# Patient Record
Sex: FEMALE | Race: WHITE | Employment: FULL TIME | URBAN - NONMETROPOLITAN AREA
[De-identification: names, ages, dates, MRNs, and addresses within clinical notes are randomized per-mention and may not be internally consistent; named-entity substitution may affect disease eponyms.]

---

## 2017-10-02 ENCOUNTER — HOSPITAL ENCOUNTER (EMERGENCY)
Age: 32
Discharge: HOME | End: 2017-10-02
Payer: COMMERCIAL

## 2017-10-02 VITALS
DIASTOLIC BLOOD PRESSURE: 79 MMHG | HEART RATE: 79 BPM | OXYGEN SATURATION: 95 % | SYSTOLIC BLOOD PRESSURE: 125 MMHG | TEMPERATURE: 98.6 F | RESPIRATION RATE: 16 BRPM | HEIGHT: 65 IN | BODY MASS INDEX: 38.32 KG/M2 | WEIGHT: 230 LBS

## 2017-10-02 DIAGNOSIS — J06.9 VIRAL URI WITH COUGH: ICD-10-CM

## 2017-10-02 DIAGNOSIS — J02.9 ACUTE PHARYNGITIS, UNSPECIFIED ETIOLOGY: Primary | ICD-10-CM

## 2017-10-02 PROCEDURE — 99213 OFFICE O/P EST LOW 20 MIN: CPT | Performed by: NURSE PRACTITIONER

## 2017-10-02 PROCEDURE — 99212 OFFICE O/P EST SF 10 MIN: CPT

## 2017-10-02 RX ORDER — BENZONATATE 200 MG/1
200 CAPSULE ORAL 3 TIMES DAILY PRN
Qty: 21 CAPSULE | Refills: 0 | Status: SHIPPED | OUTPATIENT
Start: 2017-10-02 | End: 2017-10-09

## 2017-10-02 RX ORDER — AZITHROMYCIN 250 MG/1
TABLET, FILM COATED ORAL
Qty: 6 TABLET | Refills: 0 | Status: SHIPPED | OUTPATIENT
Start: 2017-10-02 | End: 2018-01-11

## 2017-10-02 ASSESSMENT — ENCOUNTER SYMPTOMS
SORE THROAT: 1
SHORTNESS OF BREATH: 0
RHINORRHEA: 0
SINUS CONGESTION: 0
COUGH: 1
CHEST TIGHTNESS: 0
BACK PAIN: 0
VOICE CHANGE: 0
SINUS PRESSURE: 0
DIARRHEA: 0
TROUBLE SWALLOWING: 0
NAUSEA: 0
VOMITING: 0
ABDOMINAL PAIN: 0

## 2017-10-02 ASSESSMENT — PAIN DESCRIPTION - PAIN TYPE: TYPE: ACUTE PAIN

## 2017-10-02 ASSESSMENT — PAIN DESCRIPTION - LOCATION: LOCATION: THROAT

## 2017-10-02 ASSESSMENT — PAIN SCALES - GENERAL: PAINLEVEL_OUTOF10: 1

## 2017-10-02 NOTE — ED AVS SNAPSHOT
After Visit Summary  (Discharge Instructions)    Medication List for Home    Based on the information you provided to us as well as any changes during this visit, the following is your updated medication list.  Compare this with your prescription bottles at home. If you have any questions or concerns, contact your primary care physician's office. Daily Medication List (This medication list can be shared with any Healthcare provider who is helping you manage your medications)      There are NEW medications for you. START taking them after you leave the hospital     azithromycin 250 MG tablet   Commonly known as:  ZITHROMAX Z-ANDRES   2 tablets day 1 then1 tablet days 2 - 5.       benzonatate 200 MG capsule   Commonly known as:  TESSALON   Take 1 capsule by mouth 3 times daily as needed for Cough         These are medications you told us you were taking at home, CONTINUE taking them after you leave the hospital     ABILIFY 5 MG tablet   Generic drug:  ARIPiprazole   Take 5 mg by mouth daily       metoprolol tartrate 50 MG tablet   Commonly known as:  LOPRESSOR   Take 1 tablet by mouth 2 times daily       QUEtiapine 25 MG tablet   Commonly known as:  SEROQUEL   Take 25 mg by mouth as needed       WELLBUTRIN  MG extended release tablet   Generic drug:  buPROPion   Take 300 mg by mouth every morning            Where to Get Your Medications      You can get these medications from any pharmacy     Bring a paper prescription for each of these medications     azithromycin 250 MG tablet    benzonatate 200 MG capsule               Allergies as of 10/2/2017     No Known Allergies      Immunizations as of 10/2/2017     No immunizations on file. After Visit Summary    This summary was created for you. Thank you for entrusting your care to us.   The following information includes details about your hospital/visit stay along with steps you should take to help with your recovery once you leave the hospital.  In this packet, you will find information about the topics listed below:    · Instructions about your medications including a list of your home medications  · A summary of your hospital visit  · Follow-up appointments once you have left the hospital  · Your care plan at home      You may receive a survey regarding the care you received during your stay. Your input is valuable to us. We encourage you to complete and return your survey in the envelope provided. We hope you will choose us in the future for your healthcare needs. Patient Information     Patient Name SKY Patel Wooster 1985      Care Provided at:     Name Address Phone       [address] [phone]            Your Visit    Here you will find information about your visit, including the reason for your visit. Please take this sheet with you when you visit your doctor or other health care provider in the future. It will help determine the best possible medical care for you at that time. If you have any questions once you leave the hospital, please call the department phone number listed below. Diagnoses this visit     Your diagnoses were ACUTE PHARYNGITIS, UNSPECIFIED ETIOLOGY and VIRAL URI WITH COUGH. Visit Information     Date of Visit Department Dept Phone    10/2/2017 [facility] [phone]      You were seen by     You were seen by Marquis Jaye NP. Follow-up Appointments    Below is a list of your follow-up and future appointments. This may not be a complete list as you may have made appointments directly with providers that we are not aware of or your providers may have made some for you. Please call your providers to confirm appointments. It is important to keep your appointments.  Please bring your current insurance card, photo ID, co-pay, and all medication bottles to your appointment. If self-pay, payment is expected at the time of service. Follow-up Information     Follow up with Caryl Rinne, MD In 1 week. Specialty:  Family Medicine    Why:  For recheck and further evaluation and management, If symptoms worsen go to the Emergency Department    Contact information:    [address] [facility]  [phone]        Preventive Care        Date Due    HIV screening is recommended for all people regardless of risk factors  aged 15-65 years at least once (lifetime) who have never been HIV tested. 12/11/2002    Tetanus Combination Vaccine (1 - Tdap) 12/11/2006    Pneumococcal Vaccine - Pneumovax for adults aged 19-64 years with: chronic heart disease, chronic lung disease, diabetes mellitus, alcoholism, chronic liver disease, or cigarette smoking. (1 of 1 - PPSV23) 12/11/2006    Pap Smear 12/11/2008    Yearly Flu Vaccine (1) 9/1/2017                 Care Plan Once You Return Home    This section includes instructions you will need to follow once you leave the hospital.  Your care team will discuss these with you, so you and those caring for you know how to best care for your health needs at home. This section may also include educational information about certain health topics that may be of help to you. Important Information if you smoke or are exposed to smoking       SMOKING: QUIT SMOKING. THIS IS THE MOST IMPORTANT ACTION YOU CAN TAKE TO IMPROVE YOUR CURRENT AND FUTURE HEALTH. Call the [address] [place] [address] at [place] NOW ([phone])    Smoking harms nonsmokers. When nonsmokers are around people who smoke, they absorb nicotine, carbon monoxide, and other ingredients of tobacco smoke.      DO NOT SMOKE AROUND CHILDREN     Children exposed to secondhand smoke are at an increased risk of:  Sudden Infant Death Syndrome (SIDS), acute respiratory infections, inflammation of the middle ear, and severe asthma. Over a longer time, it causes heart disease and lung cancer. There is no safe level of exposure to secondhand smoke. Important information for a smoker       SMOKING: QUIT SMOKING. THIS IS THE MOST IMPORTANT ACTION YOU CAN TAKE TO IMPROVE YOUR CURRENT AND FUTURE HEALTH. Call the [address] [place] [address] [place] at [place] NOW ([phone])    Smoking harms nonsmokers. When nonsmokers are around people who smoke, they absorb nicotine, carbon monoxide, and other ingredients of tobacco smoke. DO NOT SMOKE AROUND CHILDREN     Children exposed to secondhand smoke are at an increased risk of:  Sudden Infant Death Syndrome (SIDS), acute respiratory infections, inflammation of the middle ear, and severe asthma. Over a longer time, it causes heart disease and lung cancer. There is no safe level of exposure to secondhand smoke. [employer] Signup     BoostUp allows you to send messages to your doctor, view your test results, renew your prescriptions, schedule appointments, view visit notes, and more. How Do I Sign Up? 1. In your Internet browser, go to [email or web address]  2. Click on the Sign Up Now link in the Sign In box. You will see the New Member Sign Up page. 3. Enter your BoostUp Access Code exactly as it appears below. You will not need to use this code after youve completed the sign-up process. If you do not sign up before the expiration date, you must request a new code. BoostUp Access Code: [email or web address]  Expires: 12/1/2017 10:25 AM    4. Enter your Social Security Number ([id number]) and Date of Birth (mm/dd/yyyy) as indicated and click Submit. You will be taken to the next sign-up page. 5. Create a BoostUp ID. This will be your BoostUp login ID and cannot be changed, so think of one that is secure and easy to remember. Date:____________Time:____________              Discharge Instructions            Sore Throat: Care Instructions  Your Care Instructions    Infection by bacteria or a virus causes most sore throats. Cigarette smoke, dry air, air pollution, allergies, and yelling can also cause a sore throat. Sore throats can be painful and annoying. Fortunately, most sore throats go away on their own. If you have a bacterial infection, your doctor may prescribe antibiotics. Follow-up care is a key part of your treatment and safety. Be sure to make and go to all appointments, and call your doctor if you are having problems. It's also a good idea to know your test results and keep a list of the medicines you take. How can you care for yourself at home? · If your doctor prescribed antibiotics, take them as directed. Do not stop taking them just because you feel better. You need to take the full course of antibiotics. · Gargle with warm salt water once an hour to help reduce swelling and relieve discomfort. Use 1 teaspoon of salt mixed in 1 cup of warm water. · Take an over-the-counter pain medicine, such as acetaminophen (Tylenol), ibuprofen (Advil, Motrin), or naproxen (Aleve). Read and follow all instructions on the label. · Be careful when taking over-the-counter cold or flu medicines and Tylenol at the same time. Many of these medicines have acetaminophen, which is Tylenol. Read the labels to make sure that you are not taking more than the recommended dose. Too much acetaminophen (Tylenol) can be harmful. · Drink plenty of fluids. Fluids may help soothe an irritated throat. Hot fluids, such as tea or soup, may help decrease throat pain. · Use over-the-counter throat lozenges to soothe pain. Regular cough drops or hard candy may also help. These should not be given to young children because of the risk of choking. · Do not smoke or allow others to smoke around you.  If you need help · You have a new symptom, such as a sore throat, an earache, or sinus pain. · You cough more deeply or more often, especially if you notice more mucus or a change in the color of your mucus. · You do not get better as expected. Where can you learn more? Go to [email or web address] org and sign in to your [employer] account. Enter R083 in the SocialBrowse box to learn more about \"Upper Respiratory Infection (Cold): Care Instructions. \"     If you do not have an account, please click on the \"Sign Up Now\" link. Current as of: March 25, 2017  Content Version: 11.3  © 6790-9447 SMS [employer], [employer]. Care instructions adapted under license by [facility] [address] If you have questions about a medical condition or this instruction, always ask your healthcare professional. [address] any warranty or liability for your use of this information.

## 2017-10-02 NOTE — ED PROVIDER NOTES
[facility]  Urgent Care Encounter       CHIEF COMPLAINT       Chief Complaint   Patient presents with    Pharyngitis       Nurses Notes reviewed and I agree except as noted in the HPI. HISTORY OF PRESENT ILLNESS   Jaelyn Morocho is a 36 y.o. female who presents To the urgent care center complaining of a sore throat and cough for the past month. Patient states she has been taking over-the-counter medication with no relief. Patient states that she has not had any fever, chills, vomiting or diarrhea but has been nauseated from being cough drops all day. Patient is a 36 y.o. female presenting with throat problem. The history is provided by the patient. Pharyngitis   Location:  Generalized  Quality:  Aching  Severity:  Mild  Onset quality:  Gradual  Duration:  1 month  Timing:  Constant  Progression:  Waxing and waning  Chronicity:  New  Relieved by:  Nothing  Ineffective treatments:  OTC medications  Associated symptoms: cough    Associated symptoms: no abdominal pain, no adenopathy, no chest pain, no chills, no ear discharge, no ear pain, no epistaxis, no fever, no headaches, no neck stiffness, no postnasal drip, no rash, no rhinorrhea, no shortness of breath, no sinus congestion, no trouble swallowing and no voice change    Cough:     Cough characteristics:  Non-productive    Sputum characteristics:  Nondescript    Severity:  Moderate    Onset quality:  Gradual    Duration:  2 weeks    Timing:  Intermittent    Progression:  Waxing and waning    Chronicity:  New  Risk factors: no exposure to strep        REVIEW OF SYSTEMS     Review of Systems   Constitutional: Negative for activity change, appetite change, chills, diaphoresis, fatigue and fever. HENT: Positive for sore throat. Negative for congestion, ear discharge, ear pain, nosebleeds, postnasal drip, rhinorrhea, sinus pressure, trouble swallowing and voice change. Respiratory: Positive for cough.  Negative for chest tightness

## 2017-10-02 NOTE — ED NOTES
Pt complains of having sore throat for one month. States it has gotten worse in the past week. States pain is currently 1/10, but it gets worse at night and can barely swallow.      Bard Saadia RN  10/02/17 1007

## 2018-01-11 ENCOUNTER — HOSPITAL ENCOUNTER (EMERGENCY)
Age: 33
Discharge: HOME | End: 2018-01-11
Attending: EMERGENCY MEDICINE
Payer: COMMERCIAL

## 2018-01-11 VITALS
TEMPERATURE: 98.7 F | HEART RATE: 76 BPM | OXYGEN SATURATION: 98 % | SYSTOLIC BLOOD PRESSURE: 170 MMHG | DIASTOLIC BLOOD PRESSURE: 92 MMHG | WEIGHT: 230 LBS | HEIGHT: 65 IN | RESPIRATION RATE: 16 BRPM | BODY MASS INDEX: 38.32 KG/M2

## 2018-01-11 DIAGNOSIS — R03.0 ELEVATED BLOOD PRESSURE READING WITHOUT DIAGNOSIS OF HYPERTENSION: ICD-10-CM

## 2018-01-11 DIAGNOSIS — F17.200 TOBACCO USE DISORDER: ICD-10-CM

## 2018-01-11 DIAGNOSIS — J03.90 ACUTE TONSILLITIS, UNSPECIFIED ETIOLOGY: Primary | ICD-10-CM

## 2018-01-11 DIAGNOSIS — H66.001 ACUTE SUPPURATIVE OTITIS MEDIA OF RIGHT EAR WITHOUT SPONTANEOUS RUPTURE OF TYMPANIC MEMBRANE, RECURRENCE NOT SPECIFIED: ICD-10-CM

## 2018-01-11 LAB
GROUP A STREP CULTURE, REFLEX: NEGATIVE
REFLEX THROAT C + S: NORMAL

## 2018-01-11 PROCEDURE — 87070 CULTURE OTHR SPECIMN AEROBIC: CPT

## 2018-01-11 PROCEDURE — 99213 OFFICE O/P EST LOW 20 MIN: CPT | Performed by: EMERGENCY MEDICINE

## 2018-01-11 PROCEDURE — 99213 OFFICE O/P EST LOW 20 MIN: CPT

## 2018-01-11 PROCEDURE — 87880 STREP A ASSAY W/OPTIC: CPT

## 2018-01-11 RX ORDER — PANTOPRAZOLE SODIUM 40 MG/1
40 TABLET, DELAYED RELEASE ORAL DAILY
COMMUNITY

## 2018-01-11 RX ORDER — CEFDINIR 300 MG/1
300 CAPSULE ORAL 2 TIMES DAILY
Qty: 14 CAPSULE | Refills: 0 | Status: SHIPPED | OUTPATIENT
Start: 2018-01-11 | End: 2018-01-18

## 2018-01-11 ASSESSMENT — ENCOUNTER SYMPTOMS
SHORTNESS OF BREATH: 0
FACIAL SWELLING: 0
BACK PAIN: 0
EYE PAIN: 0
CONSTIPATION: 0
WHEEZING: 0
VOICE CHANGE: 0
BLOOD IN STOOL: 0
TROUBLE SWALLOWING: 0
STRIDOR: 0
VOMITING: 0
COUGH: 0
DIARRHEA: 0
SORE THROAT: 1
SINUS PRESSURE: 0
ABDOMINAL PAIN: 0
NAUSEA: 0
CHOKING: 0
EYE REDNESS: 0
EYE DISCHARGE: 0

## 2018-01-11 ASSESSMENT — PAIN SCALES - GENERAL: PAINLEVEL_OUTOF10: 10

## 2018-01-11 ASSESSMENT — PAIN DESCRIPTION - LOCATION: LOCATION: THROAT

## 2018-01-11 ASSESSMENT — PAIN DESCRIPTION - PAIN TYPE: TYPE: ACUTE PAIN

## 2018-01-11 NOTE — ED TRIAGE NOTES
Yary Barclay arrives ambulatory by self to room with complaint of sore throat. Throat is red and tonsils are moderatllly swollenSymptoms started 3 weeks   ago.

## 2018-01-11 NOTE — LETTER
[address] [place] [address] Urgent Care  [address] [place], [place] [address] [address] [address]  Phone: [phone]               January 11, 2018    Patient: Silvia Snow   YOB: 1985   Date of Visit: 1/11/2018       To Whom It May Concern:    Yasmeen Corcoran was seen and treated in our emergency department on 1/11/2018. She may return to work on 1/15/18. No work January 11, January 13 and January 14, 2018.        Sincerely,       Will Adrian MD         Signature:__________________________________

## 2018-01-12 NOTE — ED PROVIDER NOTES
[facility]  Urgent Care Encounter      CHIEF COMPLAINT       Chief Complaint   Patient presents with    Pharyngitis       Nurses Notes reviewed and I agree except as noted in the HPI. HISTORY OF PRESENT ILLNESS   Latasha Bhatia is a 29 y.o. female who presents With three-week history of increasingly severe sore throat, ear pain, large erythematous tonsils, painful glands in the neck, congestion, dry cough, fatigue. She rates throat pain at 10 out of 10 severity. No chest pain, shortness of breath, fever, vomiting, abdominal pain, dizziness, syncope, rash,  symptoms. Has tonsils, no history of diabetes or asthma. Smokes cigarettes. REVIEW OF SYSTEMS     Review of Systems   Constitutional: Positive for appetite change. Negative for chills, fatigue, fever and unexpected weight change. HENT: Positive for ear pain, postnasal drip and sore throat. Negative for congestion, ear discharge, facial swelling, hearing loss, nosebleeds, sinus pressure, trouble swallowing and voice change. Eyes: Negative for pain, discharge, redness and visual disturbance. Respiratory: Negative for cough, choking, shortness of breath, wheezing and stridor. Cardiovascular: Negative for chest pain and leg swelling. Gastrointestinal: Negative for abdominal pain, blood in stool, constipation, diarrhea, nausea and vomiting. Genitourinary: Negative for dysuria, flank pain, frequency, hematuria, urgency, vaginal bleeding and vaginal discharge. Musculoskeletal: Negative for arthralgias, back pain, neck pain and neck stiffness. Skin: Negative for rash. Neurological: Negative for dizziness, seizures, syncope, weakness, light-headedness and headaches. Hematological: Positive for adenopathy. Does not bruise/bleed easily. Psychiatric/Behavioral: Negative for confusion, sleep disturbance and suicidal ideas. The patient is not nervous/anxious. All other systems reviewed and are negative.       PAST MEDICAL HISTORY         Diagnosis Date    Anxiety     Bipolar 1 disorder ([facility] [address]     Depression     Hypertension     Tachycardia        SURGICAL HISTORY     Patient  has a past surgical history that includes Tonsillectomy; hernia repair; and Cholecystectomy. CURRENT MEDICATIONS       Discharge Medication List as of 1/11/2018  7:26 PM      CONTINUE these medications which have NOT CHANGED    Details   pantoprazole (PROTONIX) 40 MG tablet Take 40 mg by mouth dailyHistorical Med      metoprolol (LOPRESSOR) 50 MG tablet Take 1 tablet by mouth 2 times daily, Disp-50 tablet, R-0      buPROPion (WELLBUTRIN XL) 300 MG extended release tablet Take 300 mg by mouth every morning Historical Med      ARIPiprazole (ABILIFY) 5 MG tablet Take 5 mg by mouth daily Historical Med      QUEtiapine (SEROQUEL) 25 MG tablet Take 25 mg by mouth as needed Historical Med             ALLERGIES     Patient is has No Known Allergies. FAMILY HISTORY     Patient's family history includes Cancer in her mother; Depression in her father and mother; High Cholesterol in her mother. SOCIAL HISTORY     Patient  reports that she has been smoking Cigarettes. She has been smoking about 0.50 packs per day. She has never used smokeless tobacco. She reports that she does not drink alcohol or use drugs. PHYSICAL EXAM     ED TRIAGE VITALS  BP: (!) 170/92, Temp: 98.7 °F (37.1 °C), Pulse: 76, Resp: 16, SpO2: 98 %  Physical Exam   Constitutional: She is oriented to person, place, and time. She appears well-developed and well-nourished. No distress. Moist membranes, normal airway, no stridor or trismus   HENT:   Head: Normocephalic and atraumatic. Right Ear: External ear normal. Tympanic membrane is erythematous and retracted. Left Ear: Tympanic membrane and external ear normal.   Nose: Nose normal. No rhinorrhea. Right sinus exhibits no maxillary sinus tenderness and no frontal sinus tenderness.  Left sinus exhibits no maxillary sinus capsule Take 1 capsule by mouth 2 times daily for 7 days, Disp-14 capsule, R-0Print           Discharge Medication List as of 1/11/2018  7:26 PM          MD Nadia Melara MD  01/11/18 4764

## 2018-01-13 LAB — THROAT/NOSE CULTURE: NORMAL

## 2018-01-27 ENCOUNTER — HOSPITAL ENCOUNTER (EMERGENCY)
Age: 33
Discharge: HOME | End: 2018-01-27
Payer: COMMERCIAL

## 2018-01-27 VITALS
DIASTOLIC BLOOD PRESSURE: 81 MMHG | TEMPERATURE: 98.7 F | HEIGHT: 64 IN | RESPIRATION RATE: 16 BRPM | WEIGHT: 230 LBS | OXYGEN SATURATION: 95 % | SYSTOLIC BLOOD PRESSURE: 118 MMHG | HEART RATE: 83 BPM | BODY MASS INDEX: 39.27 KG/M2

## 2018-01-27 DIAGNOSIS — H65.02 ACUTE SEROUS OTITIS MEDIA OF LEFT EAR, RECURRENCE NOT SPECIFIED: Primary | ICD-10-CM

## 2018-01-27 DIAGNOSIS — I10 BENIGN ESSENTIAL HTN: ICD-10-CM

## 2018-01-27 PROCEDURE — 99212 OFFICE O/P EST SF 10 MIN: CPT

## 2018-01-27 PROCEDURE — 99213 OFFICE O/P EST LOW 20 MIN: CPT | Performed by: NURSE PRACTITIONER

## 2018-01-27 RX ORDER — FLUTICASONE PROPIONATE 50 MCG
1 SPRAY, SUSPENSION (ML) NASAL DAILY
Qty: 1 BOTTLE | Refills: 0 | Status: SHIPPED | OUTPATIENT
Start: 2018-01-27

## 2018-01-27 RX ORDER — CETIRIZINE HYDROCHLORIDE 10 MG/1
10 TABLET ORAL DAILY
Qty: 30 TABLET | Refills: 0 | Status: SHIPPED | OUTPATIENT
Start: 2018-01-27

## 2018-01-27 ASSESSMENT — PAIN SCALES - GENERAL: PAINLEVEL_OUTOF10: 3

## 2018-01-27 ASSESSMENT — ENCOUNTER SYMPTOMS
SINUS PAIN: 0
NAUSEA: 1
VOMITING: 0
COUGH: 1
SINUS PRESSURE: 1
CHEST TIGHTNESS: 0
SHORTNESS OF BREATH: 0
RHINORRHEA: 0
SORE THROAT: 0
DIARRHEA: 1

## 2018-01-27 ASSESSMENT — PAIN DESCRIPTION - ORIENTATION: ORIENTATION: LEFT

## 2018-01-27 ASSESSMENT — PAIN DESCRIPTION - DESCRIPTORS: DESCRIPTORS: ACHING

## 2018-01-27 ASSESSMENT — PAIN DESCRIPTION - PAIN TYPE: TYPE: ACUTE PAIN

## 2018-01-27 ASSESSMENT — PAIN DESCRIPTION - LOCATION: LOCATION: EAR

## 2018-01-27 NOTE — ED TRIAGE NOTES
Pt complains she thinks she has an upper respiratory infection. States she began to have multiple symptoms 2-3 days ago and they are not getting better.

## 2018-02-08 ENCOUNTER — HOSPITAL ENCOUNTER (EMERGENCY)
Age: 33
Discharge: HOME | End: 2018-02-08
Payer: COMMERCIAL

## 2018-02-08 VITALS
OXYGEN SATURATION: 98 % | DIASTOLIC BLOOD PRESSURE: 90 MMHG | RESPIRATION RATE: 18 BRPM | WEIGHT: 230 LBS | SYSTOLIC BLOOD PRESSURE: 128 MMHG | TEMPERATURE: 97.8 F | HEIGHT: 65 IN | BODY MASS INDEX: 38.32 KG/M2 | HEART RATE: 84 BPM

## 2018-02-08 DIAGNOSIS — R11.2 NON-INTRACTABLE VOMITING WITH NAUSEA, UNSPECIFIED VOMITING TYPE: ICD-10-CM

## 2018-02-08 DIAGNOSIS — H83.09 LABYRINTHITIS, UNSPECIFIED LATERALITY: Primary | ICD-10-CM

## 2018-02-08 PROCEDURE — 99213 OFFICE O/P EST LOW 20 MIN: CPT | Performed by: NURSE PRACTITIONER

## 2018-02-08 PROCEDURE — 6370000000 HC RX 637 (ALT 250 FOR IP): Performed by: NURSE PRACTITIONER

## 2018-02-08 PROCEDURE — 6360000002 HC RX W HCPCS

## 2018-02-08 PROCEDURE — 99212 OFFICE O/P EST SF 10 MIN: CPT

## 2018-02-08 RX ORDER — ONDANSETRON 4 MG/1
4 TABLET, ORALLY DISINTEGRATING ORAL ONCE
Status: COMPLETED | OUTPATIENT
Start: 2018-02-08 | End: 2018-02-08

## 2018-02-08 RX ORDER — ONDANSETRON 4 MG/1
4 TABLET, ORALLY DISINTEGRATING ORAL EVERY 8 HOURS PRN
Qty: 15 TABLET | Refills: 0 | Status: SHIPPED | OUTPATIENT
Start: 2018-02-08 | End: 2018-02-13

## 2018-02-08 RX ORDER — ONDANSETRON 4 MG/1
TABLET, ORALLY DISINTEGRATING ORAL
Status: COMPLETED
Start: 2018-02-08 | End: 2018-02-08

## 2018-02-08 RX ORDER — PREDNISONE 10 MG/1
TABLET ORAL
Qty: 41 TABLET | Refills: 0 | Status: SHIPPED | OUTPATIENT
Start: 2018-02-08

## 2018-02-08 RX ORDER — AMOXICILLIN AND CLAVULANATE POTASSIUM 875; 125 MG/1; MG/1
1 TABLET, FILM COATED ORAL 2 TIMES DAILY
COMMUNITY

## 2018-02-08 RX ORDER — MECLIZINE HYDROCHLORIDE CHEWABLE TABLETS 25 MG/1
25 TABLET, CHEWABLE ORAL ONCE
Status: COMPLETED | OUTPATIENT
Start: 2018-02-08 | End: 2018-02-08

## 2018-02-08 RX ORDER — MECLIZINE HYDROCHLORIDE 25 MG/1
25 TABLET ORAL 3 TIMES DAILY PRN
Qty: 15 TABLET | Refills: 0 | Status: SHIPPED | OUTPATIENT
Start: 2018-02-08 | End: 2018-02-13

## 2018-02-08 RX ADMIN — ONDANSETRON 4 MG: 4 TABLET, ORALLY DISINTEGRATING ORAL at 13:04

## 2018-02-08 RX ADMIN — MECLIZINE HCL 25 MG: 25 TABLET, CHEWABLE ORAL at 12:57

## 2018-02-08 ASSESSMENT — ENCOUNTER SYMPTOMS
ABDOMINAL PAIN: 0
DIARRHEA: 1
SHORTNESS OF BREATH: 0
CHEST TIGHTNESS: 0
VOMITING: 1
SINUS PRESSURE: 0
SORE THROAT: 0
NAUSEA: 1
COUGH: 0

## 2018-02-08 NOTE — ED TRIAGE NOTES
Patient ambulated to room with c/o dizziness, nausea, and vomiting beginning this morning at 1100, upon waking. Continues on previously prescribed oral antibiotics. Requests work excuse.

## 2018-02-08 NOTE — ED PROVIDER NOTES
[facility]  Urgent Care Encounter       CHIEF COMPLAINT       Chief Complaint   Patient presents with    Nausea     Vomiting, dizziness       Nurses Notes reviewed and I agree except as noted in the HPI. HISTORY OF PRESENT ILLNESS   Renetta De Santiago is a 29 y.o. female who presents With dizziness, spinning sensation, and nausea and vomiting that started this morning. She also had one episode of diarrhea this morning. She was treated for left ear infection and tonsillitis in the beginning of January. She returned at the end of January with complaints of left ear pain and was treated for serous otitis media with Claritin and Flonase. She currently has no ear pain but she does feel like her head is congested. She denies fever chills and body aches. She states her appetite is good despite the nausea and vomiting. She denies any upper respiratory infection symptoms. The history is provided by the patient. No  was used. REVIEW OF SYSTEMS     Review of Systems   Constitutional: Negative for activity change, appetite change, chills, fatigue and fever. HENT: Positive for congestion (Head). Negative for ear pain, sinus pressure and sore throat. Respiratory: Negative for cough, chest tightness and shortness of breath. Cardiovascular: Negative for chest pain. Gastrointestinal: Positive for diarrhea (Feels is related to antibiotic use), nausea and vomiting. Negative for abdominal pain. Musculoskeletal: Negative for myalgias. Neurological: Positive for dizziness (Spinning sensation). Negative for headaches. PAST MEDICAL HISTORY         Diagnosis Date    Anxiety     Bipolar 1 disorder ([facility] [address]     Depression     Hypertension     Tachycardia        SURGICAL HISTORY     Patient  has a past surgical history that includes Tonsillectomy; hernia repair; and Cholecystectomy.     CURRENT MEDICATIONS       Previous Medications    AMOXICILLIN-CLAVULANATE

## 2018-02-26 ENCOUNTER — HOSPITAL ENCOUNTER (EMERGENCY)
Facility: HOSPITAL | Age: 33
Discharge: HOME | End: 2018-02-27
Admitting: FAMILY MEDICINE

## 2018-02-26 DIAGNOSIS — Z20.5 EXPOSURE TO HEPATITIS: Primary | ICD-10-CM

## 2018-02-26 PROCEDURE — 80074 ACUTE HEPATITIS PANEL: CPT | Performed by: NURSE PRACTITIONER

## 2018-02-26 PROCEDURE — 99283 EMERGENCY DEPT VISIT LOW MDM: CPT

## 2018-02-27 VITALS
OXYGEN SATURATION: 100 % | HEIGHT: 65 IN | HEART RATE: 85 BPM | RESPIRATION RATE: 18 BRPM | SYSTOLIC BLOOD PRESSURE: 128 MMHG | TEMPERATURE: 98 F | DIASTOLIC BLOOD PRESSURE: 78 MMHG | BODY MASS INDEX: 35.82 KG/M2 | WEIGHT: 215 LBS

## 2018-02-27 LAB
HAV IGM SERPL QL IA: NORMAL
HBV CORE IGM SERPL QL IA: NORMAL
HBV SURFACE AG SERPL QL IA: NORMAL
HCV AB SER DONR QL: NORMAL

## 2018-10-09 ENCOUNTER — NURSE TRIAGE (OUTPATIENT)
Dept: CALL CENTER | Facility: HOSPITAL | Age: 33
End: 2018-10-09

## 2018-10-10 NOTE — TELEPHONE ENCOUNTER
"Caller asking if she can soak her finger in Epsom salts after a cat bite. Advised caller she can do this as long as she continues to take her prescribed antibiotic and pain medication as directed by her PCP.     Reason for Disposition  • Health Information question, no triage required and triager able to answer question    Additional Information  • Negative: [1] Caller is not with the adult (patient) AND [2] reporting urgent symptoms  • Negative: Lab result questions  • Negative: Medication questions  • Negative: Caller cannot be reached by phone  • Negative: Caller has already spoken to PCP or another triager  • Negative: RN needs further essential information from caller in order to complete triage  • Negative: Requesting regular office appointment  • Negative: [1] Caller requesting NON-URGENT health information AND [2] PCP's office is the best resource    Answer Assessment - Initial Assessment Questions  1. REASON FOR CALL or QUESTION: \"What is your reason for calling today?\" or \"How can I best help you?\" or \"What question do you have that I can help answer?\"      Can I soak a cat bite in Epson salts?    Protocols used: INFORMATION ONLY CALL-ADULT-      "

## 2018-10-29 ENCOUNTER — LAB (OUTPATIENT)
Dept: LAB | Facility: HOSPITAL | Age: 33
End: 2018-10-29

## 2018-10-29 ENCOUNTER — TRANSCRIBE ORDERS (OUTPATIENT)
Dept: ADMINISTRATIVE | Facility: HOSPITAL | Age: 33
End: 2018-10-29

## 2018-10-29 DIAGNOSIS — L68.0 HIRSUTIES: ICD-10-CM

## 2018-10-29 DIAGNOSIS — I10 ESSENTIAL HYPERTENSION, MALIGNANT: ICD-10-CM

## 2018-10-29 DIAGNOSIS — I10 ESSENTIAL HYPERTENSION, MALIGNANT: Primary | ICD-10-CM

## 2018-10-29 DIAGNOSIS — E66.9 LIFELONG OBESITY: ICD-10-CM

## 2018-10-29 LAB
ALBUMIN SERPL-MCNC: 4.8 G/DL (ref 3.5–5)
ANION GAP SERPL CALCULATED.3IONS-SCNC: 6.8 MMOL/L (ref 3.6–11.2)
BUN BLD-MCNC: 8 MG/DL (ref 7–21)
BUN/CREAT SERPL: 12.9 (ref 7–25)
CALCIUM SPEC-SCNC: 9.8 MG/DL (ref 7.7–10)
CHLORIDE SERPL-SCNC: 105 MMOL/L (ref 99–112)
CO2 SERPL-SCNC: 24.2 MMOL/L (ref 24.3–31.9)
CREAT BLD-MCNC: 0.62 MG/DL (ref 0.43–1.29)
GFR SERPL CREATININE-BSD FRML MDRD: 113 ML/MIN/1.73
GLUCOSE BLD-MCNC: 129 MG/DL (ref 70–110)
PHOSPHATE SERPL-MCNC: 2.7 MG/DL (ref 2.7–4.5)
POTASSIUM BLD-SCNC: 4.3 MMOL/L (ref 3.5–5.3)
SODIUM BLD-SCNC: 136 MMOL/L (ref 135–153)

## 2018-10-29 PROCEDURE — 80069 RENAL FUNCTION PANEL: CPT

## 2018-10-29 PROCEDURE — 82627 DEHYDROEPIANDROSTERONE: CPT

## 2018-10-29 PROCEDURE — 36415 COLL VENOUS BLD VENIPUNCTURE: CPT

## 2018-10-29 PROCEDURE — 83835 ASSAY OF METANEPHRINES: CPT

## 2018-10-29 PROCEDURE — 83498 ASY HYDROXYPROGESTERONE 17-D: CPT

## 2018-10-29 PROCEDURE — 84146 ASSAY OF PROLACTIN: CPT

## 2018-10-29 PROCEDURE — 84402 ASSAY OF FREE TESTOSTERONE: CPT

## 2018-10-29 PROCEDURE — 84403 ASSAY OF TOTAL TESTOSTERONE: CPT

## 2018-10-30 LAB
DHEA-S SERPL-MCNC: 357.7 UG/DL (ref 84.8–378)
PROLACTIN SERPL-MCNC: 7.7 NG/ML (ref 4.8–23.3)

## 2018-10-31 ENCOUNTER — LAB (OUTPATIENT)
Dept: LAB | Facility: HOSPITAL | Age: 33
End: 2018-10-31

## 2018-10-31 ENCOUNTER — TRANSCRIBE ORDERS (OUTPATIENT)
Dept: ADMINISTRATIVE | Facility: HOSPITAL | Age: 33
End: 2018-10-31

## 2018-10-31 DIAGNOSIS — E66.9 OBESITY, UNSPECIFIED CLASSIFICATION, UNSPECIFIED OBESITY TYPE, UNSPECIFIED WHETHER SERIOUS COMORBIDITY PRESENT: Primary | ICD-10-CM

## 2018-10-31 DIAGNOSIS — E66.9 OBESITY, UNSPECIFIED CLASSIFICATION, UNSPECIFIED OBESITY TYPE, UNSPECIFIED WHETHER SERIOUS COMORBIDITY PRESENT: ICD-10-CM

## 2018-10-31 PROCEDURE — 82533 TOTAL CORTISOL: CPT

## 2018-11-01 LAB
METANEPHRINE, PL: <10 PG/ML (ref 0–62)
NORMETANEPHRINE, PL: 41 PG/ML (ref 0–145)
TESTOST FREE MFR SERPL: 2.96 % (ref 0.5–2.8)
TESTOST FREE SERPL-MCNC: 0.36 NG/DL (ref 0.1–0.85)
TESTOST SERPL-MCNC: 12 NG/DL (ref 10–55)

## 2018-11-08 LAB
17OHP SERPL-MCNC: <10 NG/DL
CORTIS SAL-MCNC: <0.01 UG/DL
SALIVARY CORTISOL #2: 0.01 UG/DL

## 2019-01-29 ENCOUNTER — OFFICE VISIT (OUTPATIENT)
Dept: PSYCHIATRY | Facility: CLINIC | Age: 34
End: 2019-01-29

## 2019-01-29 VITALS
BODY MASS INDEX: 39.12 KG/M2 | SYSTOLIC BLOOD PRESSURE: 119 MMHG | HEIGHT: 65 IN | HEART RATE: 91 BPM | DIASTOLIC BLOOD PRESSURE: 85 MMHG | WEIGHT: 234.8 LBS

## 2019-01-29 DIAGNOSIS — F31.30 BIPOLAR I DISORDER, MOST RECENT EPISODE DEPRESSED (HCC): Primary | ICD-10-CM

## 2019-01-29 DIAGNOSIS — Z79.899 MEDICATION MANAGEMENT: ICD-10-CM

## 2019-01-29 LAB
AMPHETAMINE CUT-OFF: NORMAL
BENZODIAZIPINE CUT-OFF: NORMAL
BUPRENORPHINE CUT-OFF: NORMAL
COCAINE CUT-OFF: NORMAL
EXTERNAL AMPHETAMINE SCREEN URINE: NEGATIVE
EXTERNAL BENZODIAZEPINE SCREEN URINE: NEGATIVE
EXTERNAL BUPRENORPHINE SCREEN URINE: NEGATIVE
EXTERNAL COCAINE SCREEN URINE: NEGATIVE
EXTERNAL MDMA: NEGATIVE
EXTERNAL METHADONE SCREEN URINE: NEGATIVE
EXTERNAL METHAMPHETAMINE SCREEN URINE: NEGATIVE
EXTERNAL OPIATES SCREEN URINE: NEGATIVE
EXTERNAL OXYCODONE SCREEN URINE: NEGATIVE
EXTERNAL THC SCREEN URINE: NEGATIVE
MDMA CUT-OFF: NORMAL
METHADONE CUT-OFF: NORMAL
METHAMPHETAMINE CUT-OFF: NORMAL
OPIATES CUT-OFF: NORMAL
OXYCODONE CUT-OFF: NORMAL
THC CUT-OFF: NORMAL

## 2019-01-29 PROCEDURE — 90792 PSYCH DIAG EVAL W/MED SRVCS: CPT | Performed by: NURSE PRACTITIONER

## 2019-01-29 RX ORDER — SPIRONOLACTONE 100 MG/1
TABLET, FILM COATED ORAL
Refills: 5 | COMMUNITY
Start: 2019-01-09 | End: 2020-02-27 | Stop reason: SDUPTHER

## 2019-01-29 RX ORDER — QUETIAPINE FUMARATE 50 MG/1
50 TABLET, FILM COATED ORAL
Refills: 1 | COMMUNITY
Start: 2019-01-15 | End: 2019-04-09 | Stop reason: SDUPTHER

## 2019-01-29 RX ORDER — FOLIC ACID 1 MG/1
1000 TABLET ORAL DAILY
Refills: 2 | COMMUNITY
Start: 2019-01-23 | End: 2019-05-02

## 2019-01-29 RX ORDER — LURASIDONE HYDROCHLORIDE 60 MG/1
60 TABLET, FILM COATED ORAL DAILY
Qty: 30 TABLET | Refills: 1 | Status: SHIPPED | OUTPATIENT
Start: 2019-01-29 | End: 2019-04-09 | Stop reason: SDUPTHER

## 2019-01-29 RX ORDER — PANTOPRAZOLE SODIUM 40 MG/1
40 TABLET, DELAYED RELEASE ORAL DAILY
Refills: 3 | COMMUNITY
Start: 2019-01-17 | End: 2019-05-21 | Stop reason: SDUPTHER

## 2019-01-29 RX ORDER — BUSPIRONE HYDROCHLORIDE 10 MG/1
10 TABLET ORAL 2 TIMES DAILY PRN
Refills: 1 | COMMUNITY
Start: 2019-01-23 | End: 2019-04-09 | Stop reason: SDUPTHER

## 2019-01-29 RX ORDER — METOPROLOL TARTRATE 50 MG/1
TABLET, FILM COATED ORAL
Refills: 3 | COMMUNITY
Start: 2019-01-09 | End: 2019-03-12

## 2019-01-29 RX ORDER — ERGOCALCIFEROL 1.25 MG/1
CAPSULE ORAL
Refills: 0 | COMMUNITY
Start: 2018-12-11 | End: 2019-03-26 | Stop reason: SDUPTHER

## 2019-01-29 RX ORDER — LURASIDONE HYDROCHLORIDE 60 MG/1
60 TABLET, FILM COATED ORAL DAILY
Refills: 1 | COMMUNITY
Start: 2018-11-14 | End: 2019-01-29 | Stop reason: SDUPTHER
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
## 2019-01-31 ENCOUNTER — PRIOR AUTHORIZATION (OUTPATIENT)
Dept: PSYCHIATRY | Facility: CLINIC | Age: 34
End: 2019-01-31

## 2019-01-31 ENCOUNTER — TELEPHONE (OUTPATIENT)
Dept: PSYCHIATRY | Facility: CLINIC | Age: 34
End: 2019-01-31

## 2019-02-13 ENCOUNTER — APPOINTMENT (OUTPATIENT)
Dept: CT IMAGING | Facility: HOSPITAL | Age: 34
End: 2019-02-13

## 2019-02-13 ENCOUNTER — OFFICE VISIT (OUTPATIENT)
Dept: RETAIL CLINIC | Facility: CLINIC | Age: 34
End: 2019-02-13

## 2019-02-13 ENCOUNTER — HOSPITAL ENCOUNTER (EMERGENCY)
Facility: HOSPITAL | Age: 34
Discharge: HOME | End: 2019-02-13
Attending: FAMILY MEDICINE | Admitting: FAMILY MEDICINE

## 2019-02-13 VITALS
TEMPERATURE: 98.5 F | BODY MASS INDEX: 39.61 KG/M2 | DIASTOLIC BLOOD PRESSURE: 84 MMHG | RESPIRATION RATE: 18 BRPM | WEIGHT: 238 LBS | OXYGEN SATURATION: 98 % | SYSTOLIC BLOOD PRESSURE: 122 MMHG | HEART RATE: 113 BPM

## 2019-02-13 VITALS
HEART RATE: 115 BPM | DIASTOLIC BLOOD PRESSURE: 106 MMHG | HEIGHT: 65 IN | BODY MASS INDEX: 38.32 KG/M2 | RESPIRATION RATE: 18 BRPM | SYSTOLIC BLOOD PRESSURE: 147 MMHG | TEMPERATURE: 98 F | WEIGHT: 230 LBS | OXYGEN SATURATION: 98 %

## 2019-02-13 DIAGNOSIS — R06.02 SHORTNESS OF BREATH: Primary | ICD-10-CM

## 2019-02-13 DIAGNOSIS — Z87.898 HISTORY OF TACHYCARDIA: ICD-10-CM

## 2019-02-13 DIAGNOSIS — R00.0 TACHYCARDIA: ICD-10-CM

## 2019-02-13 DIAGNOSIS — J02.0 STREP PHARYNGITIS: Primary | ICD-10-CM

## 2019-02-13 DIAGNOSIS — R00.0 SINUS TACHYCARDIA: ICD-10-CM

## 2019-02-13 LAB
ALBUMIN SERPL-MCNC: 4.8 G/DL (ref 3.5–5)
ALBUMIN/GLOB SERPL: 1.5 G/DL (ref 1.5–2.5)
ALP SERPL-CCNC: 80 U/L (ref 35–104)
ALT SERPL W P-5'-P-CCNC: 43 U/L (ref 10–36)
ANION GAP SERPL CALCULATED.3IONS-SCNC: 9.1 MMOL/L (ref 3.6–11.2)
APTT PPP: 34.2 SECONDS (ref 23.8–36.1)
AST SERPL-CCNC: 52 U/L (ref 10–30)
B-HCG UR QL: NEGATIVE
BASOPHILS # BLD AUTO: 0.06 10*3/MM3 (ref 0–0.3)
BASOPHILS NFR BLD AUTO: 0.5 % (ref 0–2)
BILIRUB SERPL-MCNC: 0.2 MG/DL (ref 0.2–1.8)
BILIRUB UR QL STRIP: NEGATIVE
BNP SERPL-MCNC: 2 PG/ML (ref 0–100)
BUN BLD-MCNC: 7 MG/DL (ref 7–21)
BUN/CREAT SERPL: 10.6 (ref 7–25)
CALCIUM SPEC-SCNC: 9.8 MG/DL (ref 7.7–10)
CHLORIDE SERPL-SCNC: 102 MMOL/L (ref 99–112)
CLARITY UR: CLEAR
CO2 SERPL-SCNC: 26.9 MMOL/L (ref 24.3–31.9)
COLOR UR: YELLOW
CREAT BLD-MCNC: 0.66 MG/DL (ref 0.43–1.29)
CRP SERPL-MCNC: 2.39 MG/DL (ref 0–0.99)
D DIMER PPP FEU-MCNC: <0.27 MCGFEU/ML (ref 0–0.5)
DEPRECATED RDW RBC AUTO: 44.5 FL (ref 37–54)
EOSINOPHIL # BLD AUTO: 0.33 10*3/MM3 (ref 0–0.7)
EOSINOPHIL NFR BLD AUTO: 2.7 % (ref 0–5)
ERYTHROCYTE [DISTWIDTH] IN BLOOD BY AUTOMATED COUNT: 14.3 % (ref 11.5–14.5)
EXPIRATION DATE: ABNORMAL
GFR SERPL CREATININE-BSD FRML MDRD: 104 ML/MIN/1.73
GLOBULIN UR ELPH-MCNC: 3.1 GM/DL
GLUCOSE BLD-MCNC: 104 MG/DL (ref 70–110)
GLUCOSE UR STRIP-MCNC: NEGATIVE MG/DL
HCT VFR BLD AUTO: 48 % (ref 37–47)
HGB BLD-MCNC: 15.6 G/DL (ref 12–16)
HGB UR QL STRIP.AUTO: NEGATIVE
IMM GRANULOCYTES # BLD AUTO: 0.1 10*3/MM3 (ref 0–0.03)
IMM GRANULOCYTES NFR BLD AUTO: 0.8 % (ref 0–0.5)
INR PPP: 0.88 (ref 0.9–1.1)
INTERNAL CONTROL: ABNORMAL
KETONES UR QL STRIP: NEGATIVE
LEUKOCYTE ESTERASE UR QL STRIP.AUTO: NEGATIVE
LYMPHOCYTES # BLD AUTO: 3.07 10*3/MM3 (ref 1–3)
LYMPHOCYTES NFR BLD AUTO: 25.4 % (ref 21–51)
Lab: ABNORMAL
MCH RBC QN AUTO: 27.8 PG (ref 27–33)
MCHC RBC AUTO-ENTMCNC: 32.5 G/DL (ref 33–37)
MCV RBC AUTO: 85.6 FL (ref 80–94)
MONOCYTES # BLD AUTO: 0.8 10*3/MM3 (ref 0.1–0.9)
MONOCYTES NFR BLD AUTO: 6.6 % (ref 0–10)
NEUTROPHILS # BLD AUTO: 7.75 10*3/MM3 (ref 1.4–6.5)
NEUTROPHILS NFR BLD AUTO: 64 % (ref 30–70)
NITRITE UR QL STRIP: NEGATIVE
OSMOLALITY SERPL CALC.SUM OF ELEC: 274 MOSM/KG (ref 273–305)
PH UR STRIP.AUTO: 8 [PH] (ref 5–8)
PLATELET # BLD AUTO: 373 10*3/MM3 (ref 130–400)
PMV BLD AUTO: 9.2 FL (ref 6–10)
POTASSIUM BLD-SCNC: 3.9 MMOL/L (ref 3.5–5.3)
PROT SERPL-MCNC: 7.9 G/DL (ref 6–8)
PROT UR QL STRIP: NEGATIVE
PROTHROMBIN TIME: 12.1 SECONDS (ref 11–15.4)
RBC # BLD AUTO: 5.61 10*6/MM3 (ref 4.2–5.4)
S PYO AG THROAT QL: NEGATIVE
S PYO AG THROAT QL: POSITIVE
SODIUM BLD-SCNC: 138 MMOL/L (ref 135–153)
SP GR UR STRIP: 1.01 (ref 1–1.03)
TROPONIN I SERPL-MCNC: <0.006 NG/ML
UROBILINOGEN UR QL STRIP: NORMAL
WBC NRBC COR # BLD: 12.11 10*3/MM3 (ref 4.5–12.5)

## 2019-02-13 PROCEDURE — 93005 ELECTROCARDIOGRAM TRACING: CPT | Performed by: FAMILY MEDICINE

## 2019-02-13 PROCEDURE — 0 IOPAMIDOL PER 1 ML: Performed by: FAMILY MEDICINE

## 2019-02-13 PROCEDURE — 86140 C-REACTIVE PROTEIN: CPT | Performed by: FAMILY MEDICINE

## 2019-02-13 PROCEDURE — 96361 HYDRATE IV INFUSION ADD-ON: CPT

## 2019-02-13 PROCEDURE — 84484 ASSAY OF TROPONIN QUANT: CPT | Performed by: FAMILY MEDICINE

## 2019-02-13 PROCEDURE — 87880 STREP A ASSAY W/OPTIC: CPT | Performed by: NURSE PRACTITIONER

## 2019-02-13 PROCEDURE — 87081 CULTURE SCREEN ONLY: CPT | Performed by: FAMILY MEDICINE

## 2019-02-13 PROCEDURE — 99203 OFFICE O/P NEW LOW 30 MIN: CPT | Performed by: NURSE PRACTITIONER

## 2019-02-13 PROCEDURE — 83880 ASSAY OF NATRIURETIC PEPTIDE: CPT | Performed by: FAMILY MEDICINE

## 2019-02-13 PROCEDURE — 80053 COMPREHEN METABOLIC PANEL: CPT | Performed by: FAMILY MEDICINE

## 2019-02-13 PROCEDURE — 81003 URINALYSIS AUTO W/O SCOPE: CPT | Performed by: FAMILY MEDICINE

## 2019-02-13 PROCEDURE — 85730 THROMBOPLASTIN TIME PARTIAL: CPT | Performed by: FAMILY MEDICINE

## 2019-02-13 PROCEDURE — 81025 URINE PREGNANCY TEST: CPT | Performed by: FAMILY MEDICINE

## 2019-02-13 PROCEDURE — 93010 ELECTROCARDIOGRAM REPORT: CPT | Performed by: INTERNAL MEDICINE

## 2019-02-13 PROCEDURE — 85610 PROTHROMBIN TIME: CPT | Performed by: FAMILY MEDICINE

## 2019-02-13 PROCEDURE — 71275 CT ANGIOGRAPHY CHEST: CPT

## 2019-02-13 PROCEDURE — 87880 STREP A ASSAY W/OPTIC: CPT | Performed by: FAMILY MEDICINE

## 2019-02-13 PROCEDURE — 99284 EMERGENCY DEPT VISIT MOD MDM: CPT

## 2019-02-13 PROCEDURE — 96360 HYDRATION IV INFUSION INIT: CPT

## 2019-02-13 PROCEDURE — 85379 FIBRIN DEGRADATION QUANT: CPT | Performed by: FAMILY MEDICINE

## 2019-02-13 PROCEDURE — 85025 COMPLETE CBC W/AUTO DIFF WBC: CPT | Performed by: FAMILY MEDICINE

## 2019-02-13 PROCEDURE — 71275 CT ANGIOGRAPHY CHEST: CPT | Performed by: RADIOLOGY

## 2019-02-13 RX ORDER — SODIUM CHLORIDE 0.9 % (FLUSH) 0.9 %
10 SYRINGE (ML) INJECTION AS NEEDED
Status: DISCONTINUED | OUTPATIENT
Start: 2019-02-13 | End: 2019-02-14 | Stop reason: HOSPADM

## 2019-02-13 RX ORDER — SODIUM CHLORIDE 9 MG/ML
125 INJECTION, SOLUTION INTRAVENOUS CONTINUOUS
Status: DISCONTINUED | OUTPATIENT
Start: 2019-02-13 | End: 2019-02-14 | Stop reason: HOSPADM

## 2019-02-13 RX ORDER — AMOXICILLIN 500 MG/1
500 CAPSULE ORAL 2 TIMES DAILY
Qty: 20 CAPSULE | Refills: 0 | OUTPATIENT
Start: 2019-02-13 | End: 2019-02-15

## 2019-02-13 RX ADMIN — IOPAMIDOL 95 ML: 755 INJECTION, SOLUTION INTRAVENOUS at 22:58

## 2019-02-13 RX ADMIN — SODIUM CHLORIDE 125 ML/HR: 9 INJECTION, SOLUTION INTRAVENOUS at 20:40

## 2019-02-13 NOTE — PATIENT INSTRUCTIONS
Strep Throat  Strep throat is a bacterial infection of the throat. Your health care provider may call the infection tonsillitis or pharyngitis, depending on whether there is swelling in the tonsils or at the back of the throat. Strep throat is most common during the cold months of the year in children who are 5-15 years of age, but it can happen during any season in people of any age. This infection is spread from person to person (contagious) through coughing, sneezing, or close contact.  What are the causes?  Strep throat is caused by the bacteria called Streptococcus pyogenes.  What increases the risk?  This condition is more likely to develop in:  · People who spend time in crowded places where the infection can spread easily.  · People who have close contact with someone who has strep throat.    What are the signs or symptoms?  Symptoms of this condition include:  · Fever or chills.  · Redness, swelling, or pain in the tonsils or throat.  · Pain or difficulty when swallowing.  · White or yellow spots on the tonsils or throat.  · Swollen, tender glands in the neck or under the jaw.  · Red rash all over the body (rare).    How is this diagnosed?  This condition is diagnosed by performing a rapid strep test or by taking a swab of your throat (throat culture test). Results from a rapid strep test are usually ready in a few minutes, but throat culture test results are available after one or two days.  How is this treated?  This condition is treated with antibiotic medicine.  Follow these instructions at home:  Medicines  · Take over-the-counter and prescription medicines only as told by your health care provider.  · Take your antibiotic as told by your health care provider. Do not stop taking the antibiotic even if you start to feel better.  · Have family members who also have a sore throat or fever tested for strep throat. They may need antibiotics if they have the strep infection.  Eating and drinking  · Do not  share food, drinking cups, or personal items that could cause the infection to spread to other people.  · If swallowing is difficult, try eating soft foods until your sore throat feels better.  · Drink enough fluid to keep your urine clear or pale yellow.  General instructions  · Gargle with a salt-water mixture 3-4 times per day or as needed. To make a salt-water mixture, completely dissolve ½-1 tsp of salt in 1 cup of warm water.  · Make sure that all household members wash their hands well.  · Get plenty of rest.  · Stay home from school or work until you have been taking antibiotics for 24 hours.  · Keep all follow-up visits as told by your health care provider. This is important.  Contact a health care provider if:  · The glands in your neck continue to get bigger.  · You develop a rash, cough, or earache.  · You cough up a thick liquid that is green, yellow-brown, or bloody.  · You have pain or discomfort that does not get better with medicine.  · Your problems seem to be getting worse rather than better.  · You have a fever.  Get help right away if:  · You have new symptoms, such as vomiting, severe headache, stiff or painful neck, chest pain, or shortness of breath.  · You have severe throat pain, drooling, or changes in your voice.  · You have swelling of the neck, or the skin on the neck becomes red and tender.  · You have signs of dehydration, such as fatigue, dry mouth, and decreased urination.  · You become increasingly sleepy, or you cannot wake up completely.  · Your joints become red or painful.  This information is not intended to replace advice given to you by your health care provider. Make sure you discuss any questions you have with your health care provider.  Document Released: 12/15/2001 Document Revised: 08/16/2017 Document Reviewed: 04/11/2016  [employer] Interactive Patient Education © 2018 [employer]

## 2019-02-13 NOTE — PROGRESS NOTES
"LAMONTKACI@  Jessica Harris is a 33 y.o. female.   Chief Complaint   Patient presents with   • Earache   • URI      History of Present Illness   Presents to [facility] with c/o onset of ear congestion with nasal stuffiness for past week with mild cough which onset a few days ago. She also has associated c/o of \"shortness of breath\" with mild exertion of walking or doing house work. Reports a PMH of tachycardia and is currently treated with Lopressor. Has never seen a cardiologist. She denies any fever.     The following portions of the patient's history were reviewed and updated as appropriate: allergies, current medications, past family history, past medical history, past social history, past surgical history and problem list.    Current Outpatient Medications:   •  busPIRone (BUSPAR) 10 MG tablet, Take 10 mg by mouth 2 (Two) Times a Day As Needed., Disp: , Rfl: 1  •  folic acid (FOLVITE) 1 MG tablet, Take 1,000 mcg by mouth Daily., Disp: , Rfl: 2  •  lurasidone HCl (LATUDA) 60 MG tablet tablet, Take 1 tablet by mouth Daily., Disp: 30 tablet, Rfl: 1  •  metoprolol tartrate (LOPRESSOR) 50 MG tablet, TAKE ONE TABLET BY MOUTH TWICE A DAY FOR BLOOD PRESSURE, Disp: , Rfl: 3  •  pantoprazole (PROTONIX) 40 MG EC tablet, Take 40 mg by mouth Daily., Disp: , Rfl: 3  •  QUEtiapine (SEROquel) 50 MG tablet, Take 50 mg by mouth every night at bedtime., Disp: , Rfl: 1  •  spironolactone (ALDACTONE) 100 MG tablet, TAKE ONE TABLET BY MOUTH EVERY DAY FOR FLUID, Disp: , Rfl: 5  •  SPRINTEC 28 0.25-35 MG-MCG per tablet, Take 1 tablet by mouth Daily., Disp: , Rfl: 11  •  vitamin D (ERGOCALCIFEROL) 26161 units capsule capsule, TAKE ONE CAPSULE BY MOUTH EVERY WEEK FOR VITAMIN DEFICIENCY, Disp: , Rfl: 0  •  amoxicillin (AMOXIL) 500 MG capsule, Take 1 capsule by mouth 2 (Two) Times a Day for 10 days., Disp: 20 capsule, Rfl: 0  No current facility-administered medications for this visit.     No Known Allergies    Review of Systems "   Constitutional: Positive for fatigue. Negative for activity change, chills and fever.   HENT: Positive for sore throat (for past days) and trouble swallowing. Negative for drooling, ear pain, mouth sores, rhinorrhea, sinus pressure and voice change.    Eyes: Negative for discharge.   Respiratory: Positive for cough (intermittent) and shortness of breath (mild). Negative for choking and wheezing.    Gastrointestinal: Negative for abdominal pain, diarrhea, nausea and vomiting.   Musculoskeletal: Negative for myalgias and neck stiffness.   Skin: Negative for color change, pallor and rash.   Allergic/Immunologic: Negative for immunocompromised state.   Neurological: Positive for headaches (mild). Negative for dizziness.   Psychiatric/Behavioral: Negative for sleep disturbance.     Objective     Visit Vitals  /84 (BP Location: Left arm, Patient Position: Sitting, Cuff Size: Adult)   Pulse 113   Temp 98.5 °F (36.9 °C) (Oral)   Resp 18   Wt 108 kg (238 lb)   LMP 11/01/2018   SpO2 98%   BMI 39.61 kg/m²     Physical Exam   Constitutional: She is oriented to person, place, and time. She appears well-developed and well-nourished.   HENT:   Head: Normocephalic.   Right Ear: Tympanic membrane is bulging. Tympanic membrane is not erythematous. A middle ear effusion is present.   Left Ear: Tympanic membrane is bulging. Tympanic membrane is not erythematous. A middle ear effusion is present.   Nose: Nose normal.   Mouth/Throat: Mucous membranes are normal. No uvula swelling. Posterior oropharyngeal erythema present. No posterior oropharyngeal edema or tonsillar abscesses. No tonsillar exudate.   Eyes: Conjunctivae are normal. Pupils are equal, round, and reactive to light.   Neck: Normal range of motion.   Cardiovascular: Regular rhythm and intact distal pulses. Tachycardia present.   Reports PMH of tachycardia   Pulmonary/Chest: Effort normal and breath sounds normal. No respiratory distress. She has no wheezes.    Abdominal: Soft. Bowel sounds are normal. She exhibits no distension. There is no tenderness. There is no guarding.   Musculoskeletal: Normal range of motion.   Lymphadenopathy:        Head (right side): Tonsillar adenopathy present.        Head (left side): Tonsillar adenopathy present.     She has cervical adenopathy.   Neurological: She is alert and oriented to person, place, and time.   Skin: Skin is warm and dry. Capillary refill takes less than 2 seconds.   Psychiatric: She has a normal mood and affect. Her behavior is normal.       Lab Results (last 24 hours)     Procedure Component Value Units Date/Time    POC Rapid Strep A [[id number]]  (Abnormal) Collected:  02/13/19 1822    Specimen:  Swab Updated:  02/13/19 1822     Rapid Strep A Screen Positive     Internal Control Passed     Lot Number [id number]     Expiration Date 05/31/20    CBC & Differential [[id number]] Collected:  02/13/19 2031    Specimen:  Blood Updated:  02/13/19 2042    Narrative:       The following orders were created for panel order CBC & Differential.  Procedure                               Abnormality         Status                     ---------                               -----------         ------                     CBC Auto Differential[[id number]]        Abnormal            Final result                 Please view results for these tests on the individual orders.    Comprehensive Metabolic Panel [[id number]]  (Abnormal) Collected:  02/13/19 2031    Specimen:  Blood from Arm, Left Updated:  02/13/19 2104     Glucose 104 mg/dL      BUN 7 mg/dL      Creatinine 0.66 mg/dL      Sodium 138 mmol/L      Potassium 3.9 mmol/L      Chloride 102 mmol/L      CO2 26.9 mmol/L      Calcium 9.8 mg/dL      Total Protein 7.9 g/dL      Albumin 4.80 g/dL      ALT (SGPT) 43 U/L      AST (SGOT) 52 U/L      Alkaline Phosphatase 80 U/L      Comment: Note New Reference Ranges        Total Bilirubin 0.2 mg/dL      eGFR Non African Amer 104 mL/min/1.73       Globulin 3.1 gm/dL      A/G Ratio 1.5 g/dL      BUN/Creatinine Ratio 10.6     Anion Gap 9.1 mmol/L     Protime-INR [[id number]]  (Abnormal) Collected:  02/13/19 2031    Specimen:  Blood from Arm, Left Updated:  02/13/19 2102     Protime 12.1 Seconds      INR 0.88    Narrative:       Suggested INR therapeutic range for stable oral anticoagulant therapy:    Low Intensity therapy:   1.5-2.0  Moderate Intensity therapy:   2.0-3.0  High Intensity therapy:   2.5-4.0    aPTT [[id number]]  (Normal) Collected:  02/13/19 2031    Specimen:  Blood from Arm, Left Updated:  02/13/19 2102     PTT 34.2 seconds     Narrative:       PTT Heparin Therapeutic Range:  59 - 95 seconds    BNP [[id number]]  (Normal) Collected:  02/13/19 2031    Specimen:  Blood from Arm, Left Updated:  02/13/19 2109     BNP 2.0 pg/mL     Troponin [[id number]]  (Normal) Collected:  02/13/19 2031    Specimen:  Blood from Arm, Left Updated:  02/13/19 2108     Troponin I <0.006 ng/mL     Narrative:       Ultra Troponin I Reference Range:         <=0.039 ng/mL: Negative    0.04-0.779 ng/mL: Indeterminate Range. Suspicious of MI.  Clinical correlation required.       >=0.78  ng/mL: Consistent with myocardial injury.  Clinical correlation required.    D-dimer, Quantitative [[id number]]  (Normal) Collected:  02/13/19 2031    Specimen:  Blood from Arm, Left Updated:  02/13/19 2102     D-Dimer, Quantitative <0.27 MCGFEU/mL     Narrative:       d-Dimer assay result is to be used in conjunction with a non-high clinical pretest probability (PTP) assessment tool to exclude PE and aid in diagnosis of VTE with cutoff of 0.5 MCGFEU/mL.    C-reactive Protein [[id number]]  (Abnormal) Collected:  02/13/19 2031    Specimen:  Blood from Arm, Left Updated:  02/13/19 2105     C-Reactive Protein 2.39 mg/dL     CBC Auto Differential [[id number]]  (Abnormal) Collected:  02/13/19 2031    Specimen:  Blood from Arm, Left Updated:  02/13/19 2042     WBC 12.11 10*3/mm3      RBC 5.61 10*6/mm3       Hemoglobin 15.6 g/dL      Hematocrit 48.0 %      MCV 85.6 fL      MCH 27.8 pg      MCHC 32.5 g/dL      RDW 14.3 %      RDW-SD 44.5 fl      MPV 9.2 fL      Platelets 373 10*3/mm3      Neutrophil % 64.0 %      Lymphocyte % 25.4 %      Monocyte % 6.6 %      Eosinophil % 2.7 %      Basophil % 0.5 %      Immature Grans % 0.8 %      Neutrophils, Absolute 7.75 10*3/mm3      Lymphocytes, Absolute 3.07 10*3/mm3      Monocytes, Absolute 0.80 10*3/mm3      Eosinophils, Absolute 0.33 10*3/mm3      Basophils, Absolute 0.06 10*3/mm3      Immature Grans, Absolute 0.10 10*3/mm3     Osmolality, Calculated [[id number]]  (Normal) Collected:  02/13/19 2031    Specimen:  Blood from Arm, Left Updated:  02/13/19 2104     Osmolality Calc 274.0 mOsm/kg     Urinalysis With Microscopic If Indicated (No Culture) - Urine, Clean Catch [[id number]]  (Normal) Collected:  02/13/19 2035    Specimen:  Urine, Clean Catch Updated:  02/13/19 2103     Color, UA Yellow     Appearance, UA Clear     pH, UA 8.0     Specific Gravity, UA 1.014     Glucose, UA Negative     Ketones, UA Negative     Bilirubin, UA Negative     Blood, UA Negative     Protein, UA Negative     Leuk Esterase, UA Negative     Nitrite, UA Negative     Urobilinogen, UA 0.2 E.U./dL    Narrative:       Urine microscopic not indicated.    Pregnancy, Urine - Urine, Clean Catch [[id number]]  (Normal) Collected:  02/13/19 2035    Specimen:  Urine, Clean Catch Updated:  02/13/19 2104     HCG, Urine QL Negative    Narrative:       Diluted specimens may cause false negative results.    Rapid Strep A Screen - Swab, Throat [[id number]]  (Normal) Collected:  02/13/19 2036    Specimen:  Swab from Throat Updated:  02/13/19 2108     Strep A Ag Negative    Beta Strep Culture, Throat - Swab, Throat [[id number]] Collected:  02/13/19 2036    Specimen:  Swab from Throat Updated:  02/13/19 2108        Assessment/Plan   Jessica was seen today for earache and uri.    Diagnoses and all orders for this visit:    Strep  pharyngitis  -     POC Rapid Strep A  -     amoxicillin (AMOXIL) 500 MG capsule; Take 1 capsule by mouth 2 (Two) Times a Day for 10 days.    Tachycardia    History of tachycardia               Discussed with the patient impression, positive strep screen. Recommend f/u at the ED for symptoms of shortness of breath which worsen or fail to improve. Schedule a f/u with PCP within next few days for additional diagnostic testing as deemed necessary. Patient verbalized understanding.

## 2019-02-14 NOTE — ED NOTES
Consent for contrast obtained at this time, placed on pt chart.     Wisam Morris, RN  02/13/19 8498

## 2019-02-14 NOTE — ED PROVIDER NOTES
Subjective   34yo female sent here from urgent care - pt reports 3 weeks of shortness of breath; however she says they were concerned where she is a smoker and on OCP's that she may have a blood clot- pt says her heart rate is always        History provided by:  Patient  Shortness of Breath   Severity:  Moderate  Onset quality:  Gradual  Timing:  Intermittent  Progression:  Worsening  Chronicity:  New  Context: activity and URI    Relieved by:  Nothing  Worsened by:  Nothing  Ineffective treatments:  None tried  Associated symptoms: cough and sore throat    Associated symptoms: no abdominal pain, no chest pain and no fever    Risk factors: no recent alcohol use, no family hx of DVT, no hx of cancer, no hx of PE/DVT, no obesity, no oral contraceptive use, no prolonged immobilization, no recent surgery and no tobacco use        Review of Systems   Constitutional: Negative.  Negative for fever.   HENT: Positive for sore throat.    Respiratory: Positive for cough and shortness of breath.    Cardiovascular: Negative.  Negative for chest pain.   Gastrointestinal: Negative.  Negative for abdominal pain.   Endocrine: Negative.    Genitourinary: Negative.  Negative for dysuria.   Skin: Negative.    Neurological: Negative.    Psychiatric/Behavioral: Negative.    All other systems reviewed and are negative.      Past Medical History:   Diagnosis Date   • Anxiety    • Bipolar 1 disorder (CMS/HCC)    • Depression    • GERD (gastroesophageal reflux disease)    • History of bronchitis    • History of ear infections    • History of stomach ulcers    • History of streptococcal infection    • Hypertension    • Urinary tract infection        No Known Allergies    Past Surgical History:   Procedure Laterality Date   • ADENOIDECTOMY     • CHOLECYSTECTOMY     • DENTAL PROCEDURE     • HERNIA REPAIR     • TONSILLECTOMY     • WISDOM TOOTH EXTRACTION         Family History   Problem Relation Age of Onset   • Cancer Mother    • Stroke Mother     • Lung disease Mother    • No Known Problems Father    • Cancer Other    • Heart disease Other    • Stroke Other        Social History     Socioeconomic History   • Marital status: Single     Spouse name: Not on file   • Number of children: Not on file   • Years of education: Not on file   • Highest education level: Not on file   Tobacco Use   • Smoking status: Current Every Day Smoker     Packs/day: 0.50     Types: Cigarettes   • Smokeless tobacco: Never Used   Substance and Sexual Activity   • Alcohol use: No     Frequency: Never   • Drug use: No   • Sexual activity: Defer     Birth control/protection: Pill           Objective   Physical Exam   Constitutional: She is oriented to person, place, and time. She appears well-developed and well-nourished. She appears ill.   HENT:   Head: Normocephalic and atraumatic.   Mouth/Throat: Oropharynx is clear and moist.   Eyes: EOM are normal. Pupils are equal, round, and reactive to light.   Neck: Neck supple.   Cardiovascular: Regular rhythm. Tachycardia present.   Pulmonary/Chest: Effort normal.   Abdominal: Soft. Bowel sounds are normal.   Musculoskeletal: Normal range of motion.   Neurological: She is alert and oriented to person, place, and time.   Skin: Skin is warm. Capillary refill takes less than 2 seconds.   Psychiatric: She has a normal mood and affect. Her behavior is normal. Judgment and thought content normal.   Nursing note and vitals reviewed.      Procedures           ED Course                  MDM  Number of Diagnoses or Management Options  Shortness of breath: new and requires workup  Sinus tachycardia: new and requires workup     Amount and/or Complexity of Data Reviewed  Clinical lab tests: ordered and reviewed  Tests in the radiology section of CPT®: ordered and reviewed  Tests in the medicine section of CPT®: reviewed and ordered  Independent visualization of images, tracings, or specimens: yes    Risk of Complications, Morbidity, and/or  Mortality  Presenting problems: high  Diagnostic procedures: moderate  Management options: moderate    Patient Progress  Patient progress: improved        Final diagnoses:   Shortness of breath   Sinus tachycardia            Roz Damon,   02/16/19 0750

## 2019-02-15 ENCOUNTER — OFFICE VISIT (OUTPATIENT)
Dept: CARDIOLOGY | Facility: CLINIC | Age: 34
End: 2019-02-15

## 2019-02-15 VITALS
DIASTOLIC BLOOD PRESSURE: 84 MMHG | WEIGHT: 238 LBS | HEART RATE: 100 BPM | BODY MASS INDEX: 40.63 KG/M2 | OXYGEN SATURATION: 94 % | HEIGHT: 64 IN | SYSTOLIC BLOOD PRESSURE: 130 MMHG

## 2019-02-15 DIAGNOSIS — F41.9 ANXIETY AND DEPRESSION: ICD-10-CM

## 2019-02-15 DIAGNOSIS — Z72.0 TOBACCO ABUSE: ICD-10-CM

## 2019-02-15 DIAGNOSIS — R00.2 PALPITATIONS: Primary | ICD-10-CM

## 2019-02-15 DIAGNOSIS — R06.09 DYSPNEA ON EXERTION: ICD-10-CM

## 2019-02-15 DIAGNOSIS — F32.A ANXIETY AND DEPRESSION: ICD-10-CM

## 2019-02-15 LAB — BACTERIA SPEC AEROBE CULT: NORMAL

## 2019-02-15 PROCEDURE — 99406 BEHAV CHNG SMOKING 3-10 MIN: CPT | Performed by: INTERNAL MEDICINE

## 2019-02-15 PROCEDURE — 93000 ELECTROCARDIOGRAM COMPLETE: CPT | Performed by: INTERNAL MEDICINE

## 2019-02-15 PROCEDURE — 99204 OFFICE O/P NEW MOD 45 MIN: CPT | Performed by: INTERNAL MEDICINE

## 2019-02-15 NOTE — PROGRESS NOTES
Vimal Walker PA  Jesscia Harris  1985  02/15/2019    Patient Active Problem List   Diagnosis   • Palpitations   • Dyspnea on exertion (NYHA class 2-3)   • Anxiety and depression   • Tobacco abuse       Dear Vimal Walker PA:    Subjective     Jessica Harris is a 33 y.o. female with the problems as listed above, presents    Chief complaint: Palpitations with rapid heart beat.    History of Present Illness: Ms. Harris is a pleasant 33-year-old  female with no history of known heart disease or coronary artery disease or any structural heart disease, no known cardiac arrhythmias, presents with complains of having palpitations with rapid heart beat associated with some dizziness at times but no syncope.  She was diagnosed to have some sinus tachycardia in the past.  She admits to having anxiety for which she takes buspar.  She denies any recent fever or chills.  She has no history of thyroid problems.  She denies taking any over-the-counter cold medicines that could affect the heart rate.  She has dyspnea with mild-to-moderate exertion with no PND, orthopnea or pedal edema.  She denies any chest pains.    No Known Allergies:      Current Outpatient Medications:   •  busPIRone (BUSPAR) 10 MG tablet, Take 10 mg by mouth 2 (Two) Times a Day As Needed., Disp: , Rfl: 1  •  folic acid (FOLVITE) 1 MG tablet, Take 1,000 mcg by mouth Daily., Disp: , Rfl: 2  •  lurasidone HCl (LATUDA) 60 MG tablet tablet, Take 1 tablet by mouth Daily., Disp: 30 tablet, Rfl: 1  •  metoprolol tartrate (LOPRESSOR) 50 MG tablet, TAKE ONE TABLET BY MOUTH TWICE A DAY FOR BLOOD PRESSURE, Disp: , Rfl: 3  •  pantoprazole (PROTONIX) 40 MG EC tablet, Take 40 mg by mouth Daily., Disp: , Rfl: 3  •  QUEtiapine (SEROquel) 50 MG tablet, Take 50 mg by mouth every night at bedtime., Disp: , Rfl: 1  •  spironolactone (ALDACTONE) 100 MG tablet, TAKE ONE TABLET BY MOUTH EVERY DAY FOR FLUID, Disp: , Rfl: 5  •  SPRINTEC 28 0.25-35 MG-MCG  per tablet, Take 1 tablet by mouth Daily., Disp: , Rfl: 11  •  vitamin D (ERGOCALCIFEROL) 50008 units capsule capsule, TAKE ONE CAPSULE BY MOUTH EVERY WEEK FOR VITAMIN DEFICIENCY, Disp: , Rfl: 0    Past Medical History:   Diagnosis Date   • Anxiety    • Bipolar 1 disorder (CMS/HCC)    • Depression    • GERD (gastroesophageal reflux disease)    • History of bronchitis    • History of ear infections    • History of stomach ulcers    • History of streptococcal infection    • Hypertension    • Urinary tract infection      Past Surgical History:   Procedure Laterality Date   • ADENOIDECTOMY     • CHOLECYSTECTOMY     • DENTAL PROCEDURE     • HERNIA REPAIR     • TONSILLECTOMY     • WISDOM TOOTH EXTRACTION       Family History   Problem Relation Age of Onset   • Cancer Mother    • Stroke Mother    • Lung disease Mother    • No Known Problems Father    • Cancer Other    • Heart disease Other    • Stroke Other      Social History     Tobacco Use   • Smoking status: Current Every Day Smoker     Packs/day: 0.50     Types: Cigarettes   • Smokeless tobacco: Never Used   Substance Use Topics   • Alcohol use: No     Frequency: Never   • Drug use: No     Review of Systems   Constitution: Positive for malaise/fatigue. Negative for chills, diaphoresis and fever.   Eyes:        Wears glasses     Cardiovascular: Positive for dyspnea on exertion and palpitations. Negative for chest pain, irregular heartbeat, leg swelling, near-syncope, orthopnea and paroxysmal nocturnal dyspnea.   Respiratory: Negative for cough, hemoptysis and shortness of breath.    Endocrine: Negative for cold intolerance and heat intolerance.   Hematologic/Lymphatic: Does not bruise/bleed easily.   Skin: Negative for rash.   Musculoskeletal: Positive for joint pain and myalgias.   Gastrointestinal: Negative for abdominal pain, constipation, diarrhea, nausea and vomiting.   Genitourinary: Negative for dysuria and hematuria.        Hx Kidney stones   Neurological:  "Positive for dizziness and headaches. Negative for focal weakness and numbness.   Psychiatric/Behavioral: Positive for depression. The patient has insomnia.        Objective   Blood pressure 130/84, pulse 100, height 162.6 cm (64\"), weight 108 kg (238 lb), SpO2 94 %.  Body mass index is 40.85 kg/m².        Physical Exam   Constitutional: She is oriented to person, place, and time. She appears well-developed and well-nourished.   HENT:   Mouth/Throat: Oropharynx is clear and moist.   Eyes: EOM are normal. Pupils are equal, round, and reactive to light.   Neck: Neck supple. No JVD present. No tracheal deviation present. No thyromegaly present.   Cardiovascular: Normal rate, regular rhythm, S1 normal and S2 normal. Exam reveals no gallop, no S3, no S4 and no friction rub.   No murmur heard.  Pulmonary/Chest: Effort normal and breath sounds normal.   Abdominal: Soft. Bowel sounds are normal. She exhibits no mass. There is no tenderness.   Musculoskeletal: Normal range of motion. She exhibits no edema.   Lymphadenopathy:     She has no cervical adenopathy.   Neurological: She is alert and oriented to person, place, and time.   Skin: Skin is warm and dry. No rash noted.   Psychiatric: She has a normal mood and affect.       Lab Results   Component Value Date     02/13/2019    K 3.9 02/13/2019     02/13/2019    CO2 26.9 02/13/2019    BUN 7 02/13/2019    CREATININE 0.66 02/13/2019    GLUCOSE 104 02/13/2019    CALCIUM 9.8 02/13/2019    AST 52 (H) 02/13/2019    ALT 43 (H) 02/13/2019    ALKPHOS 80 02/13/2019    LABIL2 1.5 01/29/2015     No results found for: CKTOTAL  Lab Results   Component Value Date    WBC 12.11 02/13/2019    HGB 15.6 02/13/2019    HCT 48.0 (H) 02/13/2019     02/13/2019     Lab Results   Component Value Date    INR 0.88 (L) 02/13/2019     Lab Results   Component Value Date    BNP 2.0 02/13/2019       ECG 12 Lead  Date/Time: 2/15/2019 9:27 AM  Performed by: Tobi Weston MD  Authorized " by: Tobi Weston MD   Rhythm: sinus rhythm  Conduction: conduction normal  ST Segments: ST segments normal    Clinical impression: normal ECG              Assessment/Plan    Diagnosis Plan   1. Palpitations  Holter Monitor - 24 Hour, Echo-doppler   2. Dyspnea on exertion (NYHA class 2-3)  Adult Transthoracic Echo   3. Anxiety and depression     4. Tobacco abuse        Recommendations:    Orders Placed This Encounter   Procedures   • Holter Monitor - 24 Hour   • ECG 12 Lead   • Adult Transthoracic Echo Complete W/ Cont if Necessary Per Protocol      1. Continue with metoprolol for now.  2. We will evaluate further with a Holter monitor and echo Doppler study.  3. I have asked her to cut back on the consumption of caffeinated beverages.  I advised Jessica of the risks of continuing to use tobacco, and I provided her with tobacco cessation educational materials in the After Visit Summary.During this visit, I spent 5  minutes counseling the patient regarding tobacco cessation.  She says she wants to try nicotine patches.    Return in about 4 weeks (around 3/15/2019).    As always, Vimal Walker PA  I appreciate very much the opportunity to participate in the cardiovascular care of your patients. Please do not hesitate to call me with any questions with regards to Jessica Harris evaluation and management.       With Best Regards,        Tobi Weston MD, [facility]    Dragon disclaimer:  Much of this encounter note is an electronic transcription/translation of spoken language to printed text. The electronic translation of spoken language may permit erroneous, or at times, nonsensical words or phrases to be inadvertently transcribed; Although I have reviewed the note for such errors, some may still exist.

## 2019-02-18 ENCOUNTER — APPOINTMENT (OUTPATIENT)
Dept: CARDIOLOGY | Facility: HOSPITAL | Age: 34
End: 2019-02-18

## 2019-02-18 ENCOUNTER — HOSPITAL ENCOUNTER (OUTPATIENT)
Dept: RESPIRATORY THERAPY | Facility: HOSPITAL | Age: 34
Discharge: HOME | End: 2019-02-18
Admitting: INTERNAL MEDICINE

## 2019-02-18 DIAGNOSIS — R00.2 PALPITATIONS: ICD-10-CM

## 2019-02-18 PROCEDURE — 93226 XTRNL ECG REC<48 HR SCAN A/R: CPT

## 2019-02-18 PROCEDURE — 93225 XTRNL ECG REC<48 HRS REC: CPT

## 2019-02-19 ENCOUNTER — HOSPITAL ENCOUNTER (OUTPATIENT)
Dept: CARDIOLOGY | Facility: HOSPITAL | Age: 34
Discharge: HOME | End: 2019-02-19
Admitting: INTERNAL MEDICINE

## 2019-02-19 DIAGNOSIS — R06.09 DYSPNEA ON EXERTION: ICD-10-CM

## 2019-02-19 DIAGNOSIS — R00.2 PALPITATIONS: ICD-10-CM

## 2019-02-19 PROCEDURE — 93306 TTE W/DOPPLER COMPLETE: CPT

## 2019-02-19 PROCEDURE — 93306 TTE W/DOPPLER COMPLETE: CPT | Performed by: INTERNAL MEDICINE

## 2019-02-20 LAB
BH CV ECHO MEAS - ACS: 1.9 CM
BH CV ECHO MEAS - AO ROOT AREA (BSA CORRECTED): 1.4
BH CV ECHO MEAS - AO ROOT AREA: 7.3 CM^2
BH CV ECHO MEAS - AO ROOT DIAM: 3 CM
BH CV ECHO MEAS - BSA(HAYCOCK): 2.3 M^2
BH CV ECHO MEAS - BSA: 2.1 M^2
BH CV ECHO MEAS - BZI_BMI: 40.9 KILOGRAMS/M^2
BH CV ECHO MEAS - BZI_METRIC_HEIGHT: 162.6 CM
BH CV ECHO MEAS - BZI_METRIC_WEIGHT: 108 KG
BH CV ECHO MEAS - EDV(CUBED): 60.5 ML
BH CV ECHO MEAS - EDV(MOD-SP4): 42 ML
BH CV ECHO MEAS - EDV(TEICH): 66.9 ML
BH CV ECHO MEAS - EF(CUBED): 57.2 %
BH CV ECHO MEAS - EF(MOD-SP4): 69 %
BH CV ECHO MEAS - EF(TEICH): 49.5 %
BH CV ECHO MEAS - ESV(CUBED): 25.9 ML
BH CV ECHO MEAS - ESV(MOD-SP4): 13 ML
BH CV ECHO MEAS - ESV(TEICH): 33.8 ML
BH CV ECHO MEAS - FS: 24.6 %
BH CV ECHO MEAS - IVS/LVPW: 1.1
BH CV ECHO MEAS - IVSD: 1.2 CM
BH CV ECHO MEAS - LA DIMENSION: 3.2 CM
BH CV ECHO MEAS - LA/AO: 1.1
BH CV ECHO MEAS - LV DIASTOLIC VOL/BSA (35-75): 19.9 ML/M^2
BH CV ECHO MEAS - LV MASS(C)D: 157.2 GRAMS
BH CV ECHO MEAS - LV MASS(C)DI: 74.7 GRAMS/M^2
BH CV ECHO MEAS - LV SYSTOLIC VOL/BSA (12-30): 6.2 ML/M^2
BH CV ECHO MEAS - LVIDD: 3.9 CM
BH CV ECHO MEAS - LVIDS: 3 CM
BH CV ECHO MEAS - LVLD AP4: 7.2 CM
BH CV ECHO MEAS - LVLS AP4: 6 CM
BH CV ECHO MEAS - LVOT AREA (M): 2.3 CM^2
BH CV ECHO MEAS - LVOT AREA: 2.3 CM^2
BH CV ECHO MEAS - LVOT DIAM: 1.7 CM
BH CV ECHO MEAS - LVPWD: 1.2 CM
BH CV ECHO MEAS - MV A MAX VEL: 75.5 CM/SEC
BH CV ECHO MEAS - MV E MAX VEL: 78.5 CM/SEC
BH CV ECHO MEAS - MV E/A: 1
BH CV ECHO MEAS - PA ACC SLOPE: 1597 CM/SEC^2
BH CV ECHO MEAS - PA ACC TIME: 0.07 SEC
BH CV ECHO MEAS - PA PR(ACCEL): 47.3 MMHG
BH CV ECHO MEAS - RVDD: 2.9 CM
BH CV ECHO MEAS - SI(CUBED): 16.4 ML/M^2
BH CV ECHO MEAS - SI(MOD-SP4): 13.8 ML/M^2
BH CV ECHO MEAS - SI(TEICH): 15.7 ML/M^2
BH CV ECHO MEAS - SV(CUBED): 34.6 ML
BH CV ECHO MEAS - SV(MOD-SP4): 29 ML
BH CV ECHO MEAS - SV(TEICH): 33.1 ML
MAXIMAL PREDICTED HEART RATE: 189 BPM
STRESS TARGET HR: 161 BPM

## 2019-02-21 PROCEDURE — 93227 XTRNL ECG REC<48 HR R&I: CPT | Performed by: INTERNAL MEDICINE

## 2019-03-12 ENCOUNTER — OFFICE VISIT (OUTPATIENT)
Dept: FAMILY MEDICINE CLINIC | Facility: CLINIC | Age: 34
End: 2019-03-12

## 2019-03-12 DIAGNOSIS — F31.77 BIPOLAR DISORDER, IN PARTIAL REMISSION, MOST RECENT EPISODE MIXED (HCC): ICD-10-CM

## 2019-03-12 DIAGNOSIS — E28.2 PCOS (POLYCYSTIC OVARIAN SYNDROME): ICD-10-CM

## 2019-03-12 DIAGNOSIS — K21.9 GASTROESOPHAGEAL REFLUX DISEASE WITHOUT ESOPHAGITIS: ICD-10-CM

## 2019-03-12 DIAGNOSIS — Z87.442 HISTORY OF NEPHROLITHIASIS: ICD-10-CM

## 2019-03-12 DIAGNOSIS — E55.9 VITAMIN D DEFICIENCY: ICD-10-CM

## 2019-03-12 DIAGNOSIS — I87.2 CHRONIC VENOUS INSUFFICIENCY: ICD-10-CM

## 2019-03-12 DIAGNOSIS — R00.2 PALPITATIONS: Primary | ICD-10-CM

## 2019-03-12 DIAGNOSIS — R06.83 SNORING: ICD-10-CM

## 2019-03-12 DIAGNOSIS — F17.200 SMOKER: ICD-10-CM

## 2019-03-12 DIAGNOSIS — Z00.00 HEALTHCARE MAINTENANCE: ICD-10-CM

## 2019-03-12 PROBLEM — R06.09 DYSPNEA ON EXERTION: Status: RESOLVED | Noted: 2019-02-15 | Resolved: 2019-03-12

## 2019-03-12 PROCEDURE — 99204 OFFICE O/P NEW MOD 45 MIN: CPT | Performed by: GENERAL PRACTICE

## 2019-03-12 RX ORDER — DEXAMETHASONE 1 MG
TABLET ORAL
Qty: 1 TABLET | Refills: 0 | Status: SHIPPED | OUTPATIENT
Start: 2019-03-12 | End: 2019-05-02

## 2019-03-12 RX ORDER — PROPRANOLOL HYDROCHLORIDE 120 MG/1
120 CAPSULE, EXTENDED RELEASE ORAL DAILY
Qty: 30 CAPSULE | Refills: 5 | Status: SHIPPED | OUTPATIENT
Start: 2019-03-12 | End: 2019-09-05 | Stop reason: SDUPTHER

## 2019-03-13 VITALS
WEIGHT: 245 LBS | TEMPERATURE: 98.6 F | RESPIRATION RATE: 12 BRPM | BODY MASS INDEX: 40.82 KG/M2 | SYSTOLIC BLOOD PRESSURE: 130 MMHG | DIASTOLIC BLOOD PRESSURE: 70 MMHG | HEIGHT: 65 IN | OXYGEN SATURATION: 98 % | HEART RATE: 118 BPM

## 2019-03-13 PROBLEM — R06.83 SNORING: Status: ACTIVE | Noted: 2019-03-13

## 2019-03-13 PROBLEM — Z87.442 HISTORY OF NEPHROLITHIASIS: Status: ACTIVE | Noted: 2019-03-13

## 2019-03-13 NOTE — PROGRESS NOTES
Subjective   Jessica Harris is a 33 y.o. female.     History of Present Illness     This 33 year old woman presents today to establish care.    Palpitations  She gives a long history of intermittent palpitations.  She admits to shortness of breath with above average exertion as well as intermittent swelling of the ankles toward the end of the day but denies any chest pain, lightheadedness, diaphoresis, or nausea.  She is currently taking metoprolol with some improvement.  Holter monitoring performed on 2/18/2019 revealed sinus tachycardia but was otherwise unremarkable.  Echocardiogram performed on 2/20/2019 was also normal with an estimated EF of 61-65%.    Bipolar Affective Disorder  She gives a history of depression and anxiety, and difficulty sleeping.  She is followed by psychiatry and is currently prescribed lurasidone, buspirone, and quetiapine.  She feels she is doing well at present    Polycystic Ovarian Syndrome  She gives a history of irregular periods, nasal hair growth, and increasing weight gain. She is followed by gynecology and is currently prescribed an OC and spironolactone.  She was unable to tolerate metformin in the past    GERD  She gives a history of intermittent heartburn described as a burning epigastric and retrosternal discomfort.  There is no history of any difficulty swallowing, vomiting, change in her bowel habits, hematochezia or melena and she denies any fever or chills.  She is currently prescribed pantoprazole with a good control of her symptoms.    The following portions of the patient's history were reviewed and updated as appropriate: allergies, current medications, past family history, past medical history, past social history, past surgical history and problem list.    Review of Systems   Constitutional: Positive for fatigue. Negative for appetite change, chills, fever and unexpected weight change.   HENT: Negative for congestion, ear pain, rhinorrhea, sneezing, sore throat  and voice change.    Eyes: Negative for visual disturbance.   Respiratory: Positive for shortness of breath. Negative for cough and wheezing.         Occasionally wakes with a feeling she cannot get breath and has been told that she snores   Cardiovascular: Positive for palpitations and leg swelling. Negative for chest pain.   Gastrointestinal: Negative for abdominal pain, blood in stool, constipation, diarrhea, nausea and vomiting.   Endocrine: Negative for polydipsia.   Genitourinary: Negative for difficulty urinating, dysuria, frequency, hematuria, menstrual problem, pelvic pain, urgency, vaginal bleeding and vaginal discharge.   Musculoskeletal: Positive for arthralgias and myalgias. Negative for back pain, joint swelling and neck pain.   Skin: Negative for color change.   Neurological: Positive for headaches. Negative for tremors, weakness and numbness.   Psychiatric/Behavioral: Negative for dysphoric mood, sleep disturbance and suicidal ideas. The patient is nervous/anxious.      Objective   Physical Exam   Constitutional: She is oriented to person, place, and time. No distress.   Bright and in good spirits. No apparent distress. No pallor, jaundice, diaphoresis, or cyanosis.   HENT:   Head: Atraumatic.   Right Ear: Tympanic membrane, external ear and ear canal normal.   Left Ear: Tympanic membrane, external ear and ear canal normal.   Nose: Nose normal.   Mouth/Throat: Oropharynx is clear and moist. Mucous membranes are not pale and not cyanotic.   Eyes: EOM are normal. Pupils are equal, round, and reactive to light. No scleral icterus.   Neck: No JVD present. Carotid bruit is not present. No tracheal deviation present. No thyromegaly present.   Cardiovascular: Normal rate, regular rhythm, S1 normal, S2 normal and intact distal pulses. Exam reveals no gallop, no S3 and no S4.   No murmur heard.  Pulmonary/Chest: Breath sounds normal. No stridor. No respiratory distress.   Abdominal: Soft. Normal aorta and  bowel sounds are normal. She exhibits no distension, no abdominal bruit and no mass. There is no hepatosplenomegaly. There is no tenderness. No hernia.   Musculoskeletal: She exhibits no tenderness or deformity.     Vascular Status -  Her right foot exhibits no edema. Her left foot exhibits no edema.  Lymphadenopathy:        Head (right side): No submandibular adenopathy present.        Head (left side): No submandibular adenopathy present.     She has no cervical adenopathy.   Neurological: She is alert and oriented to person, place, and time. She has normal reflexes. She displays normal reflexes. No cranial nerve deficit. She exhibits normal muscle tone. Coordination normal.   Skin: Skin is warm and dry. No rash noted. She is not diaphoretic. No cyanosis. No pallor. Nails show no clubbing.   Face somewhat flushed.  Occipital fat pad.   Psychiatric: She has a normal mood and affect.     Assessment/Plan   Problems Addressed this Visit        Cardiovascular and Mediastinum    Palpitations  With sinus tachycardia on recent Holter monitoring with a normal echocardiogram  Trial of propranolol LA in place of metoprolol  Encouraged to report if any worse, any new symptoms, or if no better over the next several weeks  Fasting labs scheduled    Relevant Medications    propranolol LA (INDERAL LA) 120 MG 24 hr capsule    Other Relevant Orders    Comprehensive Metabolic Panel    Lipid Panel    TSH    Chronic venous insufficiency  Advised regarding lifestyle modification  Encouraged to wear graded compression hose       Respiratory    Snoring  Patient uninterested in a sleep study at present but will consider       Digestive    Gastroesophageal reflux disease without esophagitis   Symptoms are currently well controlled.  Advised regarding lifestyle modification including: eating smaller meals, elevation of the head of bed at night, avoidance of caffeine, chocolate, nicotine and peppermint, and avoiding tight fitting  clothing.  Continue current medication.    Relevant Orders    CBC & Differential    Vitamin D deficiency    Relevant Orders    Vitamin D 25 Hydroxy       Endocrine    PCOS (polycystic ovarian syndrome)  Dexamethasone suppression test will be performed with her schedule labs  We will consider another trial of metformin at her return    Relevant Medications    dexamethasone (DECADRON) 1 MG tablet    Other Relevant Orders    Hemoglobin A1c    Cortisol       Other    Bipolar disorder, in partial remission, most recent episode mixed (CMS/HCC)  Stable.  Supportive therapy.   Follow up with psychiatry    Smoker    Healthcare maintenance  Reviewed the potential benefits and risks of hepatitis A immunizations. Patient will consider.  We will discuss a Pneumovax 23 at her return    History of nephrolithiasis

## 2019-03-15 ENCOUNTER — LAB (OUTPATIENT)
Dept: LAB | Facility: HOSPITAL | Age: 34
End: 2019-03-15

## 2019-03-15 DIAGNOSIS — R00.2 PALPITATIONS: ICD-10-CM

## 2019-03-15 DIAGNOSIS — E28.2 PCOS (POLYCYSTIC OVARIAN SYNDROME): ICD-10-CM

## 2019-03-15 DIAGNOSIS — K21.9 GASTROESOPHAGEAL REFLUX DISEASE WITHOUT ESOPHAGITIS: ICD-10-CM

## 2019-03-15 DIAGNOSIS — E55.9 VITAMIN D DEFICIENCY: ICD-10-CM

## 2019-03-15 LAB
25(OH)D3 SERPL-MCNC: 19.9 NG/ML (ref 30–100)
ALBUMIN SERPL-MCNC: 4.1 G/DL (ref 3.5–5.2)
ALBUMIN/GLOB SERPL: 1.2 G/DL
ALP SERPL-CCNC: 69 U/L (ref 39–117)
ALT SERPL W P-5'-P-CCNC: 55 U/L (ref 1–33)
ANION GAP SERPL CALCULATED.3IONS-SCNC: 14.2 MMOL/L
AST SERPL-CCNC: 103 U/L (ref 1–32)
BASOPHILS # BLD AUTO: 0.07 10*3/MM3 (ref 0–0.2)
BASOPHILS NFR BLD AUTO: 0.7 % (ref 0–1.5)
BILIRUB SERPL-MCNC: 0.2 MG/DL (ref 0.1–1.2)
BUN BLD-MCNC: 6 MG/DL (ref 6–20)
BUN/CREAT SERPL: 9.8 (ref 7–25)
CALCIUM SPEC-SCNC: 10 MG/DL (ref 8.6–10.5)
CHLORIDE SERPL-SCNC: 97 MMOL/L (ref 98–107)
CHOLEST SERPL-MCNC: 199 MG/DL (ref 0–200)
CO2 SERPL-SCNC: 25.8 MMOL/L (ref 22–29)
CORTIS SERPL-MCNC: 1.58 MCG/DL
CREAT BLD-MCNC: 0.61 MG/DL (ref 0.57–1)
DEPRECATED RDW RBC AUTO: 46.7 FL (ref 37–54)
EOSINOPHIL # BLD AUTO: 0.11 10*3/MM3 (ref 0–0.4)
EOSINOPHIL NFR BLD AUTO: 1 % (ref 0.3–6.2)
ERYTHROCYTE [DISTWIDTH] IN BLOOD BY AUTOMATED COUNT: 14.2 % (ref 12.3–15.4)
GFR SERPL CREATININE-BSD FRML MDRD: 114 ML/MIN/1.73
GLOBULIN UR ELPH-MCNC: 3.5 GM/DL
GLUCOSE BLD-MCNC: 136 MG/DL (ref 65–99)
HBA1C MFR BLD: 6.93 % (ref 4.8–5.6)
HCT VFR BLD AUTO: 48.2 % (ref 34–46.6)
HDLC SERPL-MCNC: 48 MG/DL (ref 40–60)
HGB BLD-MCNC: 14.9 G/DL (ref 12–15.9)
IMM GRANULOCYTES # BLD AUTO: 0.11 10*3/MM3 (ref 0–0.05)
IMM GRANULOCYTES NFR BLD AUTO: 1 % (ref 0–0.5)
LDLC SERPL CALC-MCNC: 86 MG/DL (ref 0–100)
LDLC/HDLC SERPL: 1.78 {RATIO}
LYMPHOCYTES # BLD AUTO: 2.29 10*3/MM3 (ref 0.7–3.1)
LYMPHOCYTES NFR BLD AUTO: 21.3 % (ref 19.6–45.3)
MCH RBC QN AUTO: 27.9 PG (ref 26.6–33)
MCHC RBC AUTO-ENTMCNC: 30.9 G/DL (ref 31.5–35.7)
MCV RBC AUTO: 90.3 FL (ref 79–97)
MONOCYTES # BLD AUTO: 0.49 10*3/MM3 (ref 0.1–0.9)
MONOCYTES NFR BLD AUTO: 4.6 % (ref 5–12)
NEUTROPHILS # BLD AUTO: 7.67 10*3/MM3 (ref 1.4–7)
NEUTROPHILS NFR BLD AUTO: 71.4 % (ref 42.7–76)
NRBC BLD AUTO-RTO: 0 /100 WBC (ref 0–0)
PLATELET # BLD AUTO: 396 10*3/MM3 (ref 140–450)
PMV BLD AUTO: 9.7 FL (ref 6–12)
POTASSIUM BLD-SCNC: 5.1 MMOL/L (ref 3.5–5.2)
PROT SERPL-MCNC: 7.6 G/DL (ref 6–8.5)
RBC # BLD AUTO: 5.34 10*6/MM3 (ref 3.77–5.28)
SODIUM BLD-SCNC: 137 MMOL/L (ref 136–145)
TRIGL SERPL-MCNC: 327 MG/DL (ref 0–150)
TSH SERPL DL<=0.05 MIU/L-ACNC: 1.36 MIU/ML (ref 0.27–4.2)
VLDLC SERPL-MCNC: 65.4 MG/DL (ref 5–40)
WBC NRBC COR # BLD: 10.74 10*3/MM3 (ref 3.4–10.8)

## 2019-03-15 PROCEDURE — 80061 LIPID PANEL: CPT

## 2019-03-15 PROCEDURE — 83036 HEMOGLOBIN GLYCOSYLATED A1C: CPT

## 2019-03-15 PROCEDURE — 82306 VITAMIN D 25 HYDROXY: CPT

## 2019-03-15 PROCEDURE — 80053 COMPREHEN METABOLIC PANEL: CPT

## 2019-03-15 PROCEDURE — 85025 COMPLETE CBC W/AUTO DIFF WBC: CPT

## 2019-03-15 PROCEDURE — 36415 COLL VENOUS BLD VENIPUNCTURE: CPT

## 2019-03-15 PROCEDURE — 82533 TOTAL CORTISOL: CPT | Performed by: GENERAL PRACTICE

## 2019-03-15 PROCEDURE — 84443 ASSAY THYROID STIM HORMONE: CPT

## 2019-03-17 ENCOUNTER — RESULTS ENCOUNTER (OUTPATIENT)
Dept: FAMILY MEDICINE CLINIC | Facility: CLINIC | Age: 34
End: 2019-03-17

## 2019-03-17 DIAGNOSIS — E28.2 PCOS (POLYCYSTIC OVARIAN SYNDROME): ICD-10-CM

## 2019-03-18 ENCOUNTER — OFFICE VISIT (OUTPATIENT)
Dept: CARDIOLOGY | Facility: CLINIC | Age: 34
End: 2019-03-18

## 2019-03-18 VITALS
DIASTOLIC BLOOD PRESSURE: 81 MMHG | HEART RATE: 89 BPM | BODY MASS INDEX: 41.32 KG/M2 | SYSTOLIC BLOOD PRESSURE: 119 MMHG | RESPIRATION RATE: 16 BRPM | HEIGHT: 65 IN | WEIGHT: 248 LBS

## 2019-03-18 DIAGNOSIS — I10 ESSENTIAL HYPERTENSION: ICD-10-CM

## 2019-03-18 DIAGNOSIS — E28.2 PCOS (POLYCYSTIC OVARIAN SYNDROME): ICD-10-CM

## 2019-03-18 DIAGNOSIS — I87.2 CHRONIC VENOUS INSUFFICIENCY: ICD-10-CM

## 2019-03-18 DIAGNOSIS — Z72.0 TOBACCO USE: ICD-10-CM

## 2019-03-18 DIAGNOSIS — F41.9 ANXIETY: ICD-10-CM

## 2019-03-18 DIAGNOSIS — R06.09 DYSPNEA ON EXERTION: ICD-10-CM

## 2019-03-18 DIAGNOSIS — E66.01 MORBID OBESITY (HCC): ICD-10-CM

## 2019-03-18 DIAGNOSIS — K21.9 GASTROESOPHAGEAL REFLUX DISEASE WITHOUT ESOPHAGITIS: ICD-10-CM

## 2019-03-18 DIAGNOSIS — F31.77 BIPOLAR DISORDER, IN PARTIAL REMISSION, MOST RECENT EPISODE MIXED (HCC): ICD-10-CM

## 2019-03-18 DIAGNOSIS — R06.83 SNORING: ICD-10-CM

## 2019-03-18 DIAGNOSIS — R00.2 PALPITATIONS: Primary | ICD-10-CM

## 2019-03-18 PROCEDURE — 99213 OFFICE O/P EST LOW 20 MIN: CPT | Performed by: INTERNAL MEDICINE

## 2019-03-18 NOTE — PROGRESS NOTES
"Jessica Harris  1985 03/18/2019   Ref:No referring provider defined for this encounter.  Pcp: Vimal Walker PA  [address] [id number]    Follow up for:  Chief Complaint   Patient presents with   • Palpitations     Holter monitor follow up        Patient Active Problem List   Diagnosis   • Palpitations   • Bipolar disorder, in partial remission, most recent episode mixed (CMS/HCC)   • Smoker   • Gastroesophageal reflux disease without esophagitis   • Chronic venous insufficiency   • PCOS (polycystic ovarian syndrome)   • Healthcare maintenance   • Vitamin D deficiency   • Snoring   • History of nephrolithiasis   • Essential hypertension   • Anxiety   • Morbid obesity (CMS/HCC)   • Tobacco use       HPI     Jessica aHrris is a 34 yo female who presents to our office today for a follow up of palpitations. Patient recently had a holter monitor and is here to obtain the results. Patient notes taking the Metoprolol Tartrate 25 mg BID; it would only work for 5 hours and then the palpitations would come back.She noted rapid, regular hard heart beats. Patients PCP stopped Metoprolol Tartrate and started her on Propranolol  mg daily and she stated she is now doing much better with little to no palpitations while taking this medication. Patient notes when she exerts herself she no longer gets short of breath. Patient denies chest pain, edema, dizziness or syncope. Patient notes that she snores and wakes up gasping for air.  She suspects she has sleep apnea, but doesn't want testing because she is afraid that a mask would \"smother\" her. She still smokes 1/2 pack / day and wants to quit, but she is allergic to the skin patches and gets a rash. She is now drinking decaffeinated tea. She is very inactive. Tobacco use 1/2 ppd.    Review of Systems   Cardiovascular: Negative for chest pain.   Respiratory: Positive for snoring.    Psychiatric/Behavioral: Positive for depression. The patient is " "nervous/anxious.          Current Outpatient Medications:   •  busPIRone (BUSPAR) 10 MG tablet, Take 10 mg by mouth 2 (Two) Times a Day As Needed., Disp: , Rfl: 1  •  dexamethasone (DECADRON) 1 MG tablet, 1 po single dose the night before labs, Disp: 1 tablet, Rfl: 0  •  folic acid (FOLVITE) 1 MG tablet, Take 1,000 mcg by mouth Daily., Disp: , Rfl: 2  •  lurasidone HCl (LATUDA) 60 MG tablet tablet, Take 1 tablet by mouth Daily., Disp: 30 tablet, Rfl: 1  •  mupirocin (BACTROBAN) 2 % ointment, APPLY TO AFFECTED AREA THREE TIMES A DAY FOR 10 DAYS, Disp: , Rfl: 3  •  pantoprazole (PROTONIX) 40 MG EC tablet, Take 40 mg by mouth Daily., Disp: , Rfl: 3  •  propranolol LA (INDERAL LA) 120 MG 24 hr capsule, Take 1 capsule by mouth Daily., Disp: 30 capsule, Rfl: 5  •  QUEtiapine (SEROquel) 50 MG tablet, Take 50 mg by mouth every night at bedtime., Disp: , Rfl: 1  •  spironolactone (ALDACTONE) 100 MG tablet, TAKE ONE TABLET BY MOUTH EVERY DAY FOR FLUID, Disp: , Rfl: 5  •  SPRINTEC 28 0.25-35 MG-MCG per tablet, Take 1 tablet by mouth Daily., Disp: , Rfl: 11  •  vitamin D (ERGOCALCIFEROL) 19215 units capsule capsule, TAKE ONE CAPSULE BY MOUTH EVERY WEEK FOR VITAMIN DEFICIENCY, Disp: , Rfl: 0    No Known Allergies    /81 (BP Location: Left arm)   Pulse 89   Resp 16   Ht 165.1 cm (65\")   Wt 112 kg (248 lb)   BMI 41.27 kg/m²        Physical Exam   Constitutional: She appears well-developed and well-nourished.   Morbid obesity     Cardiovascular: Normal rate, regular rhythm, normal heart sounds and intact distal pulses. Exam reveals no gallop and no friction rub.   No murmur heard.  Pulses:       Carotid pulses are 2+ on the right side, and 2+ on the left side.       Dorsalis pedis pulses are 2+ on the right side, and 2+ on the left side.        Posterior tibial pulses are 2+ on the right side, and 2+ on the left side.   Pulmonary/Chest: Effort normal and breath sounds normal. No respiratory distress. She has no wheezes. " She has no rales. She exhibits no tenderness.   Abdominal: She exhibits no distension and no mass. There is no tenderness. There is no rebound and no guarding.   Morbid obesity    Musculoskeletal: She exhibits no edema, tenderness or deformity.   Neurological: She displays normal reflexes. No cranial nerve deficit. She exhibits normal muscle tone. Coordination normal.   Skin: Skin is warm and dry. No rash noted. No erythema. No pallor.   Psychiatric: She has a normal mood and affect. Her behavior is normal. Judgment and thought content normal.   :    Lab Review:    Procedures    Lab Results   Component Value Date     03/15/2019    K 5.1 03/15/2019    CL 97 (L) 03/15/2019    CO2 25.8 03/15/2019    BUN 6 03/15/2019    CREATININE 0.61 03/15/2019    GLUCOSE 136 (H) 03/15/2019    CALCIUM 10.0 03/15/2019     (H) 03/15/2019    ALT 55 (H) 03/15/2019    ALKPHOS 69 03/15/2019    LABIL2 1.5 01/29/2015     No results found for: CKTOTAL  Lab Results   Component Value Date    WBC 10.74 03/15/2019    HGB 14.9 03/15/2019    HCT 48.2 (H) 03/15/2019     03/15/2019     Lab Results   Component Value Date    INR 0.88 (L) 02/13/2019     No results found for: MG  Lab Results   Component Value Date    TSH 1.360 03/15/2019    TRIG 327 (H) 03/15/2019    HDL 48 03/15/2019    LDL 86 03/15/2019      Lab Results   Component Value Date    BNP 2.0 02/13/2019       Chemistry        Component Value Date/Time     03/15/2019 0742    K 5.1 03/15/2019 0742    CL 97 (L) 03/15/2019 0742    CO2 25.8 03/15/2019 0742    BUN 6 03/15/2019 0742    CREATININE 0.61 03/15/2019 0742        Component Value Date/Time    CALCIUM 10.0 03/15/2019 0742    ALKPHOS 69 03/15/2019 0742     (H) 03/15/2019 0742    ALT 55 (H) 03/15/2019 0742    BILITOT 0.2 03/15/2019 0742        Echo 2/15/19:        Holter 2/15/19:        Impression:   Diagnosis Plan   1.  Palpitations, sinus tachycardia, improved     2. Essential hypertension     3. Chronic  venous insufficiency     4. Tobacco use     5. CURRY, improved     6. Snoring, possible ANNY     7. PCOS (polycystic ovarian syndrome)     8. Morbid obesity (CMS/HCC)     9. Bipolar disorder, in partial remission, most recent episode mixed (CMS/HCC)     10. Anxiety     11. Gastroesophageal reflux disease without esophagitis         Plan:  No orders of the defined types were placed in this encounter.  1. Suggest nicotine gum.    2.  Weight loss.  3. Increased activity.  4. Continue medications.  5. Sleep study.  She will consider, but afraid of CPAP.      Return in about 4 months (around 7/18/2019).     This document signed by Octavio Paulson MD March 18, 2019 2:31 PM     I, Octavio Paulson MD, personally performed the services described in this documentation as scribed by the above named individual in my presence, and it is both accurate and complete.  3/18/2019  2:31 PM    Scribed for Octavio Paulson MD by Cathy Walsh CMA. 3/18/2019  2:31 PM

## 2019-03-26 ENCOUNTER — OFFICE VISIT (OUTPATIENT)
Dept: FAMILY MEDICINE CLINIC | Facility: CLINIC | Age: 34
End: 2019-03-26

## 2019-03-26 DIAGNOSIS — E55.9 VITAMIN D DEFICIENCY: ICD-10-CM

## 2019-03-26 DIAGNOSIS — R00.2 PALPITATIONS: Primary | ICD-10-CM

## 2019-03-26 DIAGNOSIS — Z00.00 HEALTHCARE MAINTENANCE: ICD-10-CM

## 2019-03-26 DIAGNOSIS — E66.01 MORBID OBESITY (HCC): ICD-10-CM

## 2019-03-26 DIAGNOSIS — Z23 ENCOUNTER FOR IMMUNIZATION: ICD-10-CM

## 2019-03-26 DIAGNOSIS — F17.200 SMOKER: ICD-10-CM

## 2019-03-26 DIAGNOSIS — I10 ESSENTIAL HYPERTENSION: ICD-10-CM

## 2019-03-26 DIAGNOSIS — E28.2 PCOS (POLYCYSTIC OVARIAN SYNDROME): ICD-10-CM

## 2019-03-26 DIAGNOSIS — E11.9 TYPE 2 DIABETES MELLITUS WITHOUT COMPLICATION, WITHOUT LONG-TERM CURRENT USE OF INSULIN (HCC): ICD-10-CM

## 2019-03-26 DIAGNOSIS — F31.77 BIPOLAR DISORDER, IN PARTIAL REMISSION, MOST RECENT EPISODE MIXED (HCC): ICD-10-CM

## 2019-03-26 DIAGNOSIS — K21.9 GASTROESOPHAGEAL REFLUX DISEASE WITHOUT ESOPHAGITIS: ICD-10-CM

## 2019-03-26 DIAGNOSIS — I87.2 CHRONIC VENOUS INSUFFICIENCY: ICD-10-CM

## 2019-03-26 PROBLEM — Z72.0 TOBACCO USE: Status: RESOLVED | Noted: 2019-03-18 | Resolved: 2019-03-26

## 2019-03-26 PROBLEM — F41.9 ANXIETY: Status: RESOLVED | Noted: 2019-03-18 | Resolved: 2019-03-26

## 2019-03-26 PROCEDURE — 99407 BEHAV CHNG SMOKING > 10 MIN: CPT | Performed by: GENERAL PRACTICE

## 2019-03-26 PROCEDURE — 90732 PPSV23 VACC 2 YRS+ SUBQ/IM: CPT | Performed by: GENERAL PRACTICE

## 2019-03-26 PROCEDURE — 90471 IMMUNIZATION ADMIN: CPT | Performed by: GENERAL PRACTICE

## 2019-03-26 PROCEDURE — 90632 HEPA VACCINE ADULT IM: CPT | Performed by: GENERAL PRACTICE

## 2019-03-26 PROCEDURE — 99214 OFFICE O/P EST MOD 30 MIN: CPT | Performed by: GENERAL PRACTICE

## 2019-03-26 PROCEDURE — 90472 IMMUNIZATION ADMIN EACH ADD: CPT | Performed by: GENERAL PRACTICE

## 2019-03-26 RX ORDER — ERGOCALCIFEROL 1.25 MG/1
50000 CAPSULE ORAL
Qty: 4 CAPSULE | Refills: 5 | Status: SHIPPED | OUTPATIENT
Start: 2019-03-26 | End: 2019-08-19 | Stop reason: SDUPTHER

## 2019-03-26 RX ORDER — METFORMIN HYDROCHLORIDE 500 MG/1
500 TABLET, EXTENDED RELEASE ORAL DAILY
Qty: 30 TABLET | Refills: 5 | Status: SHIPPED | OUTPATIENT
Start: 2019-03-26 | End: 2019-10-01 | Stop reason: SDUPTHER

## 2019-03-27 VITALS
WEIGHT: 247 LBS | RESPIRATION RATE: 12 BRPM | SYSTOLIC BLOOD PRESSURE: 120 MMHG | BODY MASS INDEX: 41.15 KG/M2 | DIASTOLIC BLOOD PRESSURE: 70 MMHG | HEART RATE: 86 BPM | HEIGHT: 65 IN | OXYGEN SATURATION: 97 % | TEMPERATURE: 98.2 F

## 2019-04-03 ENCOUNTER — TELEPHONE (OUTPATIENT)
Dept: FAMILY MEDICINE CLINIC | Facility: CLINIC | Age: 34
End: 2019-04-03

## 2019-04-03 RX ORDER — LOPERAMIDE HYDROCHLORIDE 2 MG/1
2 TABLET ORAL DAILY
Qty: 30 TABLET | Refills: 5 | Status: SHIPPED | OUTPATIENT
Start: 2019-04-03 | End: 2019-04-03

## 2019-04-03 RX ORDER — DICYCLOMINE HYDROCHLORIDE 10 MG/1
10 CAPSULE ORAL 2 TIMES DAILY PRN
Qty: 60 CAPSULE | Refills: 5 | Status: SHIPPED | OUTPATIENT
Start: 2019-04-03 | End: 2019-07-25

## 2019-04-03 NOTE — TELEPHONE ENCOUNTER
----- Message from Jamie Santos MD sent at 4/3/2019  9:48 AM EDT -----  Emailed  ----- Message -----  From: Jeanette Saez MA  Sent: 4/3/2019   9:28 AM  To: Jamie Santos MD    Patient stated that imodium doesn't work for her she says it stops her up to much. She said that bentyl works best for her. Could you send her in some of that?   ----- Message -----  From: Jamie Santos MD  Sent: 4/3/2019   9:16 AM  To: Jeanette Saez MA    I have emailed a prescription for Imodium -she should take 1 before supper  ----- Message -----  From: Jeanette Saez MA  Sent: 4/2/2019   3:42 PM  To: Jamie Santos MD    She said that she is taking the medicine after supper and that she is having diarrhea.   ----- Message -----  From: Jamie Snatos MD  Sent: 4/2/2019   3:28 PM  To: Jeanette Saez MA    There really isnt anything that helps with the pain - it will generally resolve in 1-2 weeks  She taking the metformin right after supper?  Any nausea or diarrhea?    ----- Message -----  From: Jeanette Saez MA  Sent: 4/2/2019   2:12 PM  To: Jamie Santos MD    Patient stated that she has been having a lot of stomach pain since starting metformin. Could you send her some medicine in for her stomach?

## 2019-04-09 ENCOUNTER — OFFICE VISIT (OUTPATIENT)
Dept: PSYCHIATRY | Facility: CLINIC | Age: 34
End: 2019-04-09

## 2019-04-09 VITALS
DIASTOLIC BLOOD PRESSURE: 94 MMHG | HEART RATE: 98 BPM | WEIGHT: 243 LBS | BODY MASS INDEX: 40.48 KG/M2 | SYSTOLIC BLOOD PRESSURE: 134 MMHG | HEIGHT: 65 IN

## 2019-04-09 DIAGNOSIS — F31.30 BIPOLAR I DISORDER, MOST RECENT EPISODE DEPRESSED (HCC): ICD-10-CM

## 2019-04-09 PROCEDURE — 99214 OFFICE O/P EST MOD 30 MIN: CPT | Performed by: NURSE PRACTITIONER

## 2019-04-09 RX ORDER — BUSPIRONE HYDROCHLORIDE 10 MG/1
10 TABLET ORAL 2 TIMES DAILY PRN
Qty: 60 TABLET | Refills: 1 | Status: SHIPPED | OUTPATIENT
Start: 2019-04-09 | End: 2019-04-16 | Stop reason: SDUPTHER

## 2019-04-09 RX ORDER — LURASIDONE HYDROCHLORIDE 80 MG/1
80 TABLET, FILM COATED ORAL DAILY
Qty: 30 TABLET | Refills: 0 | Status: SHIPPED | OUTPATIENT
Start: 2019-04-09 | End: 2019-04-16 | Stop reason: SDUPTHER

## 2019-04-09 RX ORDER — QUETIAPINE FUMARATE 50 MG/1
50 TABLET, FILM COATED ORAL
Qty: 30 TABLET | Refills: 1 | Status: SHIPPED | OUTPATIENT
Start: 2019-04-09 | End: 2019-04-16 | Stop reason: SDUPTHER

## 2019-04-09 NOTE — PROGRESS NOTES
Subjective   Jessica Harris is a 33 y.o. female who is here today for initial appointment to evaluate for medication options.     Chief Complaint:  Bipolar     HPI:  History of Present Illness   Patient presents for follow-up today. She reports depression has worsened and she has quit her job. Poor motivation and interest, not wanting to get out of bed or shower. She reports she would like to get outside and walk her dog but can't motivate herself to get off the couch. She reports she recently applied for disability. She reports sleep has been good. Appetite is good.  Appetite is good. She denies suicidal and homicidal ideations. She denies auditory and visual hallucinations. She reports compliance with medications and is tolerating without side effects. She reports she was recently diagnosed with Diabetes and started on metformin.       Family Psychiatric History:  family history includes Cancer in her mother and other; Heart disease in her other; Lung disease in her mother; No Known Problems in her father; Stroke in her mother and other.    Medical/Surgical History:  Past Medical History:   Diagnosis Date   • Anxiety    • Bipolar 1 disorder (CMS/[facility])    • Depression    • GERD (gastroesophageal reflux disease)    • History of bronchitis    • History of ear infections    • History of stomach ulcers    • History of streptococcal infection    • Hypertension    • Urinary tract infection      Past Surgical History:   Procedure Laterality Date   • ADENOIDECTOMY     • CHOLECYSTECTOMY     • DENTAL PROCEDURE     • HERNIA REPAIR     • TONSILLECTOMY     • WISDOM TOOTH EXTRACTION         No Known Allergies        Current Medications:   Current Outpatient Medications   Medication Sig Dispense Refill   • busPIRone (BUSPAR) 10 MG tablet Take 1 tablet by mouth 2 (Two) Times a Day As Needed (anxiety). 60 tablet 1   • dicyclomine (BENTYL) 10 MG capsule Take 1 capsule by mouth 2 (Two) Times a Day As Needed (Abdominal cramping and  "diarrhea). 60 capsule 5   • folic acid (FOLVITE) 1 MG tablet Take 1,000 mcg by mouth Daily.  2   • Lurasidone HCl 80 MG tablet Take 80 mg by mouth Daily. 30 tablet 0   • metFORMIN ER (GLUCOPHAGE-XR) 500 MG 24 hr tablet Take 1 tablet by mouth Daily. 30 tablet 5   • mupirocin (BACTROBAN) 2 % ointment APPLY TO AFFECTED AREA THREE TIMES A DAY FOR 10 DAYS  3   • pantoprazole (PROTONIX) 40 MG EC tablet Take 40 mg by mouth Daily.  3   • propranolol LA (INDERAL LA) 120 MG 24 hr capsule Take 1 capsule by mouth Daily. 30 capsule 5   • QUEtiapine (SEROquel) 50 MG tablet Take 1 tablet by mouth every night at bedtime. 30 tablet 1   • spironolactone (ALDACTONE) 100 MG tablet TAKE ONE TABLET BY MOUTH EVERY DAY FOR FLUID  5   • SPRINTEC 28 0.25-35 MG-MCG per tablet Take 1 tablet by mouth Daily.  11   • vitamin D (ERGOCALCIFEROL) 10887 units capsule capsule Take 1 capsule by mouth Every 7 (Seven) Days. 4 capsule 5   • dexamethasone (DECADRON) 1 MG tablet 1 po single dose the night before labs 1 tablet 0     No current facility-administered medications for this visit.          Review of Systems   Constitutional: Positive for fatigue.   HENT: Negative.    Eyes: Negative.    Respiratory: Negative.    Cardiovascular: Negative.    Gastrointestinal: Negative.    Endocrine: Negative.    Genitourinary: Negative.    Musculoskeletal: Negative.    Skin: Negative.    Allergic/Immunologic: Negative.    Neurological: Negative.    Hematological: Negative.    Psychiatric/Behavioral: Positive for decreased concentration.    denies HEENT, cardiovascular, respiratory, liver, renal, GI/, endocrine, neuro, DERM, hematology, immunology, musculoskeletal disorders.    Objective   Physical Exam   Constitutional: She appears well-developed and well-nourished. No distress.   Vitals reviewed.    Blood pressure 134/94, pulse 98, height 165.1 cm (65\"), weight 110 kg (243 lb).    Mental Status Exam:   Hygiene:   good  Cooperation:  Cooperative  Eye Contact:  " Good  Psychomotor Behavior:  Appropriate  Affect:  Full range  Hopelessness: Denies  Speech:  Normal  Thought Process:  Goal directed and Linear  Thought Content:  Normal  Suicidal:  None  Homicidal:  None  Hallucinations:  None  Delusion:  None  Memory:  Intact  Orientation:  Person, Place, Time and Situation  Reliability:  good  Insight:  Good  Judgement:  Good  Impulse Control:  Good  Physical/Medical Issues:  Yes PCOS, GERD, Tachycardia       Short-term goals: Patient will be compliant with clinic appointments.  Patient will be engaged in therapy, medication compliant with minimal side effects. Patient  will report decrease of symptoms and frequency.    Long-term goals: Patient will have minimal symptoms of Bipolar depression with continued medication management. Patient will be compliant with treatment and appointments.       Problem list: Needs to establish care   Strengths: Educated, employed   Weaknesses: Limited support     Assessment/Plan   Diagnoses and all orders for this visit:    Bipolar I disorder, most recent episode depressed (CMS/[facility])  -     busPIRone (BUSPAR) 10 MG tablet; Take 1 tablet by mouth 2 (Two) Times a Day As Needed (anxiety).  -     Lurasidone HCl 80 MG tablet; Take 80 mg by mouth Daily.  -     QUEtiapine (SEROquel) 50 MG tablet; Take 1 tablet by mouth every night at bedtime.      Functionality: pt having significant impairment in important areas of daily functioning.  Body mass index is 40.44 kg/m².  Patient was educated on healthier and more balanced diet choices and encouraged exercise within physical limitations.  Prognosis: Good dependent on medication/follow up and treatment plan compliance.      Impression: Patient having an increase in depression.     Plan:  1. Increase Latuda 80 mg po daily for Bipolar depression.  2. Continue Buspar 10 mg po BID prn anxiety  3. Continue Seroquel 50 mg po at night for sleep disturbance   4. RTC in 4 weeks       Discussed medication options.  Discussed the risks, benefits, and side effects of the medication; client acknowledged and verbally consented.  Patient is aware to contact the [facility] with any worsening of symptom.  Patient is agreeable to go to the ER or call 911 should they begin SI/HI.    Return in 4 weeks

## 2019-04-16 ENCOUNTER — OFFICE VISIT (OUTPATIENT)
Dept: PSYCHIATRY | Facility: CLINIC | Age: 34
End: 2019-04-16

## 2019-04-16 VITALS
SYSTOLIC BLOOD PRESSURE: 129 MMHG | HEART RATE: 91 BPM | HEIGHT: 65 IN | BODY MASS INDEX: 40.62 KG/M2 | WEIGHT: 243.8 LBS | DIASTOLIC BLOOD PRESSURE: 89 MMHG

## 2019-04-16 DIAGNOSIS — F31.30 BIPOLAR I DISORDER, MOST RECENT EPISODE DEPRESSED (HCC): ICD-10-CM

## 2019-04-16 PROCEDURE — 99213 OFFICE O/P EST LOW 20 MIN: CPT | Performed by: NURSE PRACTITIONER

## 2019-04-16 RX ORDER — BUSPIRONE HYDROCHLORIDE 10 MG/1
10 TABLET ORAL 2 TIMES DAILY PRN
Qty: 60 TABLET | Refills: 1 | Status: SHIPPED | OUTPATIENT
Start: 2019-04-16 | End: 2019-05-14 | Stop reason: SDUPTHER

## 2019-04-16 RX ORDER — QUETIAPINE FUMARATE 50 MG/1
50 TABLET, FILM COATED ORAL
Qty: 30 TABLET | Refills: 1 | Status: SHIPPED | OUTPATIENT
Start: 2019-04-16 | End: 2019-05-14 | Stop reason: SDUPTHER

## 2019-04-16 RX ORDER — LURASIDONE HYDROCHLORIDE 80 MG/1
80 TABLET, FILM COATED ORAL DAILY
Qty: 30 TABLET | Refills: 1 | Status: SHIPPED | OUTPATIENT
Start: 2019-04-16 | End: 2019-05-14 | Stop reason: SDUPTHER

## 2019-04-16 NOTE — PROGRESS NOTES
Subjective   Jessica Harris is a 33 y.o. female who is here today for follow-up    Chief Complaint: f/u Bipolar     HPI:  History of Present Illness   Patient presents for follow-up today. She reports depression has improved with increase in Latuda. Patient shares she feels she has ADD. She reports growing up she had difficulty with school. She reports she is not able to complete tasks and is having difficulty with focus and concentration. She reports she was last treated when she was a child. She reports sleep is good. Appetite is good. She denies suicidal and homicidal ideations. She denies auditory and visual hallucinations. She reports compliance with medications and is tolerating without side effects.       Family Psychiatric History:  family history includes Cancer in her mother and other; Heart disease in her other; Lung disease in her mother; No Known Problems in her father; Stroke in her mother and other.    Medical/Surgical History:  Past Medical History:   Diagnosis Date   • Anxiety    • Bipolar 1 disorder (CMS/HCC)    • Depression    • GERD (gastroesophageal reflux disease)    • History of bronchitis    • History of ear infections    • History of stomach ulcers    • History of streptococcal infection    • Hypertension    • Urinary tract infection      Past Surgical History:   Procedure Laterality Date   • ADENOIDECTOMY     • CHOLECYSTECTOMY     • DENTAL PROCEDURE     • HERNIA REPAIR     • TONSILLECTOMY     • WISDOM TOOTH EXTRACTION         No Known Allergies        Current Medications:   Current Outpatient Medications   Medication Sig Dispense Refill   • busPIRone (BUSPAR) 10 MG tablet Take 1 tablet by mouth 2 (Two) Times a Day As Needed (anxiety). 60 tablet 1   • dexamethasone (DECADRON) 1 MG tablet 1 po single dose the night before labs 1 tablet 0   • dicyclomine (BENTYL) 10 MG capsule Take 1 capsule by mouth 2 (Two) Times a Day As Needed (Abdominal cramping and diarrhea). 60 capsule 5   • folic acid  "(FOLVITE) 1 MG tablet Take 1,000 mcg by mouth Daily.  2   • Lurasidone HCl 80 MG tablet Take 80 mg by mouth Daily. 30 tablet 1   • metFORMIN ER (GLUCOPHAGE-XR) 500 MG 24 hr tablet Take 1 tablet by mouth Daily. 30 tablet 5   • mupirocin (BACTROBAN) 2 % ointment APPLY TO AFFECTED AREA THREE TIMES A DAY FOR 10 DAYS  3   • pantoprazole (PROTONIX) 40 MG EC tablet Take 40 mg by mouth Daily.  3   • propranolol LA (INDERAL LA) 120 MG 24 hr capsule Take 1 capsule by mouth Daily. 30 capsule 5   • QUEtiapine (SEROquel) 50 MG tablet Take 1 tablet by mouth every night at bedtime. 30 tablet 1   • spironolactone (ALDACTONE) 100 MG tablet TAKE ONE TABLET BY MOUTH EVERY DAY FOR FLUID  5   • SPRINTEC 28 0.25-35 MG-MCG per tablet Take 1 tablet by mouth Daily.  11   • vitamin D (ERGOCALCIFEROL) 40677 units capsule capsule Take 1 capsule by mouth Every 7 (Seven) Days. 4 capsule 5     No current facility-administered medications for this visit.          Review of Systems   Constitutional: Positive for fatigue.   HENT: Negative.    Eyes: Negative.    Respiratory: Negative.    Cardiovascular: Negative.    Gastrointestinal: Negative.    Endocrine: Negative.    Genitourinary: Negative.    Musculoskeletal: Negative.    Skin: Negative.    Allergic/Immunologic: Negative.    Neurological: Negative.    Hematological: Negative.    Psychiatric/Behavioral: Positive for decreased concentration.    denies HEENT, cardiovascular, respiratory, liver, renal, GI/, endocrine, neuro, DERM, hematology, immunology, musculoskeletal disorders.    Objective   Physical Exam   Constitutional: She appears well-developed and well-nourished. No distress.   Vitals reviewed.    Blood pressure 129/89, pulse 91, height 165.1 cm (65\"), weight 111 kg (243 lb 12.8 oz).    Mental Status Exam:   Hygiene:   good  Cooperation:  Cooperative  Eye Contact:  Good  Psychomotor Behavior:  Appropriate  Affect:  Full range  Hopelessness: Denies  Speech:  Normal  Thought Process:  Goal " directed and Linear  Thought Content:  Normal  Suicidal:  None  Homicidal:  None  Hallucinations:  None  Delusion:  None  Memory:  Intact  Orientation:  Person, Place, Time and Situation  Reliability:  good  Insight:  Good  Judgement:  Good  Impulse Control:  Good  Physical/Medical Issues:  Yes PCOS, GERD, Tachycardia       Assessment/Plan   Diagnoses and all orders for this visit:    Bipolar I disorder, most recent episode depressed (CMS/HCC)  -     busPIRone (BUSPAR) 10 MG tablet; Take 1 tablet by mouth 2 (Two) Times a Day As Needed (anxiety).  -     Lurasidone HCl 80 MG tablet; Take 80 mg by mouth Daily.  -     QUEtiapine (SEROquel) 50 MG tablet; Take 1 tablet by mouth every night at bedtime.      Functionality: pt having some impairment in important areas of daily functioning.  Body mass index is 40.57 kg/m².  Patient was educated on healthier and more balanced diet choices and encouraged exercise within physical limitations.  Prognosis: Good dependent on medication/follow up and treatment plan compliance.      Impression: Depression improved. Issues with focus, concentration and completion of tasks.     Plan:  1. Continue Latuda 80 mg po daily for Bipolar depression.  2. Continue Buspar 10 mg po BID prn anxiety  3. Continue Seroquel 50 mg po at night for sleep disturbance   4. RTC in 1 month   5. CPT testing this visit to r/o ADD.       Discussed medication options. Discussed the risks, benefits, and side effects of the medication; client acknowledged and verbally consented.  Patient is aware to contact the [facility] with any worsening of symptom.  Patient is agreeable to go to the ER or call 911 should they begin SI/HI

## 2019-05-02 ENCOUNTER — OFFICE VISIT (OUTPATIENT)
Dept: FAMILY MEDICINE CLINIC | Facility: CLINIC | Age: 34
End: 2019-05-02

## 2019-05-02 VITALS
BODY MASS INDEX: 40.98 KG/M2 | OXYGEN SATURATION: 96 % | WEIGHT: 246 LBS | HEIGHT: 65 IN | HEART RATE: 103 BPM | DIASTOLIC BLOOD PRESSURE: 90 MMHG | TEMPERATURE: 98.6 F | SYSTOLIC BLOOD PRESSURE: 132 MMHG

## 2019-05-02 DIAGNOSIS — M79.10 MYALGIA: ICD-10-CM

## 2019-05-02 DIAGNOSIS — M62.838 MUSCLE SPASM: Primary | ICD-10-CM

## 2019-05-02 PROCEDURE — 99213 OFFICE O/P EST LOW 20 MIN: CPT | Performed by: PHYSICIAN ASSISTANT

## 2019-05-02 RX ORDER — CYCLOBENZAPRINE HCL 10 MG
10 TABLET ORAL NIGHTLY PRN
Qty: 30 TABLET | Refills: 2 | Status: SHIPPED | OUTPATIENT
Start: 2019-05-02 | End: 2019-07-25

## 2019-05-02 RX ORDER — IBUPROFEN 800 MG/1
800 TABLET ORAL 3 TIMES DAILY PRN
Qty: 90 TABLET | Refills: 2 | Status: SHIPPED | OUTPATIENT
Start: 2019-05-02 | End: 2019-10-08 | Stop reason: SDUPTHER

## 2019-05-02 NOTE — PROGRESS NOTES
"Subjective   Jessica Harris is a 33 y.o. female.       Chief Complaint -  Muscle pain    History of Present Illness -      Muscle pain-   She complains of muscle pain in left and right lower anterior ribs.  She reports muscle spasms began with coughing due to bronchitis one year ago.  The bronchitis and cough have resolved but muscle spasms continue intermittently.  She states they occur at work or in the middle of the night.  Worse with twisting at the waist.    The following portions of the patient's history were reviewed and updated as appropriate: allergies, current medications, past family history, past medical history, past social history, past surgical history and problem list.    Review of Systems   Constitutional: Negative for activity change, appetite change and fever.   HENT: Negative for ear pain, sinus pressure and sore throat.    Eyes: Negative for pain and visual disturbance.   Respiratory: Negative for cough and chest tightness.    Cardiovascular: Negative for chest pain and palpitations.   Gastrointestinal: Negative for abdominal pain, constipation, diarrhea, nausea and vomiting.   Endocrine: Negative for polydipsia and polyuria.   Genitourinary: Negative for dysuria and frequency.   Musculoskeletal: Positive for myalgias. Negative for back pain.   Skin: Negative for color change and rash.   Allergic/Immunologic: Negative for food allergies and immunocompromised state.   Neurological: Negative for dizziness, syncope and headaches.   Hematological: Negative for adenopathy. Does not bruise/bleed easily.   Psychiatric/Behavioral: Negative for hallucinations and suicidal ideas. The patient is not nervous/anxious.        /90   Pulse 103   Temp 98.6 °F (37 °C) (Temporal)   Ht 165.1 cm (65\")   Wt 112 kg (246 lb)   SpO2 96%   BMI 40.94 kg/m²   Results Encounter on 03/17/2019   Component Date Value Ref Range Status   • Cortisol 03/15/2019 1.58    mcg/dL Final     Lab Results   Component Value " Date    GLUCOSE 136 (H) 03/15/2019    BUN 6 03/15/2019    CREATININE 0.61 03/15/2019    EGFRIFNONA 114 03/15/2019    BCR 9.8 03/15/2019    K 5.1 03/15/2019    CO2 25.8 03/15/2019    CALCIUM 10.0 03/15/2019    ALBUMIN 4.10 03/15/2019    LABIL2 1.5 01/29/2015     (H) 03/15/2019    ALT 55 (H) 03/15/2019         Physical Exam   Constitutional: She is oriented to person, place, and time. She appears well-developed and well-nourished.   HENT:   Head: Normocephalic and atraumatic.   Nose: Nose normal.   Mouth/Throat: Oropharynx is clear and moist.   Eyes: Conjunctivae and EOM are normal. Pupils are equal, round, and reactive to light.   Neck: Normal range of motion. Neck supple. No tracheal deviation present. No thyromegaly present.   Cardiovascular: Normal rate, regular rhythm, normal heart sounds and intact distal pulses.   No murmur heard.  Pulmonary/Chest: Effort normal and breath sounds normal. No respiratory distress. She has no wheezes.   Abdominal: Soft. Bowel sounds are normal. There is no tenderness. There is no guarding.   Musculoskeletal: Normal range of motion. She exhibits no edema or tenderness.   Lymphadenopathy:     She has no cervical adenopathy.   Neurological: She is alert and oriented to person, place, and time.   Skin: Skin is warm and dry. No rash noted.   Psychiatric: She has a normal mood and affect. Her behavior is normal.   Nursing note and vitals reviewed.      Assessment/Plan     Diagnoses and all orders for this visit:    Muscle spasm  -     cyclobenzaprine (FLEXERIL) 10 MG tablet; Take 1 tablet by mouth At Night As Needed for Muscle Spasms.    Myalgia  -     ibuprofen (ADVIL,MOTRIN) 800 MG tablet; Take 1 tablet by mouth 3 (Three) Times a Day As Needed for Mild Pain  or Moderate Pain .      Symptomatic are advised including heat and stretching regularly.    She declines PT referral or xray chest/ribs at this time.            This document has been electronically signed by:  Marie Cuellar  TERRELL

## 2019-05-14 ENCOUNTER — OFFICE VISIT (OUTPATIENT)
Dept: PSYCHIATRY | Facility: CLINIC | Age: 34
End: 2019-05-14

## 2019-05-14 VITALS
WEIGHT: 246.8 LBS | HEIGHT: 65 IN | SYSTOLIC BLOOD PRESSURE: 136 MMHG | HEART RATE: 97 BPM | BODY MASS INDEX: 41.12 KG/M2 | DIASTOLIC BLOOD PRESSURE: 91 MMHG

## 2019-05-14 DIAGNOSIS — F51.05 INSOMNIA DUE TO MENTAL DISORDER: ICD-10-CM

## 2019-05-14 DIAGNOSIS — F31.30 BIPOLAR I DISORDER, MOST RECENT EPISODE DEPRESSED (HCC): Primary | ICD-10-CM

## 2019-05-14 PROCEDURE — 99214 OFFICE O/P EST MOD 30 MIN: CPT | Performed by: NURSE PRACTITIONER

## 2019-05-14 RX ORDER — QUETIAPINE FUMARATE 50 MG/1
50 TABLET, FILM COATED ORAL
Qty: 30 TABLET | Refills: 1 | Status: SHIPPED | OUTPATIENT
Start: 2019-05-14 | End: 2019-06-03 | Stop reason: SDUPTHER

## 2019-05-14 RX ORDER — BUSPIRONE HYDROCHLORIDE 10 MG/1
10 TABLET ORAL 2 TIMES DAILY PRN
Qty: 60 TABLET | Refills: 1 | Status: SHIPPED | OUTPATIENT
Start: 2019-05-14 | End: 2019-08-22 | Stop reason: SDUPTHER

## 2019-05-14 RX ORDER — BUPROPION HYDROCHLORIDE 100 MG/1
100 TABLET, EXTENDED RELEASE ORAL 2 TIMES DAILY
Qty: 60 TABLET | Refills: 0 | Status: SHIPPED | OUTPATIENT
Start: 2019-05-14 | End: 2019-06-13 | Stop reason: SDUPTHER

## 2019-05-14 RX ORDER — LURASIDONE HYDROCHLORIDE 80 MG/1
80 TABLET, FILM COATED ORAL DAILY
Qty: 30 TABLET | Refills: 1 | Status: SHIPPED | OUTPATIENT
Start: 2019-05-14 | End: 2019-06-13 | Stop reason: SDUPTHER

## 2019-05-14 NOTE — PROGRESS NOTES
Subjective   Jessica Harris is a 33 y.o. female who is here today for follow-up    Chief Complaint: f/u Bipolar     HPI:  History of Present Illness   Patient presents for follow-up today. She reports depression has improved. She continues to struggle with motivation. She reports depression has improved with increase in Latuda. Sleep is good with Seroquel. She shares if she doesn't take it she is up all night. Appetite is good. She denies suicidal and homicidal ideations. She denies auditory and visual hallucinations. She reports compliance with medications and is tolerating without side effects. Patient shares she has been searching for a job. 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 Weight is stable. She continues to take Metformin for PCOS. Tachycardia controlled.       Family Psychiatric History:  family history includes Cancer in her mother and other; Heart disease in her other; Lung disease in her mother; No Known Problems in her father; Stroke in her mother and other.    Medical/Surgical History:  Past Medical History:   Diagnosis Date   • Anxiety    • Bipolar 1 disorder (CMS/[facility])    • Depression    • GERD (gastroesophageal reflux disease)    • History of bronchitis    • History of ear infections    • History of stomach ulcers    • History of streptococcal infection    • Hypertension    • Urinary tract infection      Past Surgical History:   Procedure Laterality Date   • ADENOIDECTOMY     • CHOLECYSTECTOMY     • DENTAL PROCEDURE     • HERNIA REPAIR     • TONSILLECTOMY     • WISDOM TOOTH EXTRACTION         No Known Allergies        Current Medications:   Current Outpatient Medications   Medication Sig Dispense Refill   • busPIRone (BUSPAR) 10 MG tablet Take 1 tablet by mouth 2 (Two) Times a Day As Needed (anxiety). 60 tablet 1   • cyclobenzaprine (FLEXERIL) 10 MG tablet Take 1 tablet by mouth At Night As Needed for Muscle Spasms. 30 tablet 2   • dicyclomine  "(BENTYL) 10 MG capsule Take 1 capsule by mouth 2 (Two) Times a Day As Needed (Abdominal cramping and diarrhea). 60 capsule 5   • ibuprofen (ADVIL,MOTRIN) 800 MG tablet Take 1 tablet by mouth 3 (Three) Times a Day As Needed for Mild Pain  or Moderate Pain . 90 tablet 2   • Lurasidone HCl 80 MG tablet Take 80 mg by mouth Daily. 30 tablet 1   • metFORMIN ER (GLUCOPHAGE-XR) 500 MG 24 hr tablet Take 1 tablet by mouth Daily. 30 tablet 5   • pantoprazole (PROTONIX) 40 MG EC tablet Take 40 mg by mouth Daily.  3   • propranolol LA (INDERAL LA) 120 MG 24 hr capsule Take 1 capsule by mouth Daily. 30 capsule 5   • QUEtiapine (SEROquel) 50 MG tablet Take 1 tablet by mouth every night at bedtime. 30 tablet 1   • spironolactone (ALDACTONE) 100 MG tablet TAKE ONE TABLET BY MOUTH EVERY DAY FOR FLUID  5   • SPRINTEC 28 0.25-35 MG-MCG per tablet Take 1 tablet by mouth Daily.  11   • vitamin D (ERGOCALCIFEROL) 95021 units capsule capsule Take 1 capsule by mouth Every 7 (Seven) Days. 4 capsule 5   • buPROPion SR (WELLBUTRIN SR) 100 MG 12 hr tablet Take 1 tablet by mouth 2 (Two) Times a Day. 60 tablet 0     No current facility-administered medications for this visit.          Review of Systems   Constitutional: Positive for fatigue.   HENT: Negative.    Eyes: Negative.    Respiratory: Negative.    Cardiovascular: Negative.    Gastrointestinal: Negative.    Endocrine: Negative.    Genitourinary: Negative.    Musculoskeletal: Negative.    Skin: Negative.    Allergic/Immunologic: Negative.    Neurological: Negative.    Hematological: Negative.    Psychiatric/Behavioral: Positive for decreased concentration.    denies HEENT, cardiovascular, respiratory, liver, renal, GI/, endocrine, neuro, DERM, hematology, immunology, musculoskeletal disorders.    Objective   Physical Exam   Constitutional: She appears well-developed and well-nourished. No distress.   Vitals reviewed.    Blood pressure 136/91, pulse 97, height 165.1 cm (65\"), weight 112 " kg (246 lb 12.8 oz).    Mental Status Exam:   Hygiene:   good  Cooperation:  Cooperative  Eye Contact:  Good  Psychomotor Behavior:  Appropriate  Affect:  Full range  Hopelessness: Denies  Speech:  Normal  Thought Process:  Goal directed and Linear  Thought Content:  Normal  Suicidal:  None  Homicidal:  None  Hallucinations:  None  Delusion:  None  Memory:  Intact  Orientation:  Person, Place, Time and Situation  Reliability:  good  Insight:  Good  Judgement:  Good  Impulse Control:  Good  Physical/Medical Issues:  Yes PCOS, GERD, Tachycardia       Assessment/Plan   Diagnoses and all orders for this visit:    Bipolar I disorder, most recent episode depressed (CMS/[facility])  -     busPIRone (BUSPAR) 10 MG tablet; Take 1 tablet by mouth 2 (Two) Times a Day As Needed (anxiety).  -     QUEtiapine (SEROquel) 50 MG tablet; Take 1 tablet by mouth every night at bedtime.  -     Lurasidone HCl 80 MG tablet; Take 80 mg by mouth Daily.  -     buPROPion SR (WELLBUTRIN SR) 100 MG 12 hr tablet; Take 1 tablet by mouth 2 (Two) Times a Day.    Insomnia due to mental disorder  -     QUEtiapine (SEROquel) 50 MG tablet; Take 1 tablet by mouth every night at bedtime.      Functionality: pt having some impairment in important areas of daily functioning.  Body mass index is 41.07 kg/m².  Patient was educated on healthier and more balanced diet choices and encouraged exercise within physical limitations.  Prognosis: Good dependent on medication/follow up and treatment plan compliance.      Impression: Depression improved. She continues to have issues with low energy and motivation.     Plan:  1. Continue Latuda 80 mg po daily for Bipolar depression.  2. Continue Buspar 10 mg po BID prn anxiety  3. Continue Seroquel 50 mg po at night for sleep disturbance   4. RTC in 1 month   5. Add Wellbutrin  mg po BID as adjunct for depression, fatigue and motivation.        Discussed medication options. Discussed the risks, benefits, and side effects  of the medication; client acknowledged and verbally consented.  Patient is aware to contact the [facility] with any worsening of symptom.  Patient is agreeable to go to the ER or call 911 should they begin SI/HI    45768 - Reviewed CPT results with patient this visit. Results do not indicate ADHD.

## 2019-05-15 ENCOUNTER — TELEPHONE (OUTPATIENT)
Dept: PSYCHIATRY | Facility: CLINIC | Age: 34
End: 2019-05-15

## 2019-05-15 NOTE — TELEPHONE ENCOUNTER
Patient called requesting an increased dose for Seroquel, she stated she has been on the 50 mg dose for over 5 years now and it does not help her.

## 2019-05-21 RX ORDER — PANTOPRAZOLE SODIUM 40 MG/1
40 TABLET, DELAYED RELEASE ORAL DAILY
Qty: 30 TABLET | Refills: 3 | Status: SHIPPED | OUTPATIENT
Start: 2019-05-21 | End: 2020-02-27 | Stop reason: SDUPTHER

## 2019-06-03 DIAGNOSIS — F31.30 BIPOLAR I DISORDER, MOST RECENT EPISODE DEPRESSED (HCC): ICD-10-CM

## 2019-06-03 DIAGNOSIS — F51.05 INSOMNIA DUE TO MENTAL DISORDER: ICD-10-CM

## 2019-06-03 RX ORDER — QUETIAPINE FUMARATE 50 MG/1
50 TABLET, FILM COATED ORAL
Qty: 30 TABLET | Refills: 1 | Status: SHIPPED | OUTPATIENT
Start: 2019-06-03 | End: 2019-06-04 | Stop reason: SDUPTHER

## 2019-06-04 ENCOUNTER — TELEPHONE (OUTPATIENT)
Dept: PSYCHIATRY | Facility: CLINIC | Age: 34
End: 2019-06-04

## 2019-06-04 DIAGNOSIS — F51.05 INSOMNIA DUE TO MENTAL DISORDER: ICD-10-CM

## 2019-06-04 DIAGNOSIS — F31.30 BIPOLAR I DISORDER, MOST RECENT EPISODE DEPRESSED (HCC): ICD-10-CM

## 2019-06-04 RX ORDER — QUETIAPINE FUMARATE 100 MG/1
100 TABLET, FILM COATED ORAL
Qty: 30 TABLET | Refills: 1 | Status: SHIPPED | OUTPATIENT
Start: 2019-06-04 | End: 2019-08-04 | Stop reason: SDUPTHER

## 2019-06-13 ENCOUNTER — OFFICE VISIT (OUTPATIENT)
Dept: PSYCHIATRY | Facility: CLINIC | Age: 34
End: 2019-06-13

## 2019-06-13 VITALS
WEIGHT: 253 LBS | DIASTOLIC BLOOD PRESSURE: 84 MMHG | HEIGHT: 65 IN | HEART RATE: 88 BPM | SYSTOLIC BLOOD PRESSURE: 125 MMHG | BODY MASS INDEX: 42.15 KG/M2

## 2019-06-13 DIAGNOSIS — F31.30 BIPOLAR I DISORDER, MOST RECENT EPISODE DEPRESSED (HCC): ICD-10-CM

## 2019-06-13 PROCEDURE — 99213 OFFICE O/P EST LOW 20 MIN: CPT | Performed by: NURSE PRACTITIONER

## 2019-06-13 RX ORDER — LURASIDONE HYDROCHLORIDE 80 MG/1
80 TABLET, FILM COATED ORAL DAILY
Qty: 30 TABLET | Refills: 1 | Status: SHIPPED | OUTPATIENT
Start: 2019-06-13 | End: 2019-08-22 | Stop reason: SDUPTHER

## 2019-06-13 RX ORDER — BUPROPION HYDROCHLORIDE 100 MG/1
100 TABLET, EXTENDED RELEASE ORAL 2 TIMES DAILY
Qty: 60 TABLET | Refills: 1 | Status: SHIPPED | OUTPATIENT
Start: 2019-06-13 | End: 2019-08-20 | Stop reason: SDUPTHER

## 2019-06-13 NOTE — PROGRESS NOTES
Subjective   Jessica Harris is a 33 y.o. female who is here today for follow-up    Chief Complaint: f/u Bipolar     HPI:  History of Present Illness   Patient presents for follow-up today. Patient reports significant improvement since last visit. She reports increase in Seroquel and the addition of Wellbutrin has been helpful. She reports she got a job at [employer] which she is happy about. Sleep and appetite are good. She denies suicidal and homicidal ideations. She denies auditory and visual hallucinations. She reports compliance with medications and is tolerating without side effects. She continues to take Metformin for PCOS. Tachycardia controlled.     Family Psychiatric History:  family history includes Cancer in her mother and other; Heart disease in her other; Lung disease in her mother; No Known Problems in her father; Stroke in her mother and other.    Medical/Surgical History:  Past Medical History:   Diagnosis Date   • Anxiety    • Bipolar 1 disorder (CMS/HCC)    • Depression    • GERD (gastroesophageal reflux disease)    • History of bronchitis    • History of ear infections    • History of stomach ulcers    • History of streptococcal infection    • Hypertension    • Urinary tract infection      Past Surgical History:   Procedure Laterality Date   • ADENOIDECTOMY     • CHOLECYSTECTOMY     • DENTAL PROCEDURE     • HERNIA REPAIR     • TONSILLECTOMY     • WISDOM TOOTH EXTRACTION         No Known Allergies        Current Medications:   Current Outpatient Medications   Medication Sig Dispense Refill   • buPROPion SR (WELLBUTRIN SR) 100 MG 12 hr tablet Take 1 tablet by mouth 2 (Two) Times a Day. 60 tablet 1   • busPIRone (BUSPAR) 10 MG tablet Take 1 tablet by mouth 2 (Two) Times a Day As Needed (anxiety). 60 tablet 1   • cyclobenzaprine (FLEXERIL) 10 MG tablet Take 1 tablet by mouth At Night As Needed for Muscle Spasms. 30 tablet 2   • ibuprofen (ADVIL,MOTRIN) 800 MG tablet Take 1 tablet by mouth 3 (Three)  "Times a Day As Needed for Mild Pain  or Moderate Pain . 90 tablet 2   • Lurasidone HCl 80 MG tablet Take 80 mg by mouth Daily. 30 tablet 1   • metFORMIN ER (GLUCOPHAGE-XR) 500 MG 24 hr tablet Take 1 tablet by mouth Daily. 30 tablet 5   • pantoprazole (PROTONIX) 40 MG EC tablet Take 1 tablet by mouth Daily. 30 tablet 3   • propranolol LA (INDERAL LA) 120 MG 24 hr capsule Take 1 capsule by mouth Daily. 30 capsule 5   • QUEtiapine (SEROquel) 100 MG tablet Take 1 tablet by mouth every night at bedtime. 30 tablet 1   • spironolactone (ALDACTONE) 100 MG tablet TAKE ONE TABLET BY MOUTH EVERY DAY FOR FLUID  5   • SPRINTEC 28 0.25-35 MG-MCG per tablet Take 1 tablet by mouth Daily.  11   • vitamin D (ERGOCALCIFEROL) 66119 units capsule capsule Take 1 capsule by mouth Every 7 (Seven) Days. 4 capsule 5   • dicyclomine (BENTYL) 10 MG capsule Take 1 capsule by mouth 2 (Two) Times a Day As Needed (Abdominal cramping and diarrhea). 60 capsule 5     No current facility-administered medications for this visit.          Review of Systems   Constitutional: Negative.    HENT: Negative.    Eyes: Negative.    Respiratory: Negative.    Cardiovascular: Negative.    Gastrointestinal: Negative.    Endocrine: Negative.    Genitourinary: Negative.    Musculoskeletal: Negative.    Skin: Negative.    Allergic/Immunologic: Negative.    Neurological: Negative.    Hematological: Negative.    Psychiatric/Behavioral: Negative.     denies HEENT, cardiovascular, respiratory, liver, renal, GI/, endocrine, neuro, DERM, hematology, immunology, musculoskeletal disorders.    Objective   Physical Exam   Constitutional: She appears well-developed and well-nourished. No distress.   Vitals reviewed.    Blood pressure 125/84, pulse 88, height 165.1 cm (65\"), weight 115 kg (253 lb).    Mental Status Exam:   Hygiene:   good  Cooperation:  Cooperative  Eye Contact:  Good  Psychomotor Behavior:  Appropriate  Affect:  Full range  Hopelessness: Denies  Speech:  " Normal  Thought Process:  Goal directed and Linear  Thought Content:  Normal  Suicidal:  None  Homicidal:  None  Hallucinations:  None  Delusion:  None  Memory:  Intact  Orientation:  Person, Place, Time and Situation  Reliability:  good  Insight:  Good  Judgement:  Good  Impulse Control:  Good  Physical/Medical Issues:  Yes PCOS, GERD, Tachycardia       Assessment/Plan   Diagnoses and all orders for this visit:    Bipolar I disorder, most recent episode depressed (CMS/HCC)  -     buPROPion SR (WELLBUTRIN SR) 100 MG 12 hr tablet; Take 1 tablet by mouth 2 (Two) Times a Day.  -     Lurasidone HCl 80 MG tablet; Take 80 mg by mouth Daily.      Functionality: pt having improvement in important areas of daily functioning.  Body mass index is 42.1 kg/m².  Patient was educated on healthier and more balanced diet choices and encouraged exercise within physical limitations.  Prognosis: Good dependent on medication/follow up and treatment plan compliance.      Impression: Depression, energy and motivation has improved. She states she only needs refills on Latuda and Wellbutrin this visit.    Plan:  1. Continue Latuda 80 mg po daily for Bipolar depression.  2. Continue Buspar 10 mg po BID prn anxiety  3. Continue Seroquel 50 mg po at night for sleep disturbance   4. RTC in 2 months   5. Continue Wellbutrin  mg po BID as adjunct for depression, fatigue and motivation.        Discussed medication options. Discussed the risks, benefits, and side effects of the medication; client acknowledged and verbally consented.  Patient is aware to contact the [facility] with any worsening of symptom.  Patient is agreeable to go to the ER or call 911 should they begin SI/HI

## 2019-06-27 ENCOUNTER — OFFICE VISIT (OUTPATIENT)
Dept: FAMILY MEDICINE CLINIC | Facility: CLINIC | Age: 34
End: 2019-06-27

## 2019-06-27 VITALS
RESPIRATION RATE: 12 BRPM | HEIGHT: 65 IN | DIASTOLIC BLOOD PRESSURE: 65 MMHG | HEART RATE: 93 BPM | SYSTOLIC BLOOD PRESSURE: 120 MMHG | BODY MASS INDEX: 42.65 KG/M2 | WEIGHT: 256 LBS | OXYGEN SATURATION: 96 % | TEMPERATURE: 98.2 F

## 2019-06-27 DIAGNOSIS — E28.2 PCOS (POLYCYSTIC OVARIAN SYNDROME): ICD-10-CM

## 2019-06-27 DIAGNOSIS — K21.9 GASTROESOPHAGEAL REFLUX DISEASE WITHOUT ESOPHAGITIS: ICD-10-CM

## 2019-06-27 DIAGNOSIS — Z00.00 HEALTHCARE MAINTENANCE: ICD-10-CM

## 2019-06-27 DIAGNOSIS — F31.77 BIPOLAR DISORDER, IN PARTIAL REMISSION, MOST RECENT EPISODE MIXED (HCC): ICD-10-CM

## 2019-06-27 DIAGNOSIS — E78.2 MIXED HYPERLIPIDEMIA: ICD-10-CM

## 2019-06-27 DIAGNOSIS — F17.200 SMOKER: ICD-10-CM

## 2019-06-27 DIAGNOSIS — R00.2 PALPITATIONS: ICD-10-CM

## 2019-06-27 DIAGNOSIS — I87.2 CHRONIC VENOUS INSUFFICIENCY: ICD-10-CM

## 2019-06-27 DIAGNOSIS — E66.01 MORBID OBESITY (HCC): ICD-10-CM

## 2019-06-27 DIAGNOSIS — E55.9 VITAMIN D DEFICIENCY: ICD-10-CM

## 2019-06-27 DIAGNOSIS — I10 ESSENTIAL HYPERTENSION: Primary | ICD-10-CM

## 2019-06-27 DIAGNOSIS — Z87.442 HISTORY OF NEPHROLITHIASIS: ICD-10-CM

## 2019-06-27 DIAGNOSIS — E11.9 TYPE 2 DIABETES MELLITUS WITHOUT COMPLICATION, WITHOUT LONG-TERM CURRENT USE OF INSULIN (HCC): ICD-10-CM

## 2019-06-27 PROCEDURE — 99214 OFFICE O/P EST MOD 30 MIN: CPT | Performed by: GENERAL PRACTICE

## 2019-06-27 RX ORDER — ATORVASTATIN CALCIUM 10 MG/1
10 TABLET, FILM COATED ORAL NIGHTLY
Qty: 30 TABLET | Refills: 5 | Status: SHIPPED | OUTPATIENT
Start: 2019-06-27 | End: 2019-12-04 | Stop reason: SDUPTHER

## 2019-06-27 NOTE — PROGRESS NOTES
Subjective   Jessica Harris is a 33 y.o. female.     History of Present Illness     Diabetes  Current symptoms include none. Patient denies paresthesia of the feet, visual disturbances, polydipsia, polyuria, hypoglycemia and foot ulcerations. Evaluation to date has been: hemoglobin A1C. Home sugars: patient does not check sugars. Current treatments: metformin.  She has not experienced any significant side effects thus far.  Last dilated eye exam more than 1 year ago.      Palpitations  She gives a long history of intermittent palpitations.  She admits to shortness of breath with above average exertion as well as intermittent swelling of the ankles toward the end of the day but denies any chest pain, lightheadedness, diaphoresis, or nausea.  She has been taking propranolol ER as prescribed and denies any palpitations since last here.  Holter monitoring performed on 2/18/2019 revealed sinus tachycardia but was otherwise unremarkable.  Echocardiogram performed on 2/20/2019 was also normal with an estimated EF of 61-65%.  She continues to be followed by cardiology and will undergo reassessment on 7/16/2019    Bipolar Affective Disorder  She gives a history of depression and anxiety, and difficulty sleeping.  She is followed by psychiatry and is currently prescribed lurasidone, buspirone, and quetiapine.  She feels she continues to do well at present.  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 She is followed by gynecology and is currently prescribed an OC and spironolactone.      GERD  She gives a history of intermittent heartburn described as a burning epigastric and retrosternal discomfort.  There is no history of any difficulty swallowing, vomiting, change in her bowel habits, hematochezia or melena and she denies any fever or chills.  She is currently prescribed pantoprazole with a  good control of her symptoms.    The following portions of the patient's history were reviewed and updated as appropriate: allergies, current medications, past medical history, past social history and problem list.    Review of Systems   Constitutional: Positive for fatigue. Negative for appetite change, chills, fever and unexpected weight change.   HENT: Negative for congestion, ear pain, rhinorrhea, sneezing, sore throat and voice change.    Eyes: Negative for visual disturbance.   Respiratory: Positive for shortness of breath. Negative for cough and wheezing.         Occasionally wakes with a feeling she cannot get breath and has been told that she snores   Cardiovascular: Positive for leg swelling. Negative for chest pain and palpitations.   Gastrointestinal: Negative for abdominal pain, blood in stool, constipation, diarrhea, nausea and vomiting.   Endocrine: Negative for polydipsia.   Genitourinary: Negative for difficulty urinating, dysuria, frequency, hematuria, menstrual problem, pelvic pain, urgency, vaginal bleeding and vaginal discharge.   Musculoskeletal: Positive for arthralgias and myalgias. Negative for back pain, joint swelling and neck pain.   Skin: Negative for color change.   Neurological: Positive for headaches. Negative for tremors, weakness and numbness.   Psychiatric/Behavioral: Negative for dysphoric mood, sleep disturbance and suicidal ideas. The patient is nervous/anxious.      Objective   Physical Exam   Constitutional: She is oriented to person, place, and time. No distress.   Bright and in good spirits. No apparent distress. No pallor, jaundice, diaphoresis, or cyanosis.   HENT:   Head: Atraumatic.   Right Ear: Tympanic membrane, external ear and ear canal normal.   Left Ear: Tympanic membrane, external ear and ear canal normal.   Nose: Nose normal.   Mouth/Throat: Oropharynx is clear and moist. Mucous membranes are not pale and not cyanotic.   Eyes: EOM are normal. Pupils are equal,  round, and reactive to light. No scleral icterus.   Neck: No JVD present. Carotid bruit is not present. No tracheal deviation present. No thyromegaly present.   Cardiovascular: Normal rate, regular rhythm, S1 normal, S2 normal and intact distal pulses. Exam reveals no gallop, no S3 and no S4.   No murmur heard.  Pulmonary/Chest: Breath sounds normal. No stridor. No respiratory distress.   Abdominal: Soft. Normal aorta and bowel sounds are normal. She exhibits no distension, no abdominal bruit and no mass. There is no hepatosplenomegaly. There is no tenderness. No hernia.   Musculoskeletal: She exhibits no tenderness or deformity.     Vascular Status -  Her right foot exhibits no edema. Her left foot exhibits no edema.  Lymphadenopathy:        Head (right side): No submandibular adenopathy present.        Head (left side): No submandibular adenopathy present.     She has no cervical adenopathy.   Neurological: She is alert and oriented to person, place, and time. She has normal reflexes. She displays normal reflexes. No cranial nerve deficit. She exhibits normal muscle tone. Coordination normal.   Skin: Skin is warm and dry. No rash noted. She is not diaphoretic. No cyanosis. No pallor. Nails show no clubbing.   Face somewhat flushed.  Occipital fat pad.   Psychiatric: She has a normal mood and affect.     Assessment/Plan   Problems Addressed this Visit        Cardiovascular and Mediastinum    Palpitations  Well controlled on propranolol  Encouraged to report if this should change.  Follow up with cardiology     Chronic venous insufficiency    Essential hypertension   Hypertension: at goal. Evidence of target organ damage: none.  Encouraged to continue to work on diet and exercise plan.   Continue current medication    Relevant Orders    CBC & Differential    Comprehensive Metabolic Panel    Mixed hyperlipidemia  As above.   Continue current medication.  Scheduled for updated labs just prior to her return.     Relevant Medications    atorvastatin (LIPITOR) 10 MG tablet    Other Relevant Orders    Lipid Panel       Digestive    Gastroesophageal reflux disease without esophagitis    Vitamin D deficiency  Continue supplementation with monitoring.    Relevant Orders    Vitamin D 25 Hydroxy    Morbid obesity (CMS/HCC)       Endocrine    PCOS (polycystic ovarian syndrome)    Type 2 diabetes mellitus without complication, without long-term current use of insulin (CMS/HCC)  Diabetes mellitus Type II, under excellent control.   Encouraged to continue to pursue ADA diet  Encouraged aerobic exercise.  Continue current medication    Relevant Orders    Hemoglobin A1c    MicroAlbumin, Urine, Random - Urine, Clean Catch       Other    Bipolar disorder, in partial remission, most recent episode mixed (CMS/HCC)  Stable.  Supportive therapy.   Follow up with psychiatry    Smoker    Healthcare maintenance  Reminded to get a flu shot when available.    History of nephrolithiasis

## 2019-07-07 DIAGNOSIS — F31.30 BIPOLAR I DISORDER, MOST RECENT EPISODE DEPRESSED (HCC): ICD-10-CM

## 2019-07-07 DIAGNOSIS — F51.05 INSOMNIA DUE TO MENTAL DISORDER: ICD-10-CM

## 2019-07-08 RX ORDER — QUETIAPINE FUMARATE 100 MG/1
TABLET, FILM COATED ORAL
Qty: 30 TABLET | Refills: 1 | OUTPATIENT
Start: 2019-07-08

## 2019-07-15 DIAGNOSIS — F31.30 BIPOLAR I DISORDER, MOST RECENT EPISODE DEPRESSED (HCC): ICD-10-CM

## 2019-07-15 RX ORDER — BUPROPION HYDROCHLORIDE 100 MG/1
TABLET, EXTENDED RELEASE ORAL
Qty: 60 TABLET | Refills: 1 | OUTPATIENT
Start: 2019-07-15

## 2019-07-25 ENCOUNTER — OFFICE VISIT (OUTPATIENT)
Dept: FAMILY MEDICINE CLINIC | Facility: CLINIC | Age: 34
End: 2019-07-25

## 2019-07-25 VITALS
SYSTOLIC BLOOD PRESSURE: 130 MMHG | BODY MASS INDEX: 42.32 KG/M2 | OXYGEN SATURATION: 96 % | HEART RATE: 97 BPM | WEIGHT: 254 LBS | DIASTOLIC BLOOD PRESSURE: 84 MMHG | HEIGHT: 65 IN | TEMPERATURE: 96.2 F

## 2019-07-25 DIAGNOSIS — J40 BRONCHITIS: Primary | ICD-10-CM

## 2019-07-25 DIAGNOSIS — J06.9 UPPER RESPIRATORY TRACT INFECTION, UNSPECIFIED TYPE: ICD-10-CM

## 2019-07-25 DIAGNOSIS — F17.200 SMOKER: ICD-10-CM

## 2019-07-25 PROCEDURE — 99214 OFFICE O/P EST MOD 30 MIN: CPT | Performed by: NURSE PRACTITIONER

## 2019-07-25 RX ORDER — BENZONATATE 200 MG/1
200 CAPSULE ORAL 3 TIMES DAILY PRN
Qty: 20 CAPSULE | Refills: 0 | Status: SHIPPED | OUTPATIENT
Start: 2019-07-25 | End: 2019-08-13

## 2019-07-25 RX ORDER — GUAIFENESIN 600 MG/1
600 TABLET, EXTENDED RELEASE ORAL 2 TIMES DAILY
Qty: 20 TABLET | Refills: 0 | Status: SHIPPED | OUTPATIENT
Start: 2019-07-25 | End: 2019-08-13

## 2019-07-25 RX ORDER — DEXTROMETHORPHAN HYDROBROMIDE AND PROMETHAZINE HYDROCHLORIDE 15; 6.25 MG/5ML; MG/5ML
5 SYRUP ORAL NIGHTLY PRN
Qty: 180 ML | Refills: 0 | Status: SHIPPED | OUTPATIENT
Start: 2019-07-25 | End: 2019-08-13

## 2019-07-25 NOTE — PROGRESS NOTES
Jsesica Harris is a 33 y.o. female who presents to the clinic today c/o upper respiratory symptoms which started last night.  Associated symptoms include congestion, cough with associated occasional wheezing and worsening symptoms. She has tried no medications or home remedies.      URI    This is a new problem. The current episode started yesterday. The problem has been rapidly worsening. There has been no fever. Associated symptoms include congestion, coughing and wheezing. Pertinent negatives include no chest pain, ear pain, headaches, nausea, plugged ear sensation, rhinorrhea, sinus pain, sore throat or vomiting. She has tried nothing for the symptoms.      Refer to ROS for additional information.  The following portions of the patient's history were reviewed and updated as appropriate: allergies, current medications, past family history, past medical history, past social history, past surgical history and problem list.    Current Outpatient Medications:   •  atorvastatin (LIPITOR) 10 MG tablet, Take 1 tablet by mouth Every Night., Disp: 30 tablet, Rfl: 5  •  buPROPion SR (WELLBUTRIN SR) 100 MG 12 hr tablet, Take 1 tablet by mouth 2 (Two) Times a Day., Disp: 60 tablet, Rfl: 1  •  busPIRone (BUSPAR) 10 MG tablet, Take 1 tablet by mouth 2 (Two) Times a Day As Needed (anxiety)., Disp: 60 tablet, Rfl: 1  •  ibuprofen (ADVIL,MOTRIN) 800 MG tablet, Take 1 tablet by mouth 3 (Three) Times a Day As Needed for Mild Pain  or Moderate Pain ., Disp: 90 tablet, Rfl: 2  •  Lurasidone HCl 80 MG tablet, Take 80 mg by mouth Daily., Disp: 30 tablet, Rfl: 1  •  metFORMIN ER (GLUCOPHAGE-XR) 500 MG 24 hr tablet, Take 1 tablet by mouth Daily., Disp: 30 tablet, Rfl: 5  •  pantoprazole (PROTONIX) 40 MG EC tablet, Take 1 tablet by mouth Daily., Disp: 30 tablet, Rfl: 3  •  propranolol LA (INDERAL LA) 120 MG 24 hr capsule, Take 1 capsule by mouth Daily., Disp: 30 capsule, Rfl: 5  •  QUEtiapine (SEROquel) 100 MG tablet, Take 1 tablet by  "mouth every night at bedtime., Disp: 30 tablet, Rfl: 1  •  spironolactone (ALDACTONE) 100 MG tablet, TAKE ONE TABLET BY MOUTH EVERY DAY FOR FLUID, Disp: , Rfl: 5  •  SPRINTEC 28 0.25-35 MG-MCG per tablet, Take 1 tablet by mouth Daily., Disp: , Rfl: 11  •  vitamin D (ERGOCALCIFEROL) 62588 units capsule capsule, Take 1 capsule by mouth Every 7 (Seven) Days., Disp: 4 capsule, Rfl: 5  •  benzonatate (TESSALON) 200 MG capsule, Take 1 capsule by mouth 3 (Three) Times a Day As Needed for Cough., Disp: 20 capsule, Rfl: 0  •  guaiFENesin (MUCINEX) 600 MG 12 hr tablet, Take 1 tablet by mouth 2 (Two) Times a Day., Disp: 20 tablet, Rfl: 0  •  promethazine-dextromethorphan (PROMETHAZINE-DM) 6.25-15 MG/5ML syrup, Take 5 mL by mouth At Night As Needed for Cough., Disp: 180 mL, Rfl: 0    No Known Allergies    Review of Systems   Constitutional: Positive for activity change and fatigue. Negative for appetite change and fever.   HENT: Positive for congestion. Negative for ear pain, postnasal drip, rhinorrhea, sinus pressure, sinus pain, sore throat and trouble swallowing.    Eyes: Negative for discharge and redness.   Respiratory: Positive for cough, chest tightness and wheezing. Negative for shortness of breath.    Cardiovascular: Negative for chest pain.   Gastrointestinal: Negative for nausea and vomiting.   Endocrine: Positive for polydipsia.   Neurological: Negative for headaches.   Hematological: Negative for adenopathy.   Psychiatric/Behavioral: Positive for sleep disturbance.   All other systems reviewed and are negative.    Visit Vitals  /84   Pulse 97   Temp 96.2 °F (35.7 °C) (Tympanic)   Ht 165.1 cm (65\")   Wt 115 kg (254 lb)   LMP 06/25/2019   SpO2 96%   BMI 42.27 kg/m²     Physical Exam   Constitutional: She is oriented to person, place, and time. She appears well-developed and well-nourished. No distress.   HENT:   Head: Normocephalic.   Right Ear: Tympanic membrane and ear canal normal.   Left Ear: Tympanic " "membrane and ear canal normal.   Nose: Mucosal edema present. No rhinorrhea. Right sinus exhibits no maxillary sinus tenderness and no frontal sinus tenderness. Left sinus exhibits no maxillary sinus tenderness and no frontal sinus tenderness.   Mouth/Throat: Oropharynx is clear and moist and mucous membranes are normal. No oropharyngeal exudate.   Eyes: Conjunctivae are normal. Pupils are equal, round, and reactive to light. Right eye exhibits no discharge. Left eye exhibits no discharge. No scleral icterus.   Neck: Neck supple.   Cardiovascular: Normal rate, regular rhythm and normal heart sounds. Exam reveals no friction rub.   No murmur heard.  Pulmonary/Chest: Effort normal. No respiratory distress. She has no decreased breath sounds. She has no wheezes. She has rhonchi in the right upper field. She has no rales.   Lymphadenopathy:     She has no cervical adenopathy.   Neurological: She is alert and oriented to person, place, and time.   Skin: Skin is warm and dry. Capillary refill takes less than 2 seconds. No rash noted. No erythema.   Vitals reviewed.    Assessment/Plan   Diagnoses and all orders for this visit:    Bronchitis  Comments:  Findings and recommendations discussed with Jessica. Treatment options reviewed including taking a 'wait and see\" approach prior to adding antibiotics.  Orders:  -     benzonatate (TESSALON) 200 MG capsule; Take 1 capsule by mouth 3 (Three) Times a Day As Needed for Cough.    Upper respiratory tract infection, unspecified type  Comments:  Counseled regarding supportive care measures and smoking cessation.  Orders:  -     guaiFENesin (MUCINEX) 600 MG 12 hr tablet; Take 1 tablet by mouth 2 (Two) Times a Day.  -     promethazine-dextromethorphan (PROMETHAZINE-DM) 6.25-15 MG/5ML syrup; Take 5 mL by mouth At Night As Needed for Cough.    Smoker  Comments:  Encouraged smoking cessation.      Findings and recommendations discussed with Jessica. Treatment options reviewed including " "taking a 'wait and see\" approach prior to adding antibiotics. Counseled regarding supportive care measures and smoking cessation. .Encouraged her to seek further medical evaluation if symptoms worsen or do not improve within 48-72 hours.             This document has been electronically signed by JANIE Cabrera, FLORENTINOP-BC, CDE    "

## 2019-07-25 NOTE — PATIENT INSTRUCTIONS
"Upper Respiratory Infection, Adult  An upper respiratory infection (URI) affects the nose, throat, and upper air passages. URIs are caused by germs (viruses). The most common type of URI is often called \"the common cold.\"  Medicines cannot cure URIs, but you can do things at home to relieve your symptoms. URIs usually get better within 7-10 days.  Follow these instructions at home:  Activity  · Rest as needed.  · If you have a fever, stay home from work or school until your fever is gone, or until your doctor says you may return to work or school.  ? You should stay home until you cannot spread the infection anymore (you are not contagious).  ? Your doctor may have you wear a face mask so you have less risk of spreading the infection.  Relieving symptoms  · Gargle with a salt-water mixture 3-4 times a day or as needed. To make a salt-water mixture, completely dissolve ½-1 tsp of salt in 1 cup of warm water.  · Use a cool-mist humidifier to add moisture to the air. This can help you breathe more easily.  Eating and drinking  · Drink enough fluid to keep your pee (urine) pale yellow.  · Eat soups and other clear broths.  General instructions  · Take over-the-counter and prescription medicines only as told by your doctor. These include cold medicines, fever reducers, and cough suppressants.  · Do not use any products that contain nicotine or tobacco. These include cigarettes and e-cigarettes. If you need help quitting, ask your doctor.  · Avoid being where people are smoking (avoid secondhand smoke).  · Make sure you get regular shots and get the flu shot every year.  · Keep all follow-up visits as told by your doctor. This is important.  How to avoid spreading infection to others  · Wash your hands often with soap and water. If you do not have soap and water, use hand .  · Avoid touching your mouth, face, eyes, or nose.  · Cough or sneeze into a tissue or your sleeve or elbow. Do not cough or sneeze into your " "hand or into the air.  Contact a doctor if:  · You are getting worse, not better.  · You have any of these:  ? A fever.  ? Chills.  ? Brown or red mucus in your nose.  ? Yellow or brown fluid (discharge)coming from your nose.  ? Pain in your face, especially when you bend forward.  ? Swollen neck glands.  ? Pain with swallowing.  ? White areas in the back of your throat.  Get help right away if:  · You have shortness of breath that gets worse.  · You have very bad or constant:  ? Headache.  ? Ear pain.  ? Pain in your forehead, behind your eyes, and over your cheekbones (sinus pain).  ? Chest pain.  · You have long-lasting (chronic) lung disease along with any of these:  ? Wheezing.  ? Long-lasting cough.  ? Coughing up blood.  ? A change in your usual mucus.  · You have a stiff neck.  · You have changes in your:  ? Vision.  ? Hearing.  ? Thinking.  ? Mood.  Summary  · An upper respiratory infection (URI) is caused by a germ called a virus. The most common type of URI is often called \"the common cold.\"  · URIs usually get better within 7-10 days.  · Take over-the-counter and prescription medicines only as told by your doctor.  This information is not intended to replace advice given to you by your health care provider. Make sure you discuss any questions you have with your health care provider.  Document Released: 06/05/2009 Document Revised: 08/10/2018 Document Reviewed: 08/10/2018  [employer] Interactive Patient Education © 2019 [employer]  Acute Bronchitis, Adult  Acute bronchitis is when air tubes (bronchi) in the lungs suddenly get swollen. The condition can make it hard to breathe. It can also cause these symptoms:  · A cough.  · Coughing up clear, yellow, or green mucus.  · Wheezing.  · Chest congestion.  · Shortness of breath.  · A fever.  · Body aches.  · Chills.  · A sore throat.    Follow these instructions at home:  Medicines  · Take over-the-counter and prescription medicines only as told by your " doctor.  · If you were prescribed an antibiotic medicine, take it as told by your doctor. Do not stop taking the antibiotic even if you start to feel better.  General instructions    · Rest.  · Drink enough fluids to keep your pee (urine) pale yellow.  · Avoid smoking and secondhand smoke. If you smoke and you need help quitting, ask your doctor. Quitting will help your lungs heal faster.  · Use an inhaler, cool mist vaporizer, or humidifier as told by your doctor.  · Keep all follow-up visits as told by your doctor. This is important.  How is this prevented?  To lower your risk of getting this condition again:  · Wash your hands often with soap and water. If you cannot use soap and water, use hand .  · Avoid contact with people who have cold symptoms.  · Try not to touch your hands to your mouth, nose, or eyes.  · Make sure to get the flu shot every year.    Contact a doctor if:  · Your symptoms do not get better in 2 weeks.  Get help right away if:  · You cough up blood.  · You have chest pain.  · You have very bad shortness of breath.  · You become dehydrated.  · You faint (pass out) or keep feeling like you are going to pass out.  · You keep throwing up (vomiting).  · You have a very bad headache.  · Your fever or chills gets worse.  This information is not intended to replace advice given to you by your health care provider. Make sure you discuss any questions you have with your health care provider.  Document Released: 06/05/2009 Document Revised: 08/01/2018 Document Reviewed: 06/07/2017  [employer] Interactive Patient Education © 2019 [employer]  Steps to Quit Smoking  Smoking tobacco can be bad for your health. It can also affect almost every organ in your body. Smoking puts you and people around you at risk for many serious long-lasting (chronic) diseases. Quitting smoking is hard, but it is one of the best things that you can do for your health. It is never too late to quit.  What are the  benefits of quitting smoking?  When you quit smoking, you lower your risk for getting serious diseases and conditions. They can include:  · Lung cancer or lung disease.  · Heart disease.  · Stroke.  · Heart attack.  · Not being able to have children (infertility).  · Weak bones (osteoporosis) and broken bones (fractures).    If you have coughing, wheezing, and shortness of breath, those symptoms may get better when you quit. You may also get sick less often. If you are pregnant, quitting smoking can help to lower your chances of having a baby of low birth weight.  What can I do to help me quit smoking?  Talk with your doctor about what can help you quit smoking. Some things you can do (strategies) include:  · Quitting smoking totally, instead of slowly cutting back how much you smoke over a period of time.  · Going to in-person counseling. You are more likely to quit if you go to many counseling sessions.  · Using resources and support systems, such as:  ? Online chats with a counselor.  ? Phone quitlines.  ? Printed self-help materials.  ? Support groups or group counseling.  ? Text messaging programs.  ? Mobile phone apps or applications.  · Taking medicines. Some of these medicines may have nicotine in them. If you are pregnant or breastfeeding, do not take any medicines to quit smoking unless your doctor says it is okay. Talk with your doctor about counseling or other things that can help you.    Talk with your doctor about using more than one strategy at the same time, such as taking medicines while you are also going to in-person counseling. This can help make quitting easier.  What things can I do to make it easier to quit?  Quitting smoking might feel very hard at first, but there is a lot that you can do to make it easier. Take these steps:  · Talk to your family and friends. Ask them to support and encourage you.  · Call phone quitlines, reach out to support groups, or work with a counselor.  · Ask people  who smoke to not smoke around you.  · Avoid places that make you want (trigger) to smoke, such as:  ? Bars.  ? Parties.  ? Smoke-break areas at work.  · Spend time with people who do not smoke.  · Lower the stress in your life. Stress can make you want to smoke. Try these things to help your stress:  ? Getting regular exercise.  ? Deep-breathing exercises.  ? Yoga.  ? Meditating.  ? Doing a body scan. To do this, close your eyes, focus on one area of your body at a time from head to toe, and notice which parts of your body are tense. Try to relax the muscles in those areas.  · Download or buy apps on your mobile phone or tablet that can help you stick to your quit plan. There are many free apps, such as QuitGuide from the CDC (Centers for Disease Control and Prevention). You can find more support from smokefree.gov and other websites.    This information is not intended to replace advice given to you by your health care provider. Make sure you discuss any questions you have with your health care provider.  Document Released: 10/14/2010 Document Revised: 08/15/2017 Document Reviewed: 05/03/2016  [employer] Interactive Patient Education © 2019 [employer]

## 2019-07-26 ENCOUNTER — TELEPHONE (OUTPATIENT)
Dept: FAMILY MEDICINE CLINIC | Facility: CLINIC | Age: 34
End: 2019-07-26

## 2019-07-26 RX ORDER — CEFDINIR 300 MG/1
300 CAPSULE ORAL 2 TIMES DAILY
Qty: 20 CAPSULE | Refills: 0 | Status: SHIPPED | OUTPATIENT
Start: 2019-07-26 | End: 2019-08-05

## 2019-08-04 DIAGNOSIS — F51.05 INSOMNIA DUE TO MENTAL DISORDER: ICD-10-CM

## 2019-08-04 DIAGNOSIS — F31.30 BIPOLAR I DISORDER, MOST RECENT EPISODE DEPRESSED (HCC): ICD-10-CM

## 2019-08-05 RX ORDER — QUETIAPINE FUMARATE 100 MG/1
TABLET, FILM COATED ORAL
Qty: 30 TABLET | Refills: 5 | Status: SHIPPED | OUTPATIENT
Start: 2019-08-05 | End: 2019-08-22 | Stop reason: SDUPTHER

## 2019-08-13 ENCOUNTER — OFFICE VISIT (OUTPATIENT)
Dept: FAMILY MEDICINE CLINIC | Facility: CLINIC | Age: 34
End: 2019-08-13

## 2019-08-13 VITALS
DIASTOLIC BLOOD PRESSURE: 80 MMHG | HEART RATE: 98 BPM | HEIGHT: 65 IN | TEMPERATURE: 98.6 F | WEIGHT: 256 LBS | OXYGEN SATURATION: 99 % | BODY MASS INDEX: 42.65 KG/M2 | SYSTOLIC BLOOD PRESSURE: 124 MMHG

## 2019-08-13 DIAGNOSIS — I10 ESSENTIAL HYPERTENSION: ICD-10-CM

## 2019-08-13 DIAGNOSIS — J06.9 ACUTE URI: ICD-10-CM

## 2019-08-13 DIAGNOSIS — K21.9 GASTROESOPHAGEAL REFLUX DISEASE WITHOUT ESOPHAGITIS: ICD-10-CM

## 2019-08-13 DIAGNOSIS — E11.9 TYPE 2 DIABETES MELLITUS WITHOUT COMPLICATION, WITHOUT LONG-TERM CURRENT USE OF INSULIN (HCC): Primary | ICD-10-CM

## 2019-08-13 PROCEDURE — 99214 OFFICE O/P EST MOD 30 MIN: CPT | Performed by: NURSE PRACTITIONER

## 2019-08-13 RX ORDER — AZITHROMYCIN 250 MG/1
TABLET, FILM COATED ORAL
Qty: 6 TABLET | Refills: 0 | Status: SHIPPED | OUTPATIENT
Start: 2019-08-13 | End: 2019-08-22

## 2019-08-13 NOTE — ASSESSMENT & PLAN NOTE
Avoid over-the-counter medication due to effects on her blood pressure.  Monitor for signs of elevation while on decongestant.  Report any changes.

## 2019-08-13 NOTE — PROGRESS NOTES
Subjective   Jessica Harris is a 33 y.o. female.     Chief Complaint   Patient presents with   • URI       History of Present Illness:    URI symptoms present x2 weeks.  Patient states that she was called in an antibiotic last week but she is not sure what the name is.  I am unable to find it in the chart.  She states she took it for over a week, twice daily with no improvement.  Patient has a sore throat, cough with thick drainage.  Having a hard time getting sputum up.  Cough is worse when reclining.  Moderate intensity.  Over-the-counter medications are not helpful.    Type 2 diabetes-stable.  Patient denies any hypoglycemic episodes.    Essential hypertension-stable with blood pressure today 124/80.    GERD-with no recent exacerbation.      The following portions of the patient's history were reviewed and updated as appropriate:  Allergies, current medications, past family history, past medical history, past social history, past surgical history and problem list.    Review of Systems   Constitutional: Positive for activity change, chills, fatigue and fever. Negative for appetite change and unexpected weight change.   HENT: Positive for sinus pressure and sore throat. Negative for congestion, ear pain, nosebleeds, postnasal drip, rhinorrhea, trouble swallowing and voice change.    Eyes: Negative for pain and visual disturbance.   Respiratory: Positive for cough. Negative for chest tightness, shortness of breath and wheezing.    Cardiovascular: Negative for chest pain and palpitations.   Gastrointestinal: Negative for abdominal pain, blood in stool, constipation and diarrhea.   Endocrine: Negative for cold intolerance and polydipsia.   Genitourinary: Negative for difficulty urinating, flank pain and hematuria.   Musculoskeletal: Negative for arthralgias, back pain, gait problem, joint swelling and myalgias.   Skin: Negative for color change and rash.   Allergic/Immunologic: Negative.    Neurological: Negative for  "syncope, numbness and headaches.   Hematological: Negative.    Psychiatric/Behavioral: Negative for sleep disturbance and suicidal ideas.   All other systems reviewed and are negative.      Objective     /80   Pulse 98   Temp 98.6 °F (37 °C) (Tympanic)   Ht 165.1 cm (65\")   Wt 116 kg (256 lb)   LMP 07/20/2019   SpO2 99%   BMI 42.60 kg/m²   Results Encounter on 03/17/2019   Component Date Value Ref Range Status   • Cortisol 03/15/2019 1.58    mcg/dL Final       Physical Exam   Constitutional: She is oriented to person, place, and time. She appears well-developed and well-nourished. She has a sickly appearance.   Appears acutely ill.    HENT:   Head: Normocephalic.   Right Ear: Hearing normal. No lacerations. No drainage or swelling. No foreign bodies. No mastoid tenderness. A middle ear effusion is present.   Left Ear: Hearing normal. No lacerations. No drainage or swelling. No foreign bodies. No mastoid tenderness. A middle ear effusion is present.   Nose: Mucosal edema and rhinorrhea present. No nose lacerations, sinus tenderness or nasal deformity. No epistaxis.  No foreign bodies.   Mouth/Throat: Uvula is midline. Oropharyngeal exudate and posterior oropharyngeal erythema present. No tonsillar abscesses.   TM's are cloudy bilateral   Eyes: EOM are normal. Pupils are equal, round, and reactive to light.   Neck: Normal range of motion. Neck supple. No thyromegaly present.   Cardiovascular: Normal rate, regular rhythm, normal heart sounds and intact distal pulses.   Pulmonary/Chest: Effort normal. No respiratory distress. She has wheezes in the right upper field. She has no rhonchi. She has no rales.   Cough noted ,thick sputum production   Abdominal: Soft. Bowel sounds are normal. She exhibits no distension. There is no tenderness. There is no guarding.   Lymphadenopathy:     She has cervical adenopathy.        Right cervical: Superficial cervical adenopathy present. No deep cervical adenopathy " present.       Left cervical: Superficial cervical adenopathy present. No deep cervical adenopathy present.   Neurological: She is alert and oriented to person, place, and time. She has normal reflexes.   Skin: Skin is warm and dry. No rash noted.   Psychiatric: She has a normal mood and affect. Her behavior is normal. Judgment and thought content normal.       Assessment/Plan     Problem List Items Addressed This Visit        Cardiovascular and Mediastinum    Essential hypertension    Current Assessment & Plan     Avoid over-the-counter medication due to effects on her blood pressure.  Monitor for signs of elevation while on decongestant.  Report any changes.            Digestive    Gastroesophageal reflux disease without esophagitis    Current Assessment & Plan     Stable with Protonix            Endocrine    Type 2 diabetes mellitus without complication, without long-term current use of insulin (CMS/[facility]) - Primary    Current Assessment & Plan     Discussed sick day rules, avoid sugary sports drinks           Other Visit Diagnoses     Acute URI        Relevant Medications    azithromycin (ZITHROMAX Z-ISSAC) 250 MG tablet                   Patient's Body mass index is 42.6 kg/m². BMI is above normal parameters. Recommendations include: exercise counseling and nutrition counseling.          I have discussed diagnosis in detail today allowing time for questions and answers. Patient is aware of reasons to seek urgent or emergent medical care as well as reasons to return to the clinic for evaluation. Possible side effects, interactions and progression of symptoms discussed as well. Patient / family states understanding.   Emotional support and active listening provided.       Fluids, rest, symptomatic treatment advised. Steam therapy. Dispose of tooth bush after 24 hours of starting antibiotics if ordered. Complete antibiotics as ordered.  Discussed possible side effects/interactions of medication. Discussed symptoms to  report as well as reasons to seek urgent or emergent medical attention. Understanding stated.   Recommend follow up in 2-3 days if not improved, sooner if needed.     RTC 2-5 days if not improved, sooner if condition worsens/changes. Symptomatic care advised as well as reasons for urgent or emergent care. Pt / family state understanding.       Dictated utilizing Dragon dictation. Errors in dictation may reflect use of voice recognition software and not all errors in transcription may have been detected prior to signing.           This document has been electronically signed by:  JANIE Santoro, NP-C

## 2019-08-19 DIAGNOSIS — E55.9 VITAMIN D DEFICIENCY: ICD-10-CM

## 2019-08-19 RX ORDER — ERGOCALCIFEROL 1.25 MG/1
CAPSULE ORAL
Qty: 4 CAPSULE | Refills: 5 | Status: SHIPPED | OUTPATIENT
Start: 2019-08-19 | End: 2020-01-08

## 2019-08-20 DIAGNOSIS — F31.30 BIPOLAR I DISORDER, MOST RECENT EPISODE DEPRESSED (HCC): ICD-10-CM

## 2019-08-21 RX ORDER — BUPROPION HYDROCHLORIDE 100 MG/1
TABLET, EXTENDED RELEASE ORAL
Qty: 60 TABLET | Refills: 1 | Status: SHIPPED | OUTPATIENT
Start: 2019-08-21 | End: 2019-08-22 | Stop reason: SDUPTHER

## 2019-08-22 ENCOUNTER — OFFICE VISIT (OUTPATIENT)
Dept: PSYCHIATRY | Facility: CLINIC | Age: 34
End: 2019-08-22

## 2019-08-22 VITALS
WEIGHT: 259.4 LBS | HEART RATE: 103 BPM | DIASTOLIC BLOOD PRESSURE: 97 MMHG | HEIGHT: 65 IN | SYSTOLIC BLOOD PRESSURE: 138 MMHG | BODY MASS INDEX: 43.22 KG/M2

## 2019-08-22 DIAGNOSIS — F31.30 BIPOLAR I DISORDER, MOST RECENT EPISODE DEPRESSED (HCC): ICD-10-CM

## 2019-08-22 DIAGNOSIS — F51.05 INSOMNIA DUE TO MENTAL DISORDER: ICD-10-CM

## 2019-08-22 PROCEDURE — 99213 OFFICE O/P EST LOW 20 MIN: CPT | Performed by: NURSE PRACTITIONER

## 2019-08-22 RX ORDER — BUSPIRONE HYDROCHLORIDE 10 MG/1
10 TABLET ORAL 2 TIMES DAILY PRN
Qty: 60 TABLET | Refills: 2 | Status: SHIPPED | OUTPATIENT
Start: 2019-08-22 | End: 2020-01-08

## 2019-08-22 RX ORDER — LURASIDONE HYDROCHLORIDE 80 MG/1
80 TABLET, FILM COATED ORAL DAILY
Qty: 30 TABLET | Refills: 2 | Status: SHIPPED | OUTPATIENT
Start: 2019-08-22 | End: 2020-01-08 | Stop reason: SDUPTHER

## 2019-08-22 RX ORDER — BUPROPION HYDROCHLORIDE 100 MG/1
100 TABLET, EXTENDED RELEASE ORAL 2 TIMES DAILY
Qty: 60 TABLET | Refills: 2 | Status: SHIPPED | OUTPATIENT
Start: 2019-08-22 | End: 2020-01-08 | Stop reason: SDUPTHER

## 2019-08-22 RX ORDER — QUETIAPINE FUMARATE 100 MG/1
100 TABLET, FILM COATED ORAL
Qty: 30 TABLET | Refills: 2 | Status: SHIPPED | OUTPATIENT
Start: 2019-08-22 | End: 2020-01-08 | Stop reason: SDUPTHER

## 2019-08-22 NOTE — PROGRESS NOTES
Subjective   Jessica Harris is a 33 y.o. female who is here today for follow-up    Chief Complaint: f/u Bipolar     HPI:  History of Present Illness   Patient presents for follow-up today for Bipolar. Patient continues to work at [employer]. Patient feels she is doing well on her medications. She reports mood is stable. Sleep and appetite are good. She reports she ran out of Seroquel for a few days and was not able to sleep without it. She denies suicidal and homicidal ideations. She denies auditory and visual hallucinations. She continues to take Metformin for PCOS. Patient recently completed antibiotic treatment for respiratory infection per her report.       Family Psychiatric History:  family history includes Cancer in her mother and other; Heart disease in her other; Lung disease in her mother; No Known Problems in her father; Stroke in her mother and other.    Medical/Surgical History:  Past Medical History:   Diagnosis Date   • Anxiety    • Bipolar 1 disorder (CMS/HCC)    • Depression    • GERD (gastroesophageal reflux disease)    • History of bronchitis    • History of ear infections    • History of stomach ulcers    • History of streptococcal infection    • Hypertension    • Urinary tract infection      Past Surgical History:   Procedure Laterality Date   • ADENOIDECTOMY     • CHOLECYSTECTOMY     • DENTAL PROCEDURE     • HERNIA REPAIR     • TONSILLECTOMY     • WISDOM TOOTH EXTRACTION         No Known Allergies        Current Medications:   Current Outpatient Medications   Medication Sig Dispense Refill   • atorvastatin (LIPITOR) 10 MG tablet Take 1 tablet by mouth Every Night. 30 tablet 5   • buPROPion SR (WELLBUTRIN SR) 100 MG 12 hr tablet Take 1 tablet by mouth 2 (Two) Times a Day. 60 tablet 2   • busPIRone (BUSPAR) 10 MG tablet Take 1 tablet by mouth 2 (Two) Times a Day As Needed (anxiety). 60 tablet 2   • ibuprofen (ADVIL,MOTRIN) 800 MG tablet Take 1 tablet by mouth 3 (Three) Times a Day As Needed for  "Mild Pain  or Moderate Pain . 90 tablet 2   • Lurasidone HCl 80 MG tablet Take 80 mg by mouth Daily. 30 tablet 2   • metFORMIN ER (GLUCOPHAGE-XR) 500 MG 24 hr tablet Take 1 tablet by mouth Daily. 30 tablet 5   • pantoprazole (PROTONIX) 40 MG EC tablet Take 1 tablet by mouth Daily. 30 tablet 3   • propranolol LA (INDERAL LA) 120 MG 24 hr capsule Take 1 capsule by mouth Daily. 30 capsule 5   • QUEtiapine (SEROquel) 100 MG tablet Take 1 tablet by mouth every night at bedtime. 30 tablet 2   • spironolactone (ALDACTONE) 100 MG tablet TAKE ONE TABLET BY MOUTH EVERY DAY FOR FLUID  5   • SPRINTEC 28 0.25-35 MG-MCG per tablet Take 1 tablet by mouth Daily.  11   • vitamin D (ERGOCALCIFEROL) 23281 units capsule capsule TAKE ONE CAPSULE BY MOUTH EVERY WEEK 4 capsule 5     No current facility-administered medications for this visit.          Review of Systems   Constitutional: Negative.    HENT: Negative.    Eyes: Negative.    Respiratory: Positive for cough.    Cardiovascular: Negative.    Gastrointestinal: Negative.    Endocrine: Negative.    Genitourinary: Negative.    Musculoskeletal: Negative.    Skin: Negative.    Allergic/Immunologic: Negative.    Neurological: Negative.    Hematological: Negative.    Psychiatric/Behavioral: Negative.     denies HEENT, cardiovascular, respiratory, liver, renal, GI/, endocrine, neuro, DERM, hematology, immunology, musculoskeletal disorders.    Objective   Physical Exam   Constitutional: She appears well-developed and well-nourished. No distress.   Vitals reviewed.    Blood pressure 138/97, pulse 103, height 165.1 cm (65\"), weight 118 kg (259 lb 6.4 oz).    Mental Status Exam:   Hygiene:   good  Cooperation:  Cooperative  Eye Contact:  Good  Psychomotor Behavior:  Appropriate  Affect:  Full range  Hopelessness: Denies  Speech:  Normal  Thought Process:  Goal directed and Linear  Thought Content:  Normal  Suicidal:  None  Homicidal:  None  Hallucinations:  None  Delusion:  None  Memory:  " Intact  Orientation:  Person, Place, Time and Situation  Reliability:  good  Insight:  Good  Judgement:  Good  Impulse Control:  Good  Physical/Medical Issues:  Yes PCOS, GERD, Tachycardia       Assessment/Plan   Diagnoses and all orders for this visit:    Bipolar I disorder, most recent episode depressed (CMS/[facility])  -     busPIRone (BUSPAR) 10 MG tablet; Take 1 tablet by mouth 2 (Two) Times a Day As Needed (anxiety).  -     buPROPion SR (WELLBUTRIN SR) 100 MG 12 hr tablet; Take 1 tablet by mouth 2 (Two) Times a Day.  -     Lurasidone HCl 80 MG tablet; Take 80 mg by mouth Daily.  -     QUEtiapine (SEROquel) 100 MG tablet; Take 1 tablet by mouth every night at bedtime.    Insomnia due to mental disorder  -     QUEtiapine (SEROquel) 100 MG tablet; Take 1 tablet by mouth every night at bedtime.      Functionality: pt having improvement in important areas of daily functioning.  Body mass index is 43.17 kg/m².  Patient was educated on healthier and more balanced diet choices and encouraged exercise within physical limitations.  Prognosis: Good dependent on medication/follow up and treatment plan compliance.      Impression: Mood stable.     Plan:  1. Continue Latuda 80 mg po daily for Bipolar depression.  2. Continue Buspar 10 mg po BID prn anxiety  3. Continue Seroquel 50 mg po at night for sleep disturbance   4. RTC in 3 months   5. Continue Wellbutrin  mg po BID as adjunct for depression, fatigue and motivation.        Discussed medication options. Discussed the risks, benefits, and side effects of the medication; client acknowledged and verbally consented.  Patient is aware to contact the [facility] with any worsening of symptom.  Patient is agreeable to go to the ER or call 911 should they begin SI/HI

## 2019-09-05 DIAGNOSIS — R00.2 PALPITATIONS: ICD-10-CM

## 2019-09-05 RX ORDER — PROPRANOLOL HYDROCHLORIDE 120 MG/1
120 CAPSULE, EXTENDED RELEASE ORAL DAILY
Qty: 30 CAPSULE | Refills: 5 | Status: SHIPPED | OUTPATIENT
Start: 2019-09-05 | End: 2020-02-27 | Stop reason: SDUPTHER

## 2019-09-25 ENCOUNTER — LAB (OUTPATIENT)
Dept: LAB | Facility: HOSPITAL | Age: 34
End: 2019-09-25

## 2019-09-25 DIAGNOSIS — E78.2 MIXED HYPERLIPIDEMIA: ICD-10-CM

## 2019-09-25 DIAGNOSIS — I10 ESSENTIAL HYPERTENSION: ICD-10-CM

## 2019-09-25 DIAGNOSIS — E55.9 VITAMIN D DEFICIENCY: ICD-10-CM

## 2019-09-25 DIAGNOSIS — E11.9 TYPE 2 DIABETES MELLITUS WITHOUT COMPLICATION, WITHOUT LONG-TERM CURRENT USE OF INSULIN (HCC): ICD-10-CM

## 2019-09-25 PROCEDURE — 80053 COMPREHEN METABOLIC PANEL: CPT

## 2019-09-25 PROCEDURE — 82306 VITAMIN D 25 HYDROXY: CPT

## 2019-09-25 PROCEDURE — 82043 UR ALBUMIN QUANTITATIVE: CPT

## 2019-09-25 PROCEDURE — 83036 HEMOGLOBIN GLYCOSYLATED A1C: CPT

## 2019-09-25 PROCEDURE — 85025 COMPLETE CBC W/AUTO DIFF WBC: CPT

## 2019-09-26 LAB
25(OH)D3 SERPL-MCNC: 27.3 NG/ML (ref 30–100)
ALBUMIN SERPL-MCNC: 4.5 G/DL (ref 3.5–5.2)
ALBUMIN UR-MCNC: 3.7 MG/DL
ALBUMIN/GLOB SERPL: 1.6 G/DL
ALP SERPL-CCNC: 83 U/L (ref 39–117)
ALT SERPL W P-5'-P-CCNC: 49 U/L (ref 1–33)
ANION GAP SERPL CALCULATED.3IONS-SCNC: 16.4 MMOL/L (ref 5–15)
AST SERPL-CCNC: 66 U/L (ref 1–32)
BASOPHILS # BLD AUTO: 0.07 10*3/MM3 (ref 0–0.2)
BASOPHILS NFR BLD AUTO: 0.6 % (ref 0–1.5)
BILIRUB SERPL-MCNC: 0.2 MG/DL (ref 0.2–1.2)
BUN BLD-MCNC: 8 MG/DL (ref 6–20)
BUN/CREAT SERPL: 9.9 (ref 7–25)
CALCIUM SPEC-SCNC: 9.6 MG/DL (ref 8.6–10.5)
CHLORIDE SERPL-SCNC: 92 MMOL/L (ref 98–107)
CO2 SERPL-SCNC: 27.6 MMOL/L (ref 22–29)
CREAT BLD-MCNC: 0.81 MG/DL (ref 0.57–1)
DEPRECATED RDW RBC AUTO: 41 FL (ref 37–54)
EOSINOPHIL # BLD AUTO: 0.35 10*3/MM3 (ref 0–0.4)
EOSINOPHIL NFR BLD AUTO: 2.8 % (ref 0.3–6.2)
ERYTHROCYTE [DISTWIDTH] IN BLOOD BY AUTOMATED COUNT: 13.2 % (ref 12.3–15.4)
GFR SERPL CREATININE-BSD FRML MDRD: 82 ML/MIN/1.73
GLOBULIN UR ELPH-MCNC: 2.9 GM/DL
GLUCOSE BLD-MCNC: 208 MG/DL (ref 65–99)
HBA1C MFR BLD: 7.9 % (ref 4.8–5.6)
HCT VFR BLD AUTO: 43.5 % (ref 34–46.6)
HGB BLD-MCNC: 13.9 G/DL (ref 12–15.9)
IMM GRANULOCYTES # BLD AUTO: 0.15 10*3/MM3 (ref 0–0.05)
IMM GRANULOCYTES NFR BLD AUTO: 1.2 % (ref 0–0.5)
LYMPHOCYTES # BLD AUTO: 3.02 10*3/MM3 (ref 0.7–3.1)
LYMPHOCYTES NFR BLD AUTO: 24.4 % (ref 19.6–45.3)
MCH RBC QN AUTO: 27.6 PG (ref 26.6–33)
MCHC RBC AUTO-ENTMCNC: 32 G/DL (ref 31.5–35.7)
MCV RBC AUTO: 86.3 FL (ref 79–97)
MONOCYTES # BLD AUTO: 0.9 10*3/MM3 (ref 0.1–0.9)
MONOCYTES NFR BLD AUTO: 7.3 % (ref 5–12)
NEUTROPHILS # BLD AUTO: 7.87 10*3/MM3 (ref 1.7–7)
NEUTROPHILS NFR BLD AUTO: 63.7 % (ref 42.7–76)
NRBC BLD AUTO-RTO: 0 /100 WBC (ref 0–0.2)
PLATELET # BLD AUTO: 409 10*3/MM3 (ref 140–450)
PMV BLD AUTO: 9.7 FL (ref 6–12)
POTASSIUM BLD-SCNC: 4.1 MMOL/L (ref 3.5–5.2)
PROT SERPL-MCNC: 7.4 G/DL (ref 6–8.5)
RBC # BLD AUTO: 5.04 10*6/MM3 (ref 3.77–5.28)
SODIUM BLD-SCNC: 136 MMOL/L (ref 136–145)
WBC NRBC COR # BLD: 12.36 10*3/MM3 (ref 3.4–10.8)

## 2019-10-01 ENCOUNTER — OFFICE VISIT (OUTPATIENT)
Dept: FAMILY MEDICINE CLINIC | Facility: CLINIC | Age: 34
End: 2019-10-01

## 2019-10-01 DIAGNOSIS — K21.9 GASTROESOPHAGEAL REFLUX DISEASE WITHOUT ESOPHAGITIS: ICD-10-CM

## 2019-10-01 DIAGNOSIS — E11.9 TYPE 2 DIABETES MELLITUS WITHOUT COMPLICATION, WITHOUT LONG-TERM CURRENT USE OF INSULIN (HCC): ICD-10-CM

## 2019-10-01 DIAGNOSIS — F17.200 SMOKER: ICD-10-CM

## 2019-10-01 DIAGNOSIS — R00.2 PALPITATIONS: ICD-10-CM

## 2019-10-01 DIAGNOSIS — E66.01 MORBID OBESITY (HCC): ICD-10-CM

## 2019-10-01 DIAGNOSIS — E55.9 VITAMIN D DEFICIENCY: ICD-10-CM

## 2019-10-01 DIAGNOSIS — I10 ESSENTIAL HYPERTENSION: ICD-10-CM

## 2019-10-01 DIAGNOSIS — Z00.00 HEALTHCARE MAINTENANCE: ICD-10-CM

## 2019-10-01 DIAGNOSIS — I87.2 CHRONIC VENOUS INSUFFICIENCY: ICD-10-CM

## 2019-10-01 DIAGNOSIS — F31.77 BIPOLAR DISORDER, IN PARTIAL REMISSION, MOST RECENT EPISODE MIXED (HCC): ICD-10-CM

## 2019-10-01 DIAGNOSIS — Z23 ENCOUNTER FOR IMMUNIZATION: ICD-10-CM

## 2019-10-01 DIAGNOSIS — E78.2 MIXED HYPERLIPIDEMIA: Primary | ICD-10-CM

## 2019-10-01 DIAGNOSIS — E28.2 PCOS (POLYCYSTIC OVARIAN SYNDROME): ICD-10-CM

## 2019-10-01 PROCEDURE — 99214 OFFICE O/P EST MOD 30 MIN: CPT | Performed by: GENERAL PRACTICE

## 2019-10-01 PROCEDURE — 90471 IMMUNIZATION ADMIN: CPT | Performed by: GENERAL PRACTICE

## 2019-10-01 PROCEDURE — 90674 CCIIV4 VAC NO PRSV 0.5 ML IM: CPT | Performed by: GENERAL PRACTICE

## 2019-10-01 RX ORDER — METFORMIN HYDROCHLORIDE 500 MG/1
1000 TABLET, EXTENDED RELEASE ORAL DAILY
Qty: 60 TABLET | Refills: 5 | Status: SHIPPED | OUTPATIENT
Start: 2019-10-01 | End: 2020-02-27 | Stop reason: SDUPTHER

## 2019-10-01 RX ORDER — CYCLOBENZAPRINE HCL 10 MG
TABLET ORAL
Refills: 2 | COMMUNITY
Start: 2019-08-29 | End: 2019-10-08 | Stop reason: SDUPTHER

## 2019-10-01 NOTE — PROGRESS NOTES
Subjective   Jessica Harris is a 33 y.o. female.     History of Present Illness     Diabetes  Current symptoms include none. Patient denies paresthesia of the feet, visual disturbances, polydipsia, polyuria, hypoglycemia and foot ulcerations. Evaluation to date has been: hemoglobin A1C. Home sugars: patient does not check sugars. Current treatments: metformin.  She has not experienced any significant side effects thus far.  Last dilated eye exam more than 1 year ago.   Lab Results   Component Value Date    HGBA1C 7.90 (H) 09/25/2019      Lab Results   Component Value Date    MICROALBUR 3.7 09/25/2019     Palpitations  She gives a long history of intermittent palpitations.  She admits to shortness of breath with above average exertion as well as intermittent swelling of the ankles toward the end of the day but denies any chest pain, lightheadedness, diaphoresis, or nausea.  She has been taking propranolol ER as prescribed and continues to deny any further palpitations.  Holter monitoring performed on 2/18/2019 revealed sinus tachycardia but was otherwise unremarkable.  Echocardiogram performed on 2/20/2019 was also normal with an estimated EF of 61-65%.      Bipolar Affective Disorder  She gives a history of depression and anxiety, and difficulty sleeping.  She continues to be followed by psychiatry and is currently prescribed bupropion, lurasidone, buspirone, and quetiapine.  She feels she continues to do well at present.  She continues to work at a local [employer]     Polycystic Ovarian Syndrome  She gives a history of irregular periods, nasal hair growth, and increasing weight gain. She is followed by gynecology and is currently prescribed an OC and spironolactone.      GERD  She gives a history of intermittent heartburn described as a burning epigastric and retrosternal discomfort.  There is no history of any difficulty swallowing, vomiting, change in her bowel habits, hematochezia or melena and she denies any  fever or chills.  She remains on pantoprazole with a good control of her symptoms.    Labs  Most recent vitamin D 27.3    The following portions of the patient's history were reviewed and updated as appropriate: allergies, current medications, past medical history, past social history and problem list.    Review of Systems   Constitutional: Positive for fatigue. Negative for appetite change, chills, fever and unexpected weight change.   HENT: Negative for congestion, ear pain, rhinorrhea, sneezing, sore throat and voice change.    Eyes: Negative for visual disturbance.   Respiratory: Positive for shortness of breath. Negative for cough and wheezing.         Occasionally wakes with a feeling she cannot get breath and has been told that she snores   Cardiovascular: Positive for leg swelling. Negative for chest pain and palpitations.   Gastrointestinal: Negative for abdominal pain, blood in stool, constipation, diarrhea, nausea and vomiting.   Endocrine: Negative for polydipsia.   Genitourinary: Negative for difficulty urinating, dysuria, frequency, hematuria, menstrual problem, pelvic pain, urgency, vaginal bleeding and vaginal discharge.   Musculoskeletal: Positive for arthralgias and myalgias. Negative for back pain, joint swelling and neck pain.   Skin: Negative for color change.   Neurological: Positive for headaches. Negative for tremors, weakness and numbness.   Psychiatric/Behavioral: Negative for dysphoric mood, sleep disturbance and suicidal ideas. The patient is nervous/anxious.      Objective   Physical Exam   Constitutional: She is oriented to person, place, and time. No distress.   Bright and in good spirits. No apparent distress. No pallor, jaundice, diaphoresis, or cyanosis.   HENT:   Head: Atraumatic.   Right Ear: Tympanic membrane, external ear and ear canal normal.   Left Ear: Tympanic membrane, external ear and ear canal normal.   Nose: Nose normal.   Mouth/Throat: Oropharynx is clear and moist.  Mucous membranes are not pale and not cyanotic.   Eyes: EOM are normal. Pupils are equal, round, and reactive to light. No scleral icterus.   Neck: No JVD present. Carotid bruit is not present. No tracheal deviation present. No thyromegaly present.   Cardiovascular: Normal rate, regular rhythm, S1 normal, S2 normal and intact distal pulses. Exam reveals no gallop, no S3 and no S4.   No murmur heard.  Pulmonary/Chest: Breath sounds normal. No stridor. No respiratory distress.   Abdominal: Soft. Normal aorta and bowel sounds are normal. She exhibits no distension, no abdominal bruit and no mass. There is no hepatosplenomegaly. There is no tenderness. No hernia.   Musculoskeletal: She exhibits no tenderness or deformity.     Vascular Status -  Her right foot exhibits no edema. Her left foot exhibits no edema.  Lymphadenopathy:        Head (right side): No submandibular adenopathy present.        Head (left side): No submandibular adenopathy present.     She has no cervical adenopathy.   Neurological: She is alert and oriented to person, place, and time. She has normal reflexes. She displays normal reflexes. No cranial nerve deficit. She exhibits normal muscle tone. Coordination normal.   Skin: Skin is warm and dry. No rash noted. She is not diaphoretic. No cyanosis. No pallor. Nails show no clubbing.   Face somewhat flushed.  Occipital fat pad.   Psychiatric: She has a normal mood and affect.     Assessment/Plan   Problems Addressed this Visit        Cardiovascular and Mediastinum    Palpitations  Doing well on propranolol  Encouraged to report if this should change.    Chronic venous insufficiency    Essential hypertension  Well-controlled on propranolol and spironolactone  Continue current medication  Scheduled for updated labs just prior to her return.    Relevant Orders    Comprehensive Metabolic Panel    Mixed hyperlipidemia   As above.   Continue current medication.    Relevant Orders    Lipid Panel        Digestive    Gastroesophageal reflux disease without esophagitis   Symptoms are currently well controlled.  Continue current medication.    Vitamin D deficiency  Continue supplementation with monitoring.    Relevant Orders    Vitamin D 25 Hydroxy    Morbid obesity (CMS/HCC)       Endocrine    PCOS (polycystic ovarian syndrome)  Follow up with gynecology    Type 2 diabetes mellitus without complication, without long-term current use of insulin (CMS/HCC)  Diabetes mellitus Type II, under fair control.   Encouraged to continue to pursue ADA diet  Encouraged aerobic exercise.  Metformin ER will be titrated to 1000 daily    Relevant Medications    metFORMIN ER (GLUCOPHAGE-XR) 500 MG 24 hr tablet    Other Relevant Orders    Hemoglobin A1c       Other    Bipolar disorder, in partial remission, most recent episode mixed (CMS/HCC)  Stable.  Supportive therapy.   Follow up with psychiatry    Smoker    Healthcare maintenance  Recommended a flu shot    Relevant Orders    Flucelvax Quad=>4Years (PFS) (Completed)

## 2019-10-02 VITALS
BODY MASS INDEX: 43.32 KG/M2 | OXYGEN SATURATION: 95 % | WEIGHT: 260 LBS | SYSTOLIC BLOOD PRESSURE: 120 MMHG | HEART RATE: 93 BPM | RESPIRATION RATE: 12 BRPM | HEIGHT: 65 IN | TEMPERATURE: 98 F | DIASTOLIC BLOOD PRESSURE: 75 MMHG

## 2019-10-08 ENCOUNTER — OFFICE VISIT (OUTPATIENT)
Dept: FAMILY MEDICINE CLINIC | Facility: CLINIC | Age: 34
End: 2019-10-08

## 2019-10-08 VITALS
OXYGEN SATURATION: 94 % | HEIGHT: 65 IN | WEIGHT: 260 LBS | DIASTOLIC BLOOD PRESSURE: 70 MMHG | BODY MASS INDEX: 43.32 KG/M2 | HEART RATE: 91 BPM | SYSTOLIC BLOOD PRESSURE: 108 MMHG | TEMPERATURE: 98.5 F

## 2019-10-08 DIAGNOSIS — I10 ESSENTIAL HYPERTENSION: ICD-10-CM

## 2019-10-08 DIAGNOSIS — L03.031 CELLULITIS OF TOE OF RIGHT FOOT: Primary | ICD-10-CM

## 2019-10-08 DIAGNOSIS — M79.10 MYALGIA: ICD-10-CM

## 2019-10-08 DIAGNOSIS — K21.9 GASTROESOPHAGEAL REFLUX DISEASE WITHOUT ESOPHAGITIS: ICD-10-CM

## 2019-10-08 DIAGNOSIS — M62.838 MUSCLE SPASM: ICD-10-CM

## 2019-10-08 DIAGNOSIS — E78.2 MIXED HYPERLIPIDEMIA: ICD-10-CM

## 2019-10-08 PROCEDURE — 99214 OFFICE O/P EST MOD 30 MIN: CPT | Performed by: PHYSICIAN ASSISTANT

## 2019-10-08 RX ORDER — AMOXICILLIN AND CLAVULANATE POTASSIUM 875; 125 MG/1; MG/1
1 TABLET, FILM COATED ORAL 2 TIMES DAILY
Qty: 20 TABLET | Refills: 0 | OUTPATIENT
Start: 2019-10-08 | End: 2019-10-19

## 2019-10-08 RX ORDER — CYCLOBENZAPRINE HCL 10 MG
TABLET ORAL
Qty: 30 TABLET | Refills: 2 | Status: SHIPPED | OUTPATIENT
Start: 2019-10-08 | End: 2020-02-27

## 2019-10-08 RX ORDER — IBUPROFEN 800 MG/1
TABLET ORAL
Qty: 90 TABLET | Refills: 2 | Status: SHIPPED | OUTPATIENT
Start: 2019-10-08 | End: 2020-02-27

## 2019-10-10 NOTE — PROGRESS NOTES
Subjective   Jessica Harris is a 33 y.o. female.       Chief Complaint -toe pain    History of Present Illness -      Toe pain-  She complains of pain and redness right medial great toe secondary to ingrown toenail.  Onset 1 week ago.  There is associated clear yellow discharge and erythema with swelling.  Minimal relief with an biotic ointment.    Hypertension-controlled with spironolactone and propranolol    Lipidemia-stable with Lipitor  Lab Results   Component Value Date    CHOL 199 03/15/2019     Lab Results   Component Value Date    TRIG 327 (H) 03/15/2019     Lab Results   Component Value Date    HDL 48 03/15/2019     Lab Results   Component Value Date    LDL 86 03/15/2019     Gastroesophageal reflux disease-stable with pantoprazole    The following portions of the patient's history were reviewed and updated as appropriate: allergies, current medications, past family history, past medical history, past social history, past surgical history and problem list.    Review of Systems   Constitutional: Negative for activity change, appetite change and fever.   HENT: Negative for ear pain, sinus pressure and sore throat.    Eyes: Negative for pain and visual disturbance.   Respiratory: Negative for cough and chest tightness.    Cardiovascular: Negative for chest pain and palpitations.   Gastrointestinal: Negative for abdominal pain, constipation, diarrhea, nausea and vomiting.   Endocrine: Negative for polydipsia and polyuria.   Genitourinary: Negative for dysuria and frequency.   Musculoskeletal: Positive for gait problem. Negative for back pain and neck pain.   Skin: Negative for color change and rash.        Pain   Allergic/Immunologic: Negative for food allergies and immunocompromised state.   Neurological: Negative for dizziness, syncope and headaches.   Hematological: Negative for adenopathy. Does not bruise/bleed easily.   Psychiatric/Behavioral: Negative for hallucinations and suicidal ideas. The patient is  "not nervous/anxious.        /70   Pulse 91   Temp 98.5 °F (36.9 °C) (Oral)   Ht 165.1 cm (65\")   Wt 118 kg (260 lb)   LMP 08/28/2019   SpO2 94%   BMI 43.27 kg/m²   Lab on 09/25/2019   Component Date Value Ref Range Status   • Microalbumin, Urine 09/25/2019 3.7  mg/dL Final     Lab Results   Component Value Date    GLUCOSE 208 (H) 09/25/2019    BUN 8 09/25/2019    CREATININE 0.81 09/25/2019    EGFRIFNONA 82 09/25/2019    BCR 9.9 09/25/2019    K 4.1 09/25/2019    CO2 27.6 09/25/2019    CALCIUM 9.6 09/25/2019    ALBUMIN 4.50 09/25/2019    LABIL2 1.5 01/29/2015    AST 66 (H) 09/25/2019    ALT 49 (H) 09/25/2019         Physical Exam   Constitutional: She is oriented to person, place, and time. She appears well-developed and well-nourished.   HENT:   Head: Normocephalic and atraumatic.   Nose: Nose normal.   Mouth/Throat: Oropharynx is clear and moist.   Eyes: Conjunctivae and EOM are normal. Pupils are equal, round, and reactive to light.   Neck: Normal range of motion. Neck supple. No tracheal deviation present. No thyromegaly present.   Cardiovascular: Normal rate, regular rhythm, normal heart sounds and intact distal pulses.   No murmur heard.  Pulmonary/Chest: Effort normal and breath sounds normal. No respiratory distress. She has no wheezes.   Abdominal: Soft. Bowel sounds are normal. There is no tenderness. There is no guarding.   Musculoskeletal: Normal range of motion. She exhibits tenderness. She exhibits no edema.   Erythematous tender medial right great toe with ingrown toenail noted   Lymphadenopathy:     She has no cervical adenopathy.   Neurological: She is alert and oriented to person, place, and time.   Skin: Skin is warm and dry. No rash noted.   Psychiatric: She has a normal mood and affect. Her behavior is normal.   Nursing note and vitals reviewed.      Assessment/Plan     Diagnoses and all orders for this visit:    Cellulitis of toe of right foot  Comments:  Epson salt soaks were " advised.  She declines referral for ingrown toenail removal to podiatry.  Orders:  -     amoxicillin-clavulanate (AUGMENTIN) 875-125 MG per tablet; Take 1 tablet by mouth 2 (Two) Times a Day.    Essential hypertension  Comments:  Continue spironolactone and propranolol    Mixed hyperlipidemia  Comments:  Continue Lipitor    Gastroesophageal reflux disease without esophagitis  Comments:  Continue pantoprazole                     This document has been electronically signed by:  Marie Cuellar PA-C

## 2019-10-19 ENCOUNTER — HOSPITAL ENCOUNTER (EMERGENCY)
Facility: HOSPITAL | Age: 34
Discharge: HOME | End: 2019-10-19
Attending: EMERGENCY MEDICINE | Admitting: EMERGENCY MEDICINE

## 2019-10-19 ENCOUNTER — APPOINTMENT (OUTPATIENT)
Dept: GENERAL RADIOLOGY | Facility: HOSPITAL | Age: 34
End: 2019-10-19

## 2019-10-19 VITALS
BODY MASS INDEX: 41.78 KG/M2 | WEIGHT: 260 LBS | SYSTOLIC BLOOD PRESSURE: 120 MMHG | TEMPERATURE: 98.2 F | HEART RATE: 84 BPM | HEIGHT: 66 IN | DIASTOLIC BLOOD PRESSURE: 78 MMHG | RESPIRATION RATE: 18 BRPM | OXYGEN SATURATION: 96 %

## 2019-10-19 DIAGNOSIS — J06.9 UPPER RESPIRATORY TRACT INFECTION, UNSPECIFIED TYPE: Primary | ICD-10-CM

## 2019-10-19 PROCEDURE — 99283 EMERGENCY DEPT VISIT LOW MDM: CPT

## 2019-10-19 PROCEDURE — 71046 X-RAY EXAM CHEST 2 VIEWS: CPT

## 2019-10-19 PROCEDURE — 94799 UNLISTED PULMONARY SVC/PX: CPT

## 2019-10-19 PROCEDURE — 94640 AIRWAY INHALATION TREATMENT: CPT

## 2019-10-19 RX ORDER — IPRATROPIUM BROMIDE AND ALBUTEROL SULFATE 2.5; .5 MG/3ML; MG/3ML
3 SOLUTION RESPIRATORY (INHALATION) ONCE
Status: COMPLETED | OUTPATIENT
Start: 2019-10-19 | End: 2019-10-19

## 2019-10-19 RX ORDER — AZITHROMYCIN 250 MG/1
250 TABLET, FILM COATED ORAL DAILY
Qty: 6 TABLET | Refills: 0 | Status: SHIPPED | OUTPATIENT
Start: 2019-10-19 | End: 2020-01-08

## 2019-10-19 RX ORDER — DEXTROMETHORPHAN HYDROBROMIDE AND PROMETHAZINE HYDROCHLORIDE 15; 6.25 MG/5ML; MG/5ML
5 SYRUP ORAL 4 TIMES DAILY PRN
Qty: 100 ML | Refills: 0 | Status: SHIPPED | OUTPATIENT
Start: 2019-10-19 | End: 2020-01-08

## 2019-10-19 RX ADMIN — IPRATROPIUM BROMIDE AND ALBUTEROL SULFATE 3 ML: .5; 3 SOLUTION RESPIRATORY (INHALATION) at 08:04

## 2019-10-19 NOTE — ED NOTES
Notified respiratory of new orders, [facility] states they are in a code right now and would get to it when they finish     Dorota Tapia  10/19/19 0702

## 2019-10-19 NOTE — ED PROVIDER NOTES
Subjective     History provided by:  Patient   used: No    Cough   Cough characteristics:  Harsh and hoarse  Sputum characteristics:  Yellow  Severity:  Moderate  Onset quality:  Gradual  Duration:  7 days  Timing:  Constant  Progression:  Worsening  Chronicity:  New  Smoker: yes    Context: upper respiratory infection and weather changes    Relieved by:  Nothing  Worsened by:  Nothing  Ineffective treatments:  None tried  Associated symptoms: headaches, myalgias, sinus congestion and wheezing    Risk factors: recent infection    Risk factors: no chemical exposure        Review of Systems   Constitutional: Negative.    Eyes: Negative.    Respiratory: Positive for cough and wheezing.    Cardiovascular: Negative.    Gastrointestinal: Negative.    Endocrine: Negative.    Genitourinary: Negative.    Musculoskeletal: Positive for myalgias.   Skin: Negative.    Allergic/Immunologic: Negative.    Neurological: Positive for headaches.   Hematological: Negative.    Psychiatric/Behavioral: Negative.    All other systems reviewed and are negative.      Past Medical History:   Diagnosis Date   • Anxiety    • Bipolar 1 disorder (CMS/HCC)    • Depression    • GERD (gastroesophageal reflux disease)    • History of bronchitis    • History of ear infections    • History of stomach ulcers    • History of streptococcal infection    • Hypertension    • Urinary tract infection        No Known Allergies    Past Surgical History:   Procedure Laterality Date   • ADENOIDECTOMY     • CHOLECYSTECTOMY     • DENTAL PROCEDURE     • HERNIA REPAIR     • TONSILLECTOMY     • WISDOM TOOTH EXTRACTION         Family History   Problem Relation Age of Onset   • Cancer Mother    • Stroke Mother    • Lung disease Mother    • No Known Problems Father    • Cancer Other    • Heart disease Other    • Stroke Other        Social History     Socioeconomic History   • Marital status: Single     Spouse name: Not on file   • Number of children:  Not on file   • Years of education: Not on file   • Highest education level: Not on file   Social Needs   • Financial resource strain: Patient refused   • Food insecurity:     Worry: Patient refused     Inability: Patient refused   • Transportation needs:     Medical: Patient refused     Non-medical: Patient refused   Tobacco Use   • Smoking status: Current Every Day Smoker     Packs/day: 0.50     Types: Cigarettes   • Smokeless tobacco: Never Used   Substance and Sexual Activity   • Alcohol use: No     Frequency: Never   • Drug use: No   • Sexual activity: Defer     Birth control/protection: Pill   Lifestyle   • Physical activity:     Days per week: Patient refused     Minutes per session: Patient refused   • Stress: Patient refused           Objective   Physical Exam   Constitutional: She is oriented to person, place, and time. She appears well-developed and well-nourished.   HENT:   Head: Normocephalic.   Nose: Nose normal.   PND   Eyes: EOM are normal. Pupils are equal, round, and reactive to light.   Neck: Normal range of motion. Neck supple.   Cardiovascular: Normal rate, regular rhythm, normal heart sounds and intact distal pulses.   Pulmonary/Chest: No respiratory distress.   Decreased breath sounds throughout   Abdominal: Soft. Bowel sounds are normal.   Musculoskeletal: Normal range of motion.   Neurological: She is alert and oriented to person, place, and time.   Skin: Skin is warm. Capillary refill takes less than 2 seconds.   Psychiatric: She has a normal mood and affect. Her behavior is normal. Judgment and thought content normal.   Nursing note and vitals reviewed.      Procedures           ED Course  ED Course as of Oct 19 0810   Sat Oct 19, 2019   0806 No acute cardiopulmonary process XR Chest 2 View [KK]      ED Course User Index  [KK] Ayesha Barbosa, JANIE                  MDM  Number of Diagnoses or Management Options  Upper respiratory tract infection, unspecified type: new and requires  workup     Amount and/or Complexity of Data Reviewed  Tests in the radiology section of CPT®: reviewed and ordered    Risk of Complications, Morbidity, and/or Mortality  Presenting problems: moderate  Diagnostic procedures: moderate  Management options: moderate    Patient Progress  Patient progress: improved      Final diagnoses:   Upper respiratory tract infection, unspecified type              Ayesha Barbosa, APRN  10/19/19 0810

## 2019-10-22 ENCOUNTER — OFFICE VISIT (OUTPATIENT)
Dept: FAMILY MEDICINE CLINIC | Facility: CLINIC | Age: 34
End: 2019-10-22

## 2019-10-22 DIAGNOSIS — R06.83 SNORING: ICD-10-CM

## 2019-10-22 DIAGNOSIS — E66.01 MORBID OBESITY (HCC): ICD-10-CM

## 2019-10-22 DIAGNOSIS — Z00.00 HEALTHCARE MAINTENANCE: ICD-10-CM

## 2019-10-22 DIAGNOSIS — J06.9 RECENT UPPER RESPIRATORY TRACT INFECTION: ICD-10-CM

## 2019-10-22 DIAGNOSIS — R00.2 PALPITATIONS: ICD-10-CM

## 2019-10-22 DIAGNOSIS — I10 ESSENTIAL HYPERTENSION: ICD-10-CM

## 2019-10-22 DIAGNOSIS — E78.2 MIXED HYPERLIPIDEMIA: Primary | ICD-10-CM

## 2019-10-22 DIAGNOSIS — E11.9 TYPE 2 DIABETES MELLITUS WITHOUT COMPLICATION, WITHOUT LONG-TERM CURRENT USE OF INSULIN (HCC): ICD-10-CM

## 2019-10-22 DIAGNOSIS — F17.200 SMOKER: ICD-10-CM

## 2019-10-22 PROCEDURE — 99214 OFFICE O/P EST MOD 30 MIN: CPT | Performed by: GENERAL PRACTICE

## 2019-10-22 RX ORDER — LOPERAMIDE HYDROCHLORIDE 2 MG/1
2 CAPSULE ORAL DAILY
Refills: 5 | COMMUNITY
Start: 2019-10-16 | End: 2020-02-27 | Stop reason: SDUPTHER

## 2019-10-22 NOTE — PROGRESS NOTES
Subjective   Jessica Harris is a 33 y.o. female.     History of Present Illness     Cough  Seen at [facility] one week ago with nasal congestion, cough, and increased shortness of breath.  She denies any other respiratory tract symptoms and has had no chest pain or hemoptysis.  She has had occasional nausea following coughing episodes but denies any vomiting, abdominal pain, or diarrhea.  There is no history of any fever or chills.  She was treated with azithromycin and has noted a scant improvement.  She had a similar episode several months ago.    Palpitations  She gives a long history of intermittent palpitations.  She admits to shortness of breath with above average exertion as well as intermittent swelling of the ankles toward the end of the day but denies any chest pain, lightheadedness, diaphoresis, or nausea.  She has been taking propranolol ER as prescribed and continues to deny any further palpitations.  Holter monitoring performed on 2/18/2019 revealed sinus tachycardia but was otherwise unremarkable.  Echocardiogram performed on 2/20/2019 was also normal with an estimated EF of 61-65%.     Diabetes  Current symptoms include none. Patient denies paresthesia of the feet, visual disturbances, polydipsia, polyuria, hypoglycemia and foot ulcerations. Evaluation to date has been: hemoglobin A1C. Home sugars: patient does not check sugars. Current treatments: metformin.  She has not experienced any significant side effects thus far.  Last dilated eye exam more than 1 year ago.     GERD  She gives a history of intermittent heartburn described as a burning epigastric and retrosternal discomfort.  There is no history of any difficulty swallowing, vomiting, change in her bowel habits, hematochezia or melena and she denies any fever or chills.  She remains on pantoprazole with a good control of her symptoms.    The following portions of the patient's history were reviewed and updated as appropriate: allergies, current  medications, past medical history, past social history and problem list.    Review of Systems   Constitutional: Positive for fatigue. Negative for appetite change, chills, fever and unexpected weight change.   HENT: Positive for congestion and rhinorrhea. Negative for ear pain, sneezing, sore throat and voice change.    Eyes: Negative for visual disturbance.   Respiratory: Positive for cough and shortness of breath. Negative for wheezing.         Occasionally wakes with a feeling she cannot get breath and has been told that she snores   Cardiovascular: Positive for leg swelling. Negative for chest pain and palpitations.   Gastrointestinal: Negative for abdominal pain, blood in stool, constipation, diarrhea, nausea and vomiting.   Endocrine: Negative for polydipsia.   Genitourinary: Negative for difficulty urinating, dysuria, frequency, hematuria, menstrual problem, pelvic pain, urgency, vaginal bleeding and vaginal discharge.   Musculoskeletal: Positive for arthralgias and myalgias. Negative for back pain, joint swelling and neck pain.   Skin: Negative for color change.   Neurological: Positive for headaches. Negative for tremors, weakness and numbness.   Psychiatric/Behavioral: Negative for dysphoric mood, sleep disturbance and suicidal ideas. The patient is nervous/anxious.      Objective   Physical Exam   Constitutional: She is oriented to person, place, and time. No distress.   Bright and in good spirits. No apparent distress. No pallor, jaundice, diaphoresis, or cyanosis.   HENT:   Head: Atraumatic.   Right Ear: Tympanic membrane, external ear and ear canal normal.   Left Ear: Tympanic membrane, external ear and ear canal normal.   Nose: Nose normal.   Mouth/Throat: Oropharynx is clear and moist. Mucous membranes are not pale and not cyanotic.   Eyes: EOM are normal. Pupils are equal, round, and reactive to light. No scleral icterus.   Neck: No JVD present. Carotid bruit is not present. No tracheal deviation  present. No thyromegaly present.   Cardiovascular: Normal rate, regular rhythm, S1 normal, S2 normal and intact distal pulses. Exam reveals no gallop, no S3 and no S4.   No murmur heard.  Pulmonary/Chest: No stridor. No respiratory distress. She has wheezes (forced expiration somewhat coarse). She has no rhonchi. She has no rales.   Abdominal: Soft. Normal aorta and bowel sounds are normal. She exhibits no distension, no abdominal bruit and no mass. There is no hepatosplenomegaly. There is no tenderness. No hernia.   Musculoskeletal: She exhibits no tenderness or deformity.     Vascular Status -  Her right foot exhibits no edema. Her left foot exhibits no edema.  Lymphadenopathy:        Head (right side): No submandibular adenopathy present.        Head (left side): No submandibular adenopathy present.     She has no cervical adenopathy.   Neurological: She is alert and oriented to person, place, and time. She has normal reflexes. She displays normal reflexes. No cranial nerve deficit. She exhibits normal muscle tone. Coordination normal.   Skin: Skin is warm and dry. No rash noted. She is not diaphoretic. No cyanosis. No pallor. Nails show no clubbing.   Face somewhat flushed.  Occipital fat pad.   Psychiatric: She has a normal mood and affect.     Assessment/Plan   Problems Addressed this Visit        Cardiovascular and Mediastinum    Palpitations  Controlled with metoprolol  Encouraged to report if this should change.    Essential hypertension  Controlled with metoprolol and spironolactone  Will continue to monitor    Mixed hyperlipidemia  Encouraged to continue to work on her diet and exercise plan.  Continue current medication       Respiratory    Snoring  Patient agrees to a sleep study and this will be arranged    Relevant Orders    Home Sleep Study       Digestive    Morbid obesity (CMS/[facility])    Relevant Orders    Home Sleep Study       Endocrine    Type 2 diabetes mellitus without complication, without  long-term current use of insulin (CMS/[facility])  Diabetes mellitus Type II, under excellent control.   Encouraged to continue to pursue ADA diet  Encouraged aerobic exercise.  Continue current medication       Other    Smoker        Recent upper respiratory tract infection  If she continues to have intermittent episodes of increased shortness of breath and coughing may have to consider underlying asthma or early COPD and reconsider metoprolol  Will monitor

## 2019-10-23 VITALS
WEIGHT: 263 LBS | TEMPERATURE: 98 F | HEIGHT: 66 IN | BODY MASS INDEX: 42.27 KG/M2 | HEART RATE: 100 BPM | RESPIRATION RATE: 12 BRPM | DIASTOLIC BLOOD PRESSURE: 70 MMHG | OXYGEN SATURATION: 90 % | SYSTOLIC BLOOD PRESSURE: 120 MMHG

## 2019-10-23 PROBLEM — J06.9 RECENT UPPER RESPIRATORY TRACT INFECTION: Status: ACTIVE | Noted: 2019-10-23

## 2019-12-04 DIAGNOSIS — E78.2 MIXED HYPERLIPIDEMIA: ICD-10-CM

## 2019-12-04 RX ORDER — ATORVASTATIN CALCIUM 10 MG/1
TABLET, FILM COATED ORAL
Qty: 30 TABLET | Refills: 5 | Status: SHIPPED | OUTPATIENT
Start: 2019-12-04 | End: 2020-02-27 | Stop reason: SDUPTHER

## 2020-01-08 ENCOUNTER — OFFICE VISIT (OUTPATIENT)
Dept: PSYCHIATRY | Facility: CLINIC | Age: 35
End: 2020-01-08

## 2020-01-08 VITALS
DIASTOLIC BLOOD PRESSURE: 88 MMHG | HEART RATE: 93 BPM | BODY MASS INDEX: 40.82 KG/M2 | WEIGHT: 254 LBS | HEIGHT: 66 IN | SYSTOLIC BLOOD PRESSURE: 135 MMHG

## 2020-01-08 DIAGNOSIS — F31.30 BIPOLAR I DISORDER, MOST RECENT EPISODE DEPRESSED (HCC): ICD-10-CM

## 2020-01-08 DIAGNOSIS — F51.05 INSOMNIA DUE TO MENTAL DISORDER: ICD-10-CM

## 2020-01-08 PROCEDURE — 99214 OFFICE O/P EST MOD 30 MIN: CPT | Performed by: NURSE PRACTITIONER

## 2020-01-08 RX ORDER — QUETIAPINE FUMARATE 100 MG/1
100 TABLET, FILM COATED ORAL
Qty: 30 TABLET | Refills: 2 | Status: SHIPPED | OUTPATIENT
Start: 2020-01-08 | End: 2020-04-01 | Stop reason: SDUPTHER

## 2020-01-08 RX ORDER — LURASIDONE HYDROCHLORIDE 80 MG/1
TABLET, FILM COATED ORAL
Qty: 30 TABLET | Refills: 2 | OUTPATIENT
Start: 2020-01-08

## 2020-01-08 RX ORDER — LURASIDONE HYDROCHLORIDE 80 MG/1
80 TABLET, FILM COATED ORAL DAILY
Qty: 30 TABLET | Refills: 2 | Status: SHIPPED | OUTPATIENT
Start: 2020-01-08 | End: 2020-04-01 | Stop reason: SDUPTHER

## 2020-01-08 RX ORDER — BUPROPION HYDROCHLORIDE 100 MG/1
100 TABLET, EXTENDED RELEASE ORAL 2 TIMES DAILY
Qty: 60 TABLET | Refills: 2 | Status: SHIPPED | OUTPATIENT
Start: 2020-01-08 | End: 2020-04-01 | Stop reason: SDUPTHER

## 2020-01-08 RX ORDER — QUETIAPINE FUMARATE 100 MG/1
TABLET, FILM COATED ORAL
Qty: 30 TABLET | Refills: 5 | OUTPATIENT
Start: 2020-01-08

## 2020-01-08 NOTE — PROGRESS NOTES
Subjective   Jessica Harris is a 34 y.o. female who is here today for follow-up    Chief Complaint: f/u Bipolar     HPI:  History of Present Illness   Patient presents for follow-up today for Bipolar. Patient continues to work at [email or web address]. Patient feels she is doing well on her medications. She reports mood is stable. Sleep and appetite are good. She reports she is having difficulty with primarily initiation.  She reports that she does wake up gasping for air several times during the night.  She reports daytime fatigue and brain fog. She is requesting an increase in Seroquel to help with sleep.  We discussed the risk of increasing dose.  She is already having issues with elevated lipids, glucose and liver enzymes.  We discussed at this time benefits of increasing does not outweigh her risk.  We discussed referral to pulmonology for sleep study and frequent respiratory infections.  Patient reports she has been having issues with getting respiratory infections frequently and is unsure of the cause.  She reports she is a smoker and we discussed benefits of cessation.  She is not open to quitting at this time.  She denies suicidal and homicidal ideations. She denies auditory and visual hallucinations. She continues to take Metformin for PCOS. PCP started patient on Lipitor a couple of months ago.     Family Psychiatric History:  family history includes Cancer in her mother and another family member; Heart disease in an other family member; Lung disease in her mother; No Known Problems in her father; Stroke in her mother and another family member.    Medical/Surgical History:  Past Medical History:   Diagnosis Date   • Anxiety    • Bipolar 1 disorder (CMS/HCC)    • Depression    • GERD (gastroesophageal reflux disease)    • History of bronchitis    • History of ear infections    • History of stomach ulcers    • History of streptococcal infection    • Hypertension    • Urinary tract infection      Past Surgical History:    Procedure Laterality Date   • ADENOIDECTOMY     • CHOLECYSTECTOMY     • DENTAL PROCEDURE     • HERNIA REPAIR     • TONSILLECTOMY     • WISDOM TOOTH EXTRACTION         No Known Allergies        Current Medications:   Current Outpatient Medications   Medication Sig Dispense Refill   • atorvastatin (LIPITOR) 10 MG tablet TAKE ONE TABLET BY MOUTH EVERY EVENING 30 tablet 5   • buPROPion SR (WELLBUTRIN SR) 100 MG 12 hr tablet Take 1 tablet by mouth 2 (Two) Times a Day. 60 tablet 2   • cyclobenzaprine (FLEXERIL) 10 MG tablet TAKE ONE TABLET BY MOUTH EVERY NIGHT AT BEDTIME FOR MUSCLE SPASMS 30 tablet 2   • ibuprofen (ADVIL,MOTRIN) 800 MG tablet TAKE ONE TABLET BY MOUTH THREE TIMES A DAY AS NEEDED FOR MILD OR MODERATE PAIN 90 tablet 2   • loperamide (IMODIUM) 2 MG capsule Take 2 mg by mouth Daily.  5   • Lurasidone HCl 80 MG tablet Take 80 mg by mouth Daily. 30 tablet 2   • metFORMIN ER (GLUCOPHAGE-XR) 500 MG 24 hr tablet Take 2 tablets by mouth Daily. 60 tablet 5   • pantoprazole (PROTONIX) 40 MG EC tablet Take 1 tablet by mouth Daily. 30 tablet 3   • propranolol LA (INDERAL LA) 120 MG 24 hr capsule Take 1 capsule by mouth Daily. 30 capsule 5   • QUEtiapine (SEROquel) 100 MG tablet Take 1 tablet by mouth every night at bedtime. 30 tablet 2   • spironolactone (ALDACTONE) 100 MG tablet TAKE ONE TABLET BY MOUTH EVERY DAY FOR FLUID  5   • SPRINTEC 28 0.25-35 MG-MCG per tablet Take 1 tablet by mouth Daily.  11     No current facility-administered medications for this visit.          Review of Systems   Constitutional: Negative.    HENT: Negative.    Eyes: Negative.    Respiratory: Positive for cough.    Cardiovascular: Negative.    Gastrointestinal: Negative.    Endocrine: Negative.    Genitourinary: Negative.    Musculoskeletal: Negative.    Skin: Negative.    Allergic/Immunologic: Negative.    Neurological: Negative.    Hematological: Negative.    Psychiatric/Behavioral: Positive for sleep disturbance.    denies HEENT,  "cardiovascular, respiratory, liver, renal, GI/, endocrine, neuro, DERM, hematology, immunology, musculoskeletal disorders.    Objective   Physical Exam   Constitutional: She appears well-developed and well-nourished. No distress.   Vitals reviewed.    Blood pressure 135/88, pulse 93, height 167.6 cm (66\"), weight 115 kg (254 lb).    Mental Status Exam:   Hygiene:   good  Cooperation:  Cooperative  Eye Contact:  Good  Psychomotor Behavior:  Appropriate  Affect:  Full range  Hopelessness: Denies  Speech:  Normal  Thought Process:  Goal directed and Linear  Thought Content:  Normal  Suicidal:  None  Homicidal:  None  Hallucinations:  None  Delusion:  None  Memory:  Intact  Orientation:  Person, Place, Time and Situation  Reliability:  good  Insight:  Good  Judgement:  Good  Impulse Control:  Good  Physical/Medical Issues:  Yes PCOS, GERD, Tachycardia       Assessment/Plan   Diagnoses and all orders for this visit:    Bipolar I disorder, most recent episode depressed (CMS/[facility])  -     buPROPion SR (WELLBUTRIN SR) 100 MG 12 hr tablet; Take 1 tablet by mouth 2 (Two) Times a Day.  -     Lurasidone HCl 80 MG tablet; Take 80 mg by mouth Daily.  -     QUEtiapine (SEROquel) 100 MG tablet; Take 1 tablet by mouth every night at bedtime.    Insomnia due to mental disorder  -     QUEtiapine (SEROquel) 100 MG tablet; Take 1 tablet by mouth every night at bedtime.      Functionality: pt having some impairment in important areas of daily functioning.  Body mass index is 41 kg/m².  Patient was educated on healthier and more balanced diet choices and encouraged exercise within physical limitations.  Prognosis: Good dependent on medication/follow up and treatment plan compliance.    Impression: Mood stable. She is experiencing worsening of sleep. She describes symptoms of sleep apnea as well as expresses concerns regarding frequent respiratory infections. We discussed pulmonology referral and she is agreeable.     Plan:  1. Continue " Latuda 80 mg po daily for Bipolar depression.  2. Continue Seroquel 100 mg po at night for sleep disturbance   3. RTC in 3 months   4. Continue Wellbutrin  mg po BID as adjunct for depression, fatigue and motivation.  5. PCP has ordered for labs to be rechecked. Encouraged patient to have this done and she is agreeable.   6. Refer for sleep study/ frequent respiratory infections     Problem: Bipolar  Intervention: Continue Latuda and Seroquel and monitor side effects  Long-term goal: Overall improvement in functioning per patient self-report  Short-term goal: Jessica will adhere to medication regimen and 3 month follow-up appointment       Discussed medication options. Discussed the risks, benefits, and side effects of the medication; client acknowledged and verbally consented.  Patient is aware to contact the [facility] with any worsening of symptom.  Patient is agreeable to go to the ER or call 911 should they begin SI/HI

## 2020-01-28 ENCOUNTER — TELEPHONE (OUTPATIENT)
Dept: PSYCHIATRY | Facility: CLINIC | Age: 35
End: 2020-01-28

## 2020-01-28 NOTE — TELEPHONE ENCOUNTER
Will you check with Sana? I requested her to be referred at her last appointment for sleep apnea and frequent respiratory infections. Thank you.

## 2020-01-28 NOTE — TELEPHONE ENCOUNTER
Her referral had been done for [facility] patient is aware we are just waiting to hear back from them. I did give pt the  number to call.

## 2020-01-28 NOTE — TELEPHONE ENCOUNTER
Is Patient supposed too have a sleep study done? If so she was wanting to know when we can get her referred too one?

## 2020-02-11 ENCOUNTER — LAB (OUTPATIENT)
Dept: LAB | Facility: HOSPITAL | Age: 35
End: 2020-02-11

## 2020-02-11 DIAGNOSIS — I10 ESSENTIAL HYPERTENSION: ICD-10-CM

## 2020-02-11 DIAGNOSIS — E11.9 TYPE 2 DIABETES MELLITUS WITHOUT COMPLICATION, WITHOUT LONG-TERM CURRENT USE OF INSULIN (HCC): ICD-10-CM

## 2020-02-11 DIAGNOSIS — E55.9 VITAMIN D DEFICIENCY: ICD-10-CM

## 2020-02-11 DIAGNOSIS — E78.2 MIXED HYPERLIPIDEMIA: ICD-10-CM

## 2020-02-11 PROCEDURE — 83721 ASSAY OF BLOOD LIPOPROTEIN: CPT

## 2020-02-11 PROCEDURE — 80061 LIPID PANEL: CPT

## 2020-02-11 PROCEDURE — 82306 VITAMIN D 25 HYDROXY: CPT

## 2020-02-11 PROCEDURE — 83036 HEMOGLOBIN GLYCOSYLATED A1C: CPT

## 2020-02-11 PROCEDURE — 80053 COMPREHEN METABOLIC PANEL: CPT

## 2020-02-12 DIAGNOSIS — E78.2 MIXED HYPERLIPIDEMIA: ICD-10-CM

## 2020-02-12 DIAGNOSIS — E78.2 MIXED HYPERLIPIDEMIA: Primary | ICD-10-CM

## 2020-02-12 LAB
25(OH)D3 SERPL-MCNC: 24 NG/ML (ref 30–100)
ALBUMIN SERPL-MCNC: 4.4 G/DL (ref 3.5–5.2)
ALBUMIN/GLOB SERPL: 1.5 G/DL
ALP SERPL-CCNC: 73 U/L (ref 39–117)
ALT SERPL W P-5'-P-CCNC: 34 U/L (ref 1–33)
ANION GAP SERPL CALCULATED.3IONS-SCNC: 17.6 MMOL/L (ref 5–15)
AST SERPL-CCNC: 57 U/L (ref 1–32)
BILIRUB SERPL-MCNC: 0.2 MG/DL (ref 0.2–1.2)
BUN BLD-MCNC: 5 MG/DL (ref 6–20)
BUN/CREAT SERPL: 6.6 (ref 7–25)
CALCIUM SPEC-SCNC: 10.2 MG/DL (ref 8.6–10.5)
CHLORIDE SERPL-SCNC: 93 MMOL/L (ref 98–107)
CO2 SERPL-SCNC: 27.4 MMOL/L (ref 22–29)
CREAT BLD-MCNC: 0.76 MG/DL (ref 0.57–1)
GFR SERPL CREATININE-BSD FRML MDRD: 88 ML/MIN/1.73
GLOBULIN UR ELPH-MCNC: 2.9 GM/DL
GLUCOSE BLD-MCNC: 266 MG/DL (ref 65–99)
HBA1C MFR BLD: 10.19 % (ref 4.8–5.6)
POTASSIUM BLD-SCNC: 3.8 MMOL/L (ref 3.5–5.2)
PROT SERPL-MCNC: 7.3 G/DL (ref 6–8.5)
SODIUM BLD-SCNC: 138 MMOL/L (ref 136–145)

## 2020-02-13 LAB
CHOLEST SERPL-MCNC: 128 MG/DL (ref 0–200)
HDLC SERPL-MCNC: 31 MG/DL (ref 40–60)
LDLC SERPL CALC-MCNC: ABNORMAL MG/DL
LDLC/HDLC SERPL: ABNORMAL {RATIO}
TRIGL SERPL-MCNC: 486 MG/DL (ref 0–150)
VLDLC SERPL-MCNC: ABNORMAL MG/DL

## 2020-02-14 LAB — ARTICHOKE IGE QN: 49 MG/DL (ref 0–100)

## 2020-02-27 ENCOUNTER — OFFICE VISIT (OUTPATIENT)
Dept: FAMILY MEDICINE CLINIC | Facility: CLINIC | Age: 35
End: 2020-02-27

## 2020-02-27 ENCOUNTER — PRIOR AUTHORIZATION (OUTPATIENT)
Dept: FAMILY MEDICINE CLINIC | Facility: CLINIC | Age: 35
End: 2020-02-27

## 2020-02-27 VITALS
BODY MASS INDEX: 40.02 KG/M2 | WEIGHT: 249 LBS | TEMPERATURE: 98.2 F | HEIGHT: 66 IN | OXYGEN SATURATION: 94 % | HEART RATE: 94 BPM | DIASTOLIC BLOOD PRESSURE: 60 MMHG | RESPIRATION RATE: 12 BRPM | SYSTOLIC BLOOD PRESSURE: 108 MMHG

## 2020-02-27 DIAGNOSIS — F31.77 BIPOLAR DISORDER, IN PARTIAL REMISSION, MOST RECENT EPISODE MIXED (HCC): ICD-10-CM

## 2020-02-27 DIAGNOSIS — R00.2 PALPITATIONS: ICD-10-CM

## 2020-02-27 DIAGNOSIS — E11.9 TYPE 2 DIABETES MELLITUS WITHOUT COMPLICATION, WITHOUT LONG-TERM CURRENT USE OF INSULIN (HCC): Primary | ICD-10-CM

## 2020-02-27 DIAGNOSIS — I10 ESSENTIAL HYPERTENSION: ICD-10-CM

## 2020-02-27 DIAGNOSIS — E55.9 VITAMIN D DEFICIENCY: ICD-10-CM

## 2020-02-27 DIAGNOSIS — I87.2 CHRONIC VENOUS INSUFFICIENCY: ICD-10-CM

## 2020-02-27 DIAGNOSIS — K21.9 GASTROESOPHAGEAL REFLUX DISEASE WITHOUT ESOPHAGITIS: ICD-10-CM

## 2020-02-27 DIAGNOSIS — E78.2 MIXED HYPERLIPIDEMIA: ICD-10-CM

## 2020-02-27 DIAGNOSIS — M62.838 MUSCLE SPASM: ICD-10-CM

## 2020-02-27 DIAGNOSIS — Z00.00 HEALTHCARE MAINTENANCE: ICD-10-CM

## 2020-02-27 DIAGNOSIS — F17.200 SMOKER: ICD-10-CM

## 2020-02-27 DIAGNOSIS — E28.2 PCOS (POLYCYSTIC OVARIAN SYNDROME): ICD-10-CM

## 2020-02-27 PROBLEM — J06.9 RECENT UPPER RESPIRATORY TRACT INFECTION: Status: RESOLVED | Noted: 2019-10-23 | Resolved: 2020-02-27

## 2020-02-27 LAB — GLUCOSE BLDC GLUCOMTR-MCNC: 229 MG/DL (ref 70–130)

## 2020-02-27 PROCEDURE — 82962 GLUCOSE BLOOD TEST: CPT | Performed by: GENERAL PRACTICE

## 2020-02-27 PROCEDURE — 99214 OFFICE O/P EST MOD 30 MIN: CPT | Performed by: GENERAL PRACTICE

## 2020-02-27 RX ORDER — METFORMIN HYDROCHLORIDE 500 MG/1
1000 TABLET, EXTENDED RELEASE ORAL 2 TIMES DAILY
Qty: 120 TABLET | Refills: 5 | Status: SHIPPED | OUTPATIENT
Start: 2020-02-27 | End: 2020-03-02 | Stop reason: SDUPTHER

## 2020-02-27 RX ORDER — ICOSAPENT ETHYL 1000 MG/1
CAPSULE ORAL
Qty: 120 CAPSULE | Refills: 5 | Status: SHIPPED | OUTPATIENT
Start: 2020-02-27 | End: 2020-03-02 | Stop reason: SDUPTHER

## 2020-02-27 RX ORDER — ATORVASTATIN CALCIUM 10 MG/1
10 TABLET, FILM COATED ORAL EVERY EVENING
Qty: 30 TABLET | Refills: 5 | Status: SHIPPED | OUTPATIENT
Start: 2020-02-27 | End: 2020-06-18

## 2020-02-27 RX ORDER — PROPRANOLOL HYDROCHLORIDE 120 MG/1
120 CAPSULE, EXTENDED RELEASE ORAL DAILY
Qty: 30 CAPSULE | Refills: 5 | Status: SHIPPED | OUTPATIENT
Start: 2020-02-27 | End: 2020-07-14 | Stop reason: SDUPTHER

## 2020-02-27 RX ORDER — PANTOPRAZOLE SODIUM 40 MG/1
40 TABLET, DELAYED RELEASE ORAL DAILY
Qty: 30 TABLET | Refills: 3 | Status: SHIPPED | OUTPATIENT
Start: 2020-02-27 | End: 2020-07-14 | Stop reason: SDUPTHER

## 2020-02-27 RX ORDER — LOPERAMIDE HYDROCHLORIDE 2 MG/1
CAPSULE ORAL
Qty: 30 CAPSULE | Refills: 5 | Status: SHIPPED | OUTPATIENT
Start: 2020-02-27 | End: 2021-04-02

## 2020-02-27 RX ORDER — SPIRONOLACTONE 100 MG/1
100 TABLET, FILM COATED ORAL DAILY
Qty: 30 TABLET | Refills: 5 | Status: SHIPPED | OUTPATIENT
Start: 2020-02-27 | End: 2020-07-14 | Stop reason: SDUPTHER

## 2020-02-27 NOTE — PROGRESS NOTES
"Subjective   Jessica Harris is a 34 y.o. female.     History of Present Illness     Diabetes  Current symptoms include none. Patient denies paresthesia of the feet, visual disturbances, polydipsia, polyuria, hypoglycemia and foot ulcerations. Evaluation to date has been: hemoglobin A1C. Home sugars: patient does not check sugars. Current treatments: metformin.  She has not been following her diet or exercise plan since last here.  She admits that she drinks \"a lot\" of regular pop.  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  Last dilated eye exam more than 1 year ago.   Lab Results   Component Value Date    HGBA1C 10.19 (H) 02/11/2020     Lab Results   Component Value Date    MICROALBUR 3.7 09/25/2019     Dyslipidemia  Compliance with treatment has been poor. The patient exercises occasionally. She is currently being prescribed the following medication for her dyslipidemia - atorvastatin. Patient denies side effects associated with her medications. Most recent lipids include  Lab Results   Component Value Date    TRIG 486 (H) 02/11/2020    TRIG 327 (H) 03/15/2019    HDL 31 (L) 02/11/2020    HDL 48 03/15/2019    LDL  02/11/2020      Comment:      Unable to calculate    LDL 49 02/11/2020    LDL 86 03/15/2019     Hypertension  Home blood pressure readings: not doing. Associated signs and symptoms: dyspnea and peripheral edema. Patient denies: chest pain, palpitations, orthopnea and paroxysmal nocturnal dyspnea. Current antihypertensive medications includes propranolol and aldactone. Medication compliance: taking as prescribed. Most recent creatinine   Lab Results   Component Value Date    CREATININE 0.76 02/11/2020     Palpitations  She gives a long history of intermittent palpitations.  She admits to shortness of breath with above average exertion as well as intermittent swelling of the ankles toward the end of the day but denies any chest pain, lightheadedness, diaphoresis, or nausea.  She " has been taking propranolol ER as prescribed and continues to deny any further palpitations.  Holter monitoring performed on 2/18/2019 revealed sinus tachycardia but was otherwise unremarkable.  Echocardiogram performed on 2/20/2019 was also normal with an estimated EF of 61-65%.    GERD  She gives a history of intermittent heartburn described as a burning epigastric and retrosternal discomfort.  There is no history of any difficulty swallowing, vomiting, change in her bowel habits, hematochezia or melena and she denies any fever or chills.  She remains on pantoprazole with a good control of her symptoms.    Labs  Most recent vitamin D 24    The following portions of the patient's history were reviewed and updated as appropriate: allergies, current medications, past medical history, past social history and problem list.    Review of Systems   Constitutional: Positive for fatigue. Negative for appetite change, chills, fever and unexpected weight change.   HENT: Negative for congestion, ear pain, rhinorrhea, sneezing, sore throat and voice change.    Eyes: Negative for visual disturbance.   Respiratory: Positive for cough and shortness of breath. Negative for wheezing.         Occasionally wakes with a feeling she cannot get breath and has been told that she snores   Cardiovascular: Positive for leg swelling. Negative for chest pain and palpitations.   Gastrointestinal: Negative for abdominal pain, blood in stool, constipation, diarrhea, nausea and vomiting.   Endocrine: Negative for polydipsia.   Genitourinary: Negative for difficulty urinating, dysuria, frequency, hematuria and urgency.   Musculoskeletal: Positive for arthralgias and myalgias. Negative for back pain, joint swelling and neck pain.   Skin: Negative for rash.   Neurological: Positive for headaches. Negative for weakness and numbness.   Psychiatric/Behavioral: Negative for dysphoric mood, sleep disturbance and suicidal ideas. The patient is  nervous/anxious.      Objective   Physical Exam   Constitutional: She is oriented to person, place, and time. No distress.   Accompanied by her mother.  Bright and in good spirits. No apparent distress. No pallor, jaundice, diaphoresis, or cyanosis.   HENT:   Head: Atraumatic.   Right Ear: Tympanic membrane, external ear and ear canal normal.   Left Ear: Tympanic membrane, external ear and ear canal normal.   Nose: Nose normal.   Mouth/Throat: Oropharynx is clear and moist. Mucous membranes are not pale and not cyanotic.   Eyes: Pupils are equal, round, and reactive to light. EOM are normal. No scleral icterus.   Neck: No JVD present. Carotid bruit is not present. No tracheal deviation present. No thyromegaly present.   Cardiovascular: Normal rate, regular rhythm, S1 normal, S2 normal and intact distal pulses. Exam reveals no gallop, no S3 and no S4.   No murmur heard.  Pulmonary/Chest: Breath sounds normal. No stridor. No respiratory distress.   Musculoskeletal: She exhibits no tenderness or deformity.     Vascular Status -  Her right foot exhibits no edema. Her left foot exhibits no edema.  Lymphadenopathy:        Head (right side): No submandibular adenopathy present.        Head (left side): No submandibular adenopathy present.     She has no cervical adenopathy.   Neurological: She is alert and oriented to person, place, and time. No cranial nerve deficit. She exhibits normal muscle tone. Coordination normal.   Skin: Skin is warm and dry. No rash noted. She is not diaphoretic. No cyanosis. No pallor. Nails show no clubbing.   Face somewhat flushed.  Occipital fat pad.   Psychiatric: She has a normal mood and affect.     Assessment/Plan   Problems Addressed this Visit        Cardiovascular and Mediastinum    Palpitations  Well-controlled with propranolol  Encouraged to report if this should change.    Relevant Medications    propranolol LA (INDERAL LA) 120 MG 24 hr capsule    Chronic venous insufficiency     Essential hypertension   Hypertension: at goal. Evidence of target organ damage: none.  Encouraged to continue to work on diet and exercise plan.   Continue current medication for now  We will consider reducing the dose of spironolactone at her return with the introduction of an ACE or ARB    Relevant Medications    spironolactone (ALDACTONE) 100 MG tablet    propranolol LA (INDERAL LA) 120 MG 24 hr capsule    Mixed hyperlipidemia  As above.   We will add vascepa    Relevant Medications    atorvastatin (LIPITOR) 10 MG tablet    icosapent ethyl (VASCEPA) 1 g capsule capsule       Digestive    Gastroesophageal reflux disease without esophagitis    Relevant Medications    pantoprazole (PROTONIX) 40 MG EC tablet    Vitamin D deficiency       Endocrine    PCOS (polycystic ovarian syndrome)    Relevant Medications    spironolactone (ALDACTONE) 100 MG tablet    Type 2 diabetes mellitus without complication, without long-term current use of insulin (CMS/[facility])   Diabetes mellitus Type II, under poor control.   Encouraged to continue to pursue ADA diet  Encouraged aerobic exercise.  Metformin will be titrated as tolerated to 2000 daily  We will arrange diabetic education with JANIE Cabrera    Relevant Medications    metFORMIN ER (GLUCOPHAGE-XR) 500 MG 24 hr tablet    loperamide (IMODIUM) 2 MG capsule    Other Relevant Orders    POCT Glucose (Completed)       Other    Bipolar disorder, in partial remission, most recent episode mixed (CMS/HCC)    Smoker    Healthcare maintenance  We will discuss an updated Pap smear at her return

## 2020-03-02 DIAGNOSIS — E78.2 MIXED HYPERLIPIDEMIA: ICD-10-CM

## 2020-03-02 DIAGNOSIS — E11.9 TYPE 2 DIABETES MELLITUS WITHOUT COMPLICATION, WITHOUT LONG-TERM CURRENT USE OF INSULIN (HCC): ICD-10-CM

## 2020-03-02 RX ORDER — METFORMIN HYDROCHLORIDE 500 MG/1
1000 TABLET, EXTENDED RELEASE ORAL 2 TIMES DAILY
Qty: 120 TABLET | Refills: 5 | Status: SHIPPED | OUTPATIENT
Start: 2020-03-02 | End: 2020-07-14 | Stop reason: SDUPTHER

## 2020-03-02 RX ORDER — ICOSAPENT ETHYL 1000 MG/1
CAPSULE ORAL
Qty: 120 CAPSULE | Refills: 5 | Status: SHIPPED | OUTPATIENT
Start: 2020-03-02 | End: 2020-07-14 | Stop reason: SDUPTHER

## 2020-03-04 ENCOUNTER — TELEPHONE (OUTPATIENT)
Dept: FAMILY MEDICINE CLINIC | Facility: CLINIC | Age: 35
End: 2020-03-04

## 2020-03-04 NOTE — TELEPHONE ENCOUNTER
----- Message from Jamie Santos MD sent at 2/29/2020  9:57 AM EST -----  k - please let her know - sofía emailed a script to her pharm    ----- Message -----  From: Alyssa Fernando MA  Sent: 2/28/2020   1:51 PM EST  To: Jamie Santos MD    Approved      ----- Message -----  From: Jamie Santos MD  Sent: 2/27/2020  10:36 AM EST  To: Alyssa Fernando MA    Needs pa on vascepa  TG>400 despite atorvastatin in a diabetic

## 2020-04-01 DIAGNOSIS — F31.30 BIPOLAR I DISORDER, MOST RECENT EPISODE DEPRESSED (HCC): ICD-10-CM

## 2020-04-01 DIAGNOSIS — F51.05 INSOMNIA DUE TO MENTAL DISORDER: ICD-10-CM

## 2020-04-01 RX ORDER — BUPROPION HYDROCHLORIDE 100 MG/1
100 TABLET, EXTENDED RELEASE ORAL 2 TIMES DAILY
Qty: 60 TABLET | Refills: 2 | Status: SHIPPED | OUTPATIENT
Start: 2020-04-01 | End: 2020-09-29 | Stop reason: SDUPTHER

## 2020-04-01 RX ORDER — LURASIDONE HYDROCHLORIDE 80 MG/1
80 TABLET, FILM COATED ORAL DAILY
Qty: 30 TABLET | Refills: 2 | Status: SHIPPED | OUTPATIENT
Start: 2020-04-01 | End: 2020-09-29 | Stop reason: SDUPTHER

## 2020-04-01 RX ORDER — QUETIAPINE FUMARATE 100 MG/1
100 TABLET, FILM COATED ORAL
Qty: 30 TABLET | Refills: 2 | Status: SHIPPED | OUTPATIENT
Start: 2020-04-01 | End: 2020-09-29 | Stop reason: SDUPTHER

## 2020-04-09 DIAGNOSIS — F31.30 BIPOLAR I DISORDER, MOST RECENT EPISODE DEPRESSED (HCC): ICD-10-CM

## 2020-04-09 DIAGNOSIS — F51.05 INSOMNIA DUE TO MENTAL DISORDER: ICD-10-CM

## 2020-04-09 RX ORDER — QUETIAPINE FUMARATE 100 MG/1
TABLET, FILM COATED ORAL
Qty: 30 TABLET | Refills: 2 | OUTPATIENT
Start: 2020-04-09

## 2020-04-09 RX ORDER — LURASIDONE HYDROCHLORIDE 80 MG/1
TABLET, FILM COATED ORAL
Qty: 30 TABLET | Refills: 2 | OUTPATIENT
Start: 2020-04-09

## 2020-04-09 RX ORDER — BUPROPION HYDROCHLORIDE 100 MG/1
TABLET, EXTENDED RELEASE ORAL
Qty: 60 TABLET | Refills: 2 | OUTPATIENT
Start: 2020-04-09

## 2020-05-23 ENCOUNTER — HOSPITAL ENCOUNTER (EMERGENCY)
Facility: HOSPITAL | Age: 35
Discharge: HOME | End: 2020-05-23
Attending: FAMILY MEDICINE | Admitting: FAMILY MEDICINE

## 2020-05-23 VITALS
HEART RATE: 103 BPM | DIASTOLIC BLOOD PRESSURE: 88 MMHG | TEMPERATURE: 98.7 F | SYSTOLIC BLOOD PRESSURE: 127 MMHG | OXYGEN SATURATION: 95 % | WEIGHT: 215 LBS | RESPIRATION RATE: 16 BRPM | BODY MASS INDEX: 35.82 KG/M2 | HEIGHT: 65 IN

## 2020-05-23 DIAGNOSIS — N39.0 UTI (URINARY TRACT INFECTION), UNCOMPLICATED: Primary | ICD-10-CM

## 2020-05-23 LAB
BACTERIA UR QL AUTO: ABNORMAL /HPF
BILIRUB UR QL STRIP: ABNORMAL
CLARITY UR: ABNORMAL
COLOR UR: ABNORMAL
GLUCOSE UR STRIP-MCNC: NEGATIVE MG/DL
HGB UR QL STRIP.AUTO: ABNORMAL
HYALINE CASTS UR QL AUTO: ABNORMAL /LPF
KETONES UR QL STRIP: NEGATIVE
LEUKOCYTE ESTERASE UR QL STRIP.AUTO: ABNORMAL
NITRITE UR QL STRIP: POSITIVE
PH UR STRIP.AUTO: 5.5 [PH] (ref 5–8)
PROT UR QL STRIP: ABNORMAL
RBC # UR: ABNORMAL /HPF
REF LAB TEST METHOD: ABNORMAL
SP GR UR STRIP: 1.03 (ref 1–1.03)
SQUAMOUS #/AREA URNS HPF: ABNORMAL /HPF
UROBILINOGEN UR QL STRIP: ABNORMAL
WBC CLUMPS # UR AUTO: ABNORMAL /HPF
WBC UR QL AUTO: ABNORMAL /HPF

## 2020-05-23 PROCEDURE — 87186 SC STD MICRODIL/AGAR DIL: CPT | Performed by: FAMILY MEDICINE

## 2020-05-23 PROCEDURE — 96372 THER/PROPH/DIAG INJ SC/IM: CPT

## 2020-05-23 PROCEDURE — 87088 URINE BACTERIA CULTURE: CPT | Performed by: FAMILY MEDICINE

## 2020-05-23 PROCEDURE — 81001 URINALYSIS AUTO W/SCOPE: CPT | Performed by: FAMILY MEDICINE

## 2020-05-23 PROCEDURE — 99283 EMERGENCY DEPT VISIT LOW MDM: CPT

## 2020-05-23 PROCEDURE — 25010000002 CEFTRIAXONE PER 250 MG: Performed by: PHYSICIAN ASSISTANT

## 2020-05-23 PROCEDURE — 87086 URINE CULTURE/COLONY COUNT: CPT | Performed by: FAMILY MEDICINE

## 2020-05-23 RX ORDER — CEFDINIR 300 MG/1
300 CAPSULE ORAL 2 TIMES DAILY
Qty: 14 CAPSULE | Refills: 0 | Status: SHIPPED | OUTPATIENT
Start: 2020-05-23 | End: 2020-07-14

## 2020-05-23 RX ORDER — PHENAZOPYRIDINE HYDROCHLORIDE 200 MG/1
200 TABLET, FILM COATED ORAL 3 TIMES DAILY PRN
Qty: 9 TABLET | Refills: 0 | OUTPATIENT
Start: 2020-05-23 | End: 2020-08-21

## 2020-05-23 RX ADMIN — LIDOCAINE HYDROCHLORIDE 1 G: 10 INJECTION, SOLUTION EPIDURAL; INFILTRATION; INTRACAUDAL; PERINEURAL at 20:33

## 2020-05-24 NOTE — ED PROVIDER NOTES
Subjective   34-year-old female with history of recurrent urinary tract infections and nephrolithiasis presents to the emergency room with dysuria which began today.  Patient states it came on suddenly an hour and a half ago and states she has had burning and pain when she urinates.  She denies flank pain, fever, pregnancy or hematuria.  She states this does not feel like previous encounters with kidney stones.  Aggravating factors include urination.  Denies any alleviating factors despite taking Azo tabs.      History provided by:  Patient   used: No        Review of Systems   Constitutional: Negative.  Negative for fever.   HENT: Negative.    Respiratory: Negative.    Cardiovascular: Negative.  Negative for chest pain.   Gastrointestinal: Negative.  Negative for abdominal pain.   Endocrine: Negative.    Genitourinary: Positive for dysuria, frequency and urgency. Negative for hematuria and vaginal bleeding.   Skin: Negative.    Neurological: Negative.    Psychiatric/Behavioral: Negative.    All other systems reviewed and are negative.      Past Medical History:   Diagnosis Date   • Anxiety    • Bipolar 1 disorder (CMS/HCC)    • Depression    • GERD (gastroesophageal reflux disease)    • History of bronchitis    • History of ear infections    • History of stomach ulcers    • History of streptococcal infection    • Hypertension    • Urinary tract infection        No Known Allergies    Past Surgical History:   Procedure Laterality Date   • ADENOIDECTOMY     • CHOLECYSTECTOMY     • DENTAL PROCEDURE     • HERNIA REPAIR     • TONSILLECTOMY     • WISDOM TOOTH EXTRACTION         Family History   Problem Relation Age of Onset   • Cancer Mother    • Stroke Mother    • Lung disease Mother    • No Known Problems Father    • Cancer Other    • Heart disease Other    • Stroke Other        Social History     Socioeconomic History   • Marital status: Single     Spouse name: Not on file   • Number of children:  Not on file   • Years of education: Not on file   • Highest education level: Not on file   Social Needs   • Financial resource strain: Patient refused   • Food insecurity:     Worry: Patient refused     Inability: Patient refused   • Transportation needs:     Medical: Patient refused     Non-medical: Patient refused   Tobacco Use   • Smoking status: Current Every Day Smoker     Packs/day: 0.50     Types: Cigarettes   • Smokeless tobacco: Never Used   Substance and Sexual Activity   • Alcohol use: No     Frequency: Never   • Drug use: No   • Sexual activity: Defer     Birth control/protection: Pill   Lifestyle   • Physical activity:     Days per week: Patient refused     Minutes per session: Patient refused   • Stress: Patient refused           Objective   Physical Exam   Constitutional: She is oriented to person, place, and time. She appears well-developed and well-nourished. No distress.   HENT:   Head: Normocephalic and atraumatic.   Right Ear: External ear normal.   Left Ear: External ear normal.   Nose: Nose normal.   Eyes: Pupils are equal, round, and reactive to light. Conjunctivae and EOM are normal.   Neck: Normal range of motion. Neck supple. No JVD present. No tracheal deviation present.   Cardiovascular: Normal rate, regular rhythm and normal heart sounds.   No murmur heard.  Pulmonary/Chest: Effort normal and breath sounds normal. No respiratory distress. She has no wheezes.   Abdominal: Soft. Bowel sounds are normal. There is no tenderness.   Musculoskeletal: Normal range of motion. She exhibits no edema or deformity.   Neurological: She is alert and oriented to person, place, and time. No cranial nerve deficit.   Skin: Skin is warm and dry. No rash noted. She is not diaphoretic. No erythema. No pallor.   Psychiatric: She has a normal mood and affect. Her behavior is normal. Thought content normal.   Nursing note and vitals reviewed.      Procedures           ED Course                                            MDM  Number of Diagnoses or Management Options  UTI (urinary tract infection), uncomplicated: new and requires workup     Amount and/or Complexity of Data Reviewed  Clinical lab tests: ordered and reviewed    Risk of Complications, Morbidity, and/or Mortality  Presenting problems: low  Diagnostic procedures: low  Management options: moderate    Patient Progress  Patient progress: stable      Final diagnoses:   UTI (urinary tract infection), uncomplicated            Beni Catherine PA-C  05/23/20 2035

## 2020-05-25 LAB — BACTERIA SPEC AEROBE CULT: ABNORMAL

## 2020-06-17 DIAGNOSIS — E78.2 MIXED HYPERLIPIDEMIA: ICD-10-CM

## 2020-06-18 RX ORDER — ATORVASTATIN CALCIUM 10 MG/1
TABLET, FILM COATED ORAL
Qty: 30 TABLET | Refills: 5 | Status: SHIPPED | OUTPATIENT
Start: 2020-06-18 | End: 2020-07-14 | Stop reason: SDUPTHER

## 2020-07-14 ENCOUNTER — OFFICE VISIT (OUTPATIENT)
Dept: FAMILY MEDICINE CLINIC | Facility: CLINIC | Age: 35
End: 2020-07-14

## 2020-07-14 DIAGNOSIS — I10 ESSENTIAL HYPERTENSION: ICD-10-CM

## 2020-07-14 DIAGNOSIS — E66.01 MORBID OBESITY (HCC): ICD-10-CM

## 2020-07-14 DIAGNOSIS — E28.2 PCOS (POLYCYSTIC OVARIAN SYNDROME): ICD-10-CM

## 2020-07-14 DIAGNOSIS — K21.9 GASTROESOPHAGEAL REFLUX DISEASE WITHOUT ESOPHAGITIS: ICD-10-CM

## 2020-07-14 DIAGNOSIS — E55.9 VITAMIN D DEFICIENCY: ICD-10-CM

## 2020-07-14 DIAGNOSIS — Z00.00 HEALTHCARE MAINTENANCE: ICD-10-CM

## 2020-07-14 DIAGNOSIS — E11.9 TYPE 2 DIABETES MELLITUS WITHOUT COMPLICATION, WITHOUT LONG-TERM CURRENT USE OF INSULIN (HCC): ICD-10-CM

## 2020-07-14 DIAGNOSIS — I87.2 CHRONIC VENOUS INSUFFICIENCY: ICD-10-CM

## 2020-07-14 DIAGNOSIS — R06.83 SNORING: ICD-10-CM

## 2020-07-14 DIAGNOSIS — E78.2 MIXED HYPERLIPIDEMIA: Primary | ICD-10-CM

## 2020-07-14 DIAGNOSIS — F31.77 BIPOLAR DISORDER, IN PARTIAL REMISSION, MOST RECENT EPISODE MIXED (HCC): ICD-10-CM

## 2020-07-14 DIAGNOSIS — R00.2 PALPITATIONS: ICD-10-CM

## 2020-07-14 DIAGNOSIS — F17.200 SMOKER: ICD-10-CM

## 2020-07-14 PROCEDURE — 99442 PR PHYS/QHP TELEPHONE EVALUATION 11-20 MIN: CPT | Performed by: GENERAL PRACTICE

## 2020-07-14 RX ORDER — ICOSAPENT ETHYL 1000 MG/1
CAPSULE ORAL
Qty: 120 CAPSULE | Refills: 5 | Status: SHIPPED | OUTPATIENT
Start: 2020-07-14 | End: 2021-01-12 | Stop reason: SDUPTHER

## 2020-07-14 RX ORDER — ATORVASTATIN CALCIUM 10 MG/1
10 TABLET, FILM COATED ORAL EVERY EVENING
Qty: 30 TABLET | Refills: 5 | Status: SHIPPED | OUTPATIENT
Start: 2020-07-14 | End: 2021-01-12 | Stop reason: SDUPTHER

## 2020-07-14 RX ORDER — SPIRONOLACTONE 100 MG/1
100 TABLET, FILM COATED ORAL DAILY
Qty: 30 TABLET | Refills: 5 | Status: SHIPPED | OUTPATIENT
Start: 2020-07-14 | End: 2021-01-12 | Stop reason: SDUPTHER

## 2020-07-14 RX ORDER — METFORMIN HYDROCHLORIDE 500 MG/1
1000 TABLET, EXTENDED RELEASE ORAL 2 TIMES DAILY
Qty: 120 TABLET | Refills: 5 | Status: SHIPPED | OUTPATIENT
Start: 2020-07-14 | End: 2021-01-12 | Stop reason: SDUPTHER

## 2020-07-14 RX ORDER — PROPRANOLOL HYDROCHLORIDE 120 MG/1
120 CAPSULE, EXTENDED RELEASE ORAL DAILY
Qty: 30 CAPSULE | Refills: 5 | Status: SHIPPED | OUTPATIENT
Start: 2020-07-14 | End: 2020-08-28

## 2020-07-14 RX ORDER — PANTOPRAZOLE SODIUM 40 MG/1
40 TABLET, DELAYED RELEASE ORAL DAILY
Qty: 30 TABLET | Refills: 3 | Status: SHIPPED | OUTPATIENT
Start: 2020-07-14 | End: 2021-01-04

## 2020-07-14 NOTE — PROGRESS NOTES
Subjective   Jessica Harris is a 34 y.o. female.     History of Present Illness     This visit has been rescheduled as a phone visit to comply with patient safety concerns in accordance with CDC recommendations. Total time of discussion was 12 minutes.    You have chosen to receive care through a telephone visit. Do you consent to use a telephone visit for your medical care today? Yes    Diabetes  Current symptoms include none. Patient denies paresthesia of the feet, visual disturbances, polydipsia, polyuria, hypoglycemia and foot ulcerations. Evaluation to date has been: hemoglobin A1C. Current treatments: metformin.  She has been following her diet and exercise plan much more carefully since last here and states she is down to 205 pounds. Last dilated eye exam more than 1 year ago.  She has had no recent labs    Dyslipidemia  Compliance with treatment has been poor. The patient exercises occasionally. She is currently being prescribed the following medication for her dyslipidemia - atorvastatin and vascepa.      Hypertension  Home blood pressure readings: not doing. Associated signs and symptoms: dyspnea and peripheral edema. Patient denies: chest pain, palpitations, orthopnea and paroxysmal nocturnal dyspnea. Current antihypertensive medications includes propranolol and aldactone. Medication compliance: taking as prescribed.     Palpitations  She has a history of previous palpitations.  She admits to shortness of breath with above average exertion as well as intermittent swelling of the ankles toward the end of the day but feels the symptoms have improved with her weight loss and continues to deny any chest pain, lightheadedness, diaphoresis, or nausea.  She remains on propranolol ER as prescribed with no further palpitations.  Holter monitoring performed on 2/18/2019 revealed sinus tachycardia but was otherwise unremarkable.  Echocardiogram performed on 2/20/2019 was also normal with an estimated EF of  61-65%.    GERD  She gives a history of intermittent heartburn described as a burning epigastric and retrosternal discomfort.  There is no history of any difficulty swallowing, vomiting, change in her bowel habits, hematochezia or melena and she denies any fever or chills.  She remains on pantoprazole with a good control of her symptoms.    The following portions of the patient's history were reviewed and updated as appropriate: allergies, current medications, past medical history, past social history and problem list.    Review of Systems   Constitutional: Positive for fatigue. Negative for appetite change, chills, fever and unexpected weight change.   HENT: Negative for congestion, ear pain, rhinorrhea, sneezing and sore throat.    Eyes: Negative for visual disturbance.   Respiratory: Positive for cough and shortness of breath. Negative for wheezing.         Occasionally wakes with a feeling she cannot get breath and has been told that she snores   Cardiovascular: Positive for leg swelling. Negative for chest pain and palpitations.   Gastrointestinal: Negative for abdominal pain, blood in stool, constipation, diarrhea, nausea and vomiting.   Endocrine: Negative for polydipsia and polyuria.   Genitourinary: Negative for dysuria, hematuria and urgency.   Musculoskeletal: Positive for arthralgias and myalgias. Negative for back pain, joint swelling and neck pain.   Skin: Negative for rash.   Neurological: Positive for headaches. Negative for weakness and numbness.   Psychiatric/Behavioral: Negative for dysphoric mood, sleep disturbance and suicidal ideas. The patient is nervous/anxious.      Objective   Physical Exam   Constitutional:   Alert and oriented.  Bright and in good spirits.  No apparent distress or shortness of breath.     Assessment/Plan   Problems Addressed this Visit        Cardiovascular and Mediastinum    Chronic venous insufficiency    Essential hypertension  Encouraged to continue to work on her  diet and exercise plan.  Continue current medication for now.  Will consider adding an ACE or ARB at her return    Relevant Medications    propranolol LA (INDERAL LA) 120 MG 24 hr capsule    spironolactone (ALDACTONE) 100 MG tablet    Other Relevant Orders    CBC & Differential    Comprehensive Metabolic Panel    Mixed hyperlipidemia  As above.   Continue current medication.    Relevant Medications    atorvastatin (LIPITOR) 10 MG tablet    icosapent ethyl (Vascepa) 1 g capsule capsule    Other Relevant Orders    Lipid Panel    Palpitations  Well-controlled with propranolol  Encouraged to report if this should change.    Relevant Medications    propranolol LA (INDERAL LA) 120 MG 24 hr capsule       Respiratory    Snoring       Digestive    Gastroesophageal reflux disease without esophagitis    Relevant Medications    pantoprazole (PROTONIX) 40 MG EC tablet    Morbid obesity (CMS/HCC)    Vitamin D deficiency  Continue supplementation with monitoring.    Relevant Orders    Vitamin D 25 Hydroxy       Endocrine    PCOS (polycystic ovarian syndrome)    Relevant Medications    spironolactone (ALDACTONE) 100 MG tablet    Type 2 diabetes mellitus without complication, without long-term current use of insulin (CMS/HCC)  Diabetes mellitus Type II, under unknown control.   Encouraged to continue to pursue ADA diet  Encouraged aerobic exercise.  Continue current medication  Scheduled for updated labs     Relevant Medications    metFORMIN ER (GLUCOPHAGE-XR) 500 MG 24 hr tablet    Other Relevant Orders    Hemoglobin A1c    MicroAlbumin, Urine, Random - Urine, Clean Catch       Other    Bipolar disorder, in partial remission, most recent episode mixed (CMS/HCC)    Relevant Orders    TSH    Healthcare maintenance  Patient has arranged for an updated pap elsewhere  Reminded to get a flu shot when available    Smoker

## 2020-07-18 DIAGNOSIS — F31.30 BIPOLAR I DISORDER, MOST RECENT EPISODE DEPRESSED (HCC): ICD-10-CM

## 2020-07-18 DIAGNOSIS — F51.05 INSOMNIA DUE TO MENTAL DISORDER: ICD-10-CM

## 2020-07-20 RX ORDER — QUETIAPINE FUMARATE 100 MG/1
TABLET, FILM COATED ORAL
Qty: 30 TABLET | Refills: 2 | OUTPATIENT
Start: 2020-07-20

## 2020-07-20 RX ORDER — LURASIDONE HYDROCHLORIDE 80 MG/1
TABLET, FILM COATED ORAL
Qty: 30 TABLET | Refills: 2 | OUTPATIENT
Start: 2020-07-20

## 2020-07-20 RX ORDER — BUPROPION HYDROCHLORIDE 100 MG/1
TABLET, EXTENDED RELEASE ORAL
Qty: 60 TABLET | Refills: 2 | OUTPATIENT
Start: 2020-07-20

## 2020-08-21 ENCOUNTER — HOSPITAL ENCOUNTER (EMERGENCY)
Facility: HOSPITAL | Age: 35
Discharge: HOME | End: 2020-08-21
Attending: FAMILY MEDICINE | Admitting: FAMILY MEDICINE

## 2020-08-21 VITALS
HEART RATE: 85 BPM | TEMPERATURE: 97.8 F | RESPIRATION RATE: 17 BRPM | BODY MASS INDEX: 32.82 KG/M2 | HEIGHT: 65 IN | WEIGHT: 197 LBS | DIASTOLIC BLOOD PRESSURE: 78 MMHG | SYSTOLIC BLOOD PRESSURE: 137 MMHG | OXYGEN SATURATION: 98 %

## 2020-08-21 DIAGNOSIS — N39.0 ACUTE UTI: Primary | ICD-10-CM

## 2020-08-21 LAB
BACTERIA UR QL AUTO: ABNORMAL /HPF
BILIRUB UR QL STRIP: NEGATIVE
CLARITY UR: CLEAR
COLOR UR: ABNORMAL
GLUCOSE UR STRIP-MCNC: NEGATIVE MG/DL
HGB UR QL STRIP.AUTO: ABNORMAL
HYALINE CASTS UR QL AUTO: ABNORMAL /LPF
KETONES UR QL STRIP: NEGATIVE
LEUKOCYTE ESTERASE UR QL STRIP.AUTO: ABNORMAL
NITRITE UR QL STRIP: POSITIVE
PH UR STRIP.AUTO: 5.5 [PH] (ref 5–8)
PROT UR QL STRIP: ABNORMAL
RBC # UR: ABNORMAL /HPF
REF LAB TEST METHOD: ABNORMAL
SP GR UR STRIP: 1.01 (ref 1–1.03)
SQUAMOUS #/AREA URNS HPF: ABNORMAL /HPF
UROBILINOGEN UR QL STRIP: ABNORMAL
WBC UR QL AUTO: ABNORMAL /HPF

## 2020-08-21 PROCEDURE — 99283 EMERGENCY DEPT VISIT LOW MDM: CPT

## 2020-08-21 PROCEDURE — 87086 URINE CULTURE/COLONY COUNT: CPT | Performed by: FAMILY MEDICINE

## 2020-08-21 PROCEDURE — 81001 URINALYSIS AUTO W/SCOPE: CPT | Performed by: FAMILY MEDICINE

## 2020-08-21 RX ORDER — NITROFURANTOIN 25; 75 MG/1; MG/1
100 CAPSULE ORAL 2 TIMES DAILY
Qty: 14 CAPSULE | Refills: 0 | Status: SHIPPED | OUTPATIENT
Start: 2020-08-21 | End: 2020-08-28

## 2020-08-21 RX ORDER — PHENAZOPYRIDINE HYDROCHLORIDE 200 MG/1
200 TABLET, FILM COATED ORAL 3 TIMES DAILY PRN
Qty: 6 TABLET | Refills: 0 | Status: SHIPPED | OUTPATIENT
Start: 2020-08-21 | End: 2020-08-23

## 2020-08-21 NOTE — ED PROVIDER NOTES
Subjective     History provided by:  Patient   used: No    Difficulty Urinating   Pain quality:  Aching  Pain severity:  Moderate  Onset quality:  Sudden  Duration:  1 day  Timing:  Constant  Progression:  Waxing and waning  Chronicity:  New  Recent urinary tract infections: no    Worsened by:  Nothing  Ineffective treatments:  None tried  Urinary symptoms: frequent urination    Urinary symptoms: no discolored urine and no foul-smelling urine    Associated symptoms: no abdominal pain    Risk factors: no hx of pyelonephritis and no hx of urolithiasis        Review of Systems   Constitutional: Negative.    HENT: Negative.    Eyes: Negative.    Respiratory: Negative.    Cardiovascular: Negative.    Gastrointestinal: Negative.  Negative for abdominal pain.   Endocrine: Negative.    Genitourinary: Positive for difficulty urinating.   Musculoskeletal: Negative.    Skin: Negative.    Allergic/Immunologic: Negative.    Neurological: Negative.    Hematological: Negative.    Psychiatric/Behavioral: Negative.        Past Medical History:   Diagnosis Date   • Anxiety    • Bipolar 1 disorder (CMS/HCC)    • Depression    • GERD (gastroesophageal reflux disease)    • History of bronchitis    • History of ear infections    • History of stomach ulcers    • History of streptococcal infection    • Hypertension    • Urinary tract infection        No Known Allergies    Past Surgical History:   Procedure Laterality Date   • ADENOIDECTOMY     • CHOLECYSTECTOMY     • DENTAL PROCEDURE     • HERNIA REPAIR     • TONSILLECTOMY     • WISDOM TOOTH EXTRACTION         Family History   Problem Relation Age of Onset   • Cancer Mother    • Stroke Mother    • Lung disease Mother    • No Known Problems Father    • Cancer Other    • Heart disease Other    • Stroke Other        Social History     Socioeconomic History   • Marital status: Single     Spouse name: Not on file   • Number of children: Not on file   • Years of education:  Not on file   • Highest education level: Not on file   Social Needs   • Financial resource strain: Patient refused   • Food insecurity:     Worry: Patient refused     Inability: Patient refused   • Transportation needs:     Medical: Patient refused     Non-medical: Patient refused   Tobacco Use   • Smoking status: Current Every Day Smoker     Packs/day: 0.50     Types: Cigarettes   • Smokeless tobacco: Never Used   Substance and Sexual Activity   • Alcohol use: No     Frequency: Never   • Drug use: No   • Sexual activity: Defer     Birth control/protection: Pill   Lifestyle   • Physical activity:     Days per week: Patient refused     Minutes per session: Patient refused   • Stress: Patient refused           Objective   Physical Exam   Constitutional: She is oriented to person, place, and time. She appears well-developed and well-nourished.   HENT:   Head: Normocephalic.   Right Ear: External ear normal.   Left Ear: External ear normal.   Mouth/Throat: Oropharynx is clear and moist.   Eyes: Pupils are equal, round, and reactive to light. Conjunctivae and EOM are normal.   Neck: Normal range of motion. Neck supple.   Cardiovascular: Normal rate, regular rhythm, normal heart sounds and intact distal pulses.   Pulmonary/Chest: Effort normal and breath sounds normal.   Abdominal: Soft. Bowel sounds are normal.   Musculoskeletal: Normal range of motion.   Neurological: She is alert and oriented to person, place, and time.   Skin: Skin is warm and dry. Capillary refill takes less than 2 seconds.   Psychiatric: She has a normal mood and affect. Her behavior is normal. Thought content normal.   Nursing note and vitals reviewed.      Procedures           ED Course                                           MDM    Final diagnoses:   Acute UTI            Gabino Brennan, APRN  08/21/20 1911

## 2020-08-22 LAB — BACTERIA SPEC AEROBE CULT: NORMAL

## 2020-08-26 DIAGNOSIS — R00.2 PALPITATIONS: ICD-10-CM

## 2020-08-28 RX ORDER — PROPRANOLOL HYDROCHLORIDE 120 MG/1
CAPSULE, EXTENDED RELEASE ORAL
Qty: 30 CAPSULE | Refills: 5 | Status: SHIPPED | OUTPATIENT
Start: 2020-08-28 | End: 2021-01-12 | Stop reason: SDUPTHER

## 2020-09-03 ENCOUNTER — TELEPHONE (OUTPATIENT)
Dept: FAMILY MEDICINE CLINIC | Facility: CLINIC | Age: 35
End: 2020-09-03

## 2020-09-03 RX ORDER — AMOXICILLIN AND CLAVULANATE POTASSIUM 500; 125 MG/1; MG/1
1 TABLET, FILM COATED ORAL 3 TIMES DAILY
Qty: 30 TABLET | Refills: 0 | Status: SHIPPED | OUTPATIENT
Start: 2020-09-03 | End: 2021-01-12 | Stop reason: SDUPTHER

## 2020-09-03 NOTE — TELEPHONE ENCOUNTER
Pt is aware of this information.     ----- Message from Jamie Santos MD sent at 9/3/2020  3:21 PM EDT -----  Emailed script for augmentin  She needs to check her foot twice daily and let us know immediately  if any worse    ----- Message -----  From: Jeanette Saez MA  Sent: 9/3/2020   2:47 PM EDT  To: Jamie Satnos MD    Pt called and stated that her big toe is chronically inflamed. She said it was red and swelled. Wanted to know if you could send her in some antibiotics?

## 2020-09-25 ENCOUNTER — APPOINTMENT (OUTPATIENT)
Dept: CT IMAGING | Facility: HOSPITAL | Age: 35
End: 2020-09-25

## 2020-09-25 ENCOUNTER — HOSPITAL ENCOUNTER (EMERGENCY)
Facility: HOSPITAL | Age: 35
Discharge: HOME | End: 2020-09-25
Attending: EMERGENCY MEDICINE | Admitting: EMERGENCY MEDICINE

## 2020-09-25 VITALS
RESPIRATION RATE: 17 BRPM | HEIGHT: 65 IN | SYSTOLIC BLOOD PRESSURE: 127 MMHG | OXYGEN SATURATION: 95 % | WEIGHT: 195 LBS | BODY MASS INDEX: 32.49 KG/M2 | TEMPERATURE: 98.4 F | HEART RATE: 90 BPM | DIASTOLIC BLOOD PRESSURE: 87 MMHG

## 2020-09-25 DIAGNOSIS — K85.90 ACUTE PANCREATITIS WITHOUT INFECTION OR NECROSIS, UNSPECIFIED PANCREATITIS TYPE: Primary | ICD-10-CM

## 2020-09-25 LAB
6-ACETYL MORPHINE: NEGATIVE
ALBUMIN SERPL-MCNC: 4.23 G/DL (ref 3.5–5.2)
ALBUMIN/GLOB SERPL: 1.3 G/DL
ALP SERPL-CCNC: 85 U/L (ref 39–117)
ALT SERPL W P-5'-P-CCNC: 14 U/L (ref 1–33)
AMPHET+METHAMPHET UR QL: NEGATIVE
AMYLASE SERPL-CCNC: 193 U/L (ref 28–100)
ANION GAP SERPL CALCULATED.3IONS-SCNC: 13.1 MMOL/L (ref 5–15)
AST SERPL-CCNC: 16 U/L (ref 1–32)
B-HCG UR QL: NEGATIVE
BACTERIA UR QL AUTO: ABNORMAL /HPF
BARBITURATES UR QL SCN: NEGATIVE
BASOPHILS # BLD AUTO: 0.08 10*3/MM3 (ref 0–0.2)
BASOPHILS NFR BLD AUTO: 0.6 % (ref 0–1.5)
BENZODIAZ UR QL SCN: NEGATIVE
BILIRUB SERPL-MCNC: 0.2 MG/DL (ref 0–1.2)
BILIRUB UR QL STRIP: NEGATIVE
BUN SERPL-MCNC: 4 MG/DL (ref 6–20)
BUN/CREAT SERPL: 5.8 (ref 7–25)
BUPRENORPHINE SERPL-MCNC: NEGATIVE NG/ML
CALCIUM SPEC-SCNC: 9.8 MG/DL (ref 8.6–10.5)
CANNABINOIDS SERPL QL: POSITIVE
CHLORIDE SERPL-SCNC: 98 MMOL/L (ref 98–107)
CLARITY UR: CLEAR
CO2 SERPL-SCNC: 26.9 MMOL/L (ref 22–29)
COCAINE UR QL: NEGATIVE
COLOR UR: YELLOW
CREAT SERPL-MCNC: 0.69 MG/DL (ref 0.57–1)
CRP SERPL-MCNC: 1.14 MG/DL (ref 0–0.5)
DEPRECATED RDW RBC AUTO: 45.9 FL (ref 37–54)
EOSINOPHIL # BLD AUTO: 0.22 10*3/MM3 (ref 0–0.4)
EOSINOPHIL NFR BLD AUTO: 1.6 % (ref 0.3–6.2)
ERYTHROCYTE [DISTWIDTH] IN BLOOD BY AUTOMATED COUNT: 14.3 % (ref 12.3–15.4)
GFR SERPL CREATININE-BSD FRML MDRD: 99 ML/MIN/1.73
GLOBULIN UR ELPH-MCNC: 3.3 GM/DL
GLUCOSE SERPL-MCNC: 85 MG/DL (ref 65–99)
GLUCOSE UR STRIP-MCNC: NEGATIVE MG/DL
HCG INTACT+B SERPL-ACNC: <0.5 MIU/ML
HCT VFR BLD AUTO: 48.2 % (ref 34–46.6)
HGB BLD-MCNC: 15.4 G/DL (ref 12–15.9)
HGB UR QL STRIP.AUTO: ABNORMAL
HYALINE CASTS UR QL AUTO: ABNORMAL /LPF
IMM GRANULOCYTES # BLD AUTO: 0.11 10*3/MM3 (ref 0–0.05)
IMM GRANULOCYTES NFR BLD AUTO: 0.8 % (ref 0–0.5)
KETONES UR QL STRIP: NEGATIVE
LEUKOCYTE ESTERASE UR QL STRIP.AUTO: NEGATIVE
LIPASE SERPL-CCNC: 656 U/L (ref 13–60)
LYMPHOCYTES # BLD AUTO: 3.47 10*3/MM3 (ref 0.7–3.1)
LYMPHOCYTES NFR BLD AUTO: 25.2 % (ref 19.6–45.3)
MCH RBC QN AUTO: 28.2 PG (ref 26.6–33)
MCHC RBC AUTO-ENTMCNC: 32 G/DL (ref 31.5–35.7)
MCV RBC AUTO: 88.1 FL (ref 79–97)
METHADONE UR QL SCN: NEGATIVE
MONOCYTES # BLD AUTO: 1.03 10*3/MM3 (ref 0.1–0.9)
MONOCYTES NFR BLD AUTO: 7.5 % (ref 5–12)
NEUTROPHILS NFR BLD AUTO: 64.3 % (ref 42.7–76)
NEUTROPHILS NFR BLD AUTO: 8.87 10*3/MM3 (ref 1.7–7)
NITRITE UR QL STRIP: NEGATIVE
NRBC BLD AUTO-RTO: 0 /100 WBC (ref 0–0.2)
OPIATES UR QL: NEGATIVE
OXYCODONE UR QL SCN: NEGATIVE
PCP UR QL SCN: NEGATIVE
PH UR STRIP.AUTO: 7 [PH] (ref 5–8)
PLATELET # BLD AUTO: 400 10*3/MM3 (ref 140–450)
PMV BLD AUTO: 8.7 FL (ref 6–12)
POTASSIUM SERPL-SCNC: 3.7 MMOL/L (ref 3.5–5.2)
PROT SERPL-MCNC: 7.5 G/DL (ref 6–8.5)
PROT UR QL STRIP: NEGATIVE
RBC # BLD AUTO: 5.47 10*6/MM3 (ref 3.77–5.28)
RBC # UR: ABNORMAL /HPF
REF LAB TEST METHOD: ABNORMAL
SODIUM SERPL-SCNC: 138 MMOL/L (ref 136–145)
SP GR UR STRIP: <=1.005 (ref 1–1.03)
SQUAMOUS #/AREA URNS HPF: ABNORMAL /HPF
UROBILINOGEN UR QL STRIP: ABNORMAL
WBC # BLD AUTO: 13.78 10*3/MM3 (ref 3.4–10.8)
WBC UR QL AUTO: ABNORMAL /HPF

## 2020-09-25 PROCEDURE — 25010000002 PROCHLORPERAZINE 10 MG/2ML SOLUTION: Performed by: PHYSICIAN ASSISTANT

## 2020-09-25 PROCEDURE — 80307 DRUG TEST PRSMV CHEM ANLYZR: CPT | Performed by: PHYSICIAN ASSISTANT

## 2020-09-25 PROCEDURE — 74177 CT ABD & PELVIS W/CONTRAST: CPT | Performed by: RADIOLOGY

## 2020-09-25 PROCEDURE — 25010000002 ONDANSETRON PER 1 MG: Performed by: PHYSICIAN ASSISTANT

## 2020-09-25 PROCEDURE — 25010000002 IOPAMIDOL 61 % SOLUTION: Performed by: EMERGENCY MEDICINE

## 2020-09-25 PROCEDURE — 96375 TX/PRO/DX INJ NEW DRUG ADDON: CPT

## 2020-09-25 PROCEDURE — 83690 ASSAY OF LIPASE: CPT | Performed by: PHYSICIAN ASSISTANT

## 2020-09-25 PROCEDURE — 99283 EMERGENCY DEPT VISIT LOW MDM: CPT

## 2020-09-25 PROCEDURE — 85025 COMPLETE CBC W/AUTO DIFF WBC: CPT | Performed by: PHYSICIAN ASSISTANT

## 2020-09-25 PROCEDURE — 81001 URINALYSIS AUTO W/SCOPE: CPT | Performed by: PHYSICIAN ASSISTANT

## 2020-09-25 PROCEDURE — 96374 THER/PROPH/DIAG INJ IV PUSH: CPT

## 2020-09-25 PROCEDURE — 84702 CHORIONIC GONADOTROPIN TEST: CPT | Performed by: PHYSICIAN ASSISTANT

## 2020-09-25 PROCEDURE — 74177 CT ABD & PELVIS W/CONTRAST: CPT

## 2020-09-25 PROCEDURE — 81025 URINE PREGNANCY TEST: CPT | Performed by: PHYSICIAN ASSISTANT

## 2020-09-25 PROCEDURE — 99282 EMERGENCY DEPT VISIT SF MDM: CPT

## 2020-09-25 PROCEDURE — 80053 COMPREHEN METABOLIC PANEL: CPT | Performed by: PHYSICIAN ASSISTANT

## 2020-09-25 PROCEDURE — 86140 C-REACTIVE PROTEIN: CPT | Performed by: PHYSICIAN ASSISTANT

## 2020-09-25 PROCEDURE — 82150 ASSAY OF AMYLASE: CPT | Performed by: PHYSICIAN ASSISTANT

## 2020-09-25 RX ORDER — ONDANSETRON 4 MG/1
4 TABLET, ORALLY DISINTEGRATING ORAL EVERY 8 HOURS PRN
Qty: 15 TABLET | Refills: 0 | OUTPATIENT
Start: 2020-09-25 | End: 2021-11-14

## 2020-09-25 RX ORDER — ONDANSETRON 2 MG/ML
4 INJECTION INTRAMUSCULAR; INTRAVENOUS ONCE
Status: COMPLETED | OUTPATIENT
Start: 2020-09-25 | End: 2020-09-25

## 2020-09-25 RX ORDER — PROCHLORPERAZINE EDISYLATE 5 MG/ML
5 INJECTION INTRAMUSCULAR; INTRAVENOUS ONCE
Status: COMPLETED | OUTPATIENT
Start: 2020-09-25 | End: 2020-09-25

## 2020-09-25 RX ORDER — SODIUM CHLORIDE 0.9 % (FLUSH) 0.9 %
10 SYRINGE (ML) INJECTION AS NEEDED
Status: DISCONTINUED | OUTPATIENT
Start: 2020-09-25 | End: 2020-09-25 | Stop reason: HOSPADM

## 2020-09-25 RX ADMIN — ONDANSETRON 4 MG: 2 INJECTION INTRAMUSCULAR; INTRAVENOUS at 17:20

## 2020-09-25 RX ADMIN — SODIUM CHLORIDE 1000 ML: 9 INJECTION, SOLUTION INTRAVENOUS at 17:20

## 2020-09-25 RX ADMIN — IOPAMIDOL 100 ML: 612 INJECTION, SOLUTION INTRAVENOUS at 18:58

## 2020-09-25 RX ADMIN — PROCHLORPERAZINE EDISYLATE 5 MG: 5 INJECTION INTRAMUSCULAR; INTRAVENOUS at 19:07

## 2020-09-25 NOTE — ED PROVIDER NOTES
"Subjective   34-year-old female past medical history of diabetes and hypertension presents to the emergency room with nausea x3 days.  Patient states she was seen in urgent care and tested for COVID and had a pregnancy test which was negative.  She states she takes for same but it is no longer helping.  Denies abdominal pain, but does state that she has had a left upper quadrant \"twinge\" of pain every now and then.  Denies vomiting, dysuria, back pain, fever, sick contacts, or travel.  Denies any aggravating factors.  Denies any alleviating factors despite above-mentioned.  Denies drug or alcohol use.      History provided by:  Patient   used: No        Review of Systems   Constitutional: Negative.  Negative for fever.   HENT: Negative.    Respiratory: Negative.    Cardiovascular: Negative.  Negative for chest pain.   Gastrointestinal: Positive for nausea. Negative for abdominal pain and vomiting.   Endocrine: Negative.    Genitourinary: Negative.  Negative for dysuria.   Skin: Negative.    Neurological: Negative.    Psychiatric/Behavioral: Negative.    All other systems reviewed and are negative.      Past Medical History:   Diagnosis Date   • Anxiety    • Bipolar 1 disorder (CMS/[facility])    • Depression    • GERD (gastroesophageal reflux disease)    • History of bronchitis    • History of ear infections    • History of stomach ulcers    • History of streptococcal infection    • Hypertension    • Urinary tract infection        No Known Allergies    Past Surgical History:   Procedure Laterality Date   • ADENOIDECTOMY     • CHOLECYSTECTOMY     • DENTAL PROCEDURE     • HERNIA REPAIR     • TONSILLECTOMY     • WISDOM TOOTH EXTRACTION         Family History   Problem Relation Age of Onset   • Cancer Mother    • Stroke Mother    • Lung disease Mother    • No Known Problems Father    • Cancer Other    • Heart disease Other    • Stroke Other        Social History     Socioeconomic History   • Marital status: " Single     Spouse name: Not on file   • Number of children: Not on file   • Years of education: Not on file   • Highest education level: Not on file   Social Needs   • Financial resource strain: Patient refused   • Food insecurity     Worry: Patient refused     Inability: Patient refused   • Transportation needs     Medical: Patient refused     Non-medical: Patient refused   Tobacco Use   • Smoking status: Current Every Day Smoker     Packs/day: 0.50     Types: Cigarettes   • Smokeless tobacco: Never Used   Substance and Sexual Activity   • Alcohol use: No     Frequency: Never   • Drug use: No   • Sexual activity: Defer     Birth control/protection: Pill   Lifestyle   • Physical activity     Days per week: Patient refused     Minutes per session: Patient refused   • Stress: Patient refused           Objective   Physical Exam  Vitals signs and nursing note reviewed.   Constitutional:       General: She is not in acute distress.     Appearance: She is well-developed. She is not diaphoretic.   HENT:      Head: Normocephalic and atraumatic.      Right Ear: External ear normal.      Left Ear: External ear normal.      Nose: Nose normal.   Eyes:      Conjunctiva/sclera: Conjunctivae normal.      Pupils: Pupils are equal, round, and reactive to light.   Neck:      Musculoskeletal: Normal range of motion and neck supple.      Vascular: No JVD.      Trachea: No tracheal deviation.   Cardiovascular:      Rate and Rhythm: Normal rate and regular rhythm.      Heart sounds: Normal heart sounds. No murmur.   Pulmonary:      Effort: Pulmonary effort is normal. No respiratory distress.      Breath sounds: Normal breath sounds. No wheezing.   Abdominal:      General: Bowel sounds are normal.      Palpations: Abdomen is soft.      Tenderness: There is no abdominal tenderness.   Musculoskeletal: Normal range of motion.         General: No deformity.   Skin:     General: Skin is warm and dry.      Coloration: Skin is not pale.       Findings: No erythema or rash.   Neurological:      Mental Status: She is alert and oriented to person, place, and time.      Cranial Nerves: No cranial nerve deficit.   Psychiatric:         Behavior: Behavior normal.         Thought Content: Thought content normal.         Procedures           ED Course  ED Course as of Sep 25 2041   Fri Sep 25, 2020   1935 IMPRESSION:   1. No acute findings identified within abdomen or pelvis.  2. 8.1 cm cyst left ovary. Follow-up ultrasound to confirm resolution.  3. Mild diffuse fatty infiltration of liver.  4. Normal appendix. Cholecystectomy. Other findings above.      This report was finalized on 9/25/2020 7:12 PM by Dr. Tino Ochoa MD.    CT Abdomen Pelvis With Contrast [TK]   2009 Discussed pt case with Dr. Esteves and since patient can tolerate PO, recommends clear liquid diet and PCP follow up. If pt were to develop fever, worsening abd pain, or intractable N/V recommend she come back to the ER.     [TK]   2010 Acute pancreatitis appears to be mild, there is no peripancreatic stranding on CT, no signs of biliary disease, no systemic complications, and pt able to tolerate clear liquids. Will discharge home with RX for Zofran and bland/light diet with avoidance of alcohol.    [TK]      ED Course User Index  [TK] Beni Catherine, TERRELL                                           MDM  Number of Diagnoses or Management Options  Acute pancreatitis without infection or necrosis, unspecified pancreatitis type: new and requires workup     Amount and/or Complexity of Data Reviewed  Clinical lab tests: reviewed and ordered  Tests in the radiology section of CPT®: reviewed and ordered  Discuss the patient with other providers: yes (Danielito)    Risk of Complications, Morbidity, and/or Mortality  Presenting problems: moderate  Diagnostic procedures: moderate  Management options: moderate    Patient Progress  Patient progress: stable      Final diagnoses:   Acute pancreatitis  without infection or necrosis, unspecified pancreatitis type            Beni Catherine, TERRELL  09/25/20 2041

## 2020-09-26 NOTE — ED NOTES
Give pt water at this time per brianna jackson verbal order for po challenge     Octavio Garcia, RN  09/25/20 2009

## 2020-09-29 DIAGNOSIS — F51.05 INSOMNIA DUE TO MENTAL DISORDER: ICD-10-CM

## 2020-09-29 DIAGNOSIS — F31.30 BIPOLAR I DISORDER, MOST RECENT EPISODE DEPRESSED (HCC): ICD-10-CM

## 2020-09-29 RX ORDER — QUETIAPINE FUMARATE 100 MG/1
100 TABLET, FILM COATED ORAL
Qty: 30 TABLET | Refills: 2 | Status: SHIPPED | OUTPATIENT
Start: 2020-09-29 | End: 2020-12-11 | Stop reason: SDUPTHER

## 2020-09-29 RX ORDER — LURASIDONE HYDROCHLORIDE 80 MG/1
80 TABLET, FILM COATED ORAL DAILY
Qty: 30 TABLET | Refills: 2 | Status: SHIPPED | OUTPATIENT
Start: 2020-09-29 | End: 2020-12-11 | Stop reason: SDUPTHER

## 2020-09-29 RX ORDER — BUPROPION HYDROCHLORIDE 100 MG/1
100 TABLET, EXTENDED RELEASE ORAL 2 TIMES DAILY
Qty: 60 TABLET | Refills: 2 | Status: SHIPPED | OUTPATIENT
Start: 2020-09-29 | End: 2020-12-11 | Stop reason: SDUPTHER

## 2020-11-09 ENCOUNTER — HOSPITAL ENCOUNTER (EMERGENCY)
Facility: HOSPITAL | Age: 35
Discharge: HOME | End: 2020-11-09
Attending: FAMILY MEDICINE | Admitting: FAMILY MEDICINE

## 2020-11-09 ENCOUNTER — APPOINTMENT (OUTPATIENT)
Dept: CT IMAGING | Facility: HOSPITAL | Age: 35
End: 2020-11-09

## 2020-11-09 VITALS
HEART RATE: 85 BPM | SYSTOLIC BLOOD PRESSURE: 128 MMHG | DIASTOLIC BLOOD PRESSURE: 64 MMHG | OXYGEN SATURATION: 100 % | BODY MASS INDEX: 31.65 KG/M2 | RESPIRATION RATE: 16 BRPM | WEIGHT: 190 LBS | TEMPERATURE: 98.1 F | HEIGHT: 65 IN

## 2020-11-09 DIAGNOSIS — R10.9 ABDOMINAL PAIN, UNSPECIFIED ABDOMINAL LOCATION: Primary | ICD-10-CM

## 2020-11-09 LAB
ALBUMIN SERPL-MCNC: 4.15 G/DL (ref 3.5–5.2)
ALBUMIN/GLOB SERPL: 1 G/DL
ALP SERPL-CCNC: 105 U/L (ref 39–117)
ALT SERPL W P-5'-P-CCNC: 18 U/L (ref 1–33)
ANION GAP SERPL CALCULATED.3IONS-SCNC: 17.3 MMOL/L (ref 5–15)
AST SERPL-CCNC: 32 U/L (ref 1–32)
B PARAPERT DNA SPEC QL NAA+PROBE: NOT DETECTED
B PERT DNA SPEC QL NAA+PROBE: NOT DETECTED
B-HCG UR QL: NEGATIVE
BACTERIA UR QL AUTO: ABNORMAL /HPF
BASOPHILS # BLD AUTO: 0.05 10*3/MM3 (ref 0–0.2)
BASOPHILS NFR BLD AUTO: 0.3 % (ref 0–1.5)
BILIRUB SERPL-MCNC: 0.3 MG/DL (ref 0–1.2)
BILIRUB UR QL STRIP: ABNORMAL
BUN SERPL-MCNC: 4 MG/DL (ref 6–20)
BUN/CREAT SERPL: 5.3 (ref 7–25)
C PNEUM DNA NPH QL NAA+NON-PROBE: NOT DETECTED
CALCIUM SPEC-SCNC: 10 MG/DL (ref 8.6–10.5)
CHLORIDE SERPL-SCNC: 93 MMOL/L (ref 98–107)
CLARITY UR: ABNORMAL
CO2 SERPL-SCNC: 25.7 MMOL/L (ref 22–29)
COLOR UR: ABNORMAL
CREAT SERPL-MCNC: 0.75 MG/DL (ref 0.57–1)
DEPRECATED RDW RBC AUTO: 40.6 FL (ref 37–54)
EOSINOPHIL # BLD AUTO: 0.05 10*3/MM3 (ref 0–0.4)
EOSINOPHIL NFR BLD AUTO: 0.3 % (ref 0.3–6.2)
ERYTHROCYTE [DISTWIDTH] IN BLOOD BY AUTOMATED COUNT: 13.1 % (ref 12.3–15.4)
FLUAV H1 2009 PAND RNA NPH QL NAA+PROBE: NOT DETECTED
FLUAV H1 HA GENE NPH QL NAA+PROBE: NOT DETECTED
FLUAV H3 RNA NPH QL NAA+PROBE: NOT DETECTED
FLUAV SUBTYP SPEC NAA+PROBE: NOT DETECTED
FLUBV RNA ISLT QL NAA+PROBE: NOT DETECTED
GFR SERPL CREATININE-BSD FRML MDRD: 90 ML/MIN/1.73
GLOBULIN UR ELPH-MCNC: 4.3 GM/DL
GLUCOSE SERPL-MCNC: 159 MG/DL (ref 65–99)
GLUCOSE UR STRIP-MCNC: NEGATIVE MG/DL
HADV DNA SPEC NAA+PROBE: NOT DETECTED
HCOV 229E RNA SPEC QL NAA+PROBE: NOT DETECTED
HCOV HKU1 RNA SPEC QL NAA+PROBE: NOT DETECTED
HCOV NL63 RNA SPEC QL NAA+PROBE: NOT DETECTED
HCOV OC43 RNA SPEC QL NAA+PROBE: NOT DETECTED
HCT VFR BLD AUTO: 43.2 % (ref 34–46.6)
HGB BLD-MCNC: 14 G/DL (ref 12–15.9)
HGB UR QL STRIP.AUTO: ABNORMAL
HMPV RNA NPH QL NAA+NON-PROBE: NOT DETECTED
HPIV1 RNA SPEC QL NAA+PROBE: NOT DETECTED
HPIV2 RNA SPEC QL NAA+PROBE: NOT DETECTED
HPIV3 RNA NPH QL NAA+PROBE: NOT DETECTED
HPIV4 P GENE NPH QL NAA+PROBE: NOT DETECTED
HYALINE CASTS UR QL AUTO: ABNORMAL /LPF
IMM GRANULOCYTES # BLD AUTO: 0.12 10*3/MM3 (ref 0–0.05)
IMM GRANULOCYTES NFR BLD AUTO: 0.7 % (ref 0–0.5)
KETONES UR QL STRIP: ABNORMAL
LEUKOCYTE ESTERASE UR QL STRIP.AUTO: ABNORMAL
LIPASE SERPL-CCNC: 16 U/L (ref 13–60)
LYMPHOCYTES # BLD AUTO: 1.81 10*3/MM3 (ref 0.7–3.1)
LYMPHOCYTES NFR BLD AUTO: 10.3 % (ref 19.6–45.3)
M PNEUMO IGG SER IA-ACNC: NOT DETECTED
MCH RBC QN AUTO: 27.7 PG (ref 26.6–33)
MCHC RBC AUTO-ENTMCNC: 32.4 G/DL (ref 31.5–35.7)
MCV RBC AUTO: 85.4 FL (ref 79–97)
MONOCYTES # BLD AUTO: 1.04 10*3/MM3 (ref 0.1–0.9)
MONOCYTES NFR BLD AUTO: 5.9 % (ref 5–12)
NEUTROPHILS NFR BLD AUTO: 14.49 10*3/MM3 (ref 1.7–7)
NEUTROPHILS NFR BLD AUTO: 82.5 % (ref 42.7–76)
NITRITE UR QL STRIP: NEGATIVE
NRBC BLD AUTO-RTO: 0 /100 WBC (ref 0–0.2)
PH UR STRIP.AUTO: 6 [PH] (ref 5–8)
PLATELET # BLD AUTO: 409 10*3/MM3 (ref 140–450)
PMV BLD AUTO: 9 FL (ref 6–12)
POTASSIUM SERPL-SCNC: 3.1 MMOL/L (ref 3.5–5.2)
PROT SERPL-MCNC: 8.4 G/DL (ref 6–8.5)
PROT UR QL STRIP: ABNORMAL
RBC # BLD AUTO: 5.06 10*6/MM3 (ref 3.77–5.28)
RBC # UR: ABNORMAL /HPF
REF LAB TEST METHOD: ABNORMAL
RHINOVIRUS RNA SPEC NAA+PROBE: NOT DETECTED
RSV RNA NPH QL NAA+NON-PROBE: NOT DETECTED
SARS-COV-2 RNA NPH QL NAA+NON-PROBE: NOT DETECTED
SODIUM SERPL-SCNC: 136 MMOL/L (ref 136–145)
SP GR UR STRIP: 1.03 (ref 1–1.03)
SQUAMOUS #/AREA URNS HPF: ABNORMAL /HPF
UROBILINOGEN UR QL STRIP: ABNORMAL
WBC # BLD AUTO: 17.56 10*3/MM3 (ref 3.4–10.8)
WBC UR QL AUTO: ABNORMAL /HPF

## 2020-11-09 PROCEDURE — 99283 EMERGENCY DEPT VISIT LOW MDM: CPT

## 2020-11-09 PROCEDURE — 81001 URINALYSIS AUTO W/SCOPE: CPT | Performed by: PHYSICIAN ASSISTANT

## 2020-11-09 PROCEDURE — 81025 URINE PREGNANCY TEST: CPT | Performed by: PHYSICIAN ASSISTANT

## 2020-11-09 PROCEDURE — 85025 COMPLETE CBC W/AUTO DIFF WBC: CPT | Performed by: PHYSICIAN ASSISTANT

## 2020-11-09 PROCEDURE — 74177 CT ABD & PELVIS W/CONTRAST: CPT

## 2020-11-09 PROCEDURE — 80053 COMPREHEN METABOLIC PANEL: CPT | Performed by: PHYSICIAN ASSISTANT

## 2020-11-09 PROCEDURE — 83690 ASSAY OF LIPASE: CPT | Performed by: PHYSICIAN ASSISTANT

## 2020-11-09 PROCEDURE — 25010000002 IOPAMIDOL 61 % SOLUTION: Performed by: FAMILY MEDICINE

## 2020-11-09 PROCEDURE — 0202U NFCT DS 22 TRGT SARS-COV-2: CPT | Performed by: FAMILY MEDICINE

## 2020-11-09 PROCEDURE — 25010000002 ONDANSETRON PER 1 MG: Performed by: GENERAL ACUTE CARE HOSPITAL

## 2020-11-09 PROCEDURE — 96374 THER/PROPH/DIAG INJ IV PUSH: CPT

## 2020-11-09 RX ORDER — CEPHALEXIN 500 MG/1
500 CAPSULE ORAL 2 TIMES DAILY
Qty: 10 CAPSULE | Refills: 0 | Status: SHIPPED | OUTPATIENT
Start: 2020-11-09 | End: 2020-11-14

## 2020-11-09 RX ORDER — SODIUM CHLORIDE 0.9 % (FLUSH) 0.9 %
10 SYRINGE (ML) INJECTION AS NEEDED
Status: DISCONTINUED | OUTPATIENT
Start: 2020-11-09 | End: 2020-11-10 | Stop reason: HOSPADM

## 2020-11-09 RX ORDER — ONDANSETRON 2 MG/ML
4 INJECTION INTRAMUSCULAR; INTRAVENOUS ONCE
Status: COMPLETED | OUTPATIENT
Start: 2020-11-09 | End: 2020-11-09

## 2020-11-09 RX ADMIN — IOPAMIDOL 100 ML: 612 INJECTION, SOLUTION INTRAVENOUS at 21:20

## 2020-11-09 RX ADMIN — ONDANSETRON 4 MG: 2 INJECTION INTRAMUSCULAR; INTRAVENOUS at 22:00

## 2020-11-09 RX ADMIN — SODIUM CHLORIDE 1000 ML: 9 INJECTION, SOLUTION INTRAVENOUS at 18:26

## 2020-11-09 NOTE — ED PROVIDER NOTES
Subjective   34-year-old female who presents to the emergency department chief complaint fever, chills, nausea, vomiting, abdominal discomfort.  Patient states she has had similar symptoms previously and was diagnosed with pancreatitis.  Patient has history of bipolar disorder, anxiety, diabetes.  Patient is otherwise healthy.      History provided by:  Patient   used: No    Abdominal Pain  Pain location:  Epigastric  Pain quality: gnawing and sharp    Pain radiates to:  Does not radiate  Pain severity:  Moderate  Onset quality:  Gradual  Duration:  1 day  Timing:  Intermittent  Progression:  Worsening  Chronicity:  New  Context: not alcohol use, not diet changes, not previous surgeries, not recent illness, not recent sexual activity and not trauma    Relieved by:  Nothing  Worsened by:  Nothing  Ineffective treatments:  None tried  Associated symptoms: chills, nausea and vomiting    Associated symptoms: no dysuria, no fatigue, no fever and no hematemesis    Risk factors: no alcohol abuse, no aspirin use, has not had multiple surgeries, no NSAID use, not obese, not pregnant and no recent hospitalization        Review of Systems   Constitutional: Positive for chills. Negative for fatigue and fever.   Respiratory: Negative.    Gastrointestinal: Positive for abdominal distention, abdominal pain, nausea and vomiting. Negative for hematemesis.   Genitourinary: Negative.  Negative for dyspareunia, dysuria, flank pain and frequency.   Musculoskeletal: Negative.  Negative for arthralgias, gait problem, myalgias and neck pain.   Neurological: Negative for dizziness, facial asymmetry and numbness.   Hematological: Negative.  Negative for adenopathy. Does not bruise/bleed easily.   Psychiatric/Behavioral: Negative.    All other systems reviewed and are negative.      Past Medical History:   Diagnosis Date   • Anxiety    • Bipolar 1 disorder (CMS/[facility])    • Depression    • GERD (gastroesophageal reflux  disease)    • History of bronchitis    • History of ear infections    • History of stomach ulcers    • History of streptococcal infection    • Hypertension    • Urinary tract infection        No Known Allergies    Past Surgical History:   Procedure Laterality Date   • ADENOIDECTOMY     • CHOLECYSTECTOMY     • DENTAL PROCEDURE     • HERNIA REPAIR     • TONSILLECTOMY     • WISDOM TOOTH EXTRACTION         Family History   Problem Relation Age of Onset   • Cancer Mother    • Stroke Mother    • Lung disease Mother    • No Known Problems Father    • Cancer Other    • Heart disease Other    • Stroke Other        Social History     Socioeconomic History   • Marital status: Single     Spouse name: Not on file   • Number of children: Not on file   • Years of education: Not on file   • Highest education level: Not on file   Social Needs   • Financial resource strain: Patient refused   • Food insecurity     Worry: Patient refused     Inability: Patient refused   • Transportation needs     Medical: Patient refused     Non-medical: Patient refused   Tobacco Use   • Smoking status: Current Every Day Smoker     Packs/day: 0.50     Types: Cigarettes   • Smokeless tobacco: Never Used   Substance and Sexual Activity   • Alcohol use: No     Frequency: Never   • Drug use: No   • Sexual activity: Defer     Birth control/protection: Pill   Lifestyle   • Physical activity     Days per week: Patient refused     Minutes per session: Patient refused   • Stress: Patient refused           Objective   Physical Exam  Vitals signs and nursing note reviewed.   Constitutional:       General: She is not in acute distress.     Appearance: She is normal weight. She is not ill-appearing, toxic-appearing or diaphoretic.   HENT:      Head: Normocephalic and atraumatic.      Mouth/Throat:      Mouth: Mucous membranes are moist.      Pharynx: Oropharynx is clear. No pharyngeal swelling or oropharyngeal exudate.   Eyes:      General: No scleral icterus.      Extraocular Movements: Extraocular movements intact.      Pupils: Pupils are equal, round, and reactive to light.   Cardiovascular:      Rate and Rhythm: Normal rate and regular rhythm.      Heart sounds: Normal heart sounds. No murmur. No friction rub. No gallop.    Pulmonary:      Effort: Pulmonary effort is normal. No respiratory distress.      Breath sounds: Normal breath sounds. No stridor. No wheezing, rhonchi or rales.   Chest:      Chest wall: No tenderness.   Abdominal:      General: Abdomen is flat, scaphoid and protuberant. There is no distension or abdominal bruit. There are no signs of injury.      Palpations: Abdomen is soft. There is no shifting dullness, fluid wave, hepatomegaly, splenomegaly or mass.      Tenderness: There is abdominal tenderness in the epigastric area. There is no left CVA tenderness or rebound. Negative signs include Pretty's sign, Rovsing's sign, McBurney's sign and psoas sign.      Hernia: No hernia is present.   Skin:     General: Skin is warm and dry.      Capillary Refill: Capillary refill takes less than 2 seconds.      Coloration: Skin is not cyanotic, jaundiced, mottled or pale.      Findings: No erythema or rash.   Neurological:      General: No focal deficit present.      Mental Status: She is alert. She is disoriented.      Cranial Nerves: No cranial nerve deficit.      Motor: No weakness.   Psychiatric:         Mood and Affect: Mood normal. Mood is not anxious or depressed.         Behavior: Behavior normal.         Procedures           ED Course  ED Course as of Nov 11 0101   Mon Nov 09, 2020 1919 Awaiting CT scan and urinalysis.    []   1942 Endorsed to Dr. Acevedo.     []      ED Course User Index  [] Miquel Ochoa PA-C                                           [facility]    Final diagnoses:   Abdominal pain, unspecified abdominal location            Miquel Ochoa PA-C  11/11/20 0101

## 2020-11-09 NOTE — ED NOTES
Consent for CT with contrast obtained at this time, placed on patients chart.      Perlita Gray, RN  11/09/20 7260

## 2020-11-10 NOTE — DISCHARGE INSTRUCTIONS
We did not find a source of your fever, however your urine did show some bacteria. If you develop UTI symptoms fill the antibiotic prescription. Come back if something worsens. Call your doctor to make an appointment to discuss your ovarian cyst.

## 2020-12-11 ENCOUNTER — OFFICE VISIT (OUTPATIENT)
Dept: PSYCHIATRY | Facility: CLINIC | Age: 35
End: 2020-12-11

## 2020-12-11 VITALS
HEIGHT: 65 IN | WEIGHT: 185.4 LBS | BODY MASS INDEX: 30.89 KG/M2 | HEART RATE: 88 BPM | DIASTOLIC BLOOD PRESSURE: 82 MMHG | SYSTOLIC BLOOD PRESSURE: 128 MMHG

## 2020-12-11 DIAGNOSIS — Z79.899 MEDICATION MANAGEMENT: ICD-10-CM

## 2020-12-11 DIAGNOSIS — F51.05 INSOMNIA DUE TO MENTAL DISORDER: ICD-10-CM

## 2020-12-11 DIAGNOSIS — F31.30 BIPOLAR I DISORDER, MOST RECENT EPISODE DEPRESSED (HCC): Primary | ICD-10-CM

## 2020-12-11 LAB
AMPHETAMINE CUT-OFF: ABNORMAL
BENZODIAZIPINE CUT-OFF: ABNORMAL
BUPRENORPHINE CUT-OFF: ABNORMAL
COCAINE CUT-OFF: ABNORMAL
EXTERNAL AMPHETAMINE SCREEN URINE: NEGATIVE
EXTERNAL BENZODIAZEPINE SCREEN URINE: NEGATIVE
EXTERNAL BUPRENORPHINE SCREEN URINE: NEGATIVE
EXTERNAL COCAINE SCREEN URINE: NEGATIVE
EXTERNAL MDMA: NEGATIVE
EXTERNAL METHADONE SCREEN URINE: NEGATIVE
EXTERNAL METHAMPHETAMINE SCREEN URINE: NEGATIVE
EXTERNAL OPIATES SCREEN URINE: NEGATIVE
EXTERNAL OXYCODONE SCREEN URINE: NEGATIVE
EXTERNAL THC SCREEN URINE: POSITIVE
MDMA CUT-OFF: ABNORMAL
METHADONE CUT-OFF: ABNORMAL
METHAMPHETAMINE CUT-OFF: ABNORMAL
OPIATES CUT-OFF: ABNORMAL
OXYCODONE CUT-OFF: ABNORMAL
THC CUT-OFF: ABNORMAL

## 2020-12-11 PROCEDURE — 90792 PSYCH DIAG EVAL W/MED SRVCS: CPT | Performed by: NURSE PRACTITIONER

## 2020-12-11 RX ORDER — QUETIAPINE FUMARATE 100 MG/1
100 TABLET, FILM COATED ORAL
Qty: 30 TABLET | Refills: 2 | Status: SHIPPED | OUTPATIENT
Start: 2020-12-11 | End: 2021-01-04

## 2020-12-11 RX ORDER — LURASIDONE HYDROCHLORIDE 80 MG/1
80 TABLET, FILM COATED ORAL DAILY
Qty: 30 TABLET | Refills: 2 | Status: SHIPPED | OUTPATIENT
Start: 2020-12-11 | End: 2021-05-03 | Stop reason: SDUPTHER

## 2020-12-11 RX ORDER — BUPROPION HYDROCHLORIDE 100 MG/1
100 TABLET, EXTENDED RELEASE ORAL 2 TIMES DAILY
Qty: 60 TABLET | Refills: 2 | Status: SHIPPED | OUTPATIENT
Start: 2020-12-11 | End: 2021-05-03 | Stop reason: SDUPTHER

## 2020-12-11 NOTE — PROGRESS NOTES
"Subjective   Jessica Harris is a 35 y.o. female who presents today for follow up    Chief Complaint:  Bipolar    History of Present Illness: This is the first encounter for this APRN with this patient.  The patient came to this APRN on their current medication regimen.  She was a transfer from [facility].  She reports feeling \"stable\" on her medication regimen.  Denies any side effects.  Appears she has been on current medication regimen since 2019.  She denies any depressive or manic episodes.  She reports leaving her job at [employer] to work at [employer] where she has been for the last 7 months.  States she enjoys her job.  Does report some anxiety but she contributes this to her and her boyfriend attempting to find an apartment.  She currently rates depression 0/10 with 10 being the worst.  Rates anxiety 3/10 with 10 being the worst.  Reports sleep as good with quetiapine, she did not have sleep study performed however she reports improvement with sleep since weight loss.  Reports appetite is good, she has been dieting and has successfully lost 75 pounds within this last year.  She denies SI/HI/AVH    The following portions of the patient's history were reviewed and updated as appropriate: allergies, current medications, past family history, past medical history, past social history, past surgical history and problem list.      Past Medical History:  Past Medical History:   Diagnosis Date   • Anxiety    • Bipolar 1 disorder (CMS/[facility])    • Depression    • GERD (gastroesophageal reflux disease)    • History of bronchitis    • History of ear infections    • History of stomach ulcers    • History of streptococcal infection    • Hypertension    • Urinary tract infection        Social History:  Social History     Socioeconomic History   • Marital status: Single     Spouse name: Not on file   • Number of children: Not on file   • Years of education: Not on file   • Highest education level: Not on file   Social " Needs   • Financial resource strain: Patient refused   • Food insecurity     Worry: Patient refused     Inability: Patient refused   • Transportation needs     Medical: Patient refused     Non-medical: Patient refused   Tobacco Use   • Smoking status: Current Every Day Smoker     Packs/day: 0.50     Types: Cigarettes   • Smokeless tobacco: Never Used   Substance and Sexual Activity   • Alcohol use: No     Frequency: Never   • Drug use: No   • Sexual activity: Defer     Birth control/protection: Pill   Lifestyle   • Physical activity     Days per week: Patient refused     Minutes per session: Patient refused   • Stress: Patient refused       Family History:  Family History   Problem Relation Age of Onset   • Cancer Mother    • Stroke Mother    • Lung disease Mother    • No Known Problems Father    • Cancer Other    • Heart disease Other    • Stroke Other        Past Surgical History:  Past Surgical History:   Procedure Laterality Date   • ADENOIDECTOMY     • CHOLECYSTECTOMY     • DENTAL PROCEDURE     • HERNIA REPAIR     • TONSILLECTOMY     • WISDOM TOOTH EXTRACTION         Problem List:  Patient Active Problem List   Diagnosis   • Palpitations   • Bipolar disorder, in partial remission, most recent episode mixed (CMS/[facility])   • Smoker   • Gastroesophageal reflux disease without esophagitis   • Chronic venous insufficiency   • PCOS (polycystic ovarian syndrome)   • Healthcare maintenance   • Vitamin D deficiency   • Snoring   • History of nephrolithiasis   • Essential hypertension   • Morbid obesity (CMS/HCC)   • Type 2 diabetes mellitus without complication, without long-term current use of insulin (CMS/HCC)   • Mixed hyperlipidemia       Allergy:   No Known Allergies     Current Medications:   Current Outpatient Medications   Medication Sig Dispense Refill   • amoxicillin-clavulanate (AUGMENTIN) 500-125 MG per tablet Take 1 tablet by mouth 3 (Three) Times a Day. 30 tablet 0   • atorvastatin (LIPITOR) 10 MG tablet Take  "1 tablet by mouth Every Evening. 30 tablet 5   • buPROPion SR (WELLBUTRIN SR) 100 MG 12 hr tablet Take 1 tablet by mouth 2 (Two) Times a Day. 60 tablet 2   • loperamide (IMODIUM) 2 MG capsule 1 po qd prn 30 capsule 5   • Lurasidone HCl (LATUDA) 80 MG tablet tablet Take 1 tablet by mouth Daily. 30 tablet 2   • metFORMIN ER (GLUCOPHAGE-XR) 500 MG 24 hr tablet Take 2 tablets by mouth 2 (Two) Times a Day. 120 tablet 5   • ondansetron ODT (ZOFRAN-ODT) 4 MG disintegrating tablet Place 1 tablet on the tongue Every 8 (Eight) Hours As Needed for Nausea or Vomiting. 15 tablet 0   • pantoprazole (PROTONIX) 40 MG EC tablet Take 1 tablet by mouth Daily. 30 tablet 3   • propranolol LA (INDERAL LA) 120 MG 24 hr capsule TAKE ONE CAPSULE BY MOUTH EVERY DAY 30 capsule 5   • QUEtiapine (SEROquel) 100 MG tablet Take 1 tablet by mouth every night at bedtime. 30 tablet 2   • spironolactone (ALDACTONE) 100 MG tablet Take 1 tablet by mouth Daily. 30 tablet 5   • SPRINTEC 28 0.25-35 MG-MCG per tablet Take 1 tablet by mouth Daily.  11   • icosapent ethyl (Vascepa) 1 g capsule capsule 2 twice daily 120 capsule 5     No current facility-administered medications for this visit.        Review of Symptoms:    Review of Systems   Constitutional: Negative.    HENT: Negative.    Eyes: Negative.    Respiratory: Negative.    Cardiovascular: Negative.    Neurological: Negative.    Psychiatric/Behavioral: Positive for stress. Negative for suicidal ideas. The patient is nervous/anxious.        Objective   Physical Exam:   Blood pressure 128/82, pulse 88, height 165.1 cm (65\"), weight 84.1 kg (185 lb 6.4 oz).  Body mass index is 30.85 kg/m².    Appearance: Well nourished, well dressed and in no acute distress  Gait, Station, Strength: Within normal limits    Mental Status Exam:   Hygiene:   good  Cooperation:  Cooperative  Eye Contact:  Good  Psychomotor Behavior:  Appropriate  Affect:  Full range  Mood: normal  Hopelessness: Denies  Speech:  " Normal  Thought Process:  Goal directed and Linear  Thought Content:  Normal and Mood congruent  Suicidal:  None  Homicidal:  None  Hallucinations:  None  Delusion:  None  Memory:  Intact  Orientation:  Person, Place, Time and Situation  Reliability:  good  Insight:  Good  Judgement:  Good  Impulse Control:  Good  Physical/Medical Issues:  No      PHQ-Score Total:  PHQ-9 Total Score: 0    Lab Results:   Office Visit on 12/11/2020   Component Date Value Ref Range Status   • External Amphetamine Screen Urine 12/11/2020 Negative   Final   • Amphetamine Cut-Off 12/11/2020 1000ng/ml   Final   • External Benzodiazepine Screen Uri* 12/11/2020 Negative   Final   • Benzodiazipine Cut-Off 12/11/2020 300ng/ml   Final   • External Cocaine Screen Urine 12/11/2020 Negative   Final   • Cocaine Cut-Off 12/11/2020 300ng/ml   Final   • External THC Screen Urine 12/11/2020 Positive   Final   • THC Cut-Off 12/11/2020 50ng/ml   Final   • External Methadone Screen Urine 12/11/2020 Negative   Final   • Methadone Cut-Off 12/11/2020 300ng/ml   Final   • External Methamphetamine Screen Ur* 12/11/2020 Negative   Final   • Methamphetamine Cut-Off 12/11/2020 1000ng/ml   Final   • External Oxycodone Screen Urine 12/11/2020 Negative   Final   • Oxycodone Cut-Off 12/11/2020 100ng/ml   Final   • External Buprenorphine Screen Urine 12/11/2020 Negative   Final   • Buprenorphine Cut-Off 12/11/2020 10ng/ml   Final   • External MDMA 12/11/2020 Negative   Final   • MDMA Cut-Off 12/11/2020 500ng/ml   Final   • External Opiates Screen Urine 12/11/2020 Negative   Final   • Opiates Cut-Off 12/11/2020 300ng/ml   Final       Assessment/Plan   Diagnoses and all orders for this visit:    1. Bipolar I disorder, most recent episode depressed (CMS/HCC) (Primary)  -     buPROPion SR (WELLBUTRIN SR) 100 MG 12 hr tablet; Take 1 tablet by mouth 2 (Two) Times a Day.  Dispense: 60 tablet; Refill: 2  -     Lurasidone HCl (LATUDA) 80 MG tablet tablet; Take 1 tablet by  mouth Daily.  Dispense: 30 tablet; Refill: 2  -     QUEtiapine (SEROquel) 100 MG tablet; Take 1 tablet by mouth every night at bedtime.  Dispense: 30 tablet; Refill: 2    2. Medication management  -     [facility] Drug Screen    3. Insomnia due to mental disorder  -     QUEtiapine (SEROquel) 100 MG tablet; Take 1 tablet by mouth every night at bedtime.  Dispense: 30 tablet; Refill: 2    -Continue bupropion 100 mg twice daily for bipolar disorder  -Continue lurasidone 80 mg daily for bipolar disorder  -Continue quetiapine 100 mg for sleep  -Encourage patient to begin individual psychotherapy, patient reluctant at this time  -UDS positive for THC, patient reports smoking marijuana regularly.  Discussed risk associated with marijuana use, patient verbalizes understanding  -JULIET reviewed and appropriate. Patient counseled on use of controlled substances.   -The benefits of a healthy diet and exercise were discussed with patient, especially the positive effects they have on mental health. Patient encouraged to consider lifestyle modification regarding  diet and exercise patterns to maximize results of mental health treatment.  -Reviewed previous available documentation  -Reviewed most recent available labs         Visit Diagnoses:    ICD-10-CM ICD-9-CM   1. Bipolar I disorder, most recent episode depressed (CMS/[facility])  F31.30 296.50   2. Medication management  Z79.899 V58.69   3. Insomnia due to mental disorder  F51.05 300.9     327.02         TREATMENT PLAN/GOALS: Continue supportive psychotherapy efforts and medications as indicated. Treatment and medication options discussed during today's visit. Patient acknowledged and verbally consented to continue with current treatment plan and was educated on the importance of compliance with treatment and follow-up appointments.    MEDICATION ISSUES:    Discussed medication options and treatment plan of prescribed medication as well as the risks, benefits, and side effects  including potential falls, possible impaired driving and metabolic adversities among others. Patient is agreeable to call the office with any worsening of symptoms or onset of side effects. Patient is agreeable to call 911 or go to the nearest ER should he/she begin having SI/HI.     MEDS ORDERED DURING VISIT:  New Medications Ordered This Visit   Medications   • buPROPion SR (WELLBUTRIN SR) 100 MG 12 hr tablet     Sig: Take 1 tablet by mouth 2 (Two) Times a Day.     Dispense:  60 tablet     Refill:  2   • Lurasidone HCl (LATUDA) 80 MG tablet tablet     Sig: Take 1 tablet by mouth Daily.     Dispense:  30 tablet     Refill:  2   • QUEtiapine (SEROquel) 100 MG tablet     Sig: Take 1 tablet by mouth every night at bedtime.     Dispense:  30 tablet     Refill:  2       Return in about 3 months (around 3/11/2021), or if symptoms worsen or fail to improve.         Prognosis: Guarded dependent on medication/follow up and treatment plan compliance.  Functionality: pt showing improvements in important areas of daily functioning.     Short-term goals: Patient will adhere to medication regimen and note continued improvement in symptoms over the next 3 months.   Long-term goals: Patient will be adherent to medication management and psychotherapy with continued improvement in symptoms over the next 6 months          This document has been electronically signed by JANIE Thorpe   December 11, 2020 09:08 EST    Part of this note may be an electronic transcription/translation of spoken language to printed text using the Dragon Dictation System.

## 2021-01-02 DIAGNOSIS — F31.30 BIPOLAR I DISORDER, MOST RECENT EPISODE DEPRESSED (HCC): ICD-10-CM

## 2021-01-02 DIAGNOSIS — F51.05 INSOMNIA DUE TO MENTAL DISORDER: ICD-10-CM

## 2021-01-02 DIAGNOSIS — K21.9 GASTROESOPHAGEAL REFLUX DISEASE WITHOUT ESOPHAGITIS: ICD-10-CM

## 2021-01-04 RX ORDER — QUETIAPINE FUMARATE 100 MG/1
TABLET, FILM COATED ORAL
Qty: 30 TABLET | Refills: 2 | Status: SHIPPED | OUTPATIENT
Start: 2021-01-04 | End: 2021-04-19 | Stop reason: SDUPTHER

## 2021-01-04 RX ORDER — PANTOPRAZOLE SODIUM 40 MG/1
TABLET, DELAYED RELEASE ORAL
Qty: 30 TABLET | Refills: 3 | Status: SHIPPED | OUTPATIENT
Start: 2021-01-04 | End: 2021-01-12 | Stop reason: SDUPTHER

## 2021-01-12 ENCOUNTER — OFFICE VISIT (OUTPATIENT)
Dept: FAMILY MEDICINE CLINIC | Facility: CLINIC | Age: 36
End: 2021-01-12

## 2021-01-12 DIAGNOSIS — E66.01 MORBID OBESITY (HCC): ICD-10-CM

## 2021-01-12 DIAGNOSIS — J01.10 ACUTE NON-RECURRENT FRONTAL SINUSITIS: ICD-10-CM

## 2021-01-12 DIAGNOSIS — E55.9 VITAMIN D DEFICIENCY: ICD-10-CM

## 2021-01-12 DIAGNOSIS — I87.2 CHRONIC VENOUS INSUFFICIENCY: ICD-10-CM

## 2021-01-12 DIAGNOSIS — R00.2 PALPITATIONS: ICD-10-CM

## 2021-01-12 DIAGNOSIS — E11.628 DIABETIC FOOT INFECTION (HCC): ICD-10-CM

## 2021-01-12 DIAGNOSIS — Z00.00 HEALTHCARE MAINTENANCE: ICD-10-CM

## 2021-01-12 DIAGNOSIS — I10 ESSENTIAL HYPERTENSION: ICD-10-CM

## 2021-01-12 DIAGNOSIS — F31.77 BIPOLAR DISORDER, IN PARTIAL REMISSION, MOST RECENT EPISODE MIXED (HCC): ICD-10-CM

## 2021-01-12 DIAGNOSIS — K21.9 GASTROESOPHAGEAL REFLUX DISEASE WITHOUT ESOPHAGITIS: ICD-10-CM

## 2021-01-12 DIAGNOSIS — F17.200 SMOKER: ICD-10-CM

## 2021-01-12 DIAGNOSIS — E11.9 TYPE 2 DIABETES MELLITUS WITHOUT COMPLICATION, WITHOUT LONG-TERM CURRENT USE OF INSULIN (HCC): ICD-10-CM

## 2021-01-12 DIAGNOSIS — Z87.442 HISTORY OF NEPHROLITHIASIS: ICD-10-CM

## 2021-01-12 DIAGNOSIS — L08.9 DIABETIC FOOT INFECTION (HCC): ICD-10-CM

## 2021-01-12 DIAGNOSIS — E28.2 PCOS (POLYCYSTIC OVARIAN SYNDROME): ICD-10-CM

## 2021-01-12 DIAGNOSIS — E78.2 MIXED HYPERLIPIDEMIA: Primary | ICD-10-CM

## 2021-01-12 PROCEDURE — 99214 OFFICE O/P EST MOD 30 MIN: CPT | Performed by: GENERAL PRACTICE

## 2021-01-12 RX ORDER — AMOXICILLIN AND CLAVULANATE POTASSIUM 500; 125 MG/1; MG/1
1 TABLET, FILM COATED ORAL 3 TIMES DAILY
Qty: 30 TABLET | Refills: 0 | Status: SHIPPED | OUTPATIENT
Start: 2021-01-12 | End: 2021-04-02

## 2021-01-12 RX ORDER — PANTOPRAZOLE SODIUM 40 MG/1
40 TABLET, DELAYED RELEASE ORAL DAILY
Qty: 30 TABLET | Refills: 3 | Status: SHIPPED | OUTPATIENT
Start: 2021-01-12 | End: 2021-11-01 | Stop reason: SDUPTHER

## 2021-01-12 RX ORDER — ATORVASTATIN CALCIUM 10 MG/1
10 TABLET, FILM COATED ORAL EVERY EVENING
Qty: 30 TABLET | Refills: 5 | Status: SHIPPED | OUTPATIENT
Start: 2021-01-12 | End: 2021-09-27

## 2021-01-12 RX ORDER — ICOSAPENT ETHYL 1000 MG/1
CAPSULE ORAL
Qty: 120 CAPSULE | Refills: 5 | Status: SHIPPED | OUTPATIENT
Start: 2021-01-12 | End: 2021-11-01

## 2021-01-12 RX ORDER — PROPRANOLOL HYDROCHLORIDE 120 MG/1
120 CAPSULE, EXTENDED RELEASE ORAL DAILY
Qty: 30 CAPSULE | Refills: 5 | Status: SHIPPED | OUTPATIENT
Start: 2021-01-12 | End: 2021-11-01 | Stop reason: SDUPTHER

## 2021-01-12 RX ORDER — METFORMIN HYDROCHLORIDE 500 MG/1
1000 TABLET, EXTENDED RELEASE ORAL 2 TIMES DAILY
Qty: 120 TABLET | Refills: 5 | Status: SHIPPED | OUTPATIENT
Start: 2021-01-12 | End: 2021-11-01 | Stop reason: SDUPTHER

## 2021-01-12 RX ORDER — SPIRONOLACTONE 100 MG/1
100 TABLET, FILM COATED ORAL DAILY
Qty: 30 TABLET | Refills: 5 | Status: SHIPPED | OUTPATIENT
Start: 2021-01-12 | End: 2021-11-01

## 2021-01-12 NOTE — PROGRESS NOTES
Subjective   Jessica Harris is a 35 y.o. female.     History of Present Illness     Respiratory Tract Infection  She returns with a 2 to 3-day history of rhinorrhea, congestion, sore throat, and cough.  The symptoms have been associated with mild nausea.  There is no history of any other upper respiratory tract symptoms and she denies any chest pain or shortness of breath.  There is no history of any vomiting, abdominal pain, or diarrhea and she denies any new headaches or joint pain.  There is no history of any rash, fever, or chills.  She underwent a POC COVID-19 test yesterday that returned negative.  She has been placed off work for 10 days.    Diabetes  Current symptoms include none. Patient denies paresthesia of the feet, visual disturbances, polydipsia, polyuria, hypoglycemia or foot ulcerations. Evaluation to date has been: hemoglobin A1C. Current treatments: metformin.  She has been following her diet and exercise plan fairly more carefully. Last dilated eye exam more than 1 year ago.  She has had no recent labs    Dyslipidemia  Compliance with treatment has been much better. She remains on atorvastatin and vascepa with no apparent side effects.      Hypertension  Home blood pressure readings: not doing. Associated signs and symptoms: dyspnea and peripheral edema. Patient denies: chest pain, palpitations, orthopnea and paroxysmal nocturnal dyspnea. Current antihypertensive medications includes propranolol and aldactone. Medication compliance: taking as prescribed.     Palpitations  She has a history of previous palpitations.  She admits to shortness of breath with above average exertion as well as intermittent swelling of the ankles toward the end of the day but feels the symptoms have improved with her weight loss and continues to deny any chest pain, lightheadedness, diaphoresis, or nausea.  She remains on propranolol ER as prescribed with no further palpitations.  Holter monitoring performed on  2/18/2019 revealed sinus tachycardia but was otherwise unremarkable.  Echocardiogram performed on 2/20/2019 was also normal with an estimated EF of 61-65%.    GERD  She gives a history of intermittent heartburn described as a burning epigastric and retrosternal discomfort.  There is no history of any difficulty swallowing, vomiting, change in her bowel habits, hematochezia or melena and she denies any fever or chills.  She remains on pantoprazole with a good control of her symptoms.    The following portions of the patient's history were reviewed and updated as appropriate: allergies, current medications, past medical history, past social history and problem list.    Review of Systems   Constitutional: Positive for fatigue. Negative for appetite change, chills, fever and unexpected weight change.   HENT: Positive for congestion, rhinorrhea and sore throat. Negative for ear pain and sneezing.    Eyes: Negative for visual disturbance.   Respiratory: Positive for cough. Negative for shortness of breath and wheezing.         Occasionally wakes with a feeling she cannot get breath and has been told that she snores   Cardiovascular: Negative for chest pain, palpitations and leg swelling.   Gastrointestinal: Positive for nausea. Negative for abdominal pain, blood in stool, constipation, diarrhea and vomiting.   Endocrine: Negative for polydipsia and polyuria.   Genitourinary: Negative for dysuria, hematuria and urgency.   Musculoskeletal: Positive for arthralgias and myalgias. Negative for back pain, joint swelling and neck pain.   Skin: Negative for rash.   Neurological: Positive for headaches. Negative for weakness and numbness.   Psychiatric/Behavioral: Negative for dysphoric mood, sleep disturbance and suicidal ideas. The patient is nervous/anxious.      Objective   Physical Exam  Constitutional:       General: She is not in acute distress.     Appearance: Normal appearance. She is well-developed. She is not  diaphoretic.   HENT:      Head: Atraumatic.      Right Ear: Tympanic membrane, ear canal and external ear normal.      Left Ear: Tympanic membrane, ear canal and external ear normal.   Eyes:      Conjunctiva/sclera: Conjunctivae normal.   Neck:      Thyroid: No thyroid mass or thyromegaly.      Vascular: No carotid bruit or JVD.      Trachea: Trachea normal. No tracheal deviation.   Cardiovascular:      Rate and Rhythm: Normal rate and regular rhythm.      Heart sounds: Normal heart sounds, S1 normal and S2 normal. No murmur. No gallop.    Pulmonary:      Effort: Pulmonary effort is normal.      Breath sounds: Normal breath sounds.   Abdominal:      General: Bowel sounds are normal. There is no distension or abdominal bruit.      Palpations: Abdomen is soft. There is no hepatomegaly, splenomegaly or mass.      Tenderness: There is no abdominal tenderness.      Hernia: No hernia is present.   Musculoskeletal:      Right lower leg: No edema.      Left lower leg: No edema.   Feet:      Comments: Mild erythema, edema, and tenderness medial nail fold right second toe - patient states she cut that nail too short last week  Lymphadenopathy:      Head:      Right side of head: No submental, submandibular, tonsillar, preauricular, posterior auricular or occipital adenopathy.      Left side of head: No submental, submandibular, tonsillar, preauricular, posterior auricular or occipital adenopathy.      Cervical: No cervical adenopathy.      Upper Body:      Right upper body: No supraclavicular adenopathy.      Left upper body: No supraclavicular adenopathy.   Skin:     General: Skin is warm.      Coloration: Skin is not cyanotic, jaundiced or pale.      Findings: No rash.      Nails: There is no clubbing.     Neurological:      Mental Status: She is alert and oriented to person, place, and time.      Cranial Nerves: No cranial nerve deficit.      Motor: No tremor.      Coordination: Coordination normal.      Gait: Gait normal.    Psychiatric:         Speech: Speech normal.         Behavior: Behavior normal.         Thought Content: Thought content normal.       Assessment/Plan   Problems Addressed this Visit        Cardiac and Vasculature    Chronic venous insufficiency    Essential hypertension   Hypertension: at goal. Evidence of target organ damage: none.  Encouraged to continue to work on diet and exercise plan.   Continue current medication    Relevant Medications    propranolol LA (INDERAL LA) 120 MG 24 hr capsule    spironolactone (ALDACTONE) 100 MG tablet    Other Relevant Orders    CBC & Differential (Completed)    Comprehensive Metabolic Panel (Completed)    Mixed hyperlipidemia   As above.   Continue current medication.    Relevant Medications    atorvastatin (LIPITOR) 10 MG tablet    icosapent ethyl (Vascepa) 1 g capsule capsule    Other Relevant Orders    Lipid Panel (Completed)    TSH (Completed)    Palpitations  Continues to do well with propranolol  Encouraged to report if this should change.    Relevant Medications    propranolol LA (INDERAL LA) 120 MG 24 hr capsule       ENT    Acute non-recurrent frontal sinusitis  Advised regarding symptomatic treatment  Empiric antibiotics  Encouraged to report if any worse, any new symptoms, or if not resolving over the next week    Relevant Medications    amoxicillin-clavulanate (AUGMENTIN) 500-125 MG per tablet       Endocrine and Metabolic    Diabetic foot infection (CMS/HCC)  Advised regarding foot care along with the need for twice daily self exams long-term  Empiric antibiotics  Encouraged to report if any worse, any new symptoms, or if not resolving over the next week    Relevant Medications    amoxicillin-clavulanate (AUGMENTIN) 500-125 MG per tablet    metFORMIN ER (GLUCOPHAGE-XR) 500 MG 24 hr tablet    Morbid obesity (CMS/HCC)    PCOS (polycystic ovarian syndrome)    Relevant Medications    spironolactone (ALDACTONE) 100 MG tablet    Type 2 diabetes mellitus without  complication, without long-term current use of insulin (CMS/[facility])  Diabetes mellitus Type II, under unknown control.   Encouraged to continue to pursue ADA diet  Encouraged aerobic exercise.  Continue current medication  Updated labs drawn.    Relevant Medications    metFORMIN ER (GLUCOPHAGE-XR) 500 MG 24 hr tablet    Other Relevant Orders    Hemoglobin A1c (Completed)    MicroAlbumin, Urine, Random - Urine, Clean Catch (Completed)    Vitamin B12 (Completed)    Vitamin D deficiency    Relevant Orders    Vitamin D 25 Hydroxy (Completed)       Gastrointestinal Abdominal     Gastroesophageal reflux disease without esophagitis    Relevant Medications    pantoprazole (PROTONIX) 40 MG EC tablet       Genitourinary and Reproductive     History of nephrolithiasis       Health Encounters    Healthcare maintenance  Patient has already received a flu shot fall.  Encouraged to obtain a COVID-19 immunization when available.       Mental Health    Bipolar disorder, in partial remission, most recent episode mixed (CMS/[facility])       Tobacco    Smoker      Diagnoses       Codes Comments    Mixed hyperlipidemia    -  Primary ICD-10-CM: E78.2  ICD-9-CM: 272.2     Essential hypertension     ICD-10-CM: I10  ICD-9-CM: 401.9     Chronic venous insufficiency     ICD-10-CM: I87.2  ICD-9-CM: 459.81     Vitamin D deficiency     ICD-10-CM: E55.9  ICD-9-CM: 268.9     Type 2 diabetes mellitus without complication, without long-term current use of insulin (CMS/[facility])     ICD-10-CM: E11.9  ICD-9-CM: 250.00     PCOS (polycystic ovarian syndrome)     ICD-10-CM: E28.2  ICD-9-CM: 256.4     Morbid obesity (CMS/[facility])     ICD-10-CM: E66.01  ICD-9-CM: 278.01     Gastroesophageal reflux disease without esophagitis     ICD-10-CM: K21.9  ICD-9-CM: 530.81     History of nephrolithiasis     ICD-10-CM: Z87.442  ICD-9-CM: V13.01     Healthcare maintenance     ICD-10-CM: Z00.00  ICD-9-CM: V70.0     Bipolar disorder, in partial remission, most recent episode mixed  (CMS/[facility])     ICD-10-CM: F31.77  ICD-9-CM: 296.65     Smoker     ICD-10-CM: F17.200  ICD-9-CM: 305.1     Diabetic foot infection (CMS/HCC)     ICD-10-CM: E11.628, L08.9  ICD-9-CM: 250.80, 686.9     Acute non-recurrent frontal sinusitis     ICD-10-CM: J01.10  ICD-9-CM: 461.1     Palpitations     ICD-10-CM: R00.2  ICD-9-CM: 785.1

## 2021-01-13 VITALS
HEIGHT: 65 IN | BODY MASS INDEX: 31.32 KG/M2 | WEIGHT: 188 LBS | OXYGEN SATURATION: 95 % | TEMPERATURE: 97.1 F | RESPIRATION RATE: 14 BRPM | HEART RATE: 92 BPM | SYSTOLIC BLOOD PRESSURE: 118 MMHG | DIASTOLIC BLOOD PRESSURE: 65 MMHG

## 2021-01-13 LAB
25(OH)D3+25(OH)D2 SERPL-MCNC: 24.5 NG/ML (ref 30–100)
ALBUMIN SERPL-MCNC: 3.9 G/DL (ref 3.5–5.2)
ALBUMIN/GLOB SERPL: 1.4 G/DL
ALP SERPL-CCNC: 74 U/L (ref 39–117)
ALT SERPL-CCNC: 14 U/L (ref 1–33)
AST SERPL-CCNC: 18 U/L (ref 1–32)
BASOPHILS # BLD AUTO: 0.07 10*3/MM3 (ref 0–0.2)
BASOPHILS NFR BLD AUTO: 0.6 % (ref 0–1.5)
BILIRUB SERPL-MCNC: <0.2 MG/DL (ref 0–1.2)
BUN SERPL-MCNC: 5 MG/DL (ref 6–20)
BUN/CREAT SERPL: 6 (ref 7–25)
CALCIUM SERPL-MCNC: 9.2 MG/DL (ref 8.6–10.5)
CHLORIDE SERPL-SCNC: 103 MMOL/L (ref 98–107)
CHOLEST SERPL-MCNC: 160 MG/DL (ref 0–200)
CO2 SERPL-SCNC: 25.7 MMOL/L (ref 22–29)
CREAT SERPL-MCNC: 0.83 MG/DL (ref 0.57–1)
EOSINOPHIL # BLD AUTO: 0.29 10*3/MM3 (ref 0–0.4)
EOSINOPHIL NFR BLD AUTO: 2.5 % (ref 0.3–6.2)
ERYTHROCYTE [DISTWIDTH] IN BLOOD BY AUTOMATED COUNT: 13.4 % (ref 12.3–15.4)
GLOBULIN SER CALC-MCNC: 2.8 GM/DL
GLUCOSE SERPL-MCNC: 95 MG/DL (ref 65–99)
HBA1C MFR BLD: 5.5 % (ref 4.8–5.6)
HCT VFR BLD AUTO: 43.4 % (ref 34–46.6)
HDLC SERPL-MCNC: 31 MG/DL (ref 40–60)
HGB BLD-MCNC: 14.7 G/DL (ref 12–15.9)
IMM GRANULOCYTES # BLD AUTO: 0.09 10*3/MM3 (ref 0–0.05)
IMM GRANULOCYTES NFR BLD AUTO: 0.8 % (ref 0–0.5)
LDLC SERPL CALC-MCNC: 49 MG/DL (ref 0–100)
LYMPHOCYTES # BLD AUTO: 2.87 10*3/MM3 (ref 0.7–3.1)
LYMPHOCYTES NFR BLD AUTO: 25.1 % (ref 19.6–45.3)
MCH RBC QN AUTO: 28.4 PG (ref 26.6–33)
MCHC RBC AUTO-ENTMCNC: 33.9 G/DL (ref 31.5–35.7)
MCV RBC AUTO: 83.9 FL (ref 79–97)
MICROALBUMIN UR-MCNC: 11.1 UG/ML
MONOCYTES # BLD AUTO: 0.8 10*3/MM3 (ref 0.1–0.9)
MONOCYTES NFR BLD AUTO: 7 % (ref 5–12)
NEUTROPHILS # BLD AUTO: 7.32 10*3/MM3 (ref 1.7–7)
NEUTROPHILS NFR BLD AUTO: 64 % (ref 42.7–76)
NRBC BLD AUTO-RTO: 0 /100 WBC (ref 0–0.2)
PLATELET # BLD AUTO: 408 10*3/MM3 (ref 140–450)
POTASSIUM SERPL-SCNC: 4.6 MMOL/L (ref 3.5–5.2)
PROT SERPL-MCNC: 6.7 G/DL (ref 6–8.5)
RBC # BLD AUTO: 5.17 10*6/MM3 (ref 3.77–5.28)
SODIUM SERPL-SCNC: 140 MMOL/L (ref 136–145)
TRIGL SERPL-MCNC: 545 MG/DL (ref 0–150)
TSH SERPL DL<=0.005 MIU/L-ACNC: 1.45 UIU/ML (ref 0.27–4.2)
VIT B12 SERPL-MCNC: 309 PG/ML (ref 211–946)
VLDLC SERPL CALC-MCNC: 80 MG/DL (ref 5–40)
WBC # BLD AUTO: 11.44 10*3/MM3 (ref 3.4–10.8)

## 2021-01-18 NOTE — PROGRESS NOTES
Sent [employer] message. Phone# is disconnected.     -- Please let her know that her A1c has dropped from 10.1 to 5.5   Her TG's are quite high at 545 and this can cause pancreatitis. Is she taking both her atorvastatin and vascepa?

## 2021-02-24 DIAGNOSIS — E11.9 TYPE 2 DIABETES MELLITUS WITHOUT COMPLICATION, WITHOUT LONG-TERM CURRENT USE OF INSULIN (HCC): Primary | ICD-10-CM

## 2021-02-24 RX ORDER — BLOOD-GLUCOSE METER
1 KIT MISCELLANEOUS AS NEEDED
Qty: 1 EACH | Refills: 0 | Status: SHIPPED | OUTPATIENT
Start: 2021-02-24 | End: 2022-04-14 | Stop reason: SDUPTHER

## 2021-02-24 RX ORDER — LANCETS 30 GAUGE
EACH MISCELLANEOUS
Qty: 50 EACH | Refills: 5 | Status: SHIPPED | OUTPATIENT
Start: 2021-02-24 | End: 2022-04-14 | Stop reason: SDUPTHER

## 2021-02-24 RX ORDER — GLUCOSAMINE HCL/CHONDROITIN SU 500-400 MG
CAPSULE ORAL
Qty: 50 EACH | Refills: 5 | Status: SHIPPED | OUTPATIENT
Start: 2021-02-24 | End: 2022-04-14 | Stop reason: SDUPTHER

## 2021-02-25 ENCOUNTER — TELEPHONE (OUTPATIENT)
Dept: FAMILY MEDICINE CLINIC | Facility: CLINIC | Age: 36
End: 2021-02-25

## 2021-02-25 NOTE — TELEPHONE ENCOUNTER
Patient states that she would like to talk to the nurse she was talking with yesterday about her diabetes.  She can be reached at [phone]

## 2021-03-15 ENCOUNTER — BULK ORDERING (OUTPATIENT)
Dept: CASE MANAGEMENT | Facility: OTHER | Age: 36
End: 2021-03-15

## 2021-03-15 DIAGNOSIS — Z23 IMMUNIZATION DUE: ICD-10-CM

## 2021-03-31 ENCOUNTER — TELEPHONE (OUTPATIENT)
Dept: FAMILY MEDICINE CLINIC | Facility: CLINIC | Age: 36
End: 2021-03-31

## 2021-03-31 NOTE — TELEPHONE ENCOUNTER
Caller: Jessica Harris    Relationship: Self    Best call back number: [phone]  What medication are you requesting: NAUSEA MEDICATION    What are your current symptoms: FEVER, DIARRHEA, VOMITING    How long have you been experiencing symptoms: 6-7 MONTHS ON AND OFF    Have you had these symptoms before:    [x] Yes  [] No    Have you been treated for these symptoms before:   [x] Yes  [] No    If a prescription is needed, what is your preferred pharmacy and phone number:  [place], [place] - [address] [place] [address] - [phone] [facility] [phone]   [phone]      Additional notes:  PATIENT STATES SHE HAS ISSUES WITH HER PANCREAS AND IS WANTING TO KNOW IF DR MONTERROSO WOULD LIKE TO DO ANY LABS ON HER. PATIENT STATES SHE WAS GIVEN ZOFRAN BUT THIS IS NOT HELPING HER NAUSEA.

## 2021-04-02 ENCOUNTER — OFFICE VISIT (OUTPATIENT)
Dept: FAMILY MEDICINE CLINIC | Facility: CLINIC | Age: 36
End: 2021-04-02

## 2021-04-02 DIAGNOSIS — R11.2 NON-INTRACTABLE VOMITING WITH NAUSEA, UNSPECIFIED VOMITING TYPE: Primary | ICD-10-CM

## 2021-04-02 DIAGNOSIS — E11.9 TYPE 2 DIABETES MELLITUS WITHOUT COMPLICATION, WITHOUT LONG-TERM CURRENT USE OF INSULIN (HCC): Chronic | ICD-10-CM

## 2021-04-02 DIAGNOSIS — E87.6 HYPOKALEMIA DUE TO EXCESSIVE GASTROINTESTINAL LOSS OF POTASSIUM: ICD-10-CM

## 2021-04-02 DIAGNOSIS — E78.2 MIXED HYPERLIPIDEMIA: Chronic | ICD-10-CM

## 2021-04-02 DIAGNOSIS — I10 ESSENTIAL HYPERTENSION: Chronic | ICD-10-CM

## 2021-04-02 LAB
ALBUMIN SERPL-MCNC: 3.72 G/DL (ref 3.5–5.2)
ALBUMIN/GLOB SERPL: 1.2 G/DL
ALP SERPL-CCNC: 68 U/L (ref 39–117)
ALT SERPL W P-5'-P-CCNC: 26 U/L (ref 1–33)
AMYLASE SERPL-CCNC: 37 U/L (ref 28–100)
ANION GAP SERPL CALCULATED.3IONS-SCNC: 13.8 MMOL/L (ref 5–15)
AST SERPL-CCNC: 25 U/L (ref 1–32)
BASOPHILS # BLD AUTO: 0.09 10*3/MM3 (ref 0–0.2)
BASOPHILS NFR BLD AUTO: 1 % (ref 0–1.5)
BILIRUB SERPL-MCNC: <0.2 MG/DL (ref 0–1.2)
BUN SERPL-MCNC: 5 MG/DL (ref 6–20)
BUN/CREAT SERPL: 8.1 (ref 7–25)
CALCIUM SPEC-SCNC: 8.7 MG/DL (ref 8.6–10.5)
CHLORIDE SERPL-SCNC: 100 MMOL/L (ref 98–107)
CO2 SERPL-SCNC: 26.2 MMOL/L (ref 22–29)
CREAT SERPL-MCNC: 0.62 MG/DL (ref 0.57–1)
DEPRECATED RDW RBC AUTO: 42.6 FL (ref 37–54)
EOSINOPHIL # BLD AUTO: 0.31 10*3/MM3 (ref 0–0.4)
EOSINOPHIL NFR BLD AUTO: 3.4 % (ref 0.3–6.2)
ERYTHROCYTE [DISTWIDTH] IN BLOOD BY AUTOMATED COUNT: 13.7 % (ref 12.3–15.4)
GFR SERPL CREATININE-BSD FRML MDRD: 111 ML/MIN/1.73
GLOBULIN UR ELPH-MCNC: 3 GM/DL
GLUCOSE SERPL-MCNC: 107 MG/DL (ref 65–99)
HCT VFR BLD AUTO: 45.5 % (ref 34–46.6)
HGB BLD-MCNC: 14.5 G/DL (ref 12–15.9)
IMM GRANULOCYTES # BLD AUTO: 0.1 10*3/MM3 (ref 0–0.05)
IMM GRANULOCYTES NFR BLD AUTO: 1.1 % (ref 0–0.5)
LIPASE SERPL-CCNC: 34 U/L (ref 13–60)
LYMPHOCYTES # BLD AUTO: 3.28 10*3/MM3 (ref 0.7–3.1)
LYMPHOCYTES NFR BLD AUTO: 35.5 % (ref 19.6–45.3)
MCH RBC QN AUTO: 27.3 PG (ref 26.6–33)
MCHC RBC AUTO-ENTMCNC: 31.9 G/DL (ref 31.5–35.7)
MCV RBC AUTO: 85.7 FL (ref 79–97)
MONOCYTES # BLD AUTO: 0.86 10*3/MM3 (ref 0.1–0.9)
MONOCYTES NFR BLD AUTO: 9.3 % (ref 5–12)
NEUTROPHILS NFR BLD AUTO: 4.59 10*3/MM3 (ref 1.7–7)
NEUTROPHILS NFR BLD AUTO: 49.7 % (ref 42.7–76)
NRBC BLD AUTO-RTO: 0 /100 WBC (ref 0–0.2)
PLATELET # BLD AUTO: 340 10*3/MM3 (ref 140–450)
PMV BLD AUTO: 9.3 FL (ref 6–12)
POTASSIUM SERPL-SCNC: 3.3 MMOL/L (ref 3.5–5.2)
PROT SERPL-MCNC: 6.7 G/DL (ref 6–8.5)
RBC # BLD AUTO: 5.31 10*6/MM3 (ref 3.77–5.28)
SODIUM SERPL-SCNC: 140 MMOL/L (ref 136–145)
WBC # BLD AUTO: 9.23 10*3/MM3 (ref 3.4–10.8)

## 2021-04-02 PROCEDURE — 80053 COMPREHEN METABOLIC PANEL: CPT | Performed by: NURSE PRACTITIONER

## 2021-04-02 PROCEDURE — 85025 COMPLETE CBC W/AUTO DIFF WBC: CPT | Performed by: NURSE PRACTITIONER

## 2021-04-02 PROCEDURE — 82150 ASSAY OF AMYLASE: CPT | Performed by: NURSE PRACTITIONER

## 2021-04-02 PROCEDURE — 99214 OFFICE O/P EST MOD 30 MIN: CPT | Performed by: NURSE PRACTITIONER

## 2021-04-02 PROCEDURE — 83690 ASSAY OF LIPASE: CPT | Performed by: NURSE PRACTITIONER

## 2021-04-02 NOTE — PROGRESS NOTES
History of Present Illness   Jessica Harris is a 35 y.o. female who presents to the clinic today c/o n/v and diarrhea three days ago. She has had previous episodes with Pancreatitis and is concern she maybe having another episode. In addition, she has DM, type 2, HTN and Dyslipidemia.     Nausea  The current episode started in the past 7 days. The problem has been gradually improving. Associated symptoms include a change in bowel habit, nausea and vomiting. Pertinent negatives include no abdominal pain, fever or urinary symptoms.      Diabetes  She has type 2 diabetes mellitus/PCOS. Risk factors for coronary artery disease include dyslipidemia, hypertension, stress and tobacco exposure. Current diabetic treatment includes diet and oral agent (monotherapy). An ACE inhibitor/angiotensin II receptor blocker is not being taken.   Lab Results   Component Value Date    HGBA1C 5.50 01/12/2021     Hypertension  The problem is controlled. Agents associated with hypertension include oral contraceptives. Risk factors for coronary artery disease include diabetes mellitus, dyslipidemia, obesity and smoking/tobacco exposure.   Lab Results   Component Value Date    GLUCOSE 107 (H) 04/02/2021    BUN 5 (L) 04/02/2021    CREATININE 0.62 04/02/2021    EGFRIFNONA 111 04/02/2021    BCR 8.1 04/02/2021    K 3.3 (L) 04/02/2021    CO2 26.2 04/02/2021    CALCIUM 8.7 04/02/2021    ALBUMIN 3.72 04/02/2021    LABIL2 1.4 01/12/2021    AST 25 04/02/2021    ALT 26 04/02/2021     Dyslipidemia  This is a chronic problem. Current antihyperlipidemic treatment includes statins (Vascepa). Risk factors for coronary artery disease include diabetes mellitus, obesity, hypertension, post-menopausal and stress.   Lab Results   Component Value Date    CHLPL 160 01/12/2021    TRIG 545 (H) 01/12/2021    HDL 31 (L) 01/12/2021    LDL 49 01/12/2021     The following portions of the patient's history were reviewed and updated as appropriate: allergies, current  medications, past family history, past medical history, past social history, past surgical history and problem list.    Current Outpatient Medications:   •  atorvastatin (LIPITOR) 10 MG tablet, Take 1 tablet by mouth Every Evening., Disp: 30 tablet, Rfl: 5  •  buPROPion SR (WELLBUTRIN SR) 100 MG 12 hr tablet, Take 1 tablet by mouth 2 (Two) Times a Day., Disp: 60 tablet, Rfl: 2  •  Glucose Blood (Blood Glucose Test) strip, 1 daily, Disp: 50 each, Rfl: 5  •  glucose monitor monitoring kit, 1 each As Needed (once)., Disp: 1 each, Rfl: 0  •  icosapent ethyl (Vascepa) 1 g capsule capsule, 2 twice daily, Disp: 120 capsule, Rfl: 5  •  Lancets misc, 1 daily, Disp: 50 each, Rfl: 5  •  Lurasidone HCl (LATUDA) 80 MG tablet tablet, Take 1 tablet by mouth Daily., Disp: 30 tablet, Rfl: 2  •  metFORMIN ER (GLUCOPHAGE-XR) 500 MG 24 hr tablet, Take 2 tablets by mouth 2 (Two) Times a Day., Disp: 120 tablet, Rfl: 5  •  ondansetron ODT (ZOFRAN-ODT) 4 MG disintegrating tablet, Place 1 tablet on the tongue Every 8 (Eight) Hours As Needed for Nausea or Vomiting., Disp: 15 tablet, Rfl: 0  •  pantoprazole (PROTONIX) 40 MG EC tablet, Take 1 tablet by mouth Daily., Disp: 30 tablet, Rfl: 3  •  propranolol LA (INDERAL LA) 120 MG 24 hr capsule, Take 1 capsule by mouth Daily., Disp: 30 capsule, Rfl: 5  •  QUEtiapine (SEROquel) 100 MG tablet, TAKE ONE TABLET BY MOUTH EVERY NIGHT AT BEDTIME, Disp: 30 tablet, Rfl: 2  •  spironolactone (ALDACTONE) 100 MG tablet, Take 1 tablet by mouth Daily., Disp: 30 tablet, Rfl: 5  •  SPRINTEC 28 0.25-35 MG-MCG per tablet, Take 1 tablet by mouth Daily., Disp: , Rfl: 11    No Known Allergies    Review of Systems   Constitutional: Positive for activity change and appetite change. Negative for chills, fatigue, fever and unexpected weight change.   HENT: Negative.    Eyes: Negative for visual disturbance.   Respiratory: Negative for cough, shortness of breath and wheezing.    Cardiovascular: Negative for chest pain,  "palpitations and leg swelling.   Gastrointestinal: Positive for change in bowel habit, diarrhea, nausea and vomiting. Negative for abdominal pain, anal bleeding, blood in stool and constipation.   Endocrine: Negative for cold intolerance, heat intolerance, polydipsia, polyphagia and polyuria.   Skin: Negative for color change and rash.   Neurological: Negative for dizziness, tremors, speech difficulty, weakness, light-headedness and headaches.   Hematological: Negative for adenopathy.   Psychiatric/Behavioral: Negative for confusion, decreased concentration and suicidal ideas. The patient is not nervous/anxious.    All other systems reviewed and are negative.    Visit Vitals  /78 (BP Location: Right arm, Patient Position: Sitting, Cuff Size: Adult)   Pulse 92   Temp 96.8 °F (36 °C) (Temporal)   Resp 14   Ht 165.1 cm (65\")   Wt 86.2 kg (190 lb)   SpO2 99%   BMI 31.62 kg/m²         Physical Exam  Vitals reviewed.   Constitutional:       General: She is not in acute distress.     Appearance: She is well-developed. She is not diaphoretic.      Comments: Pleasant;  Wearing appropriate face covering   HENT:      Head: Normocephalic.      Comments: Oropharynx not examined.  Patient is presently wearing a face covering/mask due to COVID-19 pandemic.     Right Ear: Hearing, tympanic membrane, ear canal and external ear normal.      Left Ear: Hearing, tympanic membrane, ear canal and external ear normal.      Nose: Congestion present.      Mouth/Throat:      Mouth: Mucous membranes are moist.   Eyes:      General: Lids are normal. No scleral icterus.     Extraocular Movements:      Right eye: Normal extraocular motion and no nystagmus.      Left eye: Normal extraocular motion and no nystagmus.      Conjunctiva/sclera: Conjunctivae normal.   Neck:      Thyroid: No thyromegaly.      Vascular: No carotid bruit or JVD.      Trachea: No tracheal tenderness.   Cardiovascular:      Rate and Rhythm: Normal rate and regular " rhythm.      Heart sounds: Normal heart sounds, S1 normal and S2 normal. No murmur heard.     Pulmonary:      Effort: Pulmonary effort is normal.      Breath sounds: Normal breath sounds.   Abdominal:      General: Bowel sounds are normal.      Palpations: Abdomen is soft. There is no mass.      Tenderness: There is no abdominal tenderness. There is no right CVA tenderness, left CVA tenderness or guarding.   Musculoskeletal:      Cervical back: Neck supple.      Right lower leg: No edema.      Left lower leg: No edema.   Lymphadenopathy:      Cervical: No cervical adenopathy.      Right cervical: No superficial cervical adenopathy.     Left cervical: No superficial cervical adenopathy.   Skin:     General: Skin is warm and dry.      Capillary Refill: Capillary refill takes less than 2 seconds.      Coloration: Skin is not pale.      Findings: No erythema.      Nails: There is no clubbing.   Neurological:      Mental Status: She is alert and oriented to person, place, and time.      Cranial Nerves: No cranial nerve deficit or facial asymmetry.      Sensory: No sensory deficit.      Motor: No tremor, atrophy or abnormal muscle tone.      Coordination: Coordination normal.      Deep Tendon Reflexes: Reflexes are normal and symmetric.   Psychiatric:         Attention and Perception: She is attentive.         Mood and Affect: Mood normal.         Speech: Speech normal.         Behavior: Behavior normal.         Thought Content: Thought content normal.         Judgment: Judgment normal.       Assessment/Plan   Diagnoses and all orders for this visit:    1. Non-intractable vomiting with nausea, unspecified vomiting type (Primary)  Comments:  Counseled r egarding supportive care measures. Labs ordered and will be reviewed by phone   Orders:  -     Comprehensive Metabolic Panel  -     CBC & Differential  -     Amylase  -     Lipase    2. Type 2 diabetes mellitus without complication, without long-term current use of insulin  (CMS/[facility])  Comments:  Well controlled    3. Mixed hyperlipidemia  Comments:  Continue Atorvastatin and Vascepa. Cardiovascular risk reduction modifications reforced     4. Essential hypertension  Comments:  Well controlled    5. Hypokalemia due to excessive gastrointestinal loss of potassium  Comments:  Labs revealed a K+ 3.3. Discussed with Dr Santos. Encouraged her to increase K+ containing foods and will recheck in one week  Orders:  -     Comprehensive Metabolic Panel; Future    Findings and recommendations discussed with Jessica.  Reviewed test results.Cardiovascular risk reduction  modifications reinforced including nutrition and activity recommendations.Counseled regarding supportive care measures. S/S of concern reviewed and if occur to seek further medical evaluation or if symptoms do not continue to improve. She does have f/u with Dr Santos on April 16 th. She will return in one week to have another CMP completed due to her Hypokalemia.        This document has been electronically signed by JANIE Cabrera, BERTRAND-BC, MAEVEE  April 2, 2021 08:43 EDT  \

## 2021-04-02 NOTE — PATIENT INSTRUCTIONS
Acute Pancreatitis    Acute pancreatitis happens when the pancreas gets swollen. The pancreas is a large gland in the body that helps to control blood sugar. It also makes enzymes that help to digest food.  This condition can last a few days and cause serious problems. The lungs, heart, and kidneys may stop working.  What are the causes?  Causes include:  · Alcohol abuse.  · Drug abuse.  · Gallstones.    Other causes include:  · Some medicines.  · Some chemicals.  · Diabetes.  · An infection.  · Damage caused by an accident.  · The poison (venom) from a scorpion bite.  · Belly (abdominal) surgery.  · The body's defense system (immune system) attacking the pancreas (autoimmune pancreatitis).  · Genes that are passed from parent to child (inherited).  In some cases, the cause is not known.  What are the signs or symptoms?  · Pain in the upper belly that may be felt in the back. The pain may be very bad.  · Swelling of the belly.  · Feeling sick to your stomach (nauseous) and throwing up (vomiting).  · Fever.  How is this treated?  You will likely have to stay in the hospital. Treatment may include:  · Pain medicine.  · Fluid through an IV tube.  · Placing a tube in the stomach to take out the stomach contents. This may help you stop throwing up.  · Not eating for 3-4 days.  · Antibiotic medicines, if you have an infection.  · Treating any other problems that may be the cause.  · Steroid medicines, if your problem is caused by your defense system attacking your body's own tissues.  · Surgery.  Follow these instructions at home:  Eating and drinking    · Follow instructions from your doctor about what to eat and drink.  · Eat foods that do not have a lot of fat in them.  · Eat small meals often. Do not eat big meals.  · Drink enough fluid to keep your pee (urine) pale yellow.  · Do not drink alcohol if it caused your condition.  Medicines  · Take over-the-counter and prescription medicines only as told by your  doctor.  · Ask your doctor if the medicine prescribed to you:  ? Requires you to avoid driving or using heavy machinery.  ? Can cause trouble pooping (constipation). You may need to take steps to prevent or treat trouble pooping:  § Take over-the-counter or prescription medicines.  § Eat foods that are high in fiber. These include beans, whole grains, and fresh fruits and vegetables.  § Limit foods that are high in fat and sugar. These include fried or sweet foods.  General instructions  · Do not use any products that contain nicotine or tobacco, such as cigarettes, e-cigarettes, and chewing tobacco. If you need help quitting, ask your doctor.  · Get plenty of rest.  · Check your blood sugar at home as told by your doctor.  · Keep all follow-up visits as told by your doctor. This is important.  Contact a doctor if:  · You do not get better as quickly as expected.  · You have new symptoms.  · Your symptoms get worse.  · You have pain or weakness that lasts a long time.  · You keep feeling sick to your stomach.  · You get better and then you have pain again.  · You have a fever.  Get help right away if:  · You cannot eat or keep fluids down.  · Your pain gets very bad.  · Your skin or the white part of your eyes turns yellow.  · You have sudden swelling in your belly.  · You throw up.  · You feel dizzy or you pass out (faint).  · Your blood sugar is high (over 300 mg/dL).  Summary  · Acute pancreatitis happens when the pancreas gets swollen.  · This condition is often caused by alcohol abuse, drug abuse, or gallstones.  · You will likely have to stay in the hospital for treatment.  This information is not intended to replace advice given to you by your health care provider. Make sure you discuss any questions you have with your health care provider.  Document Revised: 10/07/2019 Document Reviewed: 10/07/2019  Elsevier Patient Education © 2021 [employer]

## 2021-04-04 VITALS
BODY MASS INDEX: 31.65 KG/M2 | RESPIRATION RATE: 14 BRPM | OXYGEN SATURATION: 99 % | HEIGHT: 65 IN | HEART RATE: 92 BPM | TEMPERATURE: 96.8 F | WEIGHT: 190 LBS | DIASTOLIC BLOOD PRESSURE: 78 MMHG | SYSTOLIC BLOOD PRESSURE: 108 MMHG

## 2021-04-19 ENCOUNTER — TELEPHONE (OUTPATIENT)
Dept: PSYCHIATRY | Facility: CLINIC | Age: 36
End: 2021-04-19

## 2021-04-19 DIAGNOSIS — F51.05 INSOMNIA DUE TO MENTAL DISORDER: ICD-10-CM

## 2021-04-19 DIAGNOSIS — F31.30 BIPOLAR I DISORDER, MOST RECENT EPISODE DEPRESSED (HCC): ICD-10-CM

## 2021-04-19 RX ORDER — QUETIAPINE FUMARATE 50 MG/1
50 TABLET, FILM COATED ORAL
Qty: 30 TABLET | Refills: 0 | Status: SHIPPED | OUTPATIENT
Start: 2021-04-19 | End: 2021-05-03 | Stop reason: SDUPTHER

## 2021-05-03 ENCOUNTER — OFFICE VISIT (OUTPATIENT)
Dept: PSYCHIATRY | Facility: CLINIC | Age: 36
End: 2021-05-03

## 2021-05-03 VITALS
OXYGEN SATURATION: 98 % | BODY MASS INDEX: 31.65 KG/M2 | WEIGHT: 190 LBS | DIASTOLIC BLOOD PRESSURE: 78 MMHG | HEIGHT: 65 IN | SYSTOLIC BLOOD PRESSURE: 118 MMHG | HEART RATE: 82 BPM

## 2021-05-03 DIAGNOSIS — E66.09 CLASS 1 OBESITY DUE TO EXCESS CALORIES WITHOUT SERIOUS COMORBIDITY WITH BODY MASS INDEX (BMI) OF 31.0 TO 31.9 IN ADULT: ICD-10-CM

## 2021-05-03 DIAGNOSIS — F51.05 INSOMNIA DUE TO MENTAL DISORDER: ICD-10-CM

## 2021-05-03 DIAGNOSIS — F31.30 BIPOLAR I DISORDER, MOST RECENT EPISODE DEPRESSED (HCC): Primary | ICD-10-CM

## 2021-05-03 DIAGNOSIS — Z79.899 MEDICATION MANAGEMENT: ICD-10-CM

## 2021-05-03 DIAGNOSIS — F17.210 TOBACCO DEPENDENCE DUE TO CIGARETTES: ICD-10-CM

## 2021-05-03 PROCEDURE — 99214 OFFICE O/P EST MOD 30 MIN: CPT | Performed by: NURSE PRACTITIONER

## 2021-05-03 RX ORDER — BUPROPION HYDROCHLORIDE 100 MG/1
100 TABLET, EXTENDED RELEASE ORAL 2 TIMES DAILY
Qty: 60 TABLET | Refills: 2 | Status: SHIPPED | OUTPATIENT
Start: 2021-05-03 | End: 2021-11-12 | Stop reason: SDUPTHER

## 2021-05-03 RX ORDER — QUETIAPINE FUMARATE 50 MG/1
50 TABLET, FILM COATED ORAL
Qty: 30 TABLET | Refills: 1 | Status: SHIPPED | OUTPATIENT
Start: 2021-05-03 | End: 2021-11-12 | Stop reason: SDUPTHER

## 2021-05-03 RX ORDER — LURASIDONE HYDROCHLORIDE 80 MG/1
80 TABLET, FILM COATED ORAL DAILY
Qty: 30 TABLET | Refills: 2 | Status: SHIPPED | OUTPATIENT
Start: 2021-05-03 | End: 2021-11-12 | Stop reason: SDUPTHER

## 2021-05-03 NOTE — PROGRESS NOTES
"Subjective   Jessica Harris is a 35 y.o. female who presents today for follow up    Chief Complaint:  Bipolar disorder    History of Present Illness: Patient presents as follow-up.  Reports medications are working well for her, however states she feels quetiapine may be \"too strong\".  States that she often feels fatigued in the mornings when she wakes up.  States that she quit [employer] and went back to working at [employer], she now wakes up earlier in the mornings and now she has noticed the difficulty waking up.  Reports other medication continues to work well.  She reports sleep is good, although experiencing daytime fatigue.  Reports appetite is good, she continues to diet and efforts to lose weight.  She denies SI/HI/AVH.    The following portions of the patient's history were reviewed and updated as appropriate: allergies, current medications, past family history, past medical history, past social history, past surgical history and problem list.      Past Medical History:  Past Medical History:   Diagnosis Date   • Anxiety    • Bipolar 1 disorder (CMS/[facility])    • Depression    • GERD (gastroesophageal reflux disease)    • History of bronchitis    • History of ear infections    • History of stomach ulcers    • History of streptococcal infection    • Hypertension    • Urinary tract infection        Social History:  Social History     Socioeconomic History   • Marital status: Single     Spouse name: Not on file   • Number of children: Not on file   • Years of education: Not on file   • Highest education level: Not on file   Tobacco Use   • Smoking status: Current Every Day Smoker     Packs/day: 0.50     Types: Cigarettes   • Smokeless tobacco: Never Used   Vaping Use   • Vaping Use: Never used   Substance and Sexual Activity   • Alcohol use: No   • Drug use: No   • Sexual activity: Defer     Birth control/protection: Pill       Family History:  Family History   Problem Relation Age of Onset   • Cancer Mother    • " Stroke Mother    • Lung disease Mother    • No Known Problems Father    • Cancer Other    • Heart disease Other    • Stroke Other        Past Surgical History:  Past Surgical History:   Procedure Laterality Date   • ADENOIDECTOMY     • CHOLECYSTECTOMY     • DENTAL PROCEDURE     • HERNIA REPAIR     • TONSILLECTOMY     • WISDOM TOOTH EXTRACTION         Problem List:  Patient Active Problem List   Diagnosis   • Palpitations   • Bipolar disorder, in partial remission, most recent episode mixed (CMS/HCC)   • Smoker   • Gastroesophageal reflux disease without esophagitis   • Chronic venous insufficiency   • PCOS (polycystic ovarian syndrome)   • Healthcare maintenance   • Vitamin D deficiency   • Snoring   • History of nephrolithiasis   • Essential hypertension   • Morbid obesity (CMS/[facility])   • Type 2 diabetes mellitus without complication, without long-term current use of insulin (CMS/[facility])   • Mixed hyperlipidemia   • Acute non-recurrent frontal sinusitis   • Diabetic foot infection (CMS/[facility])       Allergy:   No Known Allergies     Current Medications:   Current Outpatient Medications   Medication Sig Dispense Refill   • atorvastatin (LIPITOR) 10 MG tablet Take 1 tablet by mouth Every Evening. 30 tablet 5   • buPROPion SR (WELLBUTRIN SR) 100 MG 12 hr tablet Take 1 tablet by mouth 2 (Two) Times a Day. 60 tablet 2   • Glucose Blood (Blood Glucose Test) strip 1 daily 50 each 5   • glucose monitor monitoring kit 1 each As Needed (once). 1 each 0   • icosapent ethyl (Vascepa) 1 g capsule capsule 2 twice daily 120 capsule 5   • Lancets misc 1 daily 50 each 5   • Lurasidone HCl (LATUDA) 80 MG tablet tablet Take 1 tablet by mouth Daily. 30 tablet 2   • metFORMIN ER (GLUCOPHAGE-XR) 500 MG 24 hr tablet Take 2 tablets by mouth 2 (Two) Times a Day. 120 tablet 5   • ondansetron ODT (ZOFRAN-ODT) 4 MG disintegrating tablet Place 1 tablet on the tongue Every 8 (Eight) Hours As Needed for Nausea or Vomiting. 15 tablet 0   • pantoprazole  "(PROTONIX) 40 MG EC tablet Take 1 tablet by mouth Daily. 30 tablet 3   • propranolol LA (INDERAL LA) 120 MG 24 hr capsule Take 1 capsule by mouth Daily. 30 capsule 5   • QUEtiapine (SEROquel) 50 MG tablet Take 1 tablet by mouth every night at bedtime. 30 tablet 1   • spironolactone (ALDACTONE) 100 MG tablet Take 1 tablet by mouth Daily. 30 tablet 5   • SPRINTEC 28 0.25-35 MG-MCG per tablet Take 1 tablet by mouth Daily.  11     No current facility-administered medications for this visit.       Review of Symptoms:    Review of Systems   Constitutional: Positive for fatigue.   HENT: Negative.    Eyes: Negative.    Respiratory: Negative.    Cardiovascular: Negative.    Gastrointestinal: Negative.    Genitourinary: Negative.    Musculoskeletal: Negative.    Skin: Negative.    Neurological: Negative.    Psychiatric/Behavioral: Positive for sleep disturbance. The patient is nervous/anxious.        Objective   Physical Exam:   Blood pressure 118/78, pulse 82, height 165.1 cm (65\"), weight 86.2 kg (190 lb), SpO2 98 %.  Body mass index is 31.62 kg/m².    Appearance: Obese female, appropriately dressed, appears stated age and in no acute distress  Gait, Station, Strength: Within normal limits    Mental Status Exam:   Hygiene:   good  Cooperation:  Cooperative  Eye Contact:  Good  Psychomotor Behavior:  Appropriate  Affect:  Appropriate  Mood: normal  Hopelessness: Denies  Speech:  Normal  Thought Process:  Goal directed and Linear  Thought Content:  Normal and Mood congruent  Suicidal:  None  Homicidal:  None  Hallucinations:  None  Delusion:  None  Memory:  Intact  Orientation:  Person, Place, Time and Situation  Reliability:  good  Insight:  Good  Judgement:  Good  Impulse Control:  Good  Physical/Medical Issues:  No      PHQ-Score Total:  PHQ-9 Total Score: 0   .        Lab Results:   No visits with results within 1 Month(s) from this visit.   Latest known visit with results is:   Office Visit on 04/02/2021   Component Date " Value Ref Range Status   • Glucose 04/02/2021 107* 65 - 99 mg/dL Final   • BUN 04/02/2021 5* 6 - 20 mg/dL Final   • Creatinine 04/02/2021 0.62  0.57 - 1.00 mg/dL Final   • Sodium 04/02/2021 140  136 - 145 mmol/L Final   • Potassium 04/02/2021 3.3* 3.5 - 5.2 mmol/L Final   • Chloride 04/02/2021 100  98 - 107 mmol/L Final   • CO2 04/02/2021 26.2  22.0 - 29.0 mmol/L Final   • Calcium 04/02/2021 8.7  8.6 - 10.5 mg/dL Final   • Total Protein 04/02/2021 6.7  6.0 - 8.5 g/dL Final   • Albumin 04/02/2021 3.72  3.50 - 5.20 g/dL Final   • ALT (SGPT) 04/02/2021 26  1 - 33 U/L Final   • AST (SGOT) 04/02/2021 25  1 - 32 U/L Final   • Alkaline Phosphatase 04/02/2021 68  39 - 117 U/L Final   • Total Bilirubin 04/02/2021 <0.2  0.0 - 1.2 mg/dL Final   • eGFR Non African Amer 04/02/2021 111  >60 mL/min/1.73 Final   • Globulin 04/02/2021 3.0  gm/dL Final   • A/G Ratio 04/02/2021 1.2  g/dL Final   • BUN/Creatinine Ratio 04/02/2021 8.1  7.0 - 25.0 Final   • Anion Gap 04/02/2021 13.8  5.0 - 15.0 mmol/L Final   • Amylase 04/02/2021 37  28 - 100 U/L Final   • Lipase 04/02/2021 34  13 - 60 U/L Final   • WBC 04/02/2021 9.23  3.40 - 10.80 10*3/mm3 Final   • RBC 04/02/2021 5.31* 3.77 - 5.28 10*6/mm3 Final   • Hemoglobin 04/02/2021 14.5  12.0 - 15.9 g/dL Final   • Hematocrit 04/02/2021 45.5  34.0 - 46.6 % Final   • MCV 04/02/2021 85.7  79.0 - 97.0 fL Final   • MCH 04/02/2021 27.3  26.6 - 33.0 pg Final   • MCHC 04/02/2021 31.9  31.5 - 35.7 g/dL Final   • RDW 04/02/2021 13.7  12.3 - 15.4 % Final   • RDW-SD 04/02/2021 42.6  37.0 - 54.0 fl Final   • MPV 04/02/2021 9.3  6.0 - 12.0 fL Final   • Platelets 04/02/2021 340  140 - 450 10*3/mm3 Final   • Neutrophil % 04/02/2021 49.7  42.7 - 76.0 % Final   • Lymphocyte % 04/02/2021 35.5  19.6 - 45.3 % Final   • Monocyte % 04/02/2021 9.3  5.0 - 12.0 % Final   • Eosinophil % 04/02/2021 3.4  0.3 - 6.2 % Final   • Basophil % 04/02/2021 1.0  0.0 - 1.5 % Final   • Immature Grans % 04/02/2021 1.1* 0.0 - 0.5 % Final    • Neutrophils, Absolute 04/02/2021 4.59  1.70 - 7.00 10*3/mm3 Final   • Lymphocytes, Absolute 04/02/2021 3.28* 0.70 - 3.10 10*3/mm3 Final   • Monocytes, Absolute 04/02/2021 0.86  0.10 - 0.90 10*3/mm3 Final   • Eosinophils, Absolute 04/02/2021 0.31  0.00 - 0.40 10*3/mm3 Final   • Basophils, Absolute 04/02/2021 0.09  0.00 - 0.20 10*3/mm3 Final   • Immature Grans, Absolute 04/02/2021 0.10* 0.00 - 0.05 10*3/mm3 Final   • nRBC 04/02/2021 0.0  0.0 - 0.2 /100 WBC Final       Assessment/Plan   Diagnoses and all orders for this visit:    1. Bipolar I disorder, most recent episode depressed (CMS/[facility]) (Primary)  -     buPROPion SR (WELLBUTRIN SR) 100 MG 12 hr tablet; Take 1 tablet by mouth 2 (Two) Times a Day.  Dispense: 60 tablet; Refill: 2  -     Lurasidone HCl (LATUDA) 80 MG tablet tablet; Take 1 tablet by mouth Daily.  Dispense: 30 tablet; Refill: 2  -     QUEtiapine (SEROquel) 50 MG tablet; Take 1 tablet by mouth every night at bedtime.  Dispense: 30 tablet; Refill: 1    2. Insomnia due to mental disorder  -     QUEtiapine (SEROquel) 50 MG tablet; Take 1 tablet by mouth every night at bedtime.  Dispense: 30 tablet; Refill: 1    3. Medication management    4. Class 1 obesity due to excess calories without serious comorbidity with body mass index (BMI) of 31.0 to 31.9 in adult    5. Tobacco dependence due to cigarettes        -Decrease quetiapine 50 mg nightly at bedtime for sleep  -Continue lurasidone 80 mg daily for anxiety and depression  -Lengthy discussion with patient on the possible side effects of antipsychotic medications including increased cholesterol, increased blood sugar, and possibility of weight gain.  Also discussed the need to monitor lab work associated with this.  The risk of muscle movement disorders with this class of medication was also discussed.  -Continue bupropion 150 mg twice daily for mood and depression  -Encourage patient to restart therapy  -JULIET reviewed and appropriate. Patient  counseled on use of controlled substances.   -The benefits of a healthy diet and exercise were discussed with patient, especially the positive effects they have on mental health. Patient encouraged to consider lifestyle modification regarding  diet and exercise patterns to maximize results of mental health treatment.  -Reviewed previous available documentation  -Reviewed most recent available labs   -Jessica Harris  reports that she has been smoking cigarettes. She has been smoking about 0.50 packs per day. She has never used smokeless tobacco.. I have educated her on the risk of diseases from using tobacco products such as cancer, COPD and heart disease. I advised her to quit and she is not willing to quit. I spent 3  minutes counseling the patient.           Visit Diagnoses:    ICD-10-CM ICD-9-CM   1. Bipolar I disorder, most recent episode depressed (CMS/[facility])  F31.30 296.50   2. Insomnia due to mental disorder  F51.05 300.9     327.02   3. Medication management  Z79.899 V58.69   4. Class 1 obesity due to excess calories without serious comorbidity with body mass index (BMI) of 31.0 to 31.9 in adult  E66.09 278.00    Z68.31 V85.31   5. Tobacco dependence due to cigarettes  F17.210 305.1         TREATMENT PLAN/GOALS: Continue supportive psychotherapy efforts and medications as indicated. Treatment and medication options discussed during today's visit. Patient acknowledged and verbally consented to continue with current treatment plan and was educated on the importance of compliance with treatment and follow-up appointments.    MEDICATION ISSUES:    Discussed medication options and treatment plan of prescribed medication as well as the risks, benefits, and side effects including potential falls, possible impaired driving and metabolic adversities among others. Patient is agreeable to call the office with any worsening of symptoms or onset of side effects. Patient is agreeable to call 911 or go to the nearest ER  should he/she begin having SI/HI.     MEDS ORDERED DURING VISIT:  New Medications Ordered This Visit   Medications   • buPROPion SR (WELLBUTRIN SR) 100 MG 12 hr tablet     Sig: Take 1 tablet by mouth 2 (Two) Times a Day.     Dispense:  60 tablet     Refill:  2   • Lurasidone HCl (LATUDA) 80 MG tablet tablet     Sig: Take 1 tablet by mouth Daily.     Dispense:  30 tablet     Refill:  2   • QUEtiapine (SEROquel) 50 MG tablet     Sig: Take 1 tablet by mouth every night at bedtime.     Dispense:  30 tablet     Refill:  1       Return in about 3 months (around 8/3/2021), or if symptoms worsen or fail to improve.         Prognosis: Guarded dependent on medication/follow up and treatment plan compliance.  Functionality: pt showing improvements in important areas of daily functioning.     Short-term goals: Patient will adhere to medication regimen and note continued improvement in symptoms over the next 3 months.   Long-term goals: Patient will be adherent to medication management and psychotherapy with continued improvement in symptoms over the next 6 months          This document has been electronically signed by JANIE Thorpe   May 3, 2021 14:47 EDT    Part of this note may be an electronic transcription/translation of spoken language to printed text using the Dragon Dictation System.

## 2021-08-16 DIAGNOSIS — F31.30 BIPOLAR I DISORDER, MOST RECENT EPISODE DEPRESSED (HCC): ICD-10-CM

## 2021-08-17 RX ORDER — BUPROPION HYDROCHLORIDE 100 MG/1
TABLET, EXTENDED RELEASE ORAL
Qty: 60 TABLET | Refills: 2 | OUTPATIENT
Start: 2021-08-17

## 2021-08-17 RX ORDER — LURASIDONE HYDROCHLORIDE 80 MG/1
TABLET, FILM COATED ORAL
Qty: 30 TABLET | Refills: 2 | OUTPATIENT
Start: 2021-08-17

## 2021-09-10 ENCOUNTER — APPOINTMENT (OUTPATIENT)
Dept: ULTRASOUND IMAGING | Facility: HOSPITAL | Age: 36
End: 2021-09-10

## 2021-09-10 ENCOUNTER — HOSPITAL ENCOUNTER (EMERGENCY)
Facility: HOSPITAL | Age: 36
Discharge: HOME | End: 2021-09-11
Attending: EMERGENCY MEDICINE | Admitting: EMERGENCY MEDICINE

## 2021-09-10 ENCOUNTER — APPOINTMENT (OUTPATIENT)
Dept: CT IMAGING | Facility: HOSPITAL | Age: 36
End: 2021-09-10

## 2021-09-10 DIAGNOSIS — N39.0 ACUTE UTI: ICD-10-CM

## 2021-09-10 DIAGNOSIS — N83.202 CYST OF LEFT OVARY: Primary | ICD-10-CM

## 2021-09-10 LAB
ALBUMIN SERPL-MCNC: 4.49 G/DL (ref 3.5–5.2)
ALBUMIN/GLOB SERPL: 1.7 G/DL
ALP SERPL-CCNC: 75 U/L (ref 39–117)
ALT SERPL W P-5'-P-CCNC: 46 U/L (ref 1–33)
ANION GAP SERPL CALCULATED.3IONS-SCNC: 11.9 MMOL/L (ref 5–15)
AST SERPL-CCNC: 34 U/L (ref 1–32)
B-HCG UR QL: NEGATIVE
BACTERIA UR QL AUTO: ABNORMAL /HPF
BASOPHILS # BLD AUTO: 0.07 10*3/MM3 (ref 0–0.2)
BASOPHILS NFR BLD AUTO: 0.6 % (ref 0–1.5)
BILIRUB SERPL-MCNC: <0.2 MG/DL (ref 0–1.2)
BILIRUB UR QL STRIP: NEGATIVE
BUN SERPL-MCNC: 8 MG/DL (ref 6–20)
BUN/CREAT SERPL: 10 (ref 7–25)
CALCIUM SPEC-SCNC: 9.5 MG/DL (ref 8.6–10.5)
CHLORIDE SERPL-SCNC: 97 MMOL/L (ref 98–107)
CLARITY UR: ABNORMAL
CO2 SERPL-SCNC: 27.1 MMOL/L (ref 22–29)
COLOR UR: YELLOW
CREAT SERPL-MCNC: 0.8 MG/DL (ref 0.57–1)
CRP SERPL-MCNC: 0.8 MG/DL (ref 0–0.5)
DEPRECATED RDW RBC AUTO: 42.5 FL (ref 37–54)
EOSINOPHIL # BLD AUTO: 0.42 10*3/MM3 (ref 0–0.4)
EOSINOPHIL NFR BLD AUTO: 3.3 % (ref 0.3–6.2)
ERYTHROCYTE [DISTWIDTH] IN BLOOD BY AUTOMATED COUNT: 13.7 % (ref 12.3–15.4)
GFR SERPL CREATININE-BSD FRML MDRD: 83 ML/MIN/1.73
GLOBULIN UR ELPH-MCNC: 2.7 GM/DL
GLUCOSE SERPL-MCNC: 179 MG/DL (ref 65–99)
GLUCOSE UR STRIP-MCNC: NEGATIVE MG/DL
HCT VFR BLD AUTO: 46.6 % (ref 34–46.6)
HGB BLD-MCNC: 14.8 G/DL (ref 12–15.9)
HGB UR QL STRIP.AUTO: NEGATIVE
HYALINE CASTS UR QL AUTO: ABNORMAL /LPF
IMM GRANULOCYTES # BLD AUTO: 0.14 10*3/MM3 (ref 0–0.05)
IMM GRANULOCYTES NFR BLD AUTO: 1.1 % (ref 0–0.5)
KETONES UR QL STRIP: NEGATIVE
LEUKOCYTE ESTERASE UR QL STRIP.AUTO: ABNORMAL
LYMPHOCYTES # BLD AUTO: 4.51 10*3/MM3 (ref 0.7–3.1)
LYMPHOCYTES NFR BLD AUTO: 35.6 % (ref 19.6–45.3)
MCH RBC QN AUTO: 27.6 PG (ref 26.6–33)
MCHC RBC AUTO-ENTMCNC: 31.8 G/DL (ref 31.5–35.7)
MCV RBC AUTO: 86.8 FL (ref 79–97)
MONOCYTES # BLD AUTO: 1.04 10*3/MM3 (ref 0.1–0.9)
MONOCYTES NFR BLD AUTO: 8.2 % (ref 5–12)
NEUTROPHILS NFR BLD AUTO: 51.2 % (ref 42.7–76)
NEUTROPHILS NFR BLD AUTO: 6.49 10*3/MM3 (ref 1.7–7)
NITRITE UR QL STRIP: NEGATIVE
NRBC BLD AUTO-RTO: 0 /100 WBC (ref 0–0.2)
PH UR STRIP.AUTO: 6 [PH] (ref 5–8)
PLATELET # BLD AUTO: 409 10*3/MM3 (ref 140–450)
PMV BLD AUTO: 8.9 FL (ref 6–12)
POTASSIUM SERPL-SCNC: 4.5 MMOL/L (ref 3.5–5.2)
PROT SERPL-MCNC: 7.2 G/DL (ref 6–8.5)
PROT UR QL STRIP: NEGATIVE
RBC # BLD AUTO: 5.37 10*6/MM3 (ref 3.77–5.28)
RBC # UR: ABNORMAL /HPF
REF LAB TEST METHOD: ABNORMAL
SODIUM SERPL-SCNC: 136 MMOL/L (ref 136–145)
SP GR UR STRIP: 1.02 (ref 1–1.03)
SQUAMOUS #/AREA URNS HPF: ABNORMAL /HPF
UROBILINOGEN UR QL STRIP: ABNORMAL
WBC # BLD AUTO: 12.67 10*3/MM3 (ref 3.4–10.8)
WBC UR QL AUTO: ABNORMAL /HPF

## 2021-09-10 PROCEDURE — 76830 TRANSVAGINAL US NON-OB: CPT

## 2021-09-10 PROCEDURE — 96374 THER/PROPH/DIAG INJ IV PUSH: CPT

## 2021-09-10 PROCEDURE — 25010000002 IOPAMIDOL 61 % SOLUTION: Performed by: EMERGENCY MEDICINE

## 2021-09-10 PROCEDURE — 86140 C-REACTIVE PROTEIN: CPT | Performed by: PHYSICIAN ASSISTANT

## 2021-09-10 PROCEDURE — 25010000002 ONDANSETRON PER 1 MG: Performed by: PHYSICIAN ASSISTANT

## 2021-09-10 PROCEDURE — 96375 TX/PRO/DX INJ NEW DRUG ADDON: CPT

## 2021-09-10 PROCEDURE — 81025 URINE PREGNANCY TEST: CPT | Performed by: PHYSICIAN ASSISTANT

## 2021-09-10 PROCEDURE — 81001 URINALYSIS AUTO W/SCOPE: CPT | Performed by: PHYSICIAN ASSISTANT

## 2021-09-10 PROCEDURE — 25010000002 KETOROLAC TROMETHAMINE PER 15 MG: Performed by: PHYSICIAN ASSISTANT

## 2021-09-10 PROCEDURE — 96361 HYDRATE IV INFUSION ADD-ON: CPT

## 2021-09-10 PROCEDURE — 87086 URINE CULTURE/COLONY COUNT: CPT | Performed by: PHYSICIAN ASSISTANT

## 2021-09-10 PROCEDURE — 85025 COMPLETE CBC W/AUTO DIFF WBC: CPT | Performed by: PHYSICIAN ASSISTANT

## 2021-09-10 PROCEDURE — 74177 CT ABD & PELVIS W/CONTRAST: CPT

## 2021-09-10 PROCEDURE — 80053 COMPREHEN METABOLIC PANEL: CPT | Performed by: PHYSICIAN ASSISTANT

## 2021-09-10 RX ORDER — ONDANSETRON 2 MG/ML
4 INJECTION INTRAMUSCULAR; INTRAVENOUS ONCE
Status: COMPLETED | OUTPATIENT
Start: 2021-09-10 | End: 2021-09-10

## 2021-09-10 RX ORDER — KETOROLAC TROMETHAMINE 30 MG/ML
30 INJECTION, SOLUTION INTRAMUSCULAR; INTRAVENOUS ONCE
Status: COMPLETED | OUTPATIENT
Start: 2021-09-10 | End: 2021-09-10

## 2021-09-10 RX ORDER — SODIUM CHLORIDE 0.9 % (FLUSH) 0.9 %
10 SYRINGE (ML) INJECTION AS NEEDED
Status: DISCONTINUED | OUTPATIENT
Start: 2021-09-10 | End: 2021-09-11 | Stop reason: HOSPADM

## 2021-09-10 RX ORDER — SODIUM CHLORIDE 9 MG/ML
125 INJECTION, SOLUTION INTRAVENOUS CONTINUOUS
Status: DISCONTINUED | OUTPATIENT
Start: 2021-09-10 | End: 2021-09-11 | Stop reason: HOSPADM

## 2021-09-10 RX ADMIN — IOPAMIDOL 90 ML: 612 INJECTION, SOLUTION INTRAVENOUS at 21:52

## 2021-09-10 RX ADMIN — SODIUM CHLORIDE 125 ML/HR: 9 INJECTION, SOLUTION INTRAVENOUS at 21:00

## 2021-09-10 RX ADMIN — KETOROLAC TROMETHAMINE 30 MG: 30 INJECTION, SOLUTION INTRAMUSCULAR; INTRAVENOUS at 21:00

## 2021-09-10 RX ADMIN — ONDANSETRON 4 MG: 2 INJECTION INTRAMUSCULAR; INTRAVENOUS at 21:00

## 2021-09-10 NOTE — ED TRIAGE NOTES
MEDICAL SCREENING:    Reason for Visit: abd pain     Patient initially seen in triage.  The patient was advised further evaluation and diagnostic testing will be needed, some of the treatment and testing will be initiated in the lobby in order to begin the process.  The patient will be returned to the waiting area for the time being and possibly be re-assessed by a subsequent ED provider.  The patient will be brought back to the treatment area in as timely manner as possible.

## 2021-09-11 VITALS
BODY MASS INDEX: 33.32 KG/M2 | HEART RATE: 93 BPM | SYSTOLIC BLOOD PRESSURE: 127 MMHG | DIASTOLIC BLOOD PRESSURE: 92 MMHG | RESPIRATION RATE: 18 BRPM | WEIGHT: 200 LBS | TEMPERATURE: 98 F | HEIGHT: 65 IN | OXYGEN SATURATION: 97 %

## 2021-09-11 LAB — BACTERIA SPEC AEROBE CULT: NORMAL

## 2021-09-11 PROCEDURE — 96361 HYDRATE IV INFUSION ADD-ON: CPT

## 2021-09-11 PROCEDURE — 99284 EMERGENCY DEPT VISIT MOD MDM: CPT

## 2021-09-11 RX ORDER — NAPROXEN 500 MG/1
500 TABLET ORAL 2 TIMES DAILY PRN
Qty: 30 TABLET | Refills: 0 | OUTPATIENT
Start: 2021-09-11 | End: 2021-11-14

## 2021-09-11 RX ORDER — TIZANIDINE 4 MG/1
4 TABLET ORAL EVERY 8 HOURS PRN
Qty: 30 TABLET | Refills: 0 | Status: SHIPPED | OUTPATIENT
Start: 2021-09-11 | End: 2022-02-18

## 2021-09-11 NOTE — ED PROVIDER NOTES
"Subjective   35-year-old female presents to the ED today for left lower quadrant abdominal pain.  She states she has had intermittent pain in this area for several months.  She states this episode of pain started today.  She states the pain is quite severe when it starts.  She states it feels like \"something is bursting inside me.\"  She states the pain is worse with movement.  She does report a past history of ovarian cyst.  She denies any urinary symptoms.  She denies any fever.  She states she has had nausea but no vomiting or diarrhea.  She states she had surgery on her toe yesterday so she has been taking Norco 5 mg for pain.      History provided by:  Patient  Abdominal Pain  Pain location:  LLQ  Pain quality: sharp    Pain radiates to:  L leg  Pain severity:  Severe  Onset quality:  Sudden  Duration: several months.  Timing:  Intermittent  Progression:  Waxing and waning  Chronicity:  Recurrent  Relieved by:  Nothing  Worsened by:  Movement  Associated symptoms: nausea    Associated symptoms: no anorexia, no chest pain, no chills, no constipation, no cough, no diarrhea, no dysuria, no fatigue, no fever, no hematemesis, no hematochezia, no hematuria, no melena, no shortness of breath, no vaginal bleeding, no vaginal discharge and no vomiting        Review of Systems   Constitutional: Negative.  Negative for chills, fatigue and fever.   HENT: Negative.    Eyes: Negative.    Respiratory: Negative for cough and shortness of breath.    Cardiovascular: Negative for chest pain.   Gastrointestinal: Positive for abdominal pain and nausea. Negative for anorexia, constipation, diarrhea, hematemesis, hematochezia, melena and vomiting.   Genitourinary: Positive for pelvic pain. Negative for difficulty urinating, dysuria, flank pain, frequency, hematuria, vaginal bleeding and vaginal discharge.   Musculoskeletal: Negative.    Skin: Negative.    Neurological: Negative.    Psychiatric/Behavioral: Negative.    All other " systems reviewed and are negative.      Past Medical History:   Diagnosis Date   • Anxiety    • Bipolar 1 disorder (CMS/HCC)    • Depression    • GERD (gastroesophageal reflux disease)    • History of bronchitis    • History of ear infections    • History of stomach ulcers    • History of streptococcal infection    • Hypertension    • Urinary tract infection        No Known Allergies    Past Surgical History:   Procedure Laterality Date   • ADENOIDECTOMY     • CHOLECYSTECTOMY     • DENTAL PROCEDURE     • HERNIA REPAIR     • TONSILLECTOMY     • WISDOM TOOTH EXTRACTION         Family History   Problem Relation Age of Onset   • Cancer Mother    • Stroke Mother    • Lung disease Mother    • No Known Problems Father    • Cancer Other    • Heart disease Other    • Stroke Other        Social History     Socioeconomic History   • Marital status: Single     Spouse name: Not on file   • Number of children: Not on file   • Years of education: Not on file   • Highest education level: Not on file   Tobacco Use   • Smoking status: Current Every Day Smoker     Packs/day: 0.50     Types: Cigarettes   • Smokeless tobacco: Never Used   Vaping Use   • Vaping Use: Never used   Substance and Sexual Activity   • Alcohol use: No   • Drug use: No   • Sexual activity: Defer     Birth control/protection: Pill           Objective   Physical Exam  Vitals and nursing note reviewed.   Constitutional:       General: She is not in acute distress.     Appearance: She is well-developed. She is obese. She is not diaphoretic.   HENT:      Head: Normocephalic and atraumatic.   Eyes:      Extraocular Movements: Extraocular movements intact.      Pupils: Pupils are equal, round, and reactive to light.   Cardiovascular:      Rate and Rhythm: Normal rate and regular rhythm.      Heart sounds: Normal heart sounds.   Pulmonary:      Effort: Pulmonary effort is normal.      Breath sounds: Normal breath sounds. No wheezing or rhonchi.   Chest:      Chest wall:  No tenderness.   Abdominal:      General: Bowel sounds are normal.      Palpations: Abdomen is soft.      Tenderness: There is abdominal tenderness in the left lower quadrant. There is no right CVA tenderness, left CVA tenderness, guarding or rebound.   Skin:     General: Skin is warm and dry.      Capillary Refill: Capillary refill takes less than 2 seconds.   Neurological:      General: No focal deficit present.      Mental Status: She is alert and oriented to person, place, and time.   Psychiatric:         Mood and Affect: Mood normal.         Procedures           ED Course  ED Course as of Sep 11 0018   Fri Sep 10, 2021   2155 Endorsed to Yakov Brennan     [AH]   2254 US rad interpreted:  7.8 cm simple left ovarian cyst likely physiologic but recommend follow-up ultrasound in 6-12 weeks to confirm resolution.     Echogenic endometrium likely representing normal cyclical changes.    [RB]   2352 CT abd pelvis rad interpreted:  There is asymmetric bladder wall thickening on the left. An infiltrative bladder wall lesion is suspected. This measures up to 13 mm in thickness and extends for approximately 12:00 around to 7:00. Direct visualization with cystoscopy is recommended.    [RB]   Sat Sep 11, 2021   0006 Discussed patient with Dr. Jaimes who was able to evaluate CT scan.  Determined that the cyst was being visualized as the bladder.  Radiologist agreed with these findings.  Does not appear to be bladder lesion.  Patient given instruction for OB/GYN follow-up.    [RB]   0007 Patient is currently on Bactrim which will be sufficient for her acute urinary infection.    [RB]      ED Course User Index  [AH] Nubia Spence, PA  [RB] Yakov Brennan II, PA                                           MDM  Number of Diagnoses or Management Options  Acute UTI: new and requires workup  Cyst of left ovary: new and requires workup     Amount and/or Complexity of Data Reviewed  Clinical lab tests: reviewed and ordered  Tests in the  radiology section of CPT®: ordered and reviewed  Discuss the patient with other providers: yes    Risk of Complications, Morbidity, and/or Mortality  Presenting problems: moderate  Diagnostic procedures: moderate  Management options: low    Patient Progress  Patient progress: stable      Final diagnoses:   Cyst of left ovary   Acute UTI       ED Disposition  ED Disposition     ED Disposition Condition Comment    Discharge Stable           Gina Amezquita MD  [address] [place] [id number] [phone]    Schedule an appointment as soon as possible for a visit            Medication List      New Prescriptions    naproxen 500 MG EC tablet  Commonly known as: EC NAPROSYN  Take 1 tablet by mouth 2 (Two) Times a Day As Needed for Mild Pain .     tiZANidine 4 MG tablet  Commonly known as: ZANAFLEX  Take 1 tablet by mouth Every 8 (Eight) Hours As Needed (pan).           Where to Get Your Medications      You can get these medications from any pharmacy    Bring a paper prescription for each of these medications  · naproxen 500 MG EC tablet  · tiZANidine 4 MG tablet          Yakov Brennan II, PA  09/11/21 0018

## 2021-09-12 PROCEDURE — 96361 HYDRATE IV INFUSION ADD-ON: CPT

## 2021-09-16 PROCEDURE — 99283 EMERGENCY DEPT VISIT LOW MDM: CPT

## 2021-09-16 PROCEDURE — 87636 SARSCOV2 & INF A&B AMP PRB: CPT | Performed by: NURSE PRACTITIONER

## 2021-09-16 PROCEDURE — C9803 HOPD COVID-19 SPEC COLLECT: HCPCS

## 2021-09-17 ENCOUNTER — HOSPITAL ENCOUNTER (EMERGENCY)
Facility: HOSPITAL | Age: 36
Discharge: HOME | End: 2021-09-17
Attending: STUDENT IN AN ORGANIZED HEALTH CARE EDUCATION/TRAINING PROGRAM | Admitting: STUDENT IN AN ORGANIZED HEALTH CARE EDUCATION/TRAINING PROGRAM

## 2021-09-17 VITALS
WEIGHT: 200 LBS | RESPIRATION RATE: 18 BRPM | HEART RATE: 97 BPM | SYSTOLIC BLOOD PRESSURE: 124 MMHG | OXYGEN SATURATION: 94 % | DIASTOLIC BLOOD PRESSURE: 83 MMHG | BODY MASS INDEX: 33.32 KG/M2 | HEIGHT: 65 IN | TEMPERATURE: 98.2 F

## 2021-09-17 DIAGNOSIS — B34.9 VIRAL ILLNESS: Primary | ICD-10-CM

## 2021-09-17 LAB
FLUAV RNA RESP QL NAA+PROBE: NOT DETECTED
FLUBV RNA RESP QL NAA+PROBE: NOT DETECTED
SARS-COV-2 RNA RESP QL NAA+PROBE: NOT DETECTED

## 2021-09-17 NOTE — ED NOTES
MEDICAL SCREENING:    Reason for Visit: Fatigue, Covid exposure    Patient initially seen in triage.  The patient was advised further evaluation and diagnostic testing will be needed, some of the treatment and testing will be initiated in the lobby in order to begin the process.  The patient will be returned to the waiting area for the time being and possibly be re-assessed by a subsequent ED provider.  The patient will be brought back to the treatment area in as timely manner as possible.       Rere Matthews, APRN  09/16/21 [id number]

## 2021-09-17 NOTE — ED PROVIDER NOTES
Subjective   Patient is a 35-year-old female with past medical history significant for anxiety, bipolar, depression, GERD, and hypertension. Patient presents to the ED today with complaints of Covid exposure. Patient reports some congestion and headache for about 1 week. Patient denies any fever, cough, shortness of breath, nausea, vomiting, diarrhea, abdominal pain, weakness, or any other significant complaints at this time.      History provided by:  Patient      Review of Systems   Constitutional: Positive for fatigue. Negative for fever.   HENT: Negative.    Eyes: Negative.    Respiratory: Negative.    Cardiovascular: Negative.  Negative for chest pain.   Gastrointestinal: Negative.  Negative for abdominal pain.   Endocrine: Negative.    Genitourinary: Negative.  Negative for dysuria.   Musculoskeletal: Negative.    Skin: Negative.    Allergic/Immunologic: Negative.    Neurological: Negative.    Hematological: Negative.    Psychiatric/Behavioral: Negative.    All other systems reviewed and are negative.      Past Medical History:   Diagnosis Date   • Anxiety    • Bipolar 1 disorder (CMS/[facility])    • Depression    • GERD (gastroesophageal reflux disease)    • History of bronchitis    • History of ear infections    • History of stomach ulcers    • History of streptococcal infection    • Hypertension    • Urinary tract infection        No Known Allergies    Past Surgical History:   Procedure Laterality Date   • ADENOIDECTOMY     • CHOLECYSTECTOMY     • DENTAL PROCEDURE     • HERNIA REPAIR     • TONSILLECTOMY     • WISDOM TOOTH EXTRACTION         Family History   Problem Relation Age of Onset   • Cancer Mother    • Stroke Mother    • Lung disease Mother    • No Known Problems Father    • Cancer Other    • Heart disease Other    • Stroke Other        Social History     Socioeconomic History   • Marital status: Single     Spouse name: Not on file   • Number of children: Not on file   • Years of education: Not on file   •  Highest education level: Not on file   Tobacco Use   • Smoking status: Current Every Day Smoker     Packs/day: 0.50     Types: Cigarettes   • Smokeless tobacco: Never Used   Vaping Use   • Vaping Use: Never used   Substance and Sexual Activity   • Alcohol use: No   • Drug use: No   • Sexual activity: Defer     Birth control/protection: Pill           Objective   Physical Exam  Vitals and nursing note reviewed.   Constitutional:       General: She is not in acute distress.     Appearance: Normal appearance. She is obese. She is not ill-appearing or toxic-appearing.   HENT:      Head: Normocephalic and atraumatic.      Right Ear: Tympanic membrane normal.      Left Ear: Tympanic membrane normal.      Nose: Congestion present.      Mouth/Throat:      Mouth: Mucous membranes are moist.      Pharynx: Oropharynx is clear.   Eyes:      Conjunctiva/sclera: Conjunctivae normal.      Pupils: Pupils are equal, round, and reactive to light.   Cardiovascular:      Rate and Rhythm: Normal rate and regular rhythm.      Pulses: Normal pulses.      Heart sounds: Normal heart sounds.   Pulmonary:      Effort: Pulmonary effort is normal.      Breath sounds: Normal breath sounds.   Abdominal:      General: Bowel sounds are normal.      Palpations: Abdomen is soft.   Musculoskeletal:         General: Normal range of motion.      Cervical back: Normal range of motion and neck supple.   Skin:     General: Skin is warm and dry.      Capillary Refill: Capillary refill takes less than 2 seconds.   Neurological:      General: No focal deficit present.      Mental Status: She is alert and oriented to person, place, and time. Mental status is at baseline.   Psychiatric:         Mood and Affect: Mood normal.         Behavior: Behavior normal.         Thought Content: Thought content normal.         Judgment: Judgment normal.         Procedures           ED Course      Results for orders placed or performed during the hospital encounter of  09/17/21   COVID-19 and FLU A/B PCR - Swab, Nasopharynx    Specimen: Nasopharynx; Swab   Result Value Ref Range    COVID19 Not Detected Not Detected - Ref. Range    Influenza A PCR Not Detected Not Detected    Influenza B PCR Not Detected Not Detected                                        MDM    Final diagnoses:   Viral illness       ED Disposition  ED Disposition     ED Disposition Condition Comment    Discharge Stable           Jamie Santos MD   [place] [id number] [phone]    Call in 3 days           Medication List      No changes were made to your prescriptions during this visit.          Rere Matthews, APRN  09/17/21 5246

## 2021-09-27 DIAGNOSIS — E78.2 MIXED HYPERLIPIDEMIA: ICD-10-CM

## 2021-09-27 RX ORDER — ATORVASTATIN CALCIUM 10 MG/1
TABLET, FILM COATED ORAL
Qty: 30 TABLET | Refills: 5 | Status: SHIPPED | OUTPATIENT
Start: 2021-09-27 | End: 2021-11-01 | Stop reason: SDUPTHER

## 2021-11-01 ENCOUNTER — OFFICE VISIT (OUTPATIENT)
Dept: FAMILY MEDICINE CLINIC | Facility: CLINIC | Age: 36
End: 2021-11-01

## 2021-11-01 VITALS
HEART RATE: 102 BPM | SYSTOLIC BLOOD PRESSURE: 129 MMHG | WEIGHT: 200 LBS | DIASTOLIC BLOOD PRESSURE: 80 MMHG | HEIGHT: 65 IN | TEMPERATURE: 97.1 F | BODY MASS INDEX: 33.32 KG/M2 | RESPIRATION RATE: 14 BRPM | OXYGEN SATURATION: 97 %

## 2021-11-01 DIAGNOSIS — Z00.00 HEALTHCARE MAINTENANCE: ICD-10-CM

## 2021-11-01 DIAGNOSIS — E55.9 VITAMIN D DEFICIENCY: ICD-10-CM

## 2021-11-01 DIAGNOSIS — Z87.442 HISTORY OF NEPHROLITHIASIS: ICD-10-CM

## 2021-11-01 DIAGNOSIS — F31.77 BIPOLAR DISORDER, IN PARTIAL REMISSION, MOST RECENT EPISODE MIXED (HCC): ICD-10-CM

## 2021-11-01 DIAGNOSIS — E66.01 MORBID OBESITY (HCC): ICD-10-CM

## 2021-11-01 DIAGNOSIS — N83.202 CYST OF LEFT OVARY: ICD-10-CM

## 2021-11-01 DIAGNOSIS — R00.2 PALPITATIONS: ICD-10-CM

## 2021-11-01 DIAGNOSIS — I10 ESSENTIAL HYPERTENSION: ICD-10-CM

## 2021-11-01 DIAGNOSIS — E11.9 TYPE 2 DIABETES MELLITUS WITHOUT COMPLICATION, WITHOUT LONG-TERM CURRENT USE OF INSULIN (HCC): ICD-10-CM

## 2021-11-01 DIAGNOSIS — E78.2 MIXED HYPERLIPIDEMIA: ICD-10-CM

## 2021-11-01 DIAGNOSIS — I87.2 CHRONIC VENOUS INSUFFICIENCY: Primary | ICD-10-CM

## 2021-11-01 DIAGNOSIS — N32.89 BLADDER MASS: ICD-10-CM

## 2021-11-01 DIAGNOSIS — K21.9 GASTROESOPHAGEAL REFLUX DISEASE WITHOUT ESOPHAGITIS: ICD-10-CM

## 2021-11-01 DIAGNOSIS — Z23 ENCOUNTER FOR IMMUNIZATION: ICD-10-CM

## 2021-11-01 DIAGNOSIS — E28.2 PCOS (POLYCYSTIC OVARIAN SYNDROME): ICD-10-CM

## 2021-11-01 DIAGNOSIS — F17.200 SMOKER: ICD-10-CM

## 2021-11-01 PROBLEM — E11.628 DIABETIC FOOT INFECTION: Status: RESOLVED | Noted: 2021-01-12 | Resolved: 2021-11-01

## 2021-11-01 PROBLEM — N83.201 CYST OF RIGHT OVARY: Status: ACTIVE | Noted: 2021-11-01

## 2021-11-01 PROBLEM — L08.9 DIABETIC FOOT INFECTION: Status: RESOLVED | Noted: 2021-01-12 | Resolved: 2021-11-01

## 2021-11-01 PROBLEM — J01.10 ACUTE NON-RECURRENT FRONTAL SINUSITIS: Status: RESOLVED | Noted: 2021-01-12 | Resolved: 2021-11-01

## 2021-11-01 PROCEDURE — 90471 IMMUNIZATION ADMIN: CPT | Performed by: GENERAL PRACTICE

## 2021-11-01 PROCEDURE — 99214 OFFICE O/P EST MOD 30 MIN: CPT | Performed by: GENERAL PRACTICE

## 2021-11-01 PROCEDURE — 90686 IIV4 VACC NO PRSV 0.5 ML IM: CPT | Performed by: GENERAL PRACTICE

## 2021-11-01 RX ORDER — ATORVASTATIN CALCIUM 10 MG/1
10 TABLET, FILM COATED ORAL NIGHTLY
Qty: 30 TABLET | Refills: 5 | Status: SHIPPED | OUTPATIENT
Start: 2021-11-01 | End: 2022-02-18 | Stop reason: SDUPTHER

## 2021-11-01 RX ORDER — PANTOPRAZOLE SODIUM 40 MG/1
40 TABLET, DELAYED RELEASE ORAL DAILY
Qty: 30 TABLET | Refills: 5 | Status: SHIPPED | OUTPATIENT
Start: 2021-11-01 | End: 2021-11-22

## 2021-11-01 RX ORDER — PROPRANOLOL HYDROCHLORIDE 120 MG/1
120 CAPSULE, EXTENDED RELEASE ORAL DAILY
Qty: 30 CAPSULE | Refills: 5 | Status: SHIPPED | OUTPATIENT
Start: 2021-11-01 | End: 2022-01-19

## 2021-11-01 RX ORDER — METFORMIN HYDROCHLORIDE 500 MG/1
1000 TABLET, EXTENDED RELEASE ORAL 2 TIMES DAILY
Qty: 120 TABLET | Refills: 5 | Status: SHIPPED | OUTPATIENT
Start: 2021-11-01 | End: 2021-11-22 | Stop reason: SDUPTHER

## 2021-11-01 RX ORDER — SPIRONOLACTONE 100 MG/1
100 TABLET, FILM COATED ORAL DAILY
Qty: 30 TABLET | Refills: 5 | Status: SHIPPED | OUTPATIENT
Start: 2021-11-01 | End: 2022-02-18 | Stop reason: SDUPTHER

## 2021-11-01 NOTE — PROGRESS NOTES
Subjective   Jessica Harris is a 35 y.o. female.     Chief Complaint  She returns for a scheduled reassessment of multiple medical problems including type 2 diabetes mellitus, hyperlipidemia, essential hypertension, and recent pelvic pain    History of Present Illness     Pelvic Pain  She gives a 2 to 3-month history of intermittent pelvic pain.  This is described as a sharp pressure.  With the exception of occasional nausea she denies any associated symptoms.  There is no history of any vaginal discharge and she denies any dysuria or hematuria.  There is no history of any change in her bowel habits and she denies any fever, chills, or night sweats.  CT of the abdomen and pelvis performed on 9/10/2021 was reported as showing a small fat-containing umbilical hernia as well as asymmetric thickening of the bladder wall on the left.  Transvaginal ultrasound performed at the same time was reported as showing a 7.8 cm simple left ovarian cyst.  She is currently being followed by gynecology    Diabetes  She continues to deny any paresthesia of the feet, visual disturbances, polydipsia, polyuria, hypoglycemia or foot ulcerations. Evaluation to date has been: hemoglobin A1C. Current treatments: metformin.  She has been following her diet and exercise plan fairly carefully. Last dilated eye exam more than 1 year ago.  She had recent labs through her gynecologist that apparently included an A1c    Dyslipidemia  Compliance with treatment has been fairly good. She remains on atorvastatin but had to stop vascepa due to persistent GI upset    Hypertension  Home blood pressure readings: not doing. Associated signs and symptoms: dyspnea and peripheral edema. Patient denies: chest pain, palpitations, orthopnea and paroxysmal nocturnal dyspnea. Current antihypertensive medications includes propranolol and aldactone. Medication compliance: taking as prescribed.     Palpitations  She has a history of previous palpitations.  She admits  to shortness of breath with above average exertion as well as intermittent swelling of the ankles toward the end of the day but feels the symptoms have improved with her weight loss and continues to deny any chest pain, lightheadedness, diaphoresis, or nausea.  She remains on propranolol ER as prescribed with no further palpitations.  Holter monitoring performed on 2/18/2019 revealed sinus tachycardia but was otherwise unremarkable.  Echocardiogram performed on 2/20/2019 was also normal with an estimated EF of 61-65%.    GERD  She gives a history of intermittent heartburn described as a burning epigastric and retrosternal discomfort.  There is no history of any difficulty swallowing, vomiting, change in her bowel habits, hematochezia or melena and she denies any fever or chills.  She remains on pantoprazole with a good control of her symptoms.    The following portions of the patient's history were reviewed and updated as appropriate: allergies, current medications, past medical history, past social history and problem list.    Review of Systems   Constitutional: Positive for fatigue. Negative for appetite change, chills, fever and unexpected weight change.   HENT: Positive for congestion and rhinorrhea. Negative for ear pain, sneezing and sore throat.    Eyes: Negative for visual disturbance.   Respiratory: Positive for cough. Negative for shortness of breath and wheezing.         Occasionally wakes with a feeling she cannot get breath and has been told that she snores   Cardiovascular: Negative for chest pain, palpitations and leg swelling.   Gastrointestinal: Positive for nausea. Negative for abdominal pain, blood in stool, constipation, diarrhea and vomiting.   Endocrine: Negative for polydipsia and polyuria.   Genitourinary: Positive for pelvic pain. Negative for dysuria, hematuria and urgency.   Musculoskeletal: Positive for arthralgias and myalgias. Negative for back pain, joint swelling and neck pain.    Skin: Negative for rash.   Neurological: Positive for headaches. Negative for weakness and numbness.   Psychiatric/Behavioral: Negative for dysphoric mood, sleep disturbance and suicidal ideas. The patient is nervous/anxious.      Objective   Physical Exam  Constitutional:       General: She is not in acute distress.     Appearance: Normal appearance. She is well-developed. She is not diaphoretic.      Comments: Bright and in fair spirits. No apparent distress. No pallor, jaundice, diaphoresis, or cyanosis.   HENT:      Head: Atraumatic.      Right Ear: Tympanic membrane, ear canal and external ear normal.      Left Ear: Tympanic membrane, ear canal and external ear normal.   Eyes:      Conjunctiva/sclera: Conjunctivae normal.   Neck:      Thyroid: No thyroid mass or thyromegaly.      Vascular: No carotid bruit or JVD.      Trachea: Trachea normal. No tracheal deviation.   Cardiovascular:      Rate and Rhythm: Regular rhythm. Tachycardia present.      Heart sounds: Normal heart sounds, S1 normal and S2 normal. No murmur heard.  No gallop.    Pulmonary:      Effort: Pulmonary effort is normal.      Breath sounds: Normal breath sounds.   Chest:   Breasts:      Right: No supraclavicular adenopathy.      Left: No supraclavicular adenopathy.       Abdominal:      General: Bowel sounds are normal. There is no distension.      Palpations: Abdomen is soft. There is no mass.      Tenderness: There is no abdominal tenderness.   Musculoskeletal:      Right lower leg: No edema.      Left lower leg: No edema.   Lymphadenopathy:      Head:      Right side of head: No submental, submandibular, tonsillar, preauricular, posterior auricular or occipital adenopathy.      Left side of head: No submental, submandibular, tonsillar, preauricular, posterior auricular or occipital adenopathy.      Cervical: No cervical adenopathy.      Upper Body:      Right upper body: No supraclavicular adenopathy.      Left upper body: No  supraclavicular adenopathy.   Skin:     General: Skin is warm.      Coloration: Skin is not cyanotic, jaundiced or pale.      Findings: No rash.      Nails: There is no clubbing.   Neurological:      Mental Status: She is alert and oriented to person, place, and time.      Cranial Nerves: No cranial nerve deficit.      Motor: No tremor.      Coordination: Coordination normal.      Gait: Gait normal.   Psychiatric:         Attention and Perception: Attention normal.         Mood and Affect: Mood normal.         Speech: Speech normal.         Behavior: Behavior normal.         Thought Content: Thought content normal.       Assessment/Plan   Problems Addressed this Visit        Cardiac and Vasculature    Chronic venous insufficiency     Essential hypertension   Hypertension: at goal. Evidence of target organ damage: none.  Encouraged to continue to work on diet and exercise plan.   Continue current medication    Relevant Medications    propranolol LA (INDERAL LA) 120 MG 24 hr capsule    spironolactone (ALDACTONE) 100 MG tablet    Mixed hyperlipidemia  As above.   Continue current medication.    Relevant Medications    atorvastatin (LIPITOR) 10 MG tablet    Palpitations  Continue current medication  Encouraged to report if any worse or if any new symptoms or concerns.    Relevant Medications    propranolol LA (INDERAL LA) 120 MG 24 hr capsule       Endocrine and Metabolic    Morbid obesity (HCC)    PCOS (polycystic ovarian syndrome)  Follow up with gynecology    Relevant Medications    spironolactone (ALDACTONE) 100 MG tablet    Type 2 diabetes mellitus without complication, without long-term current use of insulin (HCC)  Diabetes mellitus Type II, under unknown control.   Encouraged to continue to pursue ADA diet  Encouraged aerobic exercise.  Continue current medication  Copies of her most recent labs will be obtained from gynecology    Relevant Medications    metFORMIN ER (GLUCOPHAGE-XR) 500 MG 24 hr tablet     Vitamin D deficiency       Gastrointestinal Abdominal     Gastroesophageal reflux disease without esophagitis   Symptoms are currently well controlled.  Reminded regarding lifestyle modification.  Continue current medication.    Relevant Medications    pantoprazole (PROTONIX) 40 MG EC tablet       Genitourinary and Reproductive     Bladder mass  Possible mass on recent CT  Reviewed options and agreed on a urology assessment    Relevant Orders    Ambulatory Referral to Urology    Cyst of left ovary  Follow up with gynecology    History of nephrolithiasis       Health Encounters    Healthcare maintenance  Flu shot administered.  Patient received the [employer] COVID-19 vaccine on 7/10/2021.  Recommend that a booster with one of the mRNA vaccines    Relevant Orders    FluLaval/Fluarix/Fluzone >6 Months (Completed)       Mental Health    Bipolar disorder, in partial remission, most recent episode mixed (HCC)       Tobacco    Smoker      Diagnoses       Codes Comments    Chronic venous insufficiency    -  Primary ICD-10-CM: I87.2  ICD-9-CM: 459.81     Essential hypertension     ICD-10-CM: I10  ICD-9-CM: 401.9     Mixed hyperlipidemia     ICD-10-CM: E78.2  ICD-9-CM: 272.2     Morbid obesity (HCC)     ICD-10-CM: E66.01  ICD-9-CM: 278.01     PCOS (polycystic ovarian syndrome)     ICD-10-CM: E28.2  ICD-9-CM: 256.4     Type 2 diabetes mellitus without complication, without long-term current use of insulin (HCC)     ICD-10-CM: E11.9  ICD-9-CM: 250.00     Vitamin D deficiency     ICD-10-CM: E55.9  ICD-9-CM: 268.9     Gastroesophageal reflux disease without esophagitis     ICD-10-CM: K21.9  ICD-9-CM: 530.81     History of nephrolithiasis     ICD-10-CM: Z87.442  ICD-9-CM: V13.01     Healthcare maintenance     ICD-10-CM: Z00.00  ICD-9-CM: V70.0     Bipolar disorder, in partial remission, most recent episode mixed (HCC)     ICD-10-CM: F31.77  ICD-9-CM: 296.65     Smoker     ICD-10-CM: F17.200  ICD-9-CM: 305.1     Encounter  for immunization     ICD-10-CM: Z23  ICD-9-CM: V03.89     Bladder mass     ICD-10-CM: N32.89  ICD-9-CM: 596.89     Palpitations     ICD-10-CM: R00.2  ICD-9-CM: 785.1     Cyst of left ovary     ICD-10-CM: N83.202  ICD-9-CM: 620.2

## 2021-11-12 ENCOUNTER — OFFICE VISIT (OUTPATIENT)
Dept: PSYCHIATRY | Facility: CLINIC | Age: 36
End: 2021-11-12

## 2021-11-12 VITALS
OXYGEN SATURATION: 96 % | HEIGHT: 65 IN | BODY MASS INDEX: 33.15 KG/M2 | HEART RATE: 86 BPM | DIASTOLIC BLOOD PRESSURE: 84 MMHG | SYSTOLIC BLOOD PRESSURE: 118 MMHG | WEIGHT: 199 LBS

## 2021-11-12 DIAGNOSIS — E66.09 CLASS 1 OBESITY DUE TO EXCESS CALORIES WITHOUT SERIOUS COMORBIDITY WITH BODY MASS INDEX (BMI) OF 31.0 TO 31.9 IN ADULT: ICD-10-CM

## 2021-11-12 DIAGNOSIS — F31.30 BIPOLAR I DISORDER, MOST RECENT EPISODE DEPRESSED (HCC): Primary | ICD-10-CM

## 2021-11-12 DIAGNOSIS — F17.210 TOBACCO DEPENDENCE DUE TO CIGARETTES: ICD-10-CM

## 2021-11-12 DIAGNOSIS — F51.05 INSOMNIA DUE TO MENTAL DISORDER: ICD-10-CM

## 2021-11-12 DIAGNOSIS — Z79.899 MEDICATION MANAGEMENT: ICD-10-CM

## 2021-11-12 PROCEDURE — 99214 OFFICE O/P EST MOD 30 MIN: CPT | Performed by: NURSE PRACTITIONER

## 2021-11-12 RX ORDER — FLUCONAZOLE 150 MG/1
TABLET ORAL
COMMUNITY
Start: 2021-11-10 | End: 2021-11-22

## 2021-11-12 RX ORDER — ERGOCALCIFEROL 1.25 MG/1
1 CAPSULE ORAL
COMMUNITY
Start: 2021-10-07 | End: 2022-02-18 | Stop reason: SDUPTHER

## 2021-11-12 RX ORDER — QUETIAPINE FUMARATE 50 MG/1
50 TABLET, FILM COATED ORAL
Qty: 30 TABLET | Refills: 2 | Status: SHIPPED | OUTPATIENT
Start: 2021-11-12 | End: 2022-01-13 | Stop reason: SDUPTHER

## 2021-11-12 RX ORDER — BROMPHENIRAMINE MALEATE, PSEUDOEPHEDRINE HYDROCHLORIDE, AND DEXTROMETHORPHAN HYDROBROMIDE 2; 30; 10 MG/5ML; MG/5ML; MG/5ML
SYRUP ORAL
COMMUNITY
Start: 2021-11-05 | End: 2021-11-22

## 2021-11-12 RX ORDER — AMOXICILLIN AND CLAVULANATE POTASSIUM 875; 125 MG/1; MG/1
TABLET, FILM COATED ORAL
COMMUNITY
Start: 2021-11-05 | End: 2021-11-22

## 2021-11-12 RX ORDER — PREDNISONE 20 MG/1
TABLET ORAL
COMMUNITY
Start: 2021-11-05 | End: 2021-11-22

## 2021-11-12 RX ORDER — LURASIDONE HYDROCHLORIDE 20 MG/1
20 TABLET, FILM COATED ORAL DAILY
Qty: 30 TABLET | Refills: 0 | Status: SHIPPED | OUTPATIENT
Start: 2021-11-12 | End: 2022-01-13 | Stop reason: SDUPTHER

## 2021-11-12 RX ORDER — BUPROPION HYDROCHLORIDE 100 MG/1
100 TABLET, EXTENDED RELEASE ORAL 2 TIMES DAILY
Qty: 60 TABLET | Refills: 2 | Status: SHIPPED | OUTPATIENT
Start: 2021-11-12 | End: 2022-01-13 | Stop reason: SDUPTHER

## 2021-11-12 NOTE — PROGRESS NOTES
"Subjective   Jessica Harris is a 35 y.o. female who presents today for follow up    Chief Complaint:  Depression    History of Present Illness: Patient presents as follow-up.  States that she has been \"busy\" and has not been able to keep her appointments but she would like to be restarted on her medications.  She has not taken any medication in the past few months, states she feels okay however she does not want to not be on them.  She reports sleep is good.  Reports appetite is good.  She denies SI/HI/AVH.    The following portions of the patient's history were reviewed and updated as appropriate: allergies, current medications, past family history, past medical history, past social history, past surgical history and problem list.      Past Medical History:  Past Medical History:   Diagnosis Date   • Anxiety    • Bipolar 1 disorder (HCC)    • Depression    • GERD (gastroesophageal reflux disease)    • History of bronchitis    • History of ear infections    • History of stomach ulcers    • History of streptococcal infection    • Hypertension    • Urinary tract infection        Social History:  Social History     Socioeconomic History   • Marital status: Single   Tobacco Use   • Smoking status: Current Every Day Smoker     Packs/day: 0.50     Types: Cigarettes   • Smokeless tobacco: Never Used   Vaping Use   • Vaping Use: Never used   Substance and Sexual Activity   • Alcohol use: No   • Drug use: No   • Sexual activity: Defer     Birth control/protection: Pill       Family History:  Family History   Problem Relation Age of Onset   • Cancer Mother    • Stroke Mother    • Lung disease Mother    • No Known Problems Father    • Cancer Other    • Heart disease Other    • Stroke Other        Past Surgical History:  Past Surgical History:   Procedure Laterality Date   • ADENOIDECTOMY     • CHOLECYSTECTOMY     • DENTAL PROCEDURE     • HERNIA REPAIR     • TONSILLECTOMY     • WISDOM TOOTH EXTRACTION         Problem " List:  Patient Active Problem List   Diagnosis   • Palpitations   • Bipolar disorder, in partial remission, most recent episode mixed ([facility])   • Smoker   • Gastroesophageal reflux disease without esophagitis   • Chronic venous insufficiency   • PCOS (polycystic ovarian syndrome)   • Healthcare maintenance   • Vitamin D deficiency   • Snoring   • History of nephrolithiasis   • Essential hypertension   • Morbid obesity ([facility])   • Type 2 diabetes mellitus without complication, without long-term current use of insulin ([facility])   • Mixed hyperlipidemia   • Bladder mass   • Cyst of left ovary       Allergy:   No Known Allergies     Current Medications:   Current Outpatient Medications   Medication Sig Dispense Refill   • amoxicillin-clavulanate (AUGMENTIN) 875-125 MG per tablet TAKE ONE TABLET BY MOUTH TWICE A DAY FOR 10 DAYS THIS WILL DECREASE THE EFFECTS OF YOUR BIRTH CONTROL     • atorvastatin (LIPITOR) 10 MG tablet Take 1 tablet by mouth Every Night. 30 tablet 5   • brompheniramine-pseudoephedrine-DM 30-2-10 MG/5ML syrup TAKE 10 ML BY MOUTH BY MOUTH THREE TIMES A DAY AS NEEDED FOR COUGH FOR 7 DAYS     • buPROPion SR (WELLBUTRIN SR) 100 MG 12 hr tablet Take 1 tablet by mouth 2 (Two) Times a Day. 60 tablet 2   • ergocalciferol (ERGOCALCIFEROL) 1.25 MG (71390 UT) capsule Take 1 capsule by mouth.     • fluconazole (Diflucan) 150 MG tablet take now. repeat in 48 hours     • Glucose Blood (Blood Glucose Test) strip 1 daily 50 each 5   • glucose monitor monitoring kit 1 each As Needed (once). 1 each 0   • Lancets misc 1 daily 50 each 5   • Lurasidone HCl (LATUDA) 20 MG tablet tablet Take 1 tablet by mouth Daily. 30 tablet 0   • metFORMIN ER (GLUCOPHAGE-XR) 500 MG 24 hr tablet Take 2 tablets by mouth 2 (Two) Times a Day. 120 tablet 5   • naproxen (EC NAPROSYN) 500 MG EC tablet Take 1 tablet by mouth 2 (Two) Times a Day As Needed for Mild Pain . 30 tablet 0   • ondansetron ODT (ZOFRAN-ODT) 4 MG disintegrating tablet Place 1 tablet  "on the tongue Every 8 (Eight) Hours As Needed for Nausea or Vomiting. 15 tablet 0   • pantoprazole (PROTONIX) 40 MG EC tablet Take 1 tablet by mouth Daily. 30 tablet 5   • predniSONE (DELTASONE) 20 MG tablet TAKE TWO TABLETS BY MOUTH EVERY DAY FOR 5 DAYS     • propranolol LA (INDERAL LA) 120 MG 24 hr capsule Take 1 capsule by mouth Daily. 30 capsule 5   • QUEtiapine (SEROquel) 50 MG tablet Take 1 tablet by mouth every night at bedtime. 30 tablet 2   • spironolactone (ALDACTONE) 100 MG tablet Take 1 tablet by mouth Daily. 30 tablet 5   • SPRINTEC 28 0.25-35 MG-MCG per tablet Take 1 tablet by mouth Daily.  11   • tiZANidine (ZANAFLEX) 4 MG tablet Take 1 tablet by mouth Every 8 (Eight) Hours As Needed (pan). 30 tablet 0     No current facility-administered medications for this visit.       Review of Symptoms:    Review of Systems   Constitutional: Negative.    HENT: Negative.    Eyes: Negative.    Respiratory: Negative.    Cardiovascular: Negative.    Gastrointestinal: Negative.    Genitourinary: Negative.    Musculoskeletal: Negative.    Skin: Negative.    Neurological: Negative.    Psychiatric/Behavioral: Positive for depressed mood. Negative for suicidal ideas. The patient is nervous/anxious.        Objective   Physical Exam:   Blood pressure 118/84, pulse 86, height 165.1 cm (65\"), weight 90.3 kg (199 lb), SpO2 96 %.  Body mass index is 33.12 kg/m².    Appearance: Well-nourished female, appropriately dressed, presented age in no acute distress  Gait, Station, Strength: Within normal limits    Mental Status Exam:   Hygiene:   good  Cooperation:  Cooperative  Eye Contact:  Good  Psychomotor Behavior:  Appropriate  Affect:  Appropriate  Mood: normal  Hopelessness: Denies  Speech:  Normal  Thought Process:  Linear  Thought Content:  Normal and Mood congruent  Suicidal:  None  Homicidal:  None  Hallucinations:  None  Delusion:  None  Memory:  Intact  Orientation:  Person, Place, Time and Situation  Reliability:  " good  Insight:  Fair  Judgement:  Fair  Impulse Control:  Good  Physical/Medical Issues:  No      PHQ-Score Total:  PHQ-9 Total Score: 0         Lab Results:   No visits with results within 1 Month(s) from this visit.   Latest known visit with results is:   Admission on 09/17/2021, Discharged on 09/17/2021   Component Date Value Ref Range Status   • COVID19 09/16/2021 Not Detected  Not Detected - Ref. Range Final   • Influenza A PCR 09/16/2021 Not Detected  Not Detected Final   • Influenza B PCR 09/16/2021 Not Detected  Not Detected Final       Assessment/Plan   Diagnoses and all orders for this visit:    1. Bipolar I disorder, most recent episode depressed (HCC) (Primary)  -     buPROPion SR (WELLBUTRIN SR) 100 MG 12 hr tablet; Take 1 tablet by mouth 2 (Two) Times a Day.  Dispense: 60 tablet; Refill: 2  -     Lurasidone HCl (LATUDA) 20 MG tablet tablet; Take 1 tablet by mouth Daily.  Dispense: 30 tablet; Refill: 0  -     QUEtiapine (SEROquel) 50 MG tablet; Take 1 tablet by mouth every night at bedtime.  Dispense: 30 tablet; Refill: 2    2. Insomnia due to mental disorder  -     QUEtiapine (SEROquel) 50 MG tablet; Take 1 tablet by mouth every night at bedtime.  Dispense: 30 tablet; Refill: 2    3. Medication management    4. Tobacco dependence due to cigarettes    5. Class 1 obesity due to excess calories without serious comorbidity with body mass index (BMI) of 31.0 to 31.9 in adult        -Restart lurasidone 20 mg daily for bipolar disorder. Lengthy discussion with patient on the possible side effects of antipsychotic medications including increased cholesterol, increased blood sugar, and possibility of weight gain.  Also discussed the need to monitor lab work associated with this.  The risk of muscle movement disorders with this class of medication was also discussed.  -Restart bupropion 150 mg twice daily for mood and depression  -Restart quetiapine 50 mg nightly for sleep  -Encourage patient to restart  therapy  -JULIET reviewed and appropriate. Patient counseled on use of controlled substances.   -The benefits of a healthy diet and exercise were discussed with patient, especially the positive effects they have on mental health. Patient encouraged to consider lifestyle modification regarding  diet and exercise patterns to maximize results of mental health treatment.  -Reviewed previous available documentation  -Reviewed most recent available labs   -Jessica Harris  reports that she has been smoking cigarettes. She has been smoking about 0.50 packs per day. She has never used smokeless tobacco.. I have educated her on the risk of diseases from using tobacco products such as cancer, COPD and heart disease. I advised her to quit and she is not willing to quit. I spent 3  minutes counseling the patient.               Visit Diagnoses:    ICD-10-CM ICD-9-CM   1. Bipolar I disorder, most recent episode depressed (HCC)  F31.30 296.50   2. Insomnia due to mental disorder  F51.05 300.9     327.02   3. Medication management  Z79.899 V58.69   4. Tobacco dependence due to cigarettes  F17.210 305.1   5. Class 1 obesity due to excess calories without serious comorbidity with body mass index (BMI) of 31.0 to 31.9 in adult  E66.09 278.00    Z68.31 V85.31         TREATMENT PLAN/GOALS: Continue supportive psychotherapy efforts and medications as indicated. Treatment and medication options discussed during today's visit. Patient acknowledged and verbally consented to continue with current treatment plan and was educated on the importance of compliance with treatment and follow-up appointments.    MEDICATION ISSUES:    Discussed medication options and treatment plan of prescribed medication as well as the risks, benefits, and side effects including potential falls, possible impaired driving and metabolic adversities among others. Patient is agreeable to call the office with any worsening of symptoms or onset of side effects. Patient  is agreeable to call 911 or go to the nearest ER should he/she begin having SI/HI.     MEDS ORDERED DURING VISIT:  New Medications Ordered This Visit   Medications   • buPROPion SR (WELLBUTRIN SR) 100 MG 12 hr tablet     Sig: Take 1 tablet by mouth 2 (Two) Times a Day.     Dispense:  60 tablet     Refill:  2   • Lurasidone HCl (LATUDA) 20 MG tablet tablet     Sig: Take 1 tablet by mouth Daily.     Dispense:  30 tablet     Refill:  0   • QUEtiapine (SEROquel) 50 MG tablet     Sig: Take 1 tablet by mouth every night at bedtime.     Dispense:  30 tablet     Refill:  2       Return in about 4 weeks (around 12/10/2021).         Prognosis: Guarded dependent on medication/follow up and treatment plan compliance.  Functionality: pt showing improvements in important areas of daily functioning.     Short-term goals: Patient will adhere to medication regimen and note continued improvement in symptoms over the next 3 months.   Long-term goals: Patient will be adherent to medication management and psychotherapy with continued improvement in symptoms over the next 6 months          This document has been electronically signed by JANIE Thorpe   November 12, 2021 14:16 EST    Part of this note may be an electronic transcription/translation of spoken language to printed text using the Dragon Dictation System.

## 2021-11-14 ENCOUNTER — HOSPITAL ENCOUNTER (EMERGENCY)
Facility: HOSPITAL | Age: 36
Discharge: HOME | End: 2021-11-14
Attending: FAMILY MEDICINE | Admitting: FAMILY MEDICINE

## 2021-11-14 ENCOUNTER — APPOINTMENT (OUTPATIENT)
Dept: ULTRASOUND IMAGING | Facility: HOSPITAL | Age: 36
End: 2021-11-14

## 2021-11-14 ENCOUNTER — APPOINTMENT (OUTPATIENT)
Dept: GENERAL RADIOLOGY | Facility: HOSPITAL | Age: 36
End: 2021-11-14

## 2021-11-14 ENCOUNTER — APPOINTMENT (OUTPATIENT)
Dept: CT IMAGING | Facility: HOSPITAL | Age: 36
End: 2021-11-14

## 2021-11-14 VITALS
TEMPERATURE: 98.8 F | OXYGEN SATURATION: 92 % | DIASTOLIC BLOOD PRESSURE: 75 MMHG | HEIGHT: 65 IN | RESPIRATION RATE: 18 BRPM | BODY MASS INDEX: 33.32 KG/M2 | WEIGHT: 200 LBS | HEART RATE: 89 BPM | SYSTOLIC BLOOD PRESSURE: 123 MMHG

## 2021-11-14 DIAGNOSIS — N83.202 LEFT OVARIAN CYST: Primary | ICD-10-CM

## 2021-11-14 DIAGNOSIS — R10.30 LOWER ABDOMINAL PAIN: ICD-10-CM

## 2021-11-14 LAB
ALBUMIN SERPL-MCNC: 4.53 G/DL (ref 3.5–5.2)
ALBUMIN/GLOB SERPL: 1.5 G/DL
ALP SERPL-CCNC: 68 U/L (ref 39–117)
ALT SERPL W P-5'-P-CCNC: 27 U/L (ref 1–33)
ANION GAP SERPL CALCULATED.3IONS-SCNC: 14.3 MMOL/L (ref 5–15)
AST SERPL-CCNC: 22 U/L (ref 1–32)
B-HCG UR QL: NEGATIVE
BACTERIA UR QL AUTO: ABNORMAL /HPF
BASOPHILS # BLD AUTO: 0.08 10*3/MM3 (ref 0–0.2)
BASOPHILS NFR BLD AUTO: 0.6 % (ref 0–1.5)
BILIRUB SERPL-MCNC: 0.4 MG/DL (ref 0–1.2)
BILIRUB UR QL STRIP: NEGATIVE
BUN SERPL-MCNC: 7 MG/DL (ref 6–20)
BUN/CREAT SERPL: 9.3 (ref 7–25)
CALCIUM SPEC-SCNC: 9.8 MG/DL (ref 8.6–10.5)
CHLORIDE SERPL-SCNC: 101 MMOL/L (ref 98–107)
CLARITY UR: ABNORMAL
CO2 SERPL-SCNC: 24.7 MMOL/L (ref 22–29)
COLOR UR: YELLOW
CREAT SERPL-MCNC: 0.75 MG/DL (ref 0.57–1)
DEPRECATED RDW RBC AUTO: 41.2 FL (ref 37–54)
EOSINOPHIL # BLD AUTO: 0.2 10*3/MM3 (ref 0–0.4)
EOSINOPHIL NFR BLD AUTO: 1.4 % (ref 0.3–6.2)
ERYTHROCYTE [DISTWIDTH] IN BLOOD BY AUTOMATED COUNT: 13.3 % (ref 12.3–15.4)
GFR SERPL CREATININE-BSD FRML MDRD: 89 ML/MIN/1.73
GLOBULIN UR ELPH-MCNC: 3 GM/DL
GLUCOSE SERPL-MCNC: 117 MG/DL (ref 65–99)
GLUCOSE UR STRIP-MCNC: NEGATIVE MG/DL
HCT VFR BLD AUTO: 49.8 % (ref 34–46.6)
HGB BLD-MCNC: 16.2 G/DL (ref 12–15.9)
HGB UR QL STRIP.AUTO: ABNORMAL
HYALINE CASTS UR QL AUTO: ABNORMAL /LPF
IMM GRANULOCYTES # BLD AUTO: 0.29 10*3/MM3 (ref 0–0.05)
IMM GRANULOCYTES NFR BLD AUTO: 2 % (ref 0–0.5)
KETONES UR QL STRIP: NEGATIVE
LEUKOCYTE ESTERASE UR QL STRIP.AUTO: NEGATIVE
LIPASE SERPL-CCNC: 32 U/L (ref 13–60)
LYMPHOCYTES # BLD AUTO: 3.62 10*3/MM3 (ref 0.7–3.1)
LYMPHOCYTES NFR BLD AUTO: 25.4 % (ref 19.6–45.3)
MCH RBC QN AUTO: 27.7 PG (ref 26.6–33)
MCHC RBC AUTO-ENTMCNC: 32.5 G/DL (ref 31.5–35.7)
MCV RBC AUTO: 85.3 FL (ref 79–97)
MONOCYTES # BLD AUTO: 0.84 10*3/MM3 (ref 0.1–0.9)
MONOCYTES NFR BLD AUTO: 5.9 % (ref 5–12)
NEUTROPHILS NFR BLD AUTO: 64.7 % (ref 42.7–76)
NEUTROPHILS NFR BLD AUTO: 9.2 10*3/MM3 (ref 1.7–7)
NITRITE UR QL STRIP: NEGATIVE
NRBC BLD AUTO-RTO: 0 /100 WBC (ref 0–0.2)
PH UR STRIP.AUTO: 6 [PH] (ref 5–8)
PLATELET # BLD AUTO: 387 10*3/MM3 (ref 140–450)
PMV BLD AUTO: 8.8 FL (ref 6–12)
POTASSIUM SERPL-SCNC: 4.7 MMOL/L (ref 3.5–5.2)
PROT SERPL-MCNC: 7.5 G/DL (ref 6–8.5)
PROT UR QL STRIP: ABNORMAL
RBC # BLD AUTO: 5.84 10*6/MM3 (ref 3.77–5.28)
RBC # UR: ABNORMAL /HPF
REF LAB TEST METHOD: ABNORMAL
SODIUM SERPL-SCNC: 140 MMOL/L (ref 136–145)
SP GR UR STRIP: 1.02 (ref 1–1.03)
SQUAMOUS #/AREA URNS HPF: ABNORMAL /HPF
UROBILINOGEN UR QL STRIP: ABNORMAL
WBC # BLD AUTO: 14.23 10*3/MM3 (ref 3.4–10.8)
WBC UR QL AUTO: ABNORMAL /HPF

## 2021-11-14 PROCEDURE — 76830 TRANSVAGINAL US NON-OB: CPT

## 2021-11-14 PROCEDURE — 80053 COMPREHEN METABOLIC PANEL: CPT | Performed by: PHYSICIAN ASSISTANT

## 2021-11-14 PROCEDURE — 74177 CT ABD & PELVIS W/CONTRAST: CPT

## 2021-11-14 PROCEDURE — 71045 X-RAY EXAM CHEST 1 VIEW: CPT | Performed by: RADIOLOGY

## 2021-11-14 PROCEDURE — 96375 TX/PRO/DX INJ NEW DRUG ADDON: CPT

## 2021-11-14 PROCEDURE — 85025 COMPLETE CBC W/AUTO DIFF WBC: CPT | Performed by: PHYSICIAN ASSISTANT

## 2021-11-14 PROCEDURE — 74177 CT ABD & PELVIS W/CONTRAST: CPT | Performed by: RADIOLOGY

## 2021-11-14 PROCEDURE — 81025 URINE PREGNANCY TEST: CPT | Performed by: PHYSICIAN ASSISTANT

## 2021-11-14 PROCEDURE — 81001 URINALYSIS AUTO W/SCOPE: CPT | Performed by: PHYSICIAN ASSISTANT

## 2021-11-14 PROCEDURE — 96374 THER/PROPH/DIAG INJ IV PUSH: CPT

## 2021-11-14 PROCEDURE — 83690 ASSAY OF LIPASE: CPT | Performed by: FAMILY MEDICINE

## 2021-11-14 PROCEDURE — 25010000002 KETOROLAC TROMETHAMINE PER 15 MG: Performed by: NURSE PRACTITIONER

## 2021-11-14 PROCEDURE — 71045 X-RAY EXAM CHEST 1 VIEW: CPT

## 2021-11-14 PROCEDURE — 25010000002 ONDANSETRON PER 1 MG: Performed by: PHYSICIAN ASSISTANT

## 2021-11-14 PROCEDURE — 99283 EMERGENCY DEPT VISIT LOW MDM: CPT

## 2021-11-14 PROCEDURE — 25010000002 IOPAMIDOL 61 % SOLUTION: Performed by: FAMILY MEDICINE

## 2021-11-14 PROCEDURE — 76830 TRANSVAGINAL US NON-OB: CPT | Performed by: RADIOLOGY

## 2021-11-14 RX ORDER — ONDANSETRON 2 MG/ML
4 INJECTION INTRAMUSCULAR; INTRAVENOUS ONCE
Status: COMPLETED | OUTPATIENT
Start: 2021-11-14 | End: 2021-11-14

## 2021-11-14 RX ORDER — SODIUM CHLORIDE 0.9 % (FLUSH) 0.9 %
10 SYRINGE (ML) INJECTION AS NEEDED
Status: DISCONTINUED | OUTPATIENT
Start: 2021-11-14 | End: 2021-11-14 | Stop reason: HOSPADM

## 2021-11-14 RX ORDER — ONDANSETRON 4 MG/1
4 TABLET, ORALLY DISINTEGRATING ORAL EVERY 8 HOURS PRN
Qty: 15 TABLET | Refills: 0 | Status: SHIPPED | OUTPATIENT
Start: 2021-11-14 | End: 2021-11-19

## 2021-11-14 RX ORDER — KETOROLAC TROMETHAMINE 30 MG/ML
30 INJECTION, SOLUTION INTRAMUSCULAR; INTRAVENOUS ONCE
Status: COMPLETED | OUTPATIENT
Start: 2021-11-14 | End: 2021-11-14

## 2021-11-14 RX ADMIN — IOPAMIDOL 86 ML: 612 INJECTION, SOLUTION INTRAVENOUS at 13:00

## 2021-11-14 RX ADMIN — ONDANSETRON 4 MG: 2 INJECTION INTRAMUSCULAR; INTRAVENOUS at 10:48

## 2021-11-14 RX ADMIN — SODIUM CHLORIDE 1000 ML: 9 INJECTION, SOLUTION INTRAVENOUS at 10:48

## 2021-11-14 RX ADMIN — KETOROLAC TROMETHAMINE 30 MG: 30 INJECTION, SOLUTION INTRAMUSCULAR; INTRAVENOUS at 15:01

## 2021-11-14 NOTE — ED NOTES
Patient ambulating to the restroom at this time to provide a urine sample.      Gunjan Acharya RN  11/14/21 9854

## 2021-11-14 NOTE — ED PROVIDER NOTES
Subjective   Patient states that she does not feel good this morning.  That she feels like she has a sinus infection but she has been on Augmentin and steroids without any improvement.      History provided by:  Patient   used: No    Vomiting  The primary symptoms include nausea and vomiting. Primary symptoms do not include fever, diarrhea, dysuria or rash. The illness began more than 7 days ago. The onset was sudden. The problem has been rapidly worsening.   The illness does not include chills.       Review of Systems   Constitutional: Negative for chills and fever.   HENT: Positive for congestion. Negative for ear pain and sore throat.    Respiratory: Positive for cough. Negative for shortness of breath and wheezing.    Cardiovascular: Negative for chest pain.   Gastrointestinal: Positive for nausea and vomiting. Negative for diarrhea.   Genitourinary: Negative for dysuria and flank pain.   Skin: Negative for rash.   Neurological: Negative for headaches.   Psychiatric/Behavioral: The patient is not nervous/anxious.    All other systems reviewed and are negative.      Past Medical History:   Diagnosis Date   • Anxiety    • Bipolar 1 disorder (HCC)    • Depression    • GERD (gastroesophageal reflux disease)    • History of bronchitis    • History of ear infections    • History of stomach ulcers    • History of streptococcal infection    • Hypertension    • Urinary tract infection        No Known Allergies    Past Surgical History:   Procedure Laterality Date   • ADENOIDECTOMY     • CHOLECYSTECTOMY     • DENTAL PROCEDURE     • HERNIA REPAIR     • TONSILLECTOMY     • WISDOM TOOTH EXTRACTION         Family History   Problem Relation Age of Onset   • Cancer Mother    • Stroke Mother    • Lung disease Mother    • No Known Problems Father    • Cancer Other    • Heart disease Other    • Stroke Other        Social History     Socioeconomic History   • Marital status: Single   Tobacco Use   • Smoking  status: Current Every Day Smoker     Packs/day: 0.50     Types: Cigarettes   • Smokeless tobacco: Never Used   Vaping Use   • Vaping Use: Never used   Substance and Sexual Activity   • Alcohol use: No   • Drug use: No   • Sexual activity: Defer     Birth control/protection: Pill           Objective   Physical Exam  Vitals and nursing note reviewed.   Constitutional:       Appearance: She is well-developed.   HENT:      Head: Normocephalic.   Cardiovascular:      Rate and Rhythm: Normal rate and regular rhythm.   Pulmonary:      Effort: Pulmonary effort is normal.      Breath sounds: Normal breath sounds.   Abdominal:      General: Bowel sounds are normal.      Palpations: Abdomen is soft.      Tenderness: There is no abdominal tenderness.   Musculoskeletal:         General: Normal range of motion.      Cervical back: Neck supple.   Skin:     General: Skin is warm and dry.   Neurological:      Mental Status: She is alert and oriented to person, place, and time.   Psychiatric:         Behavior: Behavior normal.         Thought Content: Thought content normal.         Judgment: Judgment normal.         Procedures           ED Course  ED Course as of 11/14/21 2310   Sun Nov 14, 2021   1320 CT Abdomen Pelvis With Contrast    IMPRESSION:     1. Large cystic mass, probably left adnexal in origin. It measures 7.9  cm. Gynecologic follow-up is suggested     2.Other findings as above     3. moderate to large volume stool    [MB]   1320 XR Chest 1 View  IMPRESSION:  No evidence of active or acute cardiopulmonary disease on today's chest  radiograph. [MB]   1457 US Non-ob Transvaginal  IMPRESSION:  Large cystic mass in the midline, probably intimate with the  left adnexa. This does not appear to have internal echoes on today's  exam.  Other findings as above [MB]   1505 Discussed with patient CT abdomen and US findings. She states that she is aware of the large cyst. She reports she is being treated for this at [facility]. She reports she recently had a endometrium biopsy done and is awaiting results.  [MB]      ED Course User Index  [MB] Rere Matthews APRN                                           [facility]    Final diagnoses:   Left ovarian cyst   Lower abdominal pain       ED Disposition  ED Disposition     ED Disposition Condition Comment    Discharge Stable           Garrison Rowe MD  [address] [facility]  [address]  [place] [id number]  [phone]    Call in 2 days           Medication List      Changed    ondansetron ODT 4 MG disintegrating tablet  Commonly known as: ZOFRAN-ODT  Place 1 tablet on the tongue Every 8 (Eight) Hours As Needed for Nausea for up to 5 days.  What changed: reasons to take this        Stop    naproxen 500 MG EC tablet  Commonly known as: EC NAPROSYN           Where to Get Your Medications      These medications were sent to [facility] [place], [place] - [address] [place] [address] - [phone]  - [phone] [address] [address]    Phone: [phone]   · ondansetron ODT 4 MG disintegrating tablet          Rere Matthews APRN  11/14/21 6467

## 2021-11-22 ENCOUNTER — OFFICE VISIT (OUTPATIENT)
Dept: FAMILY MEDICINE CLINIC | Facility: CLINIC | Age: 36
End: 2021-11-22

## 2021-11-22 VITALS
HEIGHT: 65 IN | DIASTOLIC BLOOD PRESSURE: 74 MMHG | HEART RATE: 86 BPM | OXYGEN SATURATION: 97 % | WEIGHT: 201 LBS | TEMPERATURE: 97.3 F | BODY MASS INDEX: 33.49 KG/M2 | SYSTOLIC BLOOD PRESSURE: 118 MMHG

## 2021-11-22 DIAGNOSIS — R14.0 ABDOMINAL BLOATING: ICD-10-CM

## 2021-11-22 DIAGNOSIS — I10 ESSENTIAL HYPERTENSION: Chronic | ICD-10-CM

## 2021-11-22 DIAGNOSIS — E78.2 MIXED HYPERLIPIDEMIA: Chronic | ICD-10-CM

## 2021-11-22 DIAGNOSIS — K21.00 GASTROESOPHAGEAL REFLUX DISEASE WITH ESOPHAGITIS WITHOUT HEMORRHAGE: Chronic | ICD-10-CM

## 2021-11-22 DIAGNOSIS — R11.2 NON-INTRACTABLE VOMITING WITH NAUSEA, UNSPECIFIED VOMITING TYPE: Primary | ICD-10-CM

## 2021-11-22 PROCEDURE — 99214 OFFICE O/P EST MOD 30 MIN: CPT | Performed by: PHYSICIAN ASSISTANT

## 2021-11-22 RX ORDER — DEXLANSOPRAZOLE 60 MG/1
60 CAPSULE, DELAYED RELEASE ORAL DAILY
Qty: 30 CAPSULE | Refills: 5 | Status: SHIPPED | OUTPATIENT
Start: 2021-11-22 | End: 2022-02-18

## 2021-11-22 RX ORDER — ONDANSETRON 4 MG/1
TABLET, ORALLY DISINTEGRATING ORAL
COMMUNITY
Start: 2021-11-22 | End: 2022-06-18

## 2021-11-22 RX ORDER — PROMETHAZINE HYDROCHLORIDE 25 MG/1
25 TABLET ORAL EVERY 6 HOURS PRN
Qty: 60 TABLET | Refills: 0 | Status: SHIPPED | OUTPATIENT
Start: 2021-11-22 | End: 2022-04-14

## 2021-11-23 ENCOUNTER — TELEPHONE (OUTPATIENT)
Dept: FAMILY MEDICINE CLINIC | Facility: CLINIC | Age: 36
End: 2021-11-23

## 2021-11-23 NOTE — TELEPHONE ENCOUNTER
Caller: Jessica Harris    Relationship: Self    Best call back number: [phone]    Who are you requesting to speak with (clinical staff, provider,  specific staff member): CLINICAL STAFF    What was the call regarding: PATIENT STATED THE MEDICATION SHE WAS PRESCRIBED ON 11/22/21, SHE DOES NOT KNOW THE NAME OF IT BUT THINKS IT IS FOR ACID REFLUX, NEEDS A PRIOR AUTHORIZATION    Do you require a callback: YES

## 2021-11-28 NOTE — PROGRESS NOTES
"Subjective   Jessica Harris is a 35 y.o. female.       Chief Complaint -nausea    History of Present Illness -    ROS    Nausea-  Patient complains of nausea and vomiting that has been worse over the past 2 weeks.  She does have a history of gastroesophageal reflux disease that is currently not at goal.  Patient states she does have associated diarrhea that began 2 days ago and bloating.  She does take Metformin but has been on it for several months prior without GI intolerance.  Some relief with Imodium.    Gastroesophageal reflux disease-  Not at goal with Protonix due to acute GI symptoms    Hypertension-controlled with spironolactone and propranolol    Hyperlipidemia-stable with atorvastatin and diet      The following portions of the patient's history were reviewed and updated as appropriate: allergies, current medications, past family history, past medical history, past social history, past surgical history and problem list.    Review of Systems    Objective  Vital signs:  /74   Pulse 86   Temp 97.3 °F (36.3 °C) (Temporal)   Ht 165.1 cm (65\")   Wt 91.2 kg (201 lb)   SpO2 97%   BMI 33.45 kg/m²     Physical Exam  Vitals and nursing note reviewed.   Constitutional:       General: She is not in acute distress.     Appearance: Normal appearance. She is well-developed. She is obese. She is not diaphoretic.   HENT:      Head: Normocephalic and atraumatic.      Right Ear: Tympanic membrane, ear canal and external ear normal.      Left Ear: Tympanic membrane, ear canal and external ear normal.      Nose: Nose normal.      Mouth/Throat:      Mouth: Mucous membranes are moist.      Pharynx: Posterior oropharyngeal erythema present. No oropharyngeal exudate.   Neck:      Thyroid: No thyromegaly.   Cardiovascular:      Rate and Rhythm: Normal rate and regular rhythm.      Heart sounds: Normal heart sounds. No murmur heard.      Pulmonary:      Effort: Pulmonary effort is normal.      Breath sounds: Normal " breath sounds. No wheezing or rales.   Abdominal:      General: Bowel sounds are normal.      Palpations: Abdomen is soft. There is no mass.      Tenderness: There is abdominal tenderness (Epigastric tenderness noted). There is no guarding or rebound.   Musculoskeletal:      Cervical back: Normal range of motion and neck supple.   Lymphadenopathy:      Cervical: No cervical adenopathy.   Skin:     General: Skin is warm and dry.      Findings: No rash.   Neurological:      Mental Status: She is alert and oriented to person, place, and time.   Psychiatric:         Mood and Affect: Mood normal.         Behavior: Behavior normal.         Thought Content: Thought content normal.         The following data was reviewed by: JODI Yee on 11/22/2021:  CMP    CMP 4/2/21 9/10/21 11/14/21   Glucose 107 (A) 179 (A) 117 (A)   BUN 5 (A) 8 7   Creatinine 0.62 0.80 0.75   eGFR Non African Am 111 83 89   Sodium 140 136 140   Potassium 3.3 (A) 4.5 4.7   Chloride 100 97 (A) 101   Calcium 8.7 9.5 9.8   Albumin 3.72 4.49 4.53   Total Bilirubin <0.2 <0.2 0.4   Alkaline Phosphatase 68 75 68   AST (SGOT) 25 34 (A) 22   ALT (SGPT) 26 46 (A) 27   (A) Abnormal value       Comments are available for some flowsheets but are not being displayed.           CBC w/diff    CBC w/Diff 4/2/21 9/10/21 11/14/21   WBC 9.23 12.67 (A) 14.23 (A)   RBC 5.31 (A) 5.37 (A) 5.84 (A)   Hemoglobin 14.5 14.8 16.2 (A)   Hematocrit 45.5 46.6 49.8 (A)   MCV 85.7 86.8 85.3   MCH 27.3 27.6 27.7   MCHC 31.9 31.8 32.5   RDW 13.7 13.7 13.3   Platelets 340 409 387   Neutrophil Rel % 49.7 51.2 64.7   Immature Granulocyte Rel % 1.1 (A) 1.1 (A) 2.0 (A)   Lymphocyte Rel % 35.5 35.6 25.4   Monocyte Rel % 9.3 8.2 5.9   Eosinophil Rel % 3.4 3.3 1.4   Basophil Rel % 1.0 0.6 0.6   (A) Abnormal value            Lipid Panel    Lipid Panel 1/12/21   Total Cholesterol 160   Triglycerides 545 (A)   HDL Cholesterol 31 (A)   VLDL Cholesterol 80 (A)   LDL Cholesterol  49   (A)  Abnormal value       Comments are available for some flowsheets but are not being displayed.           TSH    TSH 1/12/21   TSH 1.450                  Assessment/Plan     Diagnoses and all orders for this visit:    1. Non-intractable vomiting with nausea, unspecified vomiting type (Primary)  Comments:  Start Zofran  Advised symptomatic care to avoid dehydration  Lab work ordered for further evaluation  Orders:  -     CBC & Differential; Future  -     TSH; Future  -     H. Pylori IgM, Blood; Future  -     Hemoglobin A1c; Future  -     promethazine (PHENERGAN) 25 MG tablet; Take 1 tablet by mouth Every 6 (Six) Hours As Needed for Nausea or Vomiting.  Dispense: 60 tablet; Refill: 0  -     Ambulatory Referral to Gastroenterology    2. Gastroesophageal reflux disease with esophagitis without hemorrhage  Comments:  Discontinue Protonix  Start Dexilant  Orders:  -     dexlansoprazole (DEXILANT) 60 MG capsule; Take 1 capsule by mouth Daily.  Dispense: 30 capsule; Refill: 5  -     Ambulatory Referral to Gastroenterology    3. Abdominal bloating  Comments:  Start Gas-X  Conservative measures advised    4. Essential hypertension  Comments:  Continue propranolol and spironolactone  Continue to monitor    5. Mixed hyperlipidemia  Comments:  Advised low-cholesterol diet  Continue atorvastatin            Patient was given instructions and counseling regarding his condition or for health maintenance advice. Please see specific information pulled into the AVS if appropriate      This document has been electronically signed by:  Marie Cuellar PA-C

## 2021-11-28 NOTE — PATIENT INSTRUCTIONS

## 2021-12-16 ENCOUNTER — TELEPHONE (OUTPATIENT)
Dept: FAMILY MEDICINE CLINIC | Facility: CLINIC | Age: 36
End: 2021-12-16

## 2021-12-16 RX ORDER — VALACYCLOVIR HYDROCHLORIDE 1 G/1
2000 TABLET, FILM COATED ORAL 2 TIMES DAILY
Qty: 4 TABLET | Refills: 0 | Status: SHIPPED | OUTPATIENT
Start: 2021-12-16 | End: 2021-12-19 | Stop reason: SDUPTHER

## 2021-12-16 NOTE — TELEPHONE ENCOUNTER
Caller: Jessica Harris    Relationship to patient: Self    Best call back number: [phone]     What is the call regarding:  PATIENT STATES THAT SHE HAS COLD SORES AROUND HER MOUTH AND WANTED TO KNOW IF SHE COULD GET SOME MEDICATION SENT TO [facility] [place]

## 2021-12-17 ENCOUNTER — TELEPHONE (OUTPATIENT)
Dept: PSYCHIATRY | Facility: CLINIC | Age: 36
End: 2021-12-17

## 2021-12-17 NOTE — TELEPHONE ENCOUNTER
Per insurance a PA is not required for buPROPion SR (WELLBUTRIN SR) 100 MG 12 hr tablet. Case # [id number]

## 2021-12-17 NOTE — TELEPHONE ENCOUNTER
Chief Complaint   Patient presents with    Vomiting     Visit Vitals  BP 94/59   Pulse 91   Temp 98 °F (36.7 °C)   Ht (!) 4' 1.25\" (1.251 m)   Wt 56 lb (25.4 kg)   SpO2 98%   BMI 16.23 kg/m² Patient is aware.

## 2021-12-19 ENCOUNTER — HOSPITAL ENCOUNTER (EMERGENCY)
Facility: HOSPITAL | Age: 36
Discharge: HOME | End: 2021-12-19
Attending: STUDENT IN AN ORGANIZED HEALTH CARE EDUCATION/TRAINING PROGRAM | Admitting: STUDENT IN AN ORGANIZED HEALTH CARE EDUCATION/TRAINING PROGRAM

## 2021-12-19 VITALS
TEMPERATURE: 96.9 F | RESPIRATION RATE: 16 BRPM | HEIGHT: 65 IN | SYSTOLIC BLOOD PRESSURE: 131 MMHG | HEART RATE: 83 BPM | BODY MASS INDEX: 33.32 KG/M2 | WEIGHT: 200 LBS | OXYGEN SATURATION: 97 % | DIASTOLIC BLOOD PRESSURE: 88 MMHG

## 2021-12-19 DIAGNOSIS — B00.9 HERPES INFECTION: Primary | ICD-10-CM

## 2021-12-19 PROCEDURE — 99282 EMERGENCY DEPT VISIT SF MDM: CPT

## 2021-12-19 RX ORDER — TRIAMCINOLONE ACETONIDE 1 MG/G
1 CREAM TOPICAL 3 TIMES DAILY
Qty: 15 G | Refills: 0 | Status: SHIPPED | OUTPATIENT
Start: 2021-12-19 | End: 2022-02-18

## 2021-12-19 RX ORDER — VALACYCLOVIR HYDROCHLORIDE 1 G/1
1000 TABLET, FILM COATED ORAL 2 TIMES DAILY
Qty: 20 TABLET | Refills: 0 | Status: SHIPPED | OUTPATIENT
Start: 2021-12-19 | End: 2022-02-18

## 2021-12-19 NOTE — ED PROVIDER NOTES
Subjective   36-year-old white female reports rash to the left side of her face.  She states it started with tingling and burning and subsequently came out to rash at the corner of her left mouth.  She states is been anything like this before.  She reports that her primary care provider had called in 2 doses of Valtrex.  She states this is continued.  She denies any fever.  She denies any injury.  She voices no other complaints this time.          Review of Systems   Constitutional: Negative.  Negative for fever.   HENT: Negative.    Respiratory: Negative.    Cardiovascular: Negative.  Negative for chest pain.   Gastrointestinal: Negative.  Negative for abdominal pain.   Endocrine: Negative.    Genitourinary: Negative.  Negative for dysuria.   Skin: Negative.    Neurological: Negative.    Psychiatric/Behavioral: Negative.    All other systems reviewed and are negative.      Past Medical History:   Diagnosis Date   • Anxiety    • Bipolar 1 disorder (HCC)    • Depression    • GERD (gastroesophageal reflux disease)    • History of bronchitis    • History of ear infections    • History of stomach ulcers    • History of streptococcal infection    • Hypertension    • Urinary tract infection        No Known Allergies    Past Surgical History:   Procedure Laterality Date   • ADENOIDECTOMY     • CHOLECYSTECTOMY     • DENTAL PROCEDURE     • HERNIA REPAIR     • TONSILLECTOMY     • WISDOM TOOTH EXTRACTION         Family History   Problem Relation Age of Onset   • Cancer Mother    • Stroke Mother    • Lung disease Mother    • No Known Problems Father    • Cancer Other    • Heart disease Other    • Stroke Other        Social History     Socioeconomic History   • Marital status: Single   Tobacco Use   • Smoking status: Current Every Day Smoker     Packs/day: 0.50     Types: Cigarettes   • Smokeless tobacco: Never Used   Vaping Use   • Vaping Use: Never used   Substance and Sexual Activity   • Alcohol use: No   • Drug use: No   •  Sexual activity: Defer     Birth control/protection: Pill           Objective   Physical Exam  Vitals and nursing note reviewed.   Constitutional:       General: She is not in acute distress.     Appearance: She is well-developed. She is not diaphoretic.   HENT:      Head: Normocephalic and atraumatic.      Right Ear: External ear normal.      Left Ear: External ear normal.      Nose: Nose normal.   Eyes:      Conjunctiva/sclera: Conjunctivae normal.      Pupils: Pupils are equal, round, and reactive to light.   Neck:      Vascular: No JVD.      Trachea: No tracheal deviation.   Cardiovascular:      Rate and Rhythm: Normal rate and regular rhythm.      Heart sounds: Normal heart sounds. No murmur heard.      Pulmonary:      Effort: Pulmonary effort is normal. No respiratory distress.      Breath sounds: Normal breath sounds. No wheezing.   Abdominal:      General: Bowel sounds are normal.      Palpations: Abdomen is soft.      Tenderness: There is no abdominal tenderness.   Musculoskeletal:         General: No deformity. Normal range of motion.      Cervical back: Normal range of motion and neck supple.   Skin:     General: Skin is warm and dry.      Coloration: Skin is not pale.      Findings: No erythema or rash.      Comments: Herpetic appearing rash to the lateral aspect of patient's left mouth with some angular cheilosis   Neurological:      Mental Status: She is alert and oriented to person, place, and time.      Cranial Nerves: No cranial nerve deficit.   Psychiatric:         Behavior: Behavior normal.         Thought Content: Thought content normal.         Procedures           ED Course                                                 MDM  Number of Diagnoses or Management Options  Herpes infection: new and does not require workup      Final diagnoses:   Herpes infection       ED Disposition  ED Disposition     ED Disposition Condition Comment    Discharge Stable           Jamie Santos MD  [address]  [facility] [address]  [phone]    In 3 days  if persists         Medication List      New Prescriptions    triamcinolone 0.1 % cream  Commonly known as: KENALOG  Apply 1 application topically to the appropriate area as directed 3 (Three) Times a Day.        Changed    valACYclovir 1000 MG tablet  Commonly known as: Valtrex  Take 1 tablet by mouth 2 (Two) Times a Day.  What changed: how much to take           Where to Get Your Medications      You can get these medications from any pharmacy    Bring a paper prescription for each of these medications  · triamcinolone 0.1 % cream  · valACYclovir 1000 MG tablet          Rudy Sexton, PA  12/19/21 [id number]

## 2022-01-13 ENCOUNTER — OFFICE VISIT (OUTPATIENT)
Dept: PSYCHIATRY | Facility: CLINIC | Age: 37
End: 2022-01-13

## 2022-01-13 VITALS
BODY MASS INDEX: 33.82 KG/M2 | SYSTOLIC BLOOD PRESSURE: 122 MMHG | DIASTOLIC BLOOD PRESSURE: 76 MMHG | HEART RATE: 93 BPM | WEIGHT: 203 LBS | HEIGHT: 65 IN

## 2022-01-13 DIAGNOSIS — Z79.899 MEDICATION MANAGEMENT: ICD-10-CM

## 2022-01-13 DIAGNOSIS — F41.1 GENERALIZED ANXIETY DISORDER: ICD-10-CM

## 2022-01-13 DIAGNOSIS — F31.30 BIPOLAR I DISORDER, MOST RECENT EPISODE DEPRESSED: Primary | ICD-10-CM

## 2022-01-13 DIAGNOSIS — F51.05 INSOMNIA DUE TO MENTAL DISORDER: ICD-10-CM

## 2022-01-13 PROCEDURE — 99214 OFFICE O/P EST MOD 30 MIN: CPT | Performed by: NURSE PRACTITIONER

## 2022-01-13 RX ORDER — QUETIAPINE FUMARATE 100 MG/1
100 TABLET, FILM COATED ORAL
Qty: 90 TABLET | Refills: 0 | Status: SHIPPED | OUTPATIENT
Start: 2022-01-13 | End: 2022-02-14 | Stop reason: SDUPTHER

## 2022-01-13 RX ORDER — LURASIDONE HYDROCHLORIDE 20 MG/1
20 TABLET, FILM COATED ORAL DAILY
Qty: 90 TABLET | Refills: 0 | Status: SHIPPED | OUTPATIENT
Start: 2022-01-13 | End: 2022-02-14 | Stop reason: SDUPTHER

## 2022-01-13 RX ORDER — BUPROPION HYDROCHLORIDE 100 MG/1
100 TABLET, EXTENDED RELEASE ORAL 2 TIMES DAILY
Qty: 180 TABLET | Refills: 0 | Status: SHIPPED | OUTPATIENT
Start: 2022-01-13 | End: 2022-02-14 | Stop reason: SDUPTHER

## 2022-01-13 NOTE — PROGRESS NOTES
Subjective   Jessica Harris is a 36 y.o. female who presents today for follow up    Chief Complaint:  Depression    History of Present Illness: Patient presents as follow-up.  States that she was anxious and concerned about losing her insurance so she did not return to last appointment. States she is afraid of losing her insurance and not being able to afford her medications. Reports anxiety has been high over the past month. She reports sleep is poor, states the dosage of quetiapine is not working.  Reports appetite is good.  She denies SI/HI/AVH.    The following portions of the patient's history were reviewed and updated as appropriate: allergies, current medications, past family history, past medical history, past social history, past surgical history and problem list.      Past Medical History:  Past Medical History:   Diagnosis Date   • Anxiety    • Bipolar 1 disorder (HCC)    • Depression    • GERD (gastroesophageal reflux disease)    • History of bronchitis    • History of ear infections    • History of stomach ulcers    • History of streptococcal infection    • Hypertension    • Urinary tract infection        Social History:  Social History     Socioeconomic History   • Marital status: Single   Tobacco Use   • Smoking status: Current Every Day Smoker     Packs/day: 0.50     Types: Cigarettes   • Smokeless tobacco: Never Used   Vaping Use   • Vaping Use: Never used   Substance and Sexual Activity   • Alcohol use: No   • Drug use: No   • Sexual activity: Defer     Birth control/protection: Pill       Family History:  Family History   Problem Relation Age of Onset   • Cancer Mother    • Stroke Mother    • Lung disease Mother    • No Known Problems Father    • Cancer Other    • Heart disease Other    • Stroke Other        Past Surgical History:  Past Surgical History:   Procedure Laterality Date   • ADENOIDECTOMY     • CHOLECYSTECTOMY     • DENTAL PROCEDURE     • HERNIA REPAIR     • TONSILLECTOMY     • WISDOM  TOOTH EXTRACTION         Problem List:  Patient Active Problem List   Diagnosis   • Palpitations   • Bipolar disorder, in partial remission, most recent episode mixed ([facility])   • Smoker   • Gastroesophageal reflux disease without esophagitis   • Chronic venous insufficiency   • PCOS (polycystic ovarian syndrome)   • Healthcare maintenance   • Vitamin D deficiency   • Snoring   • History of nephrolithiasis   • Essential hypertension   • Morbid obesity ([facility])   • Type 2 diabetes mellitus without complication, without long-term current use of insulin ([facility])   • Mixed hyperlipidemia   • Bladder mass   • Cyst of left ovary       Allergy:   No Known Allergies     Current Medications:   Current Outpatient Medications   Medication Sig Dispense Refill   • atorvastatin (LIPITOR) 10 MG tablet Take 1 tablet by mouth Every Night. 30 tablet 5   • buPROPion SR (WELLBUTRIN SR) 100 MG 12 hr tablet Take 1 tablet by mouth 2 (Two) Times a Day. 180 tablet 0   • dexlansoprazole (DEXILANT) 60 MG capsule Take 1 capsule by mouth Daily. 30 capsule 5   • ergocalciferol (ERGOCALCIFEROL) 1.25 MG (25303 UT) capsule Take 1 capsule by mouth.     • Glucose Blood (Blood Glucose Test) strip 1 daily 50 each 5   • glucose monitor monitoring kit 1 each As Needed (once). 1 each 0   • Lancets misc 1 daily 50 each 5   • Lurasidone HCl (LATUDA) 20 MG tablet tablet Take 1 tablet by mouth Daily. 90 tablet 0   • metFORMIN (GLUCOPHAGE) 500 MG tablet Take  by mouth Every 12 (Twelve) Hours.     • ondansetron ODT (ZOFRAN-ODT) 4 MG disintegrating tablet      • promethazine (PHENERGAN) 25 MG tablet Take 1 tablet by mouth Every 6 (Six) Hours As Needed for Nausea or Vomiting. 60 tablet 0   • propranolol LA (INDERAL LA) 120 MG 24 hr capsule Take 1 capsule by mouth Daily. 30 capsule 5   • QUEtiapine (SEROquel) 100 MG tablet Take 1 tablet by mouth every night at bedtime. 90 tablet 0   • spironolactone (ALDACTONE) 100 MG tablet Take 1 tablet by mouth Daily. 30 tablet 5   •  "SPRINTEC 28 0.25-35 MG-MCG per tablet Take 1 tablet by mouth Daily.  11   • tiZANidine (ZANAFLEX) 4 MG tablet Take 1 tablet by mouth Every 8 (Eight) Hours As Needed (pan). 30 tablet 0   • triamcinolone (KENALOG) 0.1 % cream Apply 1 application topically to the appropriate area as directed 3 (Three) Times a Day. 15 g 0   • valACYclovir (Valtrex) 1000 MG tablet Take 1 tablet by mouth 2 (Two) Times a Day. 20 tablet 0     No current facility-administered medications for this visit.       Review of Symptoms:    Review of Systems   Constitutional: Positive for fatigue.   HENT: Negative.    Eyes: Negative.    Respiratory: Negative.    Cardiovascular: Negative.    Gastrointestinal: Negative.    Endocrine: Negative.    Genitourinary: Negative.    Musculoskeletal: Negative.    Skin: Negative.    Neurological: Negative.    Psychiatric/Behavioral: Positive for sleep disturbance, depressed mood and stress. Negative for suicidal ideas. The patient is nervous/anxious.        Objective   Physical Exam:   Blood pressure 122/76, pulse 93, height 165.1 cm (65\"), weight 92.1 kg (203 lb).  Body mass index is 33.78 kg/m².    Appearance: Well-nourished female, appropriately dressed, presented age in no acute distress  Gait, Station, Strength: Within normal limits    Mental Status Exam:   Hygiene:   good  Cooperation:  Cooperative  Eye Contact:  Good  Psychomotor Behavior:  Appropriate  Affect:  Appropriate  Mood: normal  Hopelessness: Denies  Speech:  Normal  Thought Process:  Linear  Thought Content:  Normal and Mood congruent  Suicidal:  None  Homicidal:  None  Hallucinations:  None  Delusion:  None  Memory:  Intact  Orientation:  Person, Place, Time and Situation  Reliability:  good  Insight:  Fair  Judgement:  Fair  Impulse Control:  Good  Physical/Medical Issues:  No      PHQ-Score Total:  PHQ-9 Total Score: 8   Patient screened positive for depression based on a PHQ-9 score of 8 on 1/13/2022. Follow-up recommendations include: " Prescribed antidepressant medication treatment and Suicide Risk Assessment performed.        Lab Results:   No visits with results within 1 Month(s) from this visit.   Latest known visit with results is:   Admission on 11/14/2021, Discharged on 11/14/2021   Component Date Value Ref Range Status   • Glucose 11/14/2021 117* 65 - 99 mg/dL Final   • BUN 11/14/2021 7  6 - 20 mg/dL Final   • Creatinine 11/14/2021 0.75  0.57 - 1.00 mg/dL Final   • Sodium 11/14/2021 140  136 - 145 mmol/L Final   • Potassium 11/14/2021 4.7  3.5 - 5.2 mmol/L Final    Slight hemolysis detected by analyzer. Results may be affected.   • Chloride 11/14/2021 101  98 - 107 mmol/L Final   • CO2 11/14/2021 24.7  22.0 - 29.0 mmol/L Final   • Calcium 11/14/2021 9.8  8.6 - 10.5 mg/dL Final   • Total Protein 11/14/2021 7.5  6.0 - 8.5 g/dL Final   • Albumin 11/14/2021 4.53  3.50 - 5.20 g/dL Final   • ALT (SGPT) 11/14/2021 27  1 - 33 U/L Final   • AST (SGOT) 11/14/2021 22  1 - 32 U/L Final   • Alkaline Phosphatase 11/14/2021 68  39 - 117 U/L Final   • Total Bilirubin 11/14/2021 0.4  0.0 - 1.2 mg/dL Final   • eGFR Non  Amer 11/14/2021 89  >60 mL/min/1.73 Final   • Globulin 11/14/2021 3.0  gm/dL Final   • A/G Ratio 11/14/2021 1.5  g/dL Final   • BUN/Creatinine Ratio 11/14/2021 9.3  7.0 - 25.0 Final   • Anion Gap 11/14/2021 14.3  5.0 - 15.0 mmol/L Final   • WBC 11/14/2021 14.23* 3.40 - 10.80 10*3/mm3 Final   • RBC 11/14/2021 5.84* 3.77 - 5.28 10*6/mm3 Final   • Hemoglobin 11/14/2021 16.2* 12.0 - 15.9 g/dL Final   • Hematocrit 11/14/2021 49.8* 34.0 - 46.6 % Final   • MCV 11/14/2021 85.3  79.0 - 97.0 fL Final   • MCH 11/14/2021 27.7  26.6 - 33.0 pg Final   • MCHC 11/14/2021 32.5  31.5 - 35.7 g/dL Final   • RDW 11/14/2021 13.3  12.3 - 15.4 % Final   • RDW-SD 11/14/2021 41.2  37.0 - 54.0 fl Final   • MPV 11/14/2021 8.8  6.0 - 12.0 fL Final   • Platelets 11/14/2021 387  140 - 450 10*3/mm3 Final   • Neutrophil % 11/14/2021 64.7  42.7 - 76.0 % Final   •  Lymphocyte % 11/14/2021 25.4  19.6 - 45.3 % Final   • Monocyte % 11/14/2021 5.9  5.0 - 12.0 % Final   • Eosinophil % 11/14/2021 1.4  0.3 - 6.2 % Final   • Basophil % 11/14/2021 0.6  0.0 - 1.5 % Final   • Immature Grans % 11/14/2021 2.0* 0.0 - 0.5 % Final   • Neutrophils, Absolute 11/14/2021 9.20* 1.70 - 7.00 10*3/mm3 Final   • Lymphocytes, Absolute 11/14/2021 3.62* 0.70 - 3.10 10*3/mm3 Final   • Monocytes, Absolute 11/14/2021 0.84  0.10 - 0.90 10*3/mm3 Final   • Eosinophils, Absolute 11/14/2021 0.20  0.00 - 0.40 10*3/mm3 Final   • Basophils, Absolute 11/14/2021 0.08  0.00 - 0.20 10*3/mm3 Final   • Immature Grans, Absolute 11/14/2021 0.29* 0.00 - 0.05 10*3/mm3 Final   • nRBC 11/14/2021 0.0  0.0 - 0.2 /100 WBC Final   • Color, UA 11/14/2021 Yellow  Yellow, Straw Final   • Appearance, UA 11/14/2021 Cloudy* Clear Final   • pH, UA 11/14/2021 6.0  5.0 - 8.0 Final   • Specific Gravity, UA 11/14/2021 1.016  1.005 - 1.030 Final   • Glucose, UA 11/14/2021 Negative  Negative Final   • Ketones, UA 11/14/2021 Negative  Negative Final   • Bilirubin, UA 11/14/2021 Negative  Negative Final   • Blood, UA 11/14/2021 Moderate (2+)* Negative Final   • Protein, UA 11/14/2021 Trace* Negative Final   • Leuk Esterase, UA 11/14/2021 Negative  Negative Final   • Nitrite, UA 11/14/2021 Negative  Negative Final   • Urobilinogen, UA 11/14/2021 0.2 E.U./dL  0.2 - 1.0 E.U./dL Final   • Lipase 11/14/2021 32  13 - 60 U/L Final   • HCG, Urine QL 11/14/2021 Negative  Negative Final   • RBC, UA 11/14/2021 31-50* None Seen, 0-2 /HPF Final   • WBC, UA 11/14/2021 0-2  None Seen, 0-2 /HPF Final   • Bacteria, UA 11/14/2021 None Seen  None Seen /HPF Final   • Squamous Epithelial Cells, UA 11/14/2021 3-6* None Seen, 0-2 /HPF Final   • Hyaline Casts, UA 11/14/2021 None Seen  None Seen /LPF Final   • Methodology 11/14/2021 Automated Microscopy   Final       Assessment/Plan   Diagnoses and all orders for this visit:    1. Bipolar I disorder, most recent episode  depressed (HCC) (Primary)  -     buPROPion SR (WELLBUTRIN SR) 100 MG 12 hr tablet; Take 1 tablet by mouth 2 (Two) Times a Day.  Dispense: 180 tablet; Refill: 0  -     Lurasidone HCl (LATUDA) 20 MG tablet tablet; Take 1 tablet by mouth Daily.  Dispense: 90 tablet; Refill: 0  -     QUEtiapine (SEROquel) 100 MG tablet; Take 1 tablet by mouth every night at bedtime.  Dispense: 90 tablet; Refill: 0    2. Insomnia due to mental disorder  -     QUEtiapine (SEROquel) 100 MG tablet; Take 1 tablet by mouth every night at bedtime.  Dispense: 90 tablet; Refill: 0    3. Medication management    4. Generalized anxiety disorder        -Restart lurasidone 20 mg daily for bipolar disorder. Lengthy discussion with patient on the possible side effects of antipsychotic medications including increased cholesterol, increased blood sugar, and possibility of weight gain.  Also discussed the need to monitor lab work associated with this.  The risk of muscle movement disorders with this class of medication was also discussed.  -Restart bupropion 150 mg twice daily for mood and depression  -Restart quetiapine at a higher dosage 100 mg nightly for sleep and mood  -Encourage patient to restart therapy  -JULIET reviewed and appropriate. Patient counseled on use of controlled substances.   -The benefits of a healthy diet and exercise were discussed with patient, especially the positive effects they have on mental health. Patient encouraged to consider lifestyle modification regarding  diet and exercise patterns to maximize results of mental health treatment.  -Reviewed previous available documentation  -Reviewed most recent available labs   -Jessica Harris  reports that she has been smoking cigarettes. She has been smoking about 0.50 packs per day. She has never used smokeless tobacco.. I have educated her on the risk of diseases from using tobacco products such as cancer, COPD and heart disease. I advised her to quit and she is not willing to  quit. I spent 3  minutes counseling the patient.               Visit Diagnoses:    ICD-10-CM ICD-9-CM   1. Bipolar I disorder, most recent episode depressed (HCC)  F31.30 296.50   2. Insomnia due to mental disorder  F51.05 300.9     327.02   3. Medication management  Z79.899 V58.69   4. Generalized anxiety disorder  F41.1 300.02         TREATMENT PLAN/GOALS: Continue supportive psychotherapy efforts and medications as indicated. Treatment and medication options discussed during today's visit. Patient acknowledged and verbally consented to continue with current treatment plan and was educated on the importance of compliance with treatment and follow-up appointments.    MEDICATION ISSUES:    Discussed medication options and treatment plan of prescribed medication as well as the risks, benefits, and side effects including potential falls, possible impaired driving and metabolic adversities among others. Patient is agreeable to call the office with any worsening of symptoms or onset of side effects. Patient is agreeable to call 911 or go to the nearest ER should he/she begin having SI/HI.     MEDS ORDERED DURING VISIT:  New Medications Ordered This Visit   Medications   • buPROPion SR (WELLBUTRIN SR) 100 MG 12 hr tablet     Sig: Take 1 tablet by mouth 2 (Two) Times a Day.     Dispense:  180 tablet     Refill:  0   • Lurasidone HCl (LATUDA) 20 MG tablet tablet     Sig: Take 1 tablet by mouth Daily.     Dispense:  90 tablet     Refill:  0   • QUEtiapine (SEROquel) 100 MG tablet     Sig: Take 1 tablet by mouth every night at bedtime.     Dispense:  90 tablet     Refill:  0       Return in about 4 weeks (around 2/10/2022).         Prognosis: Guarded dependent on medication/follow up and treatment plan compliance.  Functionality: pt showing improvements in important areas of daily functioning.     Short-term goals: Patient will adhere to medication regimen and note continued improvement in symptoms over the next 3 months.    Long-term goals: Patient will be adherent to medication management and psychotherapy with continued improvement in symptoms over the next 6 months          This document has been electronically signed by JANIE Thorpe   January 13, 2022 15:13 EST    Part of this note may be an electronic transcription/translation of spoken language to printed text using the Dragon Dictation System.

## 2022-01-18 DIAGNOSIS — R00.2 PALPITATIONS: ICD-10-CM

## 2022-01-19 RX ORDER — PROPRANOLOL HYDROCHLORIDE 120 MG/1
CAPSULE, EXTENDED RELEASE ORAL
Qty: 30 CAPSULE | Refills: 5 | Status: SHIPPED | OUTPATIENT
Start: 2022-01-19 | End: 2022-02-18 | Stop reason: SDUPTHER

## 2022-02-14 ENCOUNTER — OFFICE VISIT (OUTPATIENT)
Dept: PSYCHIATRY | Facility: CLINIC | Age: 37
End: 2022-02-14

## 2022-02-14 ENCOUNTER — OFFICE VISIT (OUTPATIENT)
Dept: UROLOGY | Facility: CLINIC | Age: 37
End: 2022-02-14

## 2022-02-14 VITALS — WEIGHT: 197.4 LBS | BODY MASS INDEX: 32.89 KG/M2 | HEIGHT: 65 IN

## 2022-02-14 VITALS
SYSTOLIC BLOOD PRESSURE: 112 MMHG | DIASTOLIC BLOOD PRESSURE: 84 MMHG | HEIGHT: 65 IN | BODY MASS INDEX: 33.99 KG/M2 | WEIGHT: 204 LBS | HEART RATE: 85 BPM

## 2022-02-14 DIAGNOSIS — F17.210 TOBACCO DEPENDENCE DUE TO CIGARETTES: ICD-10-CM

## 2022-02-14 DIAGNOSIS — E66.09 CLASS 1 OBESITY DUE TO EXCESS CALORIES WITHOUT SERIOUS COMORBIDITY WITH BODY MASS INDEX (BMI) OF 31.0 TO 31.9 IN ADULT: ICD-10-CM

## 2022-02-14 DIAGNOSIS — F31.30 BIPOLAR I DISORDER, MOST RECENT EPISODE DEPRESSED: Primary | ICD-10-CM

## 2022-02-14 DIAGNOSIS — N83.202 CYST OF LEFT OVARY: Primary | ICD-10-CM

## 2022-02-14 DIAGNOSIS — F51.05 INSOMNIA DUE TO MENTAL DISORDER: ICD-10-CM

## 2022-02-14 DIAGNOSIS — Z79.899 MEDICATION MANAGEMENT: ICD-10-CM

## 2022-02-14 PROCEDURE — 99214 OFFICE O/P EST MOD 30 MIN: CPT | Performed by: NURSE PRACTITIONER

## 2022-02-14 PROCEDURE — 99203 OFFICE O/P NEW LOW 30 MIN: CPT | Performed by: UROLOGY

## 2022-02-14 RX ORDER — LURASIDONE HYDROCHLORIDE 40 MG/1
40 TABLET, FILM COATED ORAL DAILY
Qty: 90 TABLET | Refills: 2 | Status: SHIPPED | OUTPATIENT
Start: 2022-02-14 | End: 2022-04-19 | Stop reason: SDUPTHER

## 2022-02-14 RX ORDER — PANTOPRAZOLE SODIUM 40 MG/1
40 TABLET, DELAYED RELEASE ORAL DAILY
COMMUNITY
Start: 2022-01-18 | End: 2022-02-18 | Stop reason: SDUPTHER

## 2022-02-14 RX ORDER — QUETIAPINE FUMARATE 100 MG/1
100 TABLET, FILM COATED ORAL
Qty: 90 TABLET | Refills: 2 | Status: SHIPPED | OUTPATIENT
Start: 2022-02-14 | End: 2022-04-19 | Stop reason: SDUPTHER

## 2022-02-14 RX ORDER — BUPROPION HYDROCHLORIDE 100 MG/1
100 TABLET, EXTENDED RELEASE ORAL 2 TIMES DAILY
Qty: 180 TABLET | Refills: 2 | Status: SHIPPED | OUTPATIENT
Start: 2022-02-14 | End: 2022-04-19 | Stop reason: SDUPTHER

## 2022-02-14 NOTE — PROGRESS NOTES
Chief Complaint:          Chief Complaint   Patient presents with   • Bladder mass     New Patient        HPI:   36 y.o. female referred for evaluation of a possible bladder mass.  She has no symptomatology she is a  0 para 0 I personally reviewed the films with her this is an ovarian cystic mass not related to the bladder I gave her reassurance I will see her back on an as-needed basis      Past Medical History:        Past Medical History:   Diagnosis Date   • Anxiety    • Bipolar 1 disorder (HCC)    • Depression    • GERD (gastroesophageal reflux disease)    • History of bronchitis    • History of ear infections    • History of stomach ulcers    • History of streptococcal infection    • Hypertension    • Urinary tract infection          Current Meds:     Current Outpatient Medications   Medication Sig Dispense Refill   • atorvastatin (LIPITOR) 10 MG tablet Take 1 tablet by mouth Every Night. 30 tablet 5   • buPROPion SR (WELLBUTRIN SR) 100 MG 12 hr tablet Take 1 tablet by mouth 2 (Two) Times a Day. 180 tablet 2   • dexlansoprazole (DEXILANT) 60 MG capsule Take 1 capsule by mouth Daily. 30 capsule 5   • ergocalciferol (ERGOCALCIFEROL) 1.25 MG (61825 UT) capsule Take 1 capsule by mouth.     • Glucose Blood (Blood Glucose Test) strip 1 daily 50 each 5   • glucose monitor monitoring kit 1 each As Needed (once). 1 each 0   • Lancets misc 1 daily 50 each 5   • Lurasidone HCl (LATUDA) 40 MG tablet tablet Take 1 tablet by mouth Daily. 90 tablet 2   • metFORMIN (GLUCOPHAGE) 500 MG tablet Take  by mouth Every 12 (Twelve) Hours.     • ondansetron ODT (ZOFRAN-ODT) 4 MG disintegrating tablet      • pantoprazole (PROTONIX) 40 MG EC tablet Take 40 mg by mouth Daily.     • promethazine (PHENERGAN) 25 MG tablet Take 1 tablet by mouth Every 6 (Six) Hours As Needed for Nausea or Vomiting. 60 tablet 0   • propranolol LA (INDERAL LA) 120 MG 24 hr capsule Take 1 capsule by mouth once daily 30 capsule 5   • QUEtiapine (SEROquel)  100 MG tablet Take 1 tablet by mouth every night at bedtime. 90 tablet 2   • spironolactone (ALDACTONE) 100 MG tablet Take 1 tablet by mouth Daily. 30 tablet 5   • SPRINTEC 28 0.25-35 MG-MCG per tablet Take 1 tablet by mouth Daily.  11   • tiZANidine (ZANAFLEX) 4 MG tablet Take 1 tablet by mouth Every 8 (Eight) Hours As Needed (pan). 30 tablet 0   • triamcinolone (KENALOG) 0.1 % cream Apply 1 application topically to the appropriate area as directed 3 (Three) Times a Day. 15 g 0   • valACYclovir (Valtrex) 1000 MG tablet Take 1 tablet by mouth 2 (Two) Times a Day. 20 tablet 0     No current facility-administered medications for this visit.        Allergies:      No Known Allergies     Past Surgical History:     Past Surgical History:   Procedure Laterality Date   • ADENOIDECTOMY     • CHOLECYSTECTOMY     • DENTAL PROCEDURE     • HERNIA REPAIR     • TONSILLECTOMY     • WISDOM TOOTH EXTRACTION           Social History:     Social History     Socioeconomic History   • Marital status: Single   Tobacco Use   • Smoking status: Current Every Day Smoker     Packs/day: 0.50     Types: Cigarettes   • Smokeless tobacco: Never Used   Vaping Use   • Vaping Use: Never used   Substance and Sexual Activity   • Alcohol use: No   • Drug use: No   • Sexual activity: Defer     Birth control/protection: Pill       Family History:     Family History   Problem Relation Age of Onset   • Cancer Mother    • Stroke Mother    • Lung disease Mother    • No Known Problems Father    • Cancer Other    • Heart disease Other    • Stroke Other        Review of Systems:     Review of Systems   Constitutional: Negative.  Negative for activity change, appetite change, chills, diaphoresis, fatigue and unexpected weight change.   HENT: Negative for congestion, dental problem, drooling, ear discharge, ear pain, facial swelling, hearing loss, mouth sores, nosebleeds, postnasal drip, rhinorrhea, sinus pressure, sneezing, sore throat, tinnitus, trouble  swallowing and voice change.    Eyes: Negative.  Negative for photophobia, pain, discharge, redness, itching and visual disturbance.   Respiratory: Negative.  Negative for apnea, cough, choking, chest tightness, shortness of breath, wheezing and stridor.    Cardiovascular: Negative.  Negative for chest pain, palpitations and leg swelling.   Gastrointestinal: Negative.  Negative for abdominal distention, abdominal pain, anal bleeding, blood in stool, constipation, diarrhea, nausea, rectal pain and vomiting.   Endocrine: Negative.  Negative for cold intolerance, heat intolerance, polydipsia, polyphagia and polyuria.   Musculoskeletal: Negative.  Negative for arthralgias, back pain, gait problem, joint swelling, myalgias, neck pain and neck stiffness.   Skin: Negative.  Negative for color change, pallor, rash and wound.   Allergic/Immunologic: Negative.  Negative for environmental allergies, food allergies and immunocompromised state.   Neurological: Negative.  Negative for dizziness, tremors, seizures, syncope, facial asymmetry, speech difficulty, weakness, light-headedness, numbness and headaches.   Hematological: Negative.  Negative for adenopathy. Does not bruise/bleed easily.   Psychiatric/Behavioral: Negative for agitation, behavioral problems, confusion, decreased concentration, dysphoric mood, hallucinations, self-injury, sleep disturbance and suicidal ideas. The patient is not nervous/anxious and is not hyperactive.    All other systems reviewed and are negative.      Physical Exam:     Physical Exam  Constitutional:       Appearance: She is well-developed.   HENT:      Head: Normocephalic and atraumatic.      Right Ear: External ear normal.      Left Ear: External ear normal.   Eyes:      Conjunctiva/sclera: Conjunctivae normal.      Pupils: Pupils are equal, round, and reactive to light.   Cardiovascular:      Rate and Rhythm: Normal rate and regular rhythm.      Heart sounds: Normal heart sounds.    Pulmonary:      Effort: Pulmonary effort is normal.      Breath sounds: Normal breath sounds.   Abdominal:      General: Bowel sounds are normal. There is no distension.      Palpations: Abdomen is soft. There is no mass.      Tenderness: There is no abdominal tenderness. There is no guarding or rebound.   Genitourinary:     Vagina: No vaginal discharge.   Musculoskeletal:         General: Normal range of motion.   Skin:     General: Skin is warm and dry.   Neurological:      Mental Status: She is alert.      Deep Tendon Reflexes: Reflexes are normal and symmetric.   Psychiatric:         Behavior: Behavior normal.         Thought Content: Thought content normal.         Judgment: Judgment normal.         I have reviewed the following portions of the patient's history: Allergies, current medications, past family history, past medical history, past social history, past surgical history, problem list, and ROS and confirm it is accurate.      Procedure:       Assessment/Plan:   Cystic ovarian mass-recommend gynecologic follow-up no further urologic intervention indicated                This document has been electronically signed by DANG PIKE MD February 14, 2022 15:18 EST

## 2022-02-14 NOTE — PROGRESS NOTES
Subjective   Jessica Harris is a 36 y.o. female who presents today for follow up    Chief Complaint:  Bipolar disorder    History of Present Illness: Patient presents as follow up. She reports anxiety and depression has improved however she feels dosage needs to be increased. She reports her insurance will not be valid at the end of the month. She is hopeful that she will be able to afford state insurance.  She reports sleep is good with quetiapine. Reports appetite is good. She denies SI/HI/AVH.    The following portions of the patient's history were reviewed and updated as appropriate: allergies, current medications, past family history, past medical history, past social history, past surgical history and problem list.      Past Medical History:  Past Medical History:   Diagnosis Date   • Anxiety    • Bipolar 1 disorder (HCC)    • Depression    • GERD (gastroesophageal reflux disease)    • History of bronchitis    • History of ear infections    • History of stomach ulcers    • History of streptococcal infection    • Hypertension    • Urinary tract infection        Social History:  Social History     Socioeconomic History   • Marital status: Single   Tobacco Use   • Smoking status: Current Every Day Smoker     Packs/day: 0.50     Types: Cigarettes   • Smokeless tobacco: Never Used   Vaping Use   • Vaping Use: Never used   Substance and Sexual Activity   • Alcohol use: No   • Drug use: No   • Sexual activity: Defer     Birth control/protection: Pill       Family History:  Family History   Problem Relation Age of Onset   • Cancer Mother    • Stroke Mother    • Lung disease Mother    • No Known Problems Father    • Cancer Other    • Heart disease Other    • Stroke Other        Past Surgical History:  Past Surgical History:   Procedure Laterality Date   • ADENOIDECTOMY     • CHOLECYSTECTOMY     • DENTAL PROCEDURE     • HERNIA REPAIR     • TONSILLECTOMY     • WISDOM TOOTH EXTRACTION         Problem List:  Patient  Active Problem List   Diagnosis   • Palpitations   • Bipolar disorder, in partial remission, most recent episode mixed ([facility])   • Smoker   • Gastroesophageal reflux disease without esophagitis   • Chronic venous insufficiency   • PCOS (polycystic ovarian syndrome)   • Healthcare maintenance   • Vitamin D deficiency   • Snoring   • History of nephrolithiasis   • Essential hypertension   • Morbid obesity ([facility])   • Type 2 diabetes mellitus without complication, without long-term current use of insulin ([facility])   • Mixed hyperlipidemia   • Bladder mass   • Cyst of left ovary       Allergy:   No Known Allergies     Current Medications:   Current Outpatient Medications   Medication Sig Dispense Refill   • atorvastatin (LIPITOR) 10 MG tablet Take 1 tablet by mouth Every Night. 30 tablet 5   • buPROPion SR (WELLBUTRIN SR) 100 MG 12 hr tablet Take 1 tablet by mouth 2 (Two) Times a Day. 180 tablet 2   • dexlansoprazole (DEXILANT) 60 MG capsule Take 1 capsule by mouth Daily. 30 capsule 5   • ergocalciferol (ERGOCALCIFEROL) 1.25 MG (68885 UT) capsule Take 1 capsule by mouth.     • Glucose Blood (Blood Glucose Test) strip 1 daily 50 each 5   • glucose monitor monitoring kit 1 each As Needed (once). 1 each 0   • Lancets misc 1 daily 50 each 5   • Lurasidone HCl (LATUDA) 40 MG tablet tablet Take 1 tablet by mouth Daily. 90 tablet 2   • metFORMIN (GLUCOPHAGE) 500 MG tablet Take  by mouth Every 12 (Twelve) Hours.     • ondansetron ODT (ZOFRAN-ODT) 4 MG disintegrating tablet      • pantoprazole (PROTONIX) 40 MG EC tablet Take 40 mg by mouth Daily.     • promethazine (PHENERGAN) 25 MG tablet Take 1 tablet by mouth Every 6 (Six) Hours As Needed for Nausea or Vomiting. 60 tablet 0   • propranolol LA (INDERAL LA) 120 MG 24 hr capsule Take 1 capsule by mouth once daily 30 capsule 5   • QUEtiapine (SEROquel) 100 MG tablet Take 1 tablet by mouth every night at bedtime. 90 tablet 2   • spironolactone (ALDACTONE) 100 MG tablet Take 1 tablet by  "mouth Daily. 30 tablet 5   • SPRINTEC 28 0.25-35 MG-MCG per tablet Take 1 tablet by mouth Daily.  11   • tiZANidine (ZANAFLEX) 4 MG tablet Take 1 tablet by mouth Every 8 (Eight) Hours As Needed (pan). 30 tablet 0   • triamcinolone (KENALOG) 0.1 % cream Apply 1 application topically to the appropriate area as directed 3 (Three) Times a Day. 15 g 0   • valACYclovir (Valtrex) 1000 MG tablet Take 1 tablet by mouth 2 (Two) Times a Day. 20 tablet 0     No current facility-administered medications for this visit.       Review of Symptoms:    Review of Systems   Constitutional: Negative.    HENT: Negative.    Eyes: Negative.    Respiratory: Negative.    Cardiovascular: Negative.    Gastrointestinal: Negative.    Genitourinary: Negative.    Musculoskeletal: Negative.    Skin: Negative.    Neurological: Negative.    Psychiatric/Behavioral: Positive for sleep disturbance and depressed mood. Negative for suicidal ideas. The patient is nervous/anxious.        Objective   Physical Exam:   Blood pressure 112/84, pulse 85, height 165.1 cm (65\"), weight 92.5 kg (204 lb).  Body mass index is 33.95 kg/m².    Appearance: Well nourished female, appropriately dressed, appears stated age and in no acute distress  Gait, Station, Strength: WNL    Mental Status Exam:   Hygiene:   good  Cooperation:  Cooperative  Eye Contact:  Good  Psychomotor Behavior:  Appropriate  Affect:  Appropriate  Mood: normal  Hopelessness: Denies  Speech:  Normal  Thought Process:  Goal directed and Linear  Thought Content:  Normal and Mood congruent  Suicidal:  None  Homicidal:  None  Hallucinations:  None  Delusion:  None  Memory:  Intact  Orientation:  Person, Place, Time and Situation  Reliability:  good  Insight:  Good  Judgement:  Good  Impulse Control:  Good  Physical/Medical Issues:  No      PHQ-Score Total:  PHQ-9 Total Score: 0         Lab Results:   No visits with results within 1 Month(s) from this visit.   Latest known visit with results is: "   Admission on 11/14/2021, Discharged on 11/14/2021   Component Date Value Ref Range Status   • Glucose 11/14/2021 117* 65 - 99 mg/dL Final   • BUN 11/14/2021 7  6 - 20 mg/dL Final   • Creatinine 11/14/2021 0.75  0.57 - 1.00 mg/dL Final   • Sodium 11/14/2021 140  136 - 145 mmol/L Final   • Potassium 11/14/2021 4.7  3.5 - 5.2 mmol/L Final    Slight hemolysis detected by analyzer. Results may be affected.   • Chloride 11/14/2021 101  98 - 107 mmol/L Final   • CO2 11/14/2021 24.7  22.0 - 29.0 mmol/L Final   • Calcium 11/14/2021 9.8  8.6 - 10.5 mg/dL Final   • Total Protein 11/14/2021 7.5  6.0 - 8.5 g/dL Final   • Albumin 11/14/2021 4.53  3.50 - 5.20 g/dL Final   • ALT (SGPT) 11/14/2021 27  1 - 33 U/L Final   • AST (SGOT) 11/14/2021 22  1 - 32 U/L Final   • Alkaline Phosphatase 11/14/2021 68  39 - 117 U/L Final   • Total Bilirubin 11/14/2021 0.4  0.0 - 1.2 mg/dL Final   • eGFR Non  Amer 11/14/2021 89  >60 mL/min/1.73 Final   • Globulin 11/14/2021 3.0  gm/dL Final   • A/G Ratio 11/14/2021 1.5  g/dL Final   • BUN/Creatinine Ratio 11/14/2021 9.3  7.0 - 25.0 Final   • Anion Gap 11/14/2021 14.3  5.0 - 15.0 mmol/L Final   • WBC 11/14/2021 14.23* 3.40 - 10.80 10*3/mm3 Final   • RBC 11/14/2021 5.84* 3.77 - 5.28 10*6/mm3 Final   • Hemoglobin 11/14/2021 16.2* 12.0 - 15.9 g/dL Final   • Hematocrit 11/14/2021 49.8* 34.0 - 46.6 % Final   • MCV 11/14/2021 85.3  79.0 - 97.0 fL Final   • MCH 11/14/2021 27.7  26.6 - 33.0 pg Final   • MCHC 11/14/2021 32.5  31.5 - 35.7 g/dL Final   • RDW 11/14/2021 13.3  12.3 - 15.4 % Final   • RDW-SD 11/14/2021 41.2  37.0 - 54.0 fl Final   • MPV 11/14/2021 8.8  6.0 - 12.0 fL Final   • Platelets 11/14/2021 387  140 - 450 10*3/mm3 Final   • Neutrophil % 11/14/2021 64.7  42.7 - 76.0 % Final   • Lymphocyte % 11/14/2021 25.4  19.6 - 45.3 % Final   • Monocyte % 11/14/2021 5.9  5.0 - 12.0 % Final   • Eosinophil % 11/14/2021 1.4  0.3 - 6.2 % Final   • Basophil % 11/14/2021 0.6  0.0 - 1.5 % Final   •  Immature Grans % 11/14/2021 2.0* 0.0 - 0.5 % Final   • Neutrophils, Absolute 11/14/2021 9.20* 1.70 - 7.00 10*3/mm3 Final   • Lymphocytes, Absolute 11/14/2021 3.62* 0.70 - 3.10 10*3/mm3 Final   • Monocytes, Absolute 11/14/2021 0.84  0.10 - 0.90 10*3/mm3 Final   • Eosinophils, Absolute 11/14/2021 0.20  0.00 - 0.40 10*3/mm3 Final   • Basophils, Absolute 11/14/2021 0.08  0.00 - 0.20 10*3/mm3 Final   • Immature Grans, Absolute 11/14/2021 0.29* 0.00 - 0.05 10*3/mm3 Final   • nRBC 11/14/2021 0.0  0.0 - 0.2 /100 WBC Final   • Color, UA 11/14/2021 Yellow  Yellow, Straw Final   • Appearance, UA 11/14/2021 Cloudy* Clear Final   • pH, UA 11/14/2021 6.0  5.0 - 8.0 Final   • Specific Gravity, UA 11/14/2021 1.016  1.005 - 1.030 Final   • Glucose, UA 11/14/2021 Negative  Negative Final   • Ketones, UA 11/14/2021 Negative  Negative Final   • Bilirubin, UA 11/14/2021 Negative  Negative Final   • Blood, UA 11/14/2021 Moderate (2+)* Negative Final   • Protein, UA 11/14/2021 Trace* Negative Final   • Leuk Esterase, UA 11/14/2021 Negative  Negative Final   • Nitrite, UA 11/14/2021 Negative  Negative Final   • Urobilinogen, UA 11/14/2021 0.2 E.U./dL  0.2 - 1.0 E.U./dL Final   • Lipase 11/14/2021 32  13 - 60 U/L Final   • HCG, Urine QL 11/14/2021 Negative  Negative Final   • RBC, UA 11/14/2021 31-50* None Seen, 0-2 /HPF Final   • WBC, UA 11/14/2021 0-2  None Seen, 0-2 /HPF Final   • Bacteria, UA 11/14/2021 None Seen  None Seen /HPF Final   • Squamous Epithelial Cells, UA 11/14/2021 3-6* None Seen, 0-2 /HPF Final   • Hyaline Casts, UA 11/14/2021 None Seen  None Seen /LPF Final   • Methodology 11/14/2021 Automated Microscopy   Final       Assessment/Plan   Diagnoses and all orders for this visit:    1. Bipolar I disorder, most recent episode depressed (HCC) (Primary)  -     Lurasidone HCl (LATUDA) 40 MG tablet tablet; Take 1 tablet by mouth Daily.  Dispense: 90 tablet; Refill: 2  -     buPROPion SR (WELLBUTRIN SR) 100 MG 12 hr tablet; Take 1  tablet by mouth 2 (Two) Times a Day.  Dispense: 180 tablet; Refill: 2  -     QUEtiapine (SEROquel) 100 MG tablet; Take 1 tablet by mouth every night at bedtime.  Dispense: 90 tablet; Refill: 2    2. Insomnia due to mental disorder  -     QUEtiapine (SEROquel) 100 MG tablet; Take 1 tablet by mouth every night at bedtime.  Dispense: 90 tablet; Refill: 2    3. Medication management    4. Tobacco dependence due to cigarettes    5. Class 1 obesity due to excess calories without serious comorbidity with body mass index (BMI) of 31.0 to 31.9 in adult        -Increase lurasidone 40 mg daily for bipolar disorder. Lengthy discussion with patient on the possible side effects of antipsychotic medications including increased cholesterol, increased blood sugar, and possibility of weight gain.  Also discussed the need to monitor lab work associated with this.  The risk of muscle movement disorders with this class of medication was also discussed.  -Continue bupropion 150 mg twice daily for mood and depression  -Continue quetiapine 100 mg nightly for sleep and mood  -Encourage patient to restart therapy  -JULIET reviewed and appropriate. Patient counseled on use of controlled substances.   -The benefits of a healthy diet and exercise were discussed with patient, especially the positive effects they have on mental health. Patient encouraged to consider lifestyle modification regarding  diet and exercise patterns to maximize results of mental health treatment.  -Reviewed previous available documentation  -Reviewed most recent available labs   -Jessica BENNY Harris  reports that she has been smoking cigarettes. She has been smoking about 0.50 packs per day. She has never used smokeless tobacco.. I have educated her on the risk of diseases from using tobacco products such as cancer, COPD, heart disease, reproductive problems and low birth weight. I advised her to quit and she is not willing to quit. I spent 3  minutes counseling the  patient.               Visit Diagnoses:    ICD-10-CM ICD-9-CM   1. Bipolar I disorder, most recent episode depressed (HCC)  F31.30 296.50   2. Insomnia due to mental disorder  F51.05 300.9     327.02   3. Medication management  Z79.899 V58.69   4. Tobacco dependence due to cigarettes  F17.210 305.1   5. Class 1 obesity due to excess calories without serious comorbidity with body mass index (BMI) of 31.0 to 31.9 in adult  E66.09 278.00    Z68.31 V85.31         TREATMENT PLAN/GOALS: Continue supportive psychotherapy efforts and medications as indicated. Treatment and medication options discussed during today's visit. Patient acknowledged and verbally consented to continue with current treatment plan and was educated on the importance of compliance with treatment and follow-up appointments.    MEDICATION ISSUES:    Discussed medication options and treatment plan of prescribed medication as well as the risks, benefits, and side effects including potential falls, possible impaired driving and metabolic adversities among others. Patient is agreeable to call the office with any worsening of symptoms or onset of side effects. Patient is agreeable to call 911 or go to the nearest ER should he/she begin having SI/HI.     MEDS ORDERED DURING VISIT:  New Medications Ordered This Visit   Medications   • Lurasidone HCl (LATUDA) 40 MG tablet tablet     Sig: Take 1 tablet by mouth Daily.     Dispense:  90 tablet     Refill:  2   • buPROPion SR (WELLBUTRIN SR) 100 MG 12 hr tablet     Sig: Take 1 tablet by mouth 2 (Two) Times a Day.     Dispense:  180 tablet     Refill:  2   • QUEtiapine (SEROquel) 100 MG tablet     Sig: Take 1 tablet by mouth every night at bedtime.     Dispense:  90 tablet     Refill:  2       Return in about 3 months (around 5/14/2022), or if symptoms worsen or fail to improve.         Prognosis: Guarded dependent on medication/follow up and treatment plan compliance.  Functionality: pt showing improvements in  important areas of daily functioning.     Short-term goals: Patient will adhere to medication regimen and note continued improvement in symptoms over the next 3 months.   Long-term goals: Patient will be adherent to medication management and psychotherapy with continued improvement in symptoms over the next 6 months          This document has been electronically signed by JANIE Thorpe   February 14, 2022 15:49 EST    Part of this note may be an electronic transcription/translation of spoken language to printed text using the Dragon Dictation System.

## 2022-02-18 ENCOUNTER — OFFICE VISIT (OUTPATIENT)
Dept: FAMILY MEDICINE CLINIC | Facility: CLINIC | Age: 37
End: 2022-02-18

## 2022-02-18 DIAGNOSIS — E78.2 MIXED HYPERLIPIDEMIA: ICD-10-CM

## 2022-02-18 DIAGNOSIS — E28.2 PCOS (POLYCYSTIC OVARIAN SYNDROME): ICD-10-CM

## 2022-02-18 DIAGNOSIS — E55.9 VITAMIN D DEFICIENCY: ICD-10-CM

## 2022-02-18 DIAGNOSIS — I87.2 CHRONIC VENOUS INSUFFICIENCY: Primary | ICD-10-CM

## 2022-02-18 DIAGNOSIS — J06.9 VIRAL UPPER RESPIRATORY TRACT INFECTION: ICD-10-CM

## 2022-02-18 DIAGNOSIS — I10 ESSENTIAL HYPERTENSION: ICD-10-CM

## 2022-02-18 DIAGNOSIS — E11.9 TYPE 2 DIABETES MELLITUS WITHOUT COMPLICATION, WITHOUT LONG-TERM CURRENT USE OF INSULIN: ICD-10-CM

## 2022-02-18 DIAGNOSIS — Z87.442 HISTORY OF NEPHROLITHIASIS: ICD-10-CM

## 2022-02-18 DIAGNOSIS — F31.77 BIPOLAR DISORDER, IN PARTIAL REMISSION, MOST RECENT EPISODE MIXED: ICD-10-CM

## 2022-02-18 DIAGNOSIS — R06.83 SNORING: ICD-10-CM

## 2022-02-18 DIAGNOSIS — R00.2 PALPITATIONS: ICD-10-CM

## 2022-02-18 DIAGNOSIS — Z00.00 HEALTHCARE MAINTENANCE: ICD-10-CM

## 2022-02-18 DIAGNOSIS — E66.9 CLASS 1 OBESITY WITH SERIOUS COMORBIDITY AND BODY MASS INDEX (BMI) OF 34.0 TO 34.9 IN ADULT, UNSPECIFIED OBESITY TYPE: ICD-10-CM

## 2022-02-18 DIAGNOSIS — K21.9 GASTROESOPHAGEAL REFLUX DISEASE WITHOUT ESOPHAGITIS: ICD-10-CM

## 2022-02-18 DIAGNOSIS — F17.200 SMOKER: ICD-10-CM

## 2022-02-18 PROBLEM — E66.811 CLASS 1 OBESITY WITH SERIOUS COMORBIDITY AND BODY MASS INDEX (BMI) OF 34.0 TO 34.9 IN ADULT: Status: ACTIVE | Noted: 2019-03-18

## 2022-02-18 PROCEDURE — 99214 OFFICE O/P EST MOD 30 MIN: CPT | Performed by: GENERAL PRACTICE

## 2022-02-18 RX ORDER — PROPRANOLOL HYDROCHLORIDE 120 MG/1
120 CAPSULE, EXTENDED RELEASE ORAL DAILY
Qty: 90 CAPSULE | Refills: 3 | Status: SHIPPED | OUTPATIENT
Start: 2022-02-18 | End: 2022-12-03 | Stop reason: SDUPTHER

## 2022-02-18 RX ORDER — ATORVASTATIN CALCIUM 10 MG/1
10 TABLET, FILM COATED ORAL NIGHTLY
Qty: 90 TABLET | Refills: 3 | Status: SHIPPED | OUTPATIENT
Start: 2022-02-18 | End: 2022-04-16 | Stop reason: SDUPTHER

## 2022-02-18 RX ORDER — ERGOCALCIFEROL 1.25 MG/1
1 CAPSULE ORAL WEEKLY
Qty: 12 CAPSULE | Refills: 0 | Status: SHIPPED | OUTPATIENT
Start: 2022-02-18 | End: 2022-04-16 | Stop reason: SDUPTHER

## 2022-02-18 RX ORDER — SPIRONOLACTONE 100 MG/1
100 TABLET, FILM COATED ORAL DAILY
Qty: 90 TABLET | Refills: 3 | Status: SHIPPED | OUTPATIENT
Start: 2022-02-18 | End: 2022-05-13

## 2022-02-18 RX ORDER — PANTOPRAZOLE SODIUM 40 MG/1
40 TABLET, DELAYED RELEASE ORAL DAILY
Qty: 90 TABLET | Refills: 3 | Status: SHIPPED | OUTPATIENT
Start: 2022-02-18 | End: 2022-12-03 | Stop reason: SDUPTHER

## 2022-02-18 RX ORDER — GUAIFENESIN/DEXTROMETHORPHAN 100-10MG/5
10 SYRUP ORAL 4 TIMES DAILY PRN
Qty: 473 ML | Refills: 0 | Status: SHIPPED | OUTPATIENT
Start: 2022-02-18 | End: 2022-04-14

## 2022-02-18 NOTE — PROGRESS NOTES
Subjective   Jessica Harris is a 36 y.o. female.     Chief Complaint  She returns for a scheduled reassessment of multiple medical problems including chronic pelvic pain, type 2 diabetes mellitus, hyperlipidemia, essential hypertension, gastroesophageal reflux, and recent cold-like symptoms    History of Present Illness     Cold-Like Symptoms  She gives a 1 day history of nasal congestion, postnasal drip, and dry cough.  The symptoms have been associated with mild diarrhea.  There is no history of any other upper respiratory tract symptoms and she denies any chest pain, change in her shortness of breath, or any hemoptysis.  There is no history of any nausea, vomiting, hematochezia, or melena and she denies any fever or chills.  She tested positive for Covid approximately 1 month ago and denies any sequela.  She received the [employer] COVID-19 vaccine on 7/10/2021 and has yet to get a booster.  She received a flu shot on 11/1/2021    Pelvic Pain  She continues to have intermittent pelvic pain.  This is described as a sharp pressure.  She denies any associated symptoms at present.  There is no history of any vaginal discharge and she denies any dysuria or hematuria.  There is no history of any change in her bowel habits and she denies any fever, chills, or night sweats.  CT of the abdomen and pelvis performed on 9/10/2021 was reported as showing a small fat-containing umbilical hernia as well as asymmetric thickening of the bladder wall on the left.  Transvaginal ultrasound performed at the same time was reported as showing a 7.8 cm simple left ovarian cyst.  She continues to be followed by gynecology.  She underwent a recent urology assessment and apparently no further follow-up was felt to be necessary    Diabetes  She continues to deny any paresthesia of the feet, visual disturbances, polydipsia, polyuria, hypoglycemia or foot ulcerations. Evaluation to date has been: hemoglobin A1C. Current treatments:  metformin.  She has been following her diet and exercise plan fairly carefully. Last dilated eye exam more than 1 year     Dyslipidemia  Compliance with treatment has been fairly good. She remains on atorvastatin. She experienced GI upset with vascepa    Hypertension  Home blood pressure readings: not doing. Associated signs and symptoms: dyspnea and peripheral edema. Patient denies: chest pain, palpitations, orthopnea and paroxysmal nocturnal dyspnea. Current antihypertensive medications includes propranolol and aldactone. Medication compliance: taking as prescribed.     Palpitations  She has a history of previous palpitations.  She admits to shortness of breath with above average exertion as well as intermittent swelling of the ankles toward the end of the day but feels the symptoms have improved with her weight loss and continues to deny any chest pain, lightheadedness, diaphoresis, or nausea.  She remains on propranolol ER as prescribed with no further palpitations.  Holter monitoring performed on 2/18/2019 revealed sinus tachycardia but was otherwise unremarkable.  Echocardiogram performed on 2/20/2019 was also normal with an estimated EF of 61-65%.    GERD  She gives a history of intermittent heartburn described as a burning epigastric and retrosternal discomfort.  There is no history of any difficulty swallowing, vomiting, change in her bowel habits, hematochezia or melena and she denies any fever or chills.  She remains on pantoprazole with a good control of her symptoms.    The following portions of the patient's history were reviewed and updated as appropriate: allergies, current medications, past medical history, past social history and problem list.    Review of Systems   Constitutional: Positive for fatigue. Negative for appetite change, chills, fever and unexpected weight change.   HENT: Positive for congestion, postnasal drip and rhinorrhea. Negative for ear pain, sneezing and sore throat.    Eyes:  Negative for visual disturbance.   Respiratory: Positive for cough and shortness of breath (with above average exertion). Negative for wheezing.         Occasionally wakes with a feeling she cannot get breath and has been told that she snores   Cardiovascular: Negative for chest pain, palpitations and leg swelling.   Gastrointestinal: Positive for diarrhea. Negative for abdominal pain, blood in stool, constipation, nausea and vomiting.   Endocrine: Negative for polydipsia and polyuria.   Genitourinary: Positive for pelvic pain. Negative for dysuria, hematuria and urgency.   Musculoskeletal: Positive for arthralgias and myalgias. Negative for back pain, joint swelling and neck pain.   Skin: Negative for rash.   Neurological: Positive for headaches. Negative for weakness and numbness.   Psychiatric/Behavioral: Negative for dysphoric mood, sleep disturbance and suicidal ideas. The patient is nervous/anxious.      Objective   Physical Exam  Constitutional:       General: She is not in acute distress.     Appearance: Normal appearance. She is well-developed. She is not diaphoretic.      Comments: Bright and in fair spirits. No apparent distress. No pallor, jaundice, diaphoresis, or cyanosis.   HENT:      Head: Atraumatic.      Right Ear: Tympanic membrane, ear canal and external ear normal.      Left Ear: Tympanic membrane, ear canal and external ear normal.   Eyes:      Conjunctiva/sclera: Conjunctivae normal.   Neck:      Thyroid: No thyroid mass or thyromegaly.      Vascular: No carotid bruit or JVD.      Trachea: Trachea normal. No tracheal deviation.   Cardiovascular:      Rate and Rhythm: Normal rate and regular rhythm.      Heart sounds: Normal heart sounds, S1 normal and S2 normal. No murmur heard.  No gallop.    Pulmonary:      Effort: Pulmonary effort is normal.      Breath sounds: Normal breath sounds.   Chest:   Breasts:      Right: No supraclavicular adenopathy.      Left: No supraclavicular adenopathy.        Abdominal:      General: Bowel sounds are normal. There is no distension.      Palpations: Abdomen is soft. There is no mass.      Tenderness: There is no abdominal tenderness.   Musculoskeletal:      Right lower leg: No edema.      Left lower leg: No edema.   Lymphadenopathy:      Head:      Right side of head: No submental, submandibular, tonsillar, preauricular, posterior auricular or occipital adenopathy.      Left side of head: No submental, submandibular, tonsillar, preauricular, posterior auricular or occipital adenopathy.      Cervical: No cervical adenopathy.      Upper Body:      Right upper body: No supraclavicular adenopathy.      Left upper body: No supraclavicular adenopathy.   Skin:     General: Skin is warm.      Coloration: Skin is not cyanotic, jaundiced or pale.      Findings: No rash.      Nails: There is no clubbing.   Neurological:      Mental Status: She is alert and oriented to person, place, and time.      Cranial Nerves: No cranial nerve deficit.      Motor: No tremor.      Coordination: Coordination normal.      Gait: Gait normal.   Psychiatric:         Attention and Perception: Attention normal.         Mood and Affect: Mood normal.         Speech: Speech normal.         Behavior: Behavior normal.         Thought Content: Thought content normal.       Assessment/Plan   Problems Addressed this Visit        Cardiac and Vasculature    Chronic venous insufficiency     Essential hypertension   Hypertension: at goal. Evidence of target organ damage: none.  Encouraged to continue to work on diet and exercise plan.   Continue current medication    Relevant Medications    spironolactone (ALDACTONE) 100 MG tablet    propranolol LA (INDERAL LA) 120 MG 24 hr capsule    Other Relevant Orders    CBC & Differential    Comprehensive Metabolic Panel    Mixed hyperlipidemia  As above.   Continue current medication.    Relevant Medications    atorvastatin (LIPITOR) 10 MG tablet    Other Relevant  Orders    Comprehensive Metabolic Panel    Lipid Panel    TSH    Palpitations  Continue current medication  Encouraged to report if any worse or if any new symptoms or concerns.    Relevant Medications    propranolol LA (INDERAL LA) 120 MG 24 hr capsule    Other Relevant Orders    TSH       ENT    Viral upper respiratory tract infection  Advised regarding symptomatic treatment  Encouraged to report if any worse, any new symptoms, or if not resolving over the next 4-5 days    Relevant Medications    guaiFENesin-dextromethorphan (ROBITUSSIN DM) 100-10 MG/5ML syrup       Endocrine and Metabolic    Class 1 obesity with serious comorbidity and body mass index (BMI) of 34.0 to 34.9 in adult    PCOS (polycystic ovarian syndrome)  Follow up with gynecology    Relevant Medications    spironolactone (ALDACTONE) 100 MG tablet    Type 2 diabetes mellitus without complication, without long-term current use of insulin (HCC)  Diabetes mellitus Type II, under unknown control.   Encouraged to continue to pursue ADA diet  Encouraged aerobic exercise.  Continue current medication  Scheduled for updated labs    Relevant Medications    metFORMIN (GLUCOPHAGE) 500 MG tablet    Other Relevant Orders    Hemoglobin A1c    MicroAlbumin, Urine, Random - Urine, Clean Catch    Vitamin B12    Vitamin D deficiency  Continue supplementation with monitoring.    Relevant Medications    ergocalciferol (ERGOCALCIFEROL) 1.25 MG (11854 UT) capsule    Other Relevant Orders    Vitamin D 25 Hydroxy       Gastrointestinal Abdominal     Gastroesophageal reflux disease without esophagitis   Symptoms are currently well controlled.  Reminded regarding lifestyle modification.  Continue current medication.    Relevant Medications    pantoprazole (PROTONIX) 40 MG EC tablet       Genitourinary and Reproductive     History of nephrolithiasis       Health Encounters    Healthcare maintenance  Encouraged to obtain a COVID-19 booster       Mental Health    Bipolar  disorder, in partial remission, most recent episode mixed (HCC)       Sleep    Snoring       Tobacco    Smoker      Diagnoses       Codes Comments    Chronic venous insufficiency    -  Primary ICD-10-CM: I87.2  ICD-9-CM: 459.81     Essential hypertension     ICD-10-CM: I10  ICD-9-CM: 401.9     Mixed hyperlipidemia     ICD-10-CM: E78.2  ICD-9-CM: 272.2     Palpitations     ICD-10-CM: R00.2  ICD-9-CM: 785.1     Class 1 obesity with serious comorbidity and body mass index (BMI) of 34.0 to 34.9 in adult, unspecified obesity type     ICD-10-CM: E66.9, Z68.34  ICD-9-CM: 278.00, V85.34     PCOS (polycystic ovarian syndrome)     ICD-10-CM: E28.2  ICD-9-CM: 256.4     Type 2 diabetes mellitus without complication, without long-term current use of insulin (HCC)     ICD-10-CM: E11.9  ICD-9-CM: 250.00     Vitamin D deficiency     ICD-10-CM: E55.9  ICD-9-CM: 268.9     Gastroesophageal reflux disease without esophagitis     ICD-10-CM: K21.9  ICD-9-CM: 530.81     History of nephrolithiasis     ICD-10-CM: Z87.442  ICD-9-CM: V13.01     Healthcare maintenance     ICD-10-CM: Z00.00  ICD-9-CM: V70.0     Bipolar disorder, in partial remission, most recent episode mixed (HCC)     ICD-10-CM: F31.77  ICD-9-CM: 296.65     Snoring     ICD-10-CM: R06.83  ICD-9-CM: 786.09     Smoker     ICD-10-CM: F17.200  ICD-9-CM: 305.1     Viral upper respiratory tract infection     ICD-10-CM: J06.9  ICD-9-CM: 465.9

## 2022-02-19 VITALS
HEIGHT: 65 IN | DIASTOLIC BLOOD PRESSURE: 70 MMHG | SYSTOLIC BLOOD PRESSURE: 118 MMHG | BODY MASS INDEX: 34.26 KG/M2 | HEART RATE: 96 BPM | RESPIRATION RATE: 14 BRPM | TEMPERATURE: 97.9 F | WEIGHT: 205.6 LBS | OXYGEN SATURATION: 95 %

## 2022-02-19 PROBLEM — N32.89 BLADDER MASS: Status: RESOLVED | Noted: 2021-11-01 | Resolved: 2022-02-19

## 2022-04-14 ENCOUNTER — OFFICE VISIT (OUTPATIENT)
Dept: FAMILY MEDICINE CLINIC | Facility: CLINIC | Age: 37
End: 2022-04-14

## 2022-04-14 VITALS
RESPIRATION RATE: 14 BRPM | BODY MASS INDEX: 35.65 KG/M2 | HEIGHT: 65 IN | HEART RATE: 90 BPM | WEIGHT: 214 LBS | OXYGEN SATURATION: 97 % | SYSTOLIC BLOOD PRESSURE: 120 MMHG | DIASTOLIC BLOOD PRESSURE: 80 MMHG | TEMPERATURE: 96.9 F

## 2022-04-14 DIAGNOSIS — I87.2 CHRONIC VENOUS INSUFFICIENCY: Primary | Chronic | ICD-10-CM

## 2022-04-14 DIAGNOSIS — J45.20 MILD INTERMITTENT ASTHMA, UNSPECIFIED WHETHER COMPLICATED: Chronic | ICD-10-CM

## 2022-04-14 DIAGNOSIS — E11.9 TYPE 2 DIABETES MELLITUS WITHOUT COMPLICATION, WITHOUT LONG-TERM CURRENT USE OF INSULIN: Chronic | ICD-10-CM

## 2022-04-14 DIAGNOSIS — E55.9 VITAMIN D DEFICIENCY: ICD-10-CM

## 2022-04-14 DIAGNOSIS — E78.2 MIXED DYSLIPIDEMIA: Chronic | ICD-10-CM

## 2022-04-14 DIAGNOSIS — I10 ESSENTIAL HYPERTENSION: Chronic | ICD-10-CM

## 2022-04-14 PROCEDURE — 99214 OFFICE O/P EST MOD 30 MIN: CPT | Performed by: NURSE PRACTITIONER

## 2022-04-14 RX ORDER — GLUCOSAMINE HCL/CHONDROITIN SU 500-400 MG
1 CAPSULE ORAL DAILY
Qty: 100 EACH | Refills: 3 | Status: SHIPPED | OUTPATIENT
Start: 2022-04-14 | End: 2022-06-20

## 2022-04-14 RX ORDER — LANCETS 30 GAUGE
EACH MISCELLANEOUS
Qty: 50 EACH | Refills: 5 | Status: SHIPPED | OUTPATIENT
Start: 2022-04-14 | End: 2022-06-20

## 2022-04-14 RX ORDER — BLOOD-GLUCOSE METER
1 KIT MISCELLANEOUS DAILY
Qty: 1 EACH | Refills: 0 | Status: SHIPPED | OUTPATIENT
Start: 2022-04-14 | End: 2022-06-20

## 2022-04-14 RX ORDER — ALBUTEROL SULFATE 90 UG/1
2 AEROSOL, METERED RESPIRATORY (INHALATION) EVERY 6 HOURS PRN
Qty: 18 G | Refills: 3 | Status: SHIPPED | OUTPATIENT
Start: 2022-04-14 | End: 2023-01-19 | Stop reason: SDUPTHER

## 2022-04-14 NOTE — PATIENT INSTRUCTIONS
Type 2 Diabetes Mellitus, Self-Care, Adult  When you have type 2 diabetes (type 2 diabetes mellitus), you must make sure your blood sugar (glucose) stays in a healthy range. You can do this with:  Nutrition.  Exercise.  Lifestyle changes.  Medicines or insulin, if needed.  Support from your doctors and others.  What are the risks?  Having diabetes can raise your risk for other long-term (chronic) health problems. You may get medicines to help prevent these problems.  How to stay aware of blood sugar    Check your blood sugar level every day, as often as told.  Have your A1C (hemoglobin A1C) level checked two or more times a year. Have it checked more often if told.  Your doctor will set personal treatment goals for you. In general, you should have these blood sugar levels:  Before meals:  mg/dL (4.4-7.2 mmol/L).  After meals: below 180 mg/dL (10 mmol/L).  A1C: less than 7%.  How to manage high and low blood sugar  Symptoms of high blood sugar  High blood sugar is also called hyperglycemia. Know the symptoms of high blood sugar. These may include:  More thirst.  Hunger.  Feeling very tired.  Needing to pee (urinate) more often than normal.  Seeing things blurry.  Symptoms of low blood sugar  Low blood sugar is also called hypoglycemia. This is when blood sugar is at or below 70 mg/dL (3.9 mmol/L). Symptoms may include:  Hunger.  Feeling worried or nervous (anxious).  Feeling sweaty and cold to the touch (clammy).  Being dizzy or light-headed.  Feeling sleepy.  A fast heartbeat.  Feeling grouchy (irritable).  Tingling or loss of feeling (numbness) around your mouth, lips, or tongue.  Restless sleep.  Diabetes medicines can cause low blood sugar. You are more at risk:  While you exercise.  After exercise.  During sleep.  When you are sick.  When you skip meals or do not eat for a long time.  Treating low blood sugar  If you think you have low blood sugar, eat or drink something sugary right away. Keep 15 grams of  a fast-acting carb (carbohydrate) with you all the time. Make sure your family and friends know how to treat you if you cannot treat yourself.  Treating very low blood sugar  Severe hypoglycemia is when your blood sugar is at or below 54 mg/dL (3 mmol/L). Severe hypoglycemia is an emergency. Do not wait to see if the symptoms will go away. Get medical help right away. Call your friend learn how to check your blood sugar and how to give you a glucagon shot. Ask your doctor if you should have a kit for glucagon shots.  Follow these instructions at home:  Medicines  Take diabetes medicines as told. If your doctor prescribed insulin or diabetes medicines, take them each day.  Do not run out of insulin or other medicines. Plan ahead.  If you use insulin, change the amount you take based on how active you are and what foods you eat. Your doctor will tell you how to do this.  Take over-the-counter and prescription medicines only as told by your doctor.  Eating and drinking    Eat healthy foods. These include:  Low-fat (lean) proteins.  Complex carbs, such as whole grains.  Fresh fruits and vegetables.  Low-fat dairy products.  Healthy fats.  Meet with a food expert (dietitian) to make an eating plan.  Follow instructions from your doctor about what you cannot eat or drink.  Drink enough fluid to keep your pee (urine) pale yellow.  Keep track of carbs that you eat. Read food labels and learn serving sizes of foods.  Follow your sick-day plan when you cannot eat or drink as normal. Make this plan with your doctor so it is ready to use.    Activity  Exercise as told by your doctor. You may need to:  Do stretching and strength exercises 2 or more times a week.  Do 150 minutes or more of exercise each week that makes your heart beat faster and makes you sweat.  Spread out your exercise over 3 or more days a week.  Do not go more than 2 days in a row without exercise.  Talk with your doctor before you start a new exercise. Your  doctor may tell you to change:  How much insulin or medicines you take.  How much food you eat.  Lifestyle  Do not use any products that contain nicotine or tobacco, such as cigarettes, e-cigarettes, and chewing tobacco. If you need help quitting, ask your doctor.  If you drink alcohol and your doctor says alcohol is safe for you:  Limit how much you use to:  0-1 drink a day for women who are not pregnant.  0-2 drinks a day for men.  Be aware of how much alcohol is in your drink. In the [place] one drink equals one 12 oz bottle of beer (355 mL), one 5 oz glass of wine (148 mL), or one 1½ oz glass of hard liquor (44 mL).  Learn to deal with stress. If you need help, ask your doctor.  Body care    Stay up to date with your shots (immunizations).  Have your eyes and feet checked by a doctor as often as told.  Check your skin and feet every day. Check for cuts, bruises, redness, blisters, or sores.  Brush your teeth and gums two times a day. Floss one or more times a day.  Go to the dentist one or more times every 6 months.  Stay at a healthy weight.    General instructions  Share your diabetes care plan with:  Your work or school.  People you live with.  Carry a card or wear jewelry that says you have diabetes.  Keep all follow-up visits as told by your doctor. This is important.  Questions to ask your doctor  Do I need to meet with a certified expert in diabetes education and care?  Where can I find a support group?  Where to find more information  American Diabetes Association: www.diabetes.org  American Association of Diabetes Care and Education Specialists: www.diabeteseducator.org  International Diabetes Federation: www.idf.org  Summary  When you have type 2 diabetes, you must make sure your blood sugar (glucose) stays in a healthy range. You can do this with nutrition, exercise, medicines and insulin, and support from doctors and others.  Check your blood sugar every day, or as often as told.  Having diabetes can  raise your risk for other long-term health problems. You may get medicines to help prevent these problems.  Share your diabetes management plan with people at work, school, and home.  Keep all follow-up visits as told by your doctor. This is important.  This information is not intended to replace advice given to you by your health care provider. Make sure you discuss any questions you have with your health care provider.  Document Revised: 07/21/2021 Document Reviewed: 07/21/2021  [employer] Patient Education © 2021 [employer]

## 2022-04-14 NOTE — PROGRESS NOTES
History of Present Illness  Jessica Harris is a 36 y.o. female who presents to the clinic today c/o numbness and tingling of her extremities. She has been diagnosed with Chronic Venous Insufficiency,  DM, type 2, HTN, and Dyslipidemia.    Diabetes  She has type 2 diabetes mellitus. . Risk factors for coronary artery disease include diabetes mellitus, dyslipidemia, hypertension, obesity and tobacco exposure. Current diabetic treatment includes oral agent (monotherapy). She was seeing Dr Pandey, Podiatrist,  who no longer treats individuals with Diabetes feet complaints.   Lab Results   Component Value Date    HGBA1C 6.90 (H) 04/14/2022     Hypertension  Well controlled. Risk factors for coronary artery disease include diabetes mellitus, dyslipidemia and smoking/tobacco exposure.   Lab Results   Component Value Date    GLUCOSE 190 (H) 04/14/2022    BUN 5 (L) 04/14/2022    CREATININE 0.58 04/14/2022    EGFRIFNONA 89 11/14/2021    EGFRIFAFRI 96 01/12/2021    BCR 8.6 04/14/2022    K 4.1 04/14/2022    CO2 25.1 04/14/2022    CALCIUM 9.8 04/14/2022    PROTENTOTREF 7.3 04/14/2022    ALBUMIN 4.80 04/14/2022    LABIL2 1.9 04/14/2022    AST 25 04/14/2022    ALT 31 04/14/2022     Dyslipidemia  The current episode started more than 1 year ago. Current antihyperlipidemic treatment includes statins. Risk factors for coronary artery disease include diabetes mellitus, dyslipidemia, hypertension, obesity and a sedentary lifestyle.   Lab Results   Component Value Date    CHOL 128 02/11/2020    CHOL 199 03/15/2019    CHLPL 256 (H) 04/14/2022    CHLPL 160 01/12/2021     Lab Results   Component Value Date    TRIG 756 (H) 04/14/2022    TRIG 545 (H) 01/12/2021    TRIG 486 (H) 02/11/2020     Lab Results   Component Value Date    HDL 35 (L) 04/14/2022    HDL 31 (L) 01/12/2021    HDL 31 (L) 02/11/2020     Lab Results   Component Value Date    LDL 94 04/14/2022    LDL 49 01/12/2021    LDL  02/11/2020      Comment:      Unable to calculate     "LDL 49 02/11/2020     The following portions of the patient's history were reviewed and updated as appropriate: allergies, current medications, past family history, past medical history, past social history, past surgical history and problem list.    Review of Systems   Constitutional: Positive for appetite change and fatigue. Negative for activity change, chills, fever, unexpected weight change and weight loss.   HENT: Negative.    Eyes: Negative for visual disturbance.   Respiratory: Negative for cough, shortness of breath and wheezing.    Cardiovascular: Negative for chest pain, palpitations and leg swelling.   Gastrointestinal: Positive for nausea. Negative for vomiting.   Endocrine: Negative for cold intolerance, heat intolerance, polydipsia, polyphagia and polyuria.   Musculoskeletal: Positive for arthralgias.   Skin: Negative for color change and rash.   Neurological: Positive for numbness (Hands/Feet ). Negative for weakness and light-headedness.   Hematological: Negative for adenopathy.   Psychiatric/Behavioral: Negative for decreased concentration and suicidal ideas.   All other systems reviewed and are negative.    Vital signs:  /80 (BP Location: Right arm, Patient Position: Sitting, Cuff Size: Adult)   Pulse 90   Temp 96.9 °F (36.1 °C) (Temporal)   Resp 14   Ht 165.1 cm (65\")   Wt 97.1 kg (214 lb)   SpO2 97%   BMI 35.61 kg/m²     Physical Exam  Vitals and nursing note reviewed.   Constitutional:       General: She is not in acute distress.     Appearance: She is well-developed.      Interventions: Face mask in place.      Comments: Pleasant; Child accompanies her today   HENT:      Head: Normocephalic.      Right Ear: A middle ear effusion is present. Tympanic membrane is not erythematous or bulging.      Left Ear: A middle ear effusion is present. Tympanic membrane is not erythematous or bulging.      Nose: Nose normal.   Eyes:      General: No scleral icterus.        Right eye: No " discharge.         Left eye: No discharge.      Conjunctiva/sclera: Conjunctivae normal.   Neck:      Thyroid: No thyromegaly.   Cardiovascular:      Rate and Rhythm: Normal rate and regular rhythm.      Heart sounds: Normal heart sounds. No murmur heard.    No friction rub.   Pulmonary:      Effort: Pulmonary effort is normal. No respiratory distress.      Breath sounds: Decreased breath sounds and wheezing present. No rhonchi or rales.   Abdominal:      General: Bowel sounds are normal. There is no distension.      Palpations: Abdomen is soft.      Tenderness: There is no abdominal tenderness. There is no guarding.   Musculoskeletal:      Cervical back: Neck supple.      Right lower leg: No edema.      Left lower leg: No edema.   Lymphadenopathy:      Cervical: No cervical adenopathy.   Skin:     General: Skin is warm and dry.      Capillary Refill: Capillary refill takes less than 2 seconds.   Neurological:      Mental Status: She is alert and oriented to person, place, and time.      Cranial Nerves: Cranial nerves are intact.   Psychiatric:         Mood and Affect: Mood and affect normal.         Speech: Speech normal.         Behavior: Behavior is cooperative.         Thought Content: Thought content normal.         Cognition and Memory: Cognition and memory normal.       Patient's Body mass index is 35.61 kg/m². indicating that she is obese (BMI >30). Obesity-related health conditions include the following: hypertension, diabetes mellitus, dyslipidemias and GERD. Obesity is increased during winter. BMI  is above average; BMI management plan is completed. Provided information and discussion of portion control and increasing exercise..     Assessment/Plan     Diagnoses and all orders for this visit:    1. Chronic venous insufficiency (Primary)  Comments:  Lifestyle modifications including smoking cessation. May benefit from Trental     2. Type 2 diabetes mellitus without complication, without long-term current  use of insulin (HCC)  Comments:  Continue Metformin  Orders:  -     glucose monitor monitoring kit; 1 each Daily.  Dispense: 1 each; Refill: 0  -     Glucose Blood (Blood Glucose Test) strip; 1 Device by Other route Daily. 1 daily  Dispense: 100 each; Refill: 3  -     Lancets misc; 1 daily  Dispense: 50 each; Refill: 5  -     Hemoglobin A1c  -     MicroAlbumin, Urine, Random - Urine, Clean Catch  -     Vitamin B12    3. Essential hypertension  Comments:  Continue Propranolol   Orders:  -     CBC & Differential  -     Comprehensive Metabolic Panel  -     TSH    4. Mild intermittent asthma, unspecified whether complicated  Comments:  Smoking cessation and Albuteral Inh as needed   Orders:  -     albuterol sulfate  (90 Base) MCG/ACT inhaler; Inhale 2 puffs Every 6 (Six) Hours As Needed for Wheezing.  Dispense: 18 g; Refill: 3    5. Mixed dyslipidemia  Comments:  Continue Atorvastatin and Vascepa   Orders:  -     Comprehensive Metabolic Panel  -     Lipid Panel  -     TSH    6. Vitamin D deficiency  Comments:  Labs ordered   Orders:  -     Vitamin D 25 Hydroxy    Follow Up April 19th  Findings and recommendations discussed with Jessica. Reviewed treatment options. Lifestyle modifications reinforced including nutrition and activity recommendations. She will follow up on April 19th sooner if problems/concerns occur.  Jessica was given instructions and counseling regarding her condition or for health maintenance advice. Please see specific information pulled into the AVS if appropriate    This document has been electronically signed by:

## 2022-04-15 LAB
25(OH)D3+25(OH)D2 SERPL-MCNC: 14.7 NG/ML (ref 30–100)
ALBUMIN SERPL-MCNC: 4.8 G/DL (ref 3.5–5.2)
ALBUMIN/GLOB SERPL: 1.9 G/DL
ALP SERPL-CCNC: 72 U/L (ref 39–117)
ALT SERPL-CCNC: 31 U/L (ref 1–33)
AST SERPL-CCNC: 25 U/L (ref 1–32)
BASOPHILS # BLD AUTO: 0.06 10*3/MM3 (ref 0–0.2)
BASOPHILS NFR BLD AUTO: 0.6 % (ref 0–1.5)
BILIRUB SERPL-MCNC: 0.2 MG/DL (ref 0–1.2)
BUN SERPL-MCNC: 5 MG/DL (ref 6–20)
BUN/CREAT SERPL: 8.6 (ref 7–25)
CALCIUM SERPL-MCNC: 9.8 MG/DL (ref 8.6–10.5)
CHLORIDE SERPL-SCNC: 101 MMOL/L (ref 98–107)
CHOLEST SERPL-MCNC: 256 MG/DL (ref 0–200)
CO2 SERPL-SCNC: 25.1 MMOL/L (ref 22–29)
CREAT SERPL-MCNC: 0.58 MG/DL (ref 0.57–1)
EGFRCR SERPLBLD CKD-EPI 2021: 122 ML/MIN/1.73
EOSINOPHIL # BLD AUTO: 0.31 10*3/MM3 (ref 0–0.4)
EOSINOPHIL NFR BLD AUTO: 3 % (ref 0.3–6.2)
ERYTHROCYTE [DISTWIDTH] IN BLOOD BY AUTOMATED COUNT: 12.7 % (ref 12.3–15.4)
GLOBULIN SER CALC-MCNC: 2.5 GM/DL
GLUCOSE SERPL-MCNC: 190 MG/DL (ref 65–99)
HBA1C MFR BLD: 6.9 % (ref 4.8–5.6)
HCT VFR BLD AUTO: 45.8 % (ref 34–46.6)
HDLC SERPL-MCNC: 35 MG/DL (ref 40–60)
HGB BLD-MCNC: 14.9 G/DL (ref 12–15.9)
IMM GRANULOCYTES # BLD AUTO: 0.13 10*3/MM3 (ref 0–0.05)
IMM GRANULOCYTES NFR BLD AUTO: 1.2 % (ref 0–0.5)
LDLC SERPL CALC-MCNC: 94 MG/DL (ref 0–100)
LYMPHOCYTES # BLD AUTO: 2.98 10*3/MM3 (ref 0.7–3.1)
LYMPHOCYTES NFR BLD AUTO: 28.4 % (ref 19.6–45.3)
MCH RBC QN AUTO: 27.9 PG (ref 26.6–33)
MCHC RBC AUTO-ENTMCNC: 32.5 G/DL (ref 31.5–35.7)
MCV RBC AUTO: 85.6 FL (ref 79–97)
MICROALBUMIN UR-MCNC: 96.8 UG/ML
MONOCYTES # BLD AUTO: 0.69 10*3/MM3 (ref 0.1–0.9)
MONOCYTES NFR BLD AUTO: 6.6 % (ref 5–12)
NEUTROPHILS # BLD AUTO: 6.33 10*3/MM3 (ref 1.7–7)
NEUTROPHILS NFR BLD AUTO: 60.2 % (ref 42.7–76)
NRBC BLD AUTO-RTO: 0 /100 WBC (ref 0–0.2)
PLATELET # BLD AUTO: 427 10*3/MM3 (ref 140–450)
POTASSIUM SERPL-SCNC: 4.1 MMOL/L (ref 3.5–5.2)
PROT SERPL-MCNC: 7.3 G/DL (ref 6–8.5)
RBC # BLD AUTO: 5.35 10*6/MM3 (ref 3.77–5.28)
SODIUM SERPL-SCNC: 141 MMOL/L (ref 136–145)
TRIGL SERPL-MCNC: 756 MG/DL (ref 0–150)
TSH SERPL DL<=0.005 MIU/L-ACNC: 1.74 UIU/ML (ref 0.27–4.2)
VIT B12 SERPL-MCNC: 360 PG/ML (ref 211–946)
VLDLC SERPL CALC-MCNC: 127 MG/DL (ref 5–40)
WBC # BLD AUTO: 10.5 10*3/MM3 (ref 3.4–10.8)

## 2022-04-16 DIAGNOSIS — E55.9 VITAMIN D DEFICIENCY: ICD-10-CM

## 2022-04-16 DIAGNOSIS — E78.2 MIXED HYPERLIPIDEMIA: ICD-10-CM

## 2022-04-16 RX ORDER — ATORVASTATIN CALCIUM 20 MG/1
20 TABLET, FILM COATED ORAL NIGHTLY
Qty: 30 TABLET | Refills: 5 | Status: SHIPPED | OUTPATIENT
Start: 2022-04-16 | End: 2022-12-03 | Stop reason: SDUPTHER

## 2022-04-16 RX ORDER — ERGOCALCIFEROL 1.25 MG/1
1 CAPSULE ORAL WEEKLY
Qty: 4 CAPSULE | Refills: 5 | Status: ON HOLD | OUTPATIENT
Start: 2022-04-16 | End: 2022-06-22

## 2022-04-16 RX ORDER — ICOSAPENT ETHYL 1000 MG/1
CAPSULE ORAL
Qty: 120 CAPSULE | Refills: 5 | Status: SHIPPED | OUTPATIENT
Start: 2022-04-16 | End: 2022-04-19 | Stop reason: SDUPTHER

## 2022-04-17 PROBLEM — G62.9 NEUROPATHY: Status: ACTIVE | Noted: 2022-04-17

## 2022-04-18 ENCOUNTER — HOSPITAL ENCOUNTER (EMERGENCY)
Facility: HOSPITAL | Age: 37
Discharge: LEFT BEFORE DISCHARGE | End: 2022-04-18
Attending: STUDENT IN AN ORGANIZED HEALTH CARE EDUCATION/TRAINING PROGRAM | Admitting: STUDENT IN AN ORGANIZED HEALTH CARE EDUCATION/TRAINING PROGRAM

## 2022-04-18 ENCOUNTER — APPOINTMENT (OUTPATIENT)
Dept: GENERAL RADIOLOGY | Facility: HOSPITAL | Age: 37
End: 2022-04-18

## 2022-04-18 VITALS
BODY MASS INDEX: 35.82 KG/M2 | HEIGHT: 65 IN | OXYGEN SATURATION: 97 % | TEMPERATURE: 97.8 F | DIASTOLIC BLOOD PRESSURE: 79 MMHG | WEIGHT: 215 LBS | SYSTOLIC BLOOD PRESSURE: 160 MMHG | RESPIRATION RATE: 20 BRPM | HEART RATE: 109 BPM

## 2022-04-18 DIAGNOSIS — R10.10 PAIN OF UPPER ABDOMEN: Primary | ICD-10-CM

## 2022-04-18 LAB
ALBUMIN SERPL-MCNC: 4.56 G/DL (ref 3.5–5.2)
ALBUMIN/GLOB SERPL: 1.5 G/DL
ALP SERPL-CCNC: 87 U/L (ref 39–117)
ALT SERPL W P-5'-P-CCNC: 39 U/L (ref 1–33)
ANION GAP SERPL CALCULATED.3IONS-SCNC: 13.8 MMOL/L (ref 5–15)
AST SERPL-CCNC: 24 U/L (ref 1–32)
B-HCG UR QL: NEGATIVE
BACTERIA UR QL AUTO: ABNORMAL /HPF
BASOPHILS # BLD AUTO: 0.06 10*3/MM3 (ref 0–0.2)
BASOPHILS NFR BLD AUTO: 0.5 % (ref 0–1.5)
BILIRUB SERPL-MCNC: 0.2 MG/DL (ref 0–1.2)
BILIRUB UR QL STRIP: NEGATIVE
BUN SERPL-MCNC: 5 MG/DL (ref 6–20)
BUN/CREAT SERPL: 8.3 (ref 7–25)
CALCIUM SPEC-SCNC: 10 MG/DL (ref 8.6–10.5)
CHLORIDE SERPL-SCNC: 92 MMOL/L (ref 98–107)
CLARITY UR: ABNORMAL
CO2 SERPL-SCNC: 26.2 MMOL/L (ref 22–29)
COLOR UR: YELLOW
CREAT SERPL-MCNC: 0.6 MG/DL (ref 0.57–1)
DEPRECATED RDW RBC AUTO: 40.3 FL (ref 37–54)
EGFRCR SERPLBLD CKD-EPI 2021: 121 ML/MIN/1.73
EOSINOPHIL # BLD AUTO: 0.24 10*3/MM3 (ref 0–0.4)
EOSINOPHIL NFR BLD AUTO: 2.1 % (ref 0.3–6.2)
ERYTHROCYTE [DISTWIDTH] IN BLOOD BY AUTOMATED COUNT: 13 % (ref 12.3–15.4)
GLOBULIN UR ELPH-MCNC: 3 GM/DL
GLUCOSE SERPL-MCNC: 183 MG/DL (ref 65–99)
GLUCOSE UR STRIP-MCNC: NEGATIVE MG/DL
HCT VFR BLD AUTO: 47 % (ref 34–46.6)
HGB BLD-MCNC: 15 G/DL (ref 12–15.9)
HGB UR QL STRIP.AUTO: NEGATIVE
HYALINE CASTS UR QL AUTO: ABNORMAL /LPF
IMM GRANULOCYTES # BLD AUTO: 0.09 10*3/MM3 (ref 0–0.05)
IMM GRANULOCYTES NFR BLD AUTO: 0.8 % (ref 0–0.5)
KETONES UR QL STRIP: ABNORMAL
LEUKOCYTE ESTERASE UR QL STRIP.AUTO: NEGATIVE
LYMPHOCYTES # BLD AUTO: 2.86 10*3/MM3 (ref 0.7–3.1)
LYMPHOCYTES NFR BLD AUTO: 25.2 % (ref 19.6–45.3)
MCH RBC QN AUTO: 27.3 PG (ref 26.6–33)
MCHC RBC AUTO-ENTMCNC: 31.9 G/DL (ref 31.5–35.7)
MCV RBC AUTO: 85.5 FL (ref 79–97)
MONOCYTES # BLD AUTO: 0.63 10*3/MM3 (ref 0.1–0.9)
MONOCYTES NFR BLD AUTO: 5.6 % (ref 5–12)
NEUTROPHILS NFR BLD AUTO: 65.8 % (ref 42.7–76)
NEUTROPHILS NFR BLD AUTO: 7.45 10*3/MM3 (ref 1.7–7)
NITRITE UR QL STRIP: NEGATIVE
NRBC BLD AUTO-RTO: 0 /100 WBC (ref 0–0.2)
PH UR STRIP.AUTO: 6.5 [PH] (ref 5–8)
PLATELET # BLD AUTO: 388 10*3/MM3 (ref 140–450)
PMV BLD AUTO: 8.8 FL (ref 6–12)
POTASSIUM SERPL-SCNC: 3.9 MMOL/L (ref 3.5–5.2)
PROT SERPL-MCNC: 7.6 G/DL (ref 6–8.5)
PROT UR QL STRIP: ABNORMAL
RBC # BLD AUTO: 5.5 10*6/MM3 (ref 3.77–5.28)
RBC # UR STRIP: ABNORMAL /HPF
REF LAB TEST METHOD: ABNORMAL
SODIUM SERPL-SCNC: 132 MMOL/L (ref 136–145)
SP GR UR STRIP: 1.02 (ref 1–1.03)
SQUAMOUS #/AREA URNS HPF: ABNORMAL /HPF
UROBILINOGEN UR QL STRIP: ABNORMAL
WBC # UR STRIP: ABNORMAL /HPF
WBC NRBC COR # BLD: 11.33 10*3/MM3 (ref 3.4–10.8)

## 2022-04-18 PROCEDURE — 81025 URINE PREGNANCY TEST: CPT | Performed by: PHYSICIAN ASSISTANT

## 2022-04-18 PROCEDURE — 99282 EMERGENCY DEPT VISIT SF MDM: CPT

## 2022-04-18 PROCEDURE — 81001 URINALYSIS AUTO W/SCOPE: CPT | Performed by: STUDENT IN AN ORGANIZED HEALTH CARE EDUCATION/TRAINING PROGRAM

## 2022-04-18 PROCEDURE — 83690 ASSAY OF LIPASE: CPT | Performed by: NURSE PRACTITIONER

## 2022-04-18 PROCEDURE — 82150 ASSAY OF AMYLASE: CPT | Performed by: NURSE PRACTITIONER

## 2022-04-18 PROCEDURE — 36415 COLL VENOUS BLD VENIPUNCTURE: CPT | Performed by: NURSE PRACTITIONER

## 2022-04-18 PROCEDURE — 85025 COMPLETE CBC W/AUTO DIFF WBC: CPT | Performed by: PHYSICIAN ASSISTANT

## 2022-04-18 PROCEDURE — 80053 COMPREHEN METABOLIC PANEL: CPT | Performed by: PHYSICIAN ASSISTANT

## 2022-04-18 PROCEDURE — 96374 THER/PROPH/DIAG INJ IV PUSH: CPT

## 2022-04-18 PROCEDURE — 83735 ASSAY OF MAGNESIUM: CPT | Performed by: NURSE PRACTITIONER

## 2022-04-18 RX ORDER — PANTOPRAZOLE SODIUM 40 MG/10ML
40 INJECTION, POWDER, LYOPHILIZED, FOR SOLUTION INTRAVENOUS ONCE
Status: COMPLETED | OUTPATIENT
Start: 2022-04-18 | End: 2022-04-18

## 2022-04-18 RX ORDER — SODIUM CHLORIDE 0.9 % (FLUSH) 0.9 %
10 SYRINGE (ML) INJECTION AS NEEDED
Status: DISCONTINUED | OUTPATIENT
Start: 2022-04-18 | End: 2022-04-18 | Stop reason: HOSPADM

## 2022-04-18 RX ADMIN — PANTOPRAZOLE SODIUM 40 MG: 40 INJECTION, POWDER, FOR SOLUTION INTRAVENOUS at 11:23

## 2022-04-18 RX ADMIN — SODIUM CHLORIDE 1000 ML: 9 INJECTION, SOLUTION INTRAVENOUS at 11:22

## 2022-04-18 NOTE — ED NOTES
"Pt came back to ED at this time, took her arm out of her jacket and spoke with GABRIELLE Bowers and said, \"Look I don't have an IV.\" Pt left without signing the AMA form.  "

## 2022-04-18 NOTE — ED NOTES
JODI De La Rosa spoke with pt at this time via telephone and informed pt that she would need to come back to ED so we can remove her IV. Pt informed Trish that she already removed the IV and threw it in the trash. Trish informed pt that if she did not return to the ED then we would need to contact the police.

## 2022-04-18 NOTE — ED NOTES
"X-Ray came to get pt at this time and pt was not in room. I went to lobby and called pt x2 and was unable to locate pt. I contacted pt at mothers number and the pts mother reports pt left and was upset. Pts mother states pt said, \"I am upset because nobody came to see me.\" I informed the mother that I did assess the pt and she did not utilize the call light if anything was needed. I informed the pts mother that since pt has an IV she will need to come back and if she does not come back then we will have to call the police. Pts mother said she will call back to the ED in a few minutes. Charge nurse Nikki Drew and Provider JODI De La Rosa Aware.  "

## 2022-04-19 ENCOUNTER — OFFICE VISIT (OUTPATIENT)
Dept: FAMILY MEDICINE CLINIC | Facility: CLINIC | Age: 37
End: 2022-04-19

## 2022-04-19 ENCOUNTER — OFFICE VISIT (OUTPATIENT)
Dept: PSYCHIATRY | Facility: CLINIC | Age: 37
End: 2022-04-19

## 2022-04-19 VITALS
DIASTOLIC BLOOD PRESSURE: 87 MMHG | WEIGHT: 212.6 LBS | BODY MASS INDEX: 35.42 KG/M2 | HEART RATE: 90 BPM | SYSTOLIC BLOOD PRESSURE: 127 MMHG | HEIGHT: 65 IN

## 2022-04-19 VITALS
SYSTOLIC BLOOD PRESSURE: 110 MMHG | DIASTOLIC BLOOD PRESSURE: 68 MMHG | OXYGEN SATURATION: 99 % | BODY MASS INDEX: 35.32 KG/M2 | TEMPERATURE: 97.1 F | HEIGHT: 65 IN | WEIGHT: 212 LBS | HEART RATE: 91 BPM

## 2022-04-19 DIAGNOSIS — K21.9 GASTROESOPHAGEAL REFLUX DISEASE WITHOUT ESOPHAGITIS: Chronic | ICD-10-CM

## 2022-04-19 DIAGNOSIS — E11.9 TYPE 2 DIABETES MELLITUS WITHOUT COMPLICATION, WITHOUT LONG-TERM CURRENT USE OF INSULIN: Chronic | ICD-10-CM

## 2022-04-19 DIAGNOSIS — R39.9 LOWER URINARY TRACT SYMPTOMS (LUTS): ICD-10-CM

## 2022-04-19 DIAGNOSIS — G62.9 NEUROPATHY: Chronic | ICD-10-CM

## 2022-04-19 DIAGNOSIS — R19.7 DIARRHEA, UNSPECIFIED TYPE: Chronic | ICD-10-CM

## 2022-04-19 DIAGNOSIS — I10 ESSENTIAL HYPERTENSION: Chronic | ICD-10-CM

## 2022-04-19 DIAGNOSIS — F31.30 BIPOLAR I DISORDER, MOST RECENT EPISODE DEPRESSED: ICD-10-CM

## 2022-04-19 DIAGNOSIS — R10.13 EPIGASTRIC PAIN: Primary | ICD-10-CM

## 2022-04-19 DIAGNOSIS — F51.05 INSOMNIA DUE TO MENTAL DISORDER: ICD-10-CM

## 2022-04-19 DIAGNOSIS — E78.2 MIXED DYSLIPIDEMIA: Chronic | ICD-10-CM

## 2022-04-19 LAB
AMYLASE SERPL-CCNC: 39 U/L (ref 28–100)
BILIRUB BLD-MCNC: NEGATIVE MG/DL
CLARITY, POC: ABNORMAL
COLOR UR: YELLOW
EXPIRATION DATE: ABNORMAL
GLUCOSE UR STRIP-MCNC: NEGATIVE MG/DL
KETONES UR QL: NEGATIVE
LEUKOCYTE EST, POC: NEGATIVE
LIPASE SERPL-CCNC: 31 U/L (ref 13–60)
Lab: ABNORMAL
MAGNESIUM SERPL-MCNC: 2.3 MG/DL (ref 1.6–2.6)
NITRITE UR-MCNC: NEGATIVE MG/ML
PH UR: 6 [PH] (ref 5–8)
PROT UR STRIP-MCNC: NEGATIVE MG/DL
RBC # UR STRIP: NEGATIVE /UL
SP GR UR: 1.01 (ref 1–1.03)
UROBILINOGEN UR QL: NORMAL

## 2022-04-19 PROCEDURE — 99214 OFFICE O/P EST MOD 30 MIN: CPT | Performed by: NURSE PRACTITIONER

## 2022-04-19 PROCEDURE — 96372 THER/PROPH/DIAG INJ SC/IM: CPT | Performed by: NURSE PRACTITIONER

## 2022-04-19 RX ORDER — ICOSAPENT ETHYL 1000 MG/1
CAPSULE ORAL
Qty: 120 CAPSULE | Refills: 5 | Status: ON HOLD | OUTPATIENT
Start: 2022-04-19 | End: 2022-06-22

## 2022-04-19 RX ORDER — QUETIAPINE FUMARATE 100 MG/1
100 TABLET, FILM COATED ORAL
Qty: 90 TABLET | Refills: 1 | Status: ON HOLD | OUTPATIENT
Start: 2022-04-19 | End: 2022-06-23 | Stop reason: SDUPTHER

## 2022-04-19 RX ORDER — BUPROPION HYDROCHLORIDE 100 MG/1
100 TABLET, EXTENDED RELEASE ORAL 2 TIMES DAILY
Qty: 180 TABLET | Refills: 1 | Status: ON HOLD | OUTPATIENT
Start: 2022-04-19 | End: 2022-06-23 | Stop reason: SDUPTHER

## 2022-04-19 RX ORDER — LURASIDONE HYDROCHLORIDE 60 MG/1
60 TABLET, FILM COATED ORAL DAILY
Qty: 90 TABLET | Refills: 1 | Status: ON HOLD | OUTPATIENT
Start: 2022-04-19 | End: 2022-06-23 | Stop reason: SDUPTHER

## 2022-04-19 RX ORDER — CYANOCOBALAMIN 1000 UG/ML
1000 INJECTION, SOLUTION INTRAMUSCULAR; SUBCUTANEOUS ONCE
Status: COMPLETED | OUTPATIENT
Start: 2022-04-19 | End: 2022-04-19

## 2022-04-19 RX ORDER — FAMOTIDINE 20 MG/1
20 TABLET, FILM COATED ORAL 2 TIMES DAILY
Qty: 60 TABLET | Refills: 0 | Status: SHIPPED | OUTPATIENT
Start: 2022-04-19 | End: 2022-12-03

## 2022-04-19 RX ADMIN — CYANOCOBALAMIN 1000 MCG: 1000 INJECTION, SOLUTION INTRAMUSCULAR; SUBCUTANEOUS at 12:37

## 2022-04-19 NOTE — PROGRESS NOTES
Injection  Injection performed in right deltoid by Jessica Gagnon MA. Patient tolerated the procedure well without complications.  04/19/22   Jessica Gagnon MA

## 2022-04-19 NOTE — PROGRESS NOTES
"Subjective   Jessica Harris is a 36 y.o. female who presents today for follow up    Chief Complaint:  Bipolar    History of Present Illness: Patient presents as follow up. She reports anxiety and depression has improved however she feels dosage needs to be increased. States she has been caring for her boyfriend's children and it has been a a stressful job. She reports quitting her job and is currently unemployed, states she has been able to continue her health insurance.  She reports sleep is good with medication. Reports appetite is good. States she has been feeling unwell and thinks she has a UTI. She denies SI/HI/AVH.    The following portions of the patient's history were reviewed and updated as appropriate: allergies, current medications, past family history, past medical history, past social history, past surgical history and problem list.      Past Medical History:  Past Medical History:   Diagnosis Date   • Anxiety    • Bipolar 1 disorder (HCC)    • Depression    • GERD (gastroesophageal reflux disease)    • History of bronchitis    • History of ear infections    • History of stomach ulcers    • History of streptococcal infection    • Hypertension    • Urinary tract infection        Social History:  Social History     Socioeconomic History   • Marital status: Single   Tobacco Use   • Smoking status: Current Every Day Smoker     Packs/day: 0.50     Types: Cigarettes   • Smokeless tobacco: Never Used   • Tobacco comment: \"thinking about it\"   Vaping Use   • Vaping Use: Never used   Substance and Sexual Activity   • Alcohol use: No   • Drug use: No   • Sexual activity: Defer     Birth control/protection: Pill       Family History:  Family History   Problem Relation Age of Onset   • Cancer Mother    • Stroke Mother    • Lung disease Mother    • No Known Problems Father    • Cancer Other    • Heart disease Other    • Stroke Other        Past Surgical History:  Past Surgical History:   Procedure Laterality Date "   • ADENOIDECTOMY     • CHOLECYSTECTOMY     • DENTAL PROCEDURE     • HERNIA REPAIR     • TONSILLECTOMY     • WISDOM TOOTH EXTRACTION         Problem List:  Patient Active Problem List   Diagnosis   • Palpitations   • Bipolar disorder, in partial remission, most recent episode mixed ([facility])   • Smoker   • Gastroesophageal reflux disease without esophagitis   • Chronic venous insufficiency   • PCOS (polycystic ovarian syndrome)   • Healthcare maintenance   • Vitamin D deficiency   • Snoring   • History of nephrolithiasis   • Essential hypertension   • Class 1 obesity with serious comorbidity and body mass index (BMI) of 34.0 to 34.9 in adult   • Type 2 diabetes mellitus without complication, without long-term current use of insulin ([facility])   • Mixed hyperlipidemia   • Viral upper respiratory tract infection   • Cyst of left ovary   • Neuropathy       Allergy:   No Known Allergies     Current Medications:   Current Outpatient Medications   Medication Sig Dispense Refill   • albuterol sulfate  (90 Base) MCG/ACT inhaler Inhale 2 puffs Every 6 (Six) Hours As Needed for Wheezing. 18 g 3   • atorvastatin (LIPITOR) 20 MG tablet Take 1 tablet by mouth Every Night. 30 tablet 5   • buPROPion SR (WELLBUTRIN SR) 100 MG 12 hr tablet Take 1 tablet by mouth 2 (Two) Times a Day. 180 tablet 1   • Glucose Blood (Blood Glucose Test) strip 1 Device by Other route Daily. 1 daily 100 each 3   • glucose monitor monitoring kit 1 each Daily. 1 each 0   • Lancets misc 1 daily 50 each 5   • lurasidone (LATUDA) 60 MG tablet tablet Take 1 tablet by mouth Daily. 90 tablet 1   • metFORMIN (GLUCOPHAGE) 500 MG tablet Take 1 tablet by mouth 2 (Two) Times a Day With Meals. 180 tablet 3   • ondansetron ODT (ZOFRAN-ODT) 4 MG disintegrating tablet      • pantoprazole (PROTONIX) 40 MG EC tablet Take 1 tablet by mouth Daily. 90 tablet 3   • propranolol LA (INDERAL LA) 120 MG 24 hr capsule Take 1 capsule by mouth Daily. 90 capsule 3   • QUEtiapine  "(SEROquel) 100 MG tablet Take 1 tablet by mouth every night at bedtime. 90 tablet 1   • cyanocobalamin ([employer] Vitamin B-12) 2000 MCG tablet Take 1 tablet by mouth Daily. 30 tablet 3   • ergocalciferol (ERGOCALCIFEROL) 1.25 MG (72345 UT) capsule Take 1 capsule by mouth 1 (One) Time Per Week. 4 capsule 5   • famotidine (Pepcid) 20 MG tablet Take 1 tablet by mouth 2 (Two) Times a Day. 60 tablet 0   • icosapent ethyl (Vascepa) 1 g capsule capsule 2 twice daily 120 capsule 5   • spironolactone (ALDACTONE) 100 MG tablet Take 1 tablet by mouth Daily. 90 tablet 3     No current facility-administered medications for this visit.       Review of Symptoms:    Review of Systems   Constitutional: Positive for fatigue.   HENT: Negative.    Eyes: Negative.    Respiratory: Negative.    Cardiovascular: Negative.    Gastrointestinal: Negative.    Endocrine: Negative.    Genitourinary: Negative.    Musculoskeletal: Negative.    Skin: Negative.    Neurological: Negative.    Psychiatric/Behavioral: Positive for depressed mood and stress. Negative for suicidal ideas. The patient is nervous/anxious.        Objective   Physical Exam:   Blood pressure 127/87, pulse 90, height 165.1 cm (65\"), weight 96.4 kg (212 lb 9.6 oz).  Body mass index is 35.38 kg/m².    Appearance: Well nourished female, appropriately dressed, appears stated age and in no acute distress  Gait, Station, Strength: WNL    Mental Status Exam:   Hygiene:   good  Cooperation:  Cooperative  Eye Contact:  Good  Psychomotor Behavior:  Appropriate  Affect:  Appropriate  Mood: normal  Hopelessness: Denies  Speech:  Normal  Thought Process:  Goal directed  Thought Content:  Normal and Mood congruent  Suicidal:  None  Homicidal:  None  Hallucinations:  None  Delusion:  None  Memory:  Intact  Orientation:  Person, Place, Time and Situation  Reliability:  good  Insight:  Good  Judgement:  Good  Impulse Control:  Good  Physical/Medical Issues:  No      PHQ-Score Total:  PHQ-9 Total " Score:   0       Lab Results:   Office Visit on 04/19/2022   Component Date Value Ref Range Status   • Color 04/19/2022 Yellow  Yellow, Straw, Dark Yellow, Charleen Final   • Clarity, UA 04/19/2022 Slightly Cloudy (A) Clear Final   • Specific Gravity  04/19/2022 1.015  1.005 - 1.030 Final   • pH, Urine 04/19/2022 6.0  5.0 - 8.0 Final   • Leukocytes 04/19/2022 Negative  Negative Final   • Nitrite, UA 04/19/2022 Negative  Negative Final   • Protein, POC 04/19/2022 Negative  Negative mg/dL Final   • Glucose, UA 04/19/2022 Negative  Negative, 1000 mg/dL (3+) mg/dL Final   • Ketones, UA 04/19/2022 Negative  Negative Final   • Urobilinogen, UA 04/19/2022 Normal  Normal Final   • Bilirubin 04/19/2022 Negative  Negative Final   • Blood, UA 04/19/2022 Negative  Negative Final   • Lot Number 04/19/2022 [id number]   Final   • Expiration Date 04/19/2022 1/8/23   Final   • Magnesium 04/18/2022 2.3  1.6 - 2.6 mg/dL Final   • Lipase 04/18/2022 31  13 - 60 U/L Final   • Amylase 04/18/2022 39  28 - 100 U/L Final   Admission on 04/18/2022, Discharged on 04/18/2022   Component Date Value Ref Range Status   • Glucose 04/18/2022 183 (A) 65 - 99 mg/dL Final   • BUN 04/18/2022 5 (A) 6 - 20 mg/dL Final   • Creatinine 04/18/2022 0.60  0.57 - 1.00 mg/dL Final   • Sodium 04/18/2022 132 (A) 136 - 145 mmol/L Final   • Potassium 04/18/2022 3.9  3.5 - 5.2 mmol/L Final   • Chloride 04/18/2022 92 (A) 98 - 107 mmol/L Final   • CO2 04/18/2022 26.2  22.0 - 29.0 mmol/L Final   • Calcium 04/18/2022 10.0  8.6 - 10.5 mg/dL Final   • Total Protein 04/18/2022 7.6  6.0 - 8.5 g/dL Final   • Albumin 04/18/2022 4.56  3.50 - 5.20 g/dL Final   • ALT (SGPT) 04/18/2022 39 (A) 1 - 33 U/L Final   • AST (SGOT) 04/18/2022 24  1 - 32 U/L Final   • Alkaline Phosphatase 04/18/2022 87  39 - 117 U/L Final   • Total Bilirubin 04/18/2022 0.2  0.0 - 1.2 mg/dL Final   • Globulin 04/18/2022 3.0  gm/dL Final   • A/G Ratio 04/18/2022 1.5  g/dL Final   • BUN/Creatinine Ratio  04/18/2022 8.3  7.0 - 25.0 Final   • Anion Gap 04/18/2022 13.8  5.0 - 15.0 mmol/L Final   • eGFR 04/18/2022 121.0  >60.0 mL/min/1.73 Final    National Kidney Foundation and American Society of Nephrology (ASN) Task Force recommended calculation based on the Chronic Kidney Disease Epidemiology Collaboration (CKD-EPI) equation refit without adjustment for race.   • WBC 04/18/2022 11.33 (A) 3.40 - 10.80 10*3/mm3 Final   • RBC 04/18/2022 5.50 (A) 3.77 - 5.28 10*6/mm3 Final   • Hemoglobin 04/18/2022 15.0  12.0 - 15.9 g/dL Final   • Hematocrit 04/18/2022 47.0 (A) 34.0 - 46.6 % Final   • MCV 04/18/2022 85.5  79.0 - 97.0 fL Final   • MCH 04/18/2022 27.3  26.6 - 33.0 pg Final   • MCHC 04/18/2022 31.9  31.5 - 35.7 g/dL Final   • RDW 04/18/2022 13.0  12.3 - 15.4 % Final   • RDW-SD 04/18/2022 40.3  37.0 - 54.0 fl Final   • MPV 04/18/2022 8.8  6.0 - 12.0 fL Final   • Platelets 04/18/2022 388  140 - 450 10*3/mm3 Final   • Neutrophil % 04/18/2022 65.8  42.7 - 76.0 % Final   • Lymphocyte % 04/18/2022 25.2  19.6 - 45.3 % Final   • Monocyte % 04/18/2022 5.6  5.0 - 12.0 % Final   • Eosinophil % 04/18/2022 2.1  0.3 - 6.2 % Final   • Basophil % 04/18/2022 0.5  0.0 - 1.5 % Final   • Immature Grans % 04/18/2022 0.8 (A) 0.0 - 0.5 % Final   • Neutrophils, Absolute 04/18/2022 7.45 (A) 1.70 - 7.00 10*3/mm3 Final   • Lymphocytes, Absolute 04/18/2022 2.86  0.70 - 3.10 10*3/mm3 Final   • Monocytes, Absolute 04/18/2022 0.63  0.10 - 0.90 10*3/mm3 Final   • Eosinophils, Absolute 04/18/2022 0.24  0.00 - 0.40 10*3/mm3 Final   • Basophils, Absolute 04/18/2022 0.06  0.00 - 0.20 10*3/mm3 Final   • Immature Grans, Absolute 04/18/2022 0.09 (A) 0.00 - 0.05 10*3/mm3 Final   • nRBC 04/18/2022 0.0  0.0 - 0.2 /100 WBC Final   • Color, UA 04/18/2022 Yellow  Yellow, Straw Final   • Appearance, UA 04/18/2022 Cloudy (A) Clear Final   • pH, UA 04/18/2022 6.5  5.0 - 8.0 Final   • Specific Gravity, UA 04/18/2022 1.019  1.005 - 1.030 Final   • Glucose, UA 04/18/2022  Negative  Negative Final   • Ketones, UA 04/18/2022 Trace (A) Negative Final   • Bilirubin, UA 04/18/2022 Negative  Negative Final   • Blood, UA 04/18/2022 Negative  Negative Final   • Protein, UA 04/18/2022 100 mg/dL (2+) (A) Negative Final   • Leuk Esterase, UA 04/18/2022 Negative  Negative Final   • Nitrite, UA 04/18/2022 Negative  Negative Final   • Urobilinogen, UA 04/18/2022 0.2 E.U./dL  0.2 - 1.0 E.U./dL Final   • RBC, UA 04/18/2022 3-5 (A) None Seen, 0-2 /HPF Final   • WBC, UA 04/18/2022 3-5 (A) None Seen, 0-2 /HPF Final   • Bacteria, UA 04/18/2022 2+ (A) None Seen /HPF Final   • Squamous Epithelial Cells, UA 04/18/2022 13-20 (A) None Seen, 0-2 /HPF Final   • Hyaline Casts, UA 04/18/2022 None Seen  None Seen /LPF Final   • Methodology 04/18/2022 Automated Microscopy   Final   • HCG, Urine QL 04/18/2022 Negative  Negative Final   Office Visit on 04/14/2022   Component Date Value Ref Range Status   • WBC 04/14/2022 10.50  3.40 - 10.80 10*3/mm3 Final   • RBC 04/14/2022 5.35 (A) 3.77 - 5.28 10*6/mm3 Final   • Hemoglobin 04/14/2022 14.9  12.0 - 15.9 g/dL Final   • Hematocrit 04/14/2022 45.8  34.0 - 46.6 % Final   • MCV 04/14/2022 85.6  79.0 - 97.0 fL Final   • MCH 04/14/2022 27.9  26.6 - 33.0 pg Final   • MCHC 04/14/2022 32.5  31.5 - 35.7 g/dL Final   • RDW 04/14/2022 12.7  12.3 - 15.4 % Final   • Platelets 04/14/2022 427  140 - 450 10*3/mm3 Final   • Neutrophil Rel % 04/14/2022 60.2  42.7 - 76.0 % Final   • Lymphocyte Rel % 04/14/2022 28.4  19.6 - 45.3 % Final   • Monocyte Rel % 04/14/2022 6.6  5.0 - 12.0 % Final   • Eosinophil Rel % 04/14/2022 3.0  0.3 - 6.2 % Final   • Basophil Rel % 04/14/2022 0.6  0.0 - 1.5 % Final   • Neutrophils Absolute 04/14/2022 6.33  1.70 - 7.00 10*3/mm3 Final   • Lymphocytes Absolute 04/14/2022 2.98  0.70 - 3.10 10*3/mm3 Final   • Monocytes Absolute 04/14/2022 0.69  0.10 - 0.90 10*3/mm3 Final   • Eosinophils Absolute 04/14/2022 0.31  0.00 - 0.40 10*3/mm3 Final   • Basophils Absolute  04/14/2022 0.06  0.00 - 0.20 10*3/mm3 Final   • Immature Granulocyte Rel % 04/14/2022 1.2 (A) 0.0 - 0.5 % Final   • Immature Grans Absolute 04/14/2022 0.13 (A) 0.00 - 0.05 10*3/mm3 Final   • nRBC 04/14/2022 0.0  0.0 - 0.2 /100 WBC Final   • Glucose 04/14/2022 190 (A) 65 - 99 mg/dL Final   • BUN 04/14/2022 5 (A) 6 - 20 mg/dL Final   • Creatinine 04/14/2022 0.58  0.57 - 1.00 mg/dL Final   • EGFR Result 04/14/2022 122.0  >60.0 mL/min/1.73 Final    Comment: National Kidney Foundation and American Society of  Nephrology (ASN) Task Force recommended calculation based  on the Chronic Kidney Disease Epidemiology Collaboration  (CKD-EPI) equation refit without adjustment for race.  GFR Normal >60  Chronic Kidney Disease <60  Kidney Failure <15     • BUN/Creatinine Ratio 04/14/2022 8.6  7.0 - 25.0 Final   • Sodium 04/14/2022 141  136 - 145 mmol/L Final   • Potassium 04/14/2022 4.1  3.5 - 5.2 mmol/L Final   • Chloride 04/14/2022 101  98 - 107 mmol/L Final   • Total CO2 04/14/2022 25.1  22.0 - 29.0 mmol/L Final   • Calcium 04/14/2022 9.8  8.6 - 10.5 mg/dL Final   • Total Protein 04/14/2022 7.3  6.0 - 8.5 g/dL Final   • Albumin 04/14/2022 4.80  3.50 - 5.20 g/dL Final   • Globulin 04/14/2022 2.5  gm/dL Final   • A/G Ratio 04/14/2022 1.9  g/dL Final   • Total Bilirubin 04/14/2022 0.2  0.0 - 1.2 mg/dL Final   • Alkaline Phosphatase 04/14/2022 72  39 - 117 U/L Final   • AST (SGOT) 04/14/2022 25  1 - 32 U/L Final   • ALT (SGPT) 04/14/2022 31  1 - 33 U/L Final   • Total Cholesterol 04/14/2022 256 (A) 0 - 200 mg/dL Final    Comment: Cholesterol Reference Ranges  (U.S. Department of Health and Human Services ATP III  Classifications)  Desirable          <200 mg/dL  Borderline High    200-239 mg/dL  High Risk          >240 mg/dL  Triglyceride Reference Ranges  (U.S. Department of Health and Human Services ATP III  Classifications)  Normal           <150 mg/dL  Borderline High  150-199 mg/dL  High             200-499 mg/dL  Very High         >500 mg/dL  HDL Reference Ranges  (U.S. Department of Health and Human Services ATP III  Classifications)  Low     <40 mg/dl (major risk factor for CHD)  High    >60 mg/dl ('negative' risk factor for CHD)  LDL Reference Ranges  (U.S. Department of Health and Human Services ATP III  Classifications)  Optimal          <100 mg/dL  Near Optimal     100-129 mg/dL  Borderline High  130-159 mg/dL  High             160-189 mg/dL  Very High        >189 mg/dL     • Triglycerides 04/14/2022 756 (A) 0 - 150 mg/dL Final   • HDL Cholesterol 04/14/2022 35 (A) 40 - 60 mg/dL Final   • VLDL Cholesterol [place] 04/14/2022 127 (A) 5 - 40 mg/dL Final   • LDL Chol Calc ([facility]) 04/14/2022 94  0 - 100 mg/dL Final   • TSH 04/14/2022 1.740  0.270 - 4.200 uIU/mL Final   • Hemoglobin A1C 04/14/2022 6.90 (A) 4.80 - 5.60 % Final    Comment: Hemoglobin A1C Ranges:  Increased Risk for Diabetes  5.7% to 6.4%  Diabetes                     >= 6.5%  Diabetic Goal                < 7.0%     • Microalbumin, Urine 04/14/2022 96.8  Not Estab. ug/mL Final   • 25 Hydroxy, Vitamin D 04/14/2022 14.7 (A) 30.0 - 100.0 ng/ml Final    Comment: Results may be falsely increased if patient taking Biotin.  Reference Range for Total Vitamin D 25(OH)  Deficiency <20.0 ng/mL  Insufficiency 21-29 ng/mL  Sufficiency  ng/mL  Toxicity >100 ng/ml     • Vitamin B-12 04/14/2022 360  211 - 946 pg/mL Final    Results may be falsely increased if patient taking Biotin.       Assessment/Plan   Diagnoses and all orders for this visit:    1. Bipolar I disorder, most recent episode depressed (HCC)  -     QUEtiapine (SEROquel) 100 MG tablet; Take 1 tablet by mouth every night at bedtime.  Dispense: 90 tablet; Refill: 1  -     lurasidone (LATUDA) 60 MG tablet tablet; Take 1 tablet by mouth Daily.  Dispense: 90 tablet; Refill: 1  -     buPROPion SR (WELLBUTRIN SR) 100 MG 12 hr tablet; Take 1 tablet by mouth 2 (Two) Times a Day.  Dispense: 180 tablet; Refill: 1    2. Insomnia due to  mental disorder  -     QUEtiapine (SEROquel) 100 MG tablet; Take 1 tablet by mouth every night at bedtime.  Dispense: 90 tablet; Refill: 1        -Increase lurasidone 60 mg daily for bipolar disorder. Lengthy discussion with patient on the possible side effects of antipsychotic medications including increased cholesterol, increased blood sugar, and possibility of weight gain.  Also discussed the need to monitor lab work associated with this.  The risk of muscle movement disorders with this class of medication was also discussed.  -Continue bupropion 150 mg twice daily for mood and depression  -Continue quetiapine 100 mg nightly for sleep and mood  -Encourage patient to restart therapy  -JULIET reviewed and appropriate. Patient counseled on use of controlled substances.  -The benefits of a healthy diet and exercise were discussed with patient, especially the positive effects they have on mental health. Patient encouraged to consider lifestyle modification regarding  diet and exercise patterns to maximize results of mental health treatment.  -Reviewed previous available documentation  -Reviewed most recent available labs   -Jessica Harris  reports that she has been smoking cigarettes. She has been smoking about 0.50 packs per day. She has never used smokeless tobacco.. I have educated her on the risk of diseases from using tobacco products such as cancer, COPD, heart disease, reproductive problems and low birth weight. I advised her to quit and she is not willing to quit. I spent 3  minutes counseling the patient.               Visit Diagnoses:    ICD-10-CM ICD-9-CM   1. Bipolar I disorder, most recent episode depressed (HCC)  F31.30 296.50   2. Insomnia due to mental disorder  F51.05 300.9     327.02         TREATMENT PLAN/GOALS: Continue supportive psychotherapy efforts and medications as indicated. Treatment and medication options discussed during today's visit. Patient acknowledged and verbally consented to  continue with current treatment plan and was educated on the importance of compliance with treatment and follow-up appointments.    MEDICATION ISSUES:    Discussed medication options and treatment plan of prescribed medication as well as the risks, benefits, and side effects including potential falls, possible impaired driving and metabolic adversities among others. Patient is agreeable to call the office with any worsening of symptoms or onset of side effects. Patient is agreeable to call 911 or go to the nearest ER should he/she begin having SI/HI.     MEDS ORDERED DURING VISIT:  New Medications Ordered This Visit   Medications   • QUEtiapine (SEROquel) 100 MG tablet     Sig: Take 1 tablet by mouth every night at bedtime.     Dispense:  90 tablet     Refill:  1   • lurasidone (LATUDA) 60 MG tablet tablet     Sig: Take 1 tablet by mouth Daily.     Dispense:  90 tablet     Refill:  1   • buPROPion SR (WELLBUTRIN SR) 100 MG 12 hr tablet     Sig: Take 1 tablet by mouth 2 (Two) Times a Day.     Dispense:  180 tablet     Refill:  1       Return in about 8 weeks (around 6/14/2022), or if symptoms worsen or fail to improve.         Prognosis: Guarded dependent on medication/follow up and treatment plan compliance.  Functionality: pt showing improvements in important areas of daily functioning.     Short-term goals: Patient will adhere to medication regimen and note continued improvement in symptoms over the next 3 months.   Long-term goals: Patient will be adherent to medication management and psychotherapy with continued improvement in symptoms over the next 6 months          This document has been electronically signed by JANIE Thorpe   April 19, 2022 14:06 EDT    Part of this note may be an electronic transcription/translation of spoken language to printed text using the Dragon Dictation System.

## 2022-04-19 NOTE — PROGRESS NOTES
History of Present Illness  Jessica aHrris is a 36 y.o. female who presents to the clinic today for follow up and to review recent labs. She has been diagnosed with Chronic Venous Insufficiency,  DM, type 2, HTN, and Dyslipidemia. She was seen in the [facility] ED last night. Those records have been reviewed.     Diabetes  She has type 2 diabetes mellitus. . Risk factors for coronary artery disease include diabetes mellitus, dyslipidemia, hypertension, obesity and tobacco exposure. Current diabetic treatment includes oral agent (monotherapy). She was seeing Dr Pandey, Podiatrist,  who no longer treats individuals with Diabetes feet complaints.   Lab Results   Component Value Date    HGBA1C 6.90 (H) 04/14/2022     Hypertension  Well controlled. Risk factors for coronary artery disease include diabetes mellitus, dyslipidemia and smoking/tobacco exposure.   Lab Results   Component Value Date    GLUCOSE 190 (H) 04/14/2022    BUN 5 (L) 04/14/2022    CREATININE 0.58 04/14/2022    BCR 8.6 04/14/2022    K 4.1 04/14/2022    CO2 25.1 04/14/2022    CALCIUM 9.8 04/14/2022    PROTENTOTREF 7.3 04/14/2022    ALBUMIN 4.80 04/14/2022    LABIL2 1.9 04/14/2022    AST 25 04/14/2022    ALT 31 04/14/2022     Dyslipidemia  The current episode started more than 1 year ago. Current antihyperlipidemic treatment includes statins. Risk factors for coronary artery disease include diabetes mellitus, dyslipidemia, hypertension, obesity and a sedentary lifestyle.   Lab Results     Lab Results   Component Value Date    CHOL 128 02/11/2020    CHOL 199 03/15/2019    CHLPL 256 (H) 04/14/2022     Lab Results   Component Value Date    TRIG 756 (H) 04/14/2022    TRIG 545 (H) 01/12/2021    TRIG 486 (H) 02/11/2020     Lab Results   Component Value Date    HDL 35 (L) 04/14/2022    HDL 31 (L) 01/12/2021    HDL 31 (L) 02/11/2020     Lab Results   Component Value Date    LDL 94 04/14/2022    LDL 49 01/12/2021    LDL  02/11/2020      Comment:      Unable to calculate  "   LDL 49 02/11/2020       The following portions of the patient's history were reviewed and updated as appropriate: allergies, current medications, past family history, past medical history, past social history, past surgical history and problem list.    Review of Systems   Constitutional: Positive for appetite change and fatigue. Negative for activity change, chills, fever and unexpected weight change.   HENT: Negative.    Eyes: Negative for visual disturbance.   Respiratory: Negative for cough, shortness of breath and wheezing.    Cardiovascular: Negative for chest pain, palpitations and leg swelling.   Gastrointestinal: Positive for abdominal pain and nausea. Negative for vomiting.   Endocrine: Negative for cold intolerance, heat intolerance, polydipsia, polyphagia and polyuria.   Genitourinary: Positive for urgency. Negative for difficulty urinating, dysuria, flank pain and hematuria.   Musculoskeletal: Positive for arthralgias.   Skin: Negative for color change and rash.   Neurological: Positive for numbness (Hands/Feet ). Negative for weakness and light-headedness.   Hematological: Negative for adenopathy.   All other systems reviewed and are negative.      Vital signs:  /68 (BP Location: Right arm, Patient Position: Sitting, Cuff Size: Adult)   Pulse 91   Temp 97.1 °F (36.2 °C) (Temporal)   Ht 165.1 cm (65\")   Wt 96.2 kg (212 lb)   SpO2 99%   BMI 35.28 kg/m²     Physical Exam  Vitals and nursing note reviewed.   Constitutional:       General: She is not in acute distress.     Appearance: She is well-developed.   HENT:      Head: Normocephalic.      Nose: Nose normal.   Eyes:      General: No scleral icterus.        Right eye: No discharge.         Left eye: No discharge.      Conjunctiva/sclera: Conjunctivae normal.   Neck:      Thyroid: No thyromegaly.      Vascular: No JVD.   Cardiovascular:      Rate and Rhythm: Normal rate and regular rhythm.      Heart sounds: Normal heart sounds. No murmur " heard.    No friction rub.   Pulmonary:      Effort: Pulmonary effort is normal. No respiratory distress.      Breath sounds: Normal breath sounds. No wheezing or rales.   Abdominal:      General: Bowel sounds are normal. There is no distension.      Palpations: Abdomen is soft.      Tenderness: There is abdominal tenderness in the epigastric area. There is no guarding or rebound.   Musculoskeletal:         General: No tenderness.      Cervical back: Neck supple.   Lymphadenopathy:      Cervical: No cervical adenopathy.   Skin:     General: Skin is warm and dry.      Findings: No erythema or rash.   Neurological:      Mental Status: She is alert and oriented to person, place, and time.      Cranial Nerves: Cranial nerves are intact.      Gait: Gait is intact.   Psychiatric:         Mood and Affect: Mood and affect normal.         Speech: Speech normal.         Behavior: Behavior is cooperative.         Thought Content: Thought content normal.         Cognition and Memory: Cognition and memory normal.       Result Review :  Results for orders placed or performed in visit on 04/19/22   Urine Culture - Urine, Urine, Clean Catch    Specimen: Urine, Clean Catch    Urine  Release to jolie   Result Value Ref Range    Urine Culture Final report     Result 1 Comment    Magnesium    Specimen: Blood   Result Value Ref Range    Magnesium 2.3 1.6 - 2.6 mg/dL   Lipase    Specimen: Blood   Result Value Ref Range    Lipase 31 13 - 60 U/L   Amylase    Specimen: Blood   Result Value Ref Range    Amylase 39 28 - 100 U/L   POC Urinalysis Dipstick, Automated    Specimen: Urine   Result Value Ref Range    Color Yellow Yellow, Straw, Dark Yellow, Charleen    Clarity, UA Slightly Cloudy (A) Clear    Specific Gravity  1.015 1.005 - 1.030    pH, Urine 6.0 5.0 - 8.0    Leukocytes Negative Negative    Nitrite, UA Negative Negative    Protein, POC Negative Negative mg/dL    Glucose, UA Negative Negative, 1000 mg/dL (3+) mg/dL    Ketones, UA  Negative Negative    Urobilinogen, UA Normal Normal    Bilirubin Negative Negative    Blood, UA Negative Negative    Lot Number [id number]     Expiration Date 1/8/23      Patient's Body mass index is 35.28 kg/m². indicating that she is obese (BMI >30). Obesity-related health conditions include the following: hypertension, diabetes mellitus, dyslipidemias and GERD. Obesity is unchanged. BMI  is above average; BMI management plan is completed. Provided information portion control and increasing exercise..     Assessment/Plan     Diagnoses and all orders for this visit:    1. Epigastric pain (Primary)  Comments:  Labs ordered which were discussed by phone with her  Orders:  -     Lipase; Future  -     Amylase; Future  -     Lipase  -     Amylase    2. Lower urinary tract symptoms (LUTS)  Comments:  UA and Urine culture ordered and resulted. Reviewed with Jessica  Orders:  -     POC Urinalysis Dipstick, Automated  -     Cancel: Urine Culture - Urine, Urine, Clean Catch  -     Urine Culture - Urine, Urine, Clean Catch    3. Gastroesophageal reflux disease without esophagitis  Comments:  Pepcid ordered. Referral to GI  Orders:  -     famotidine (Pepcid) 20 MG tablet; Take 1 tablet by mouth 2 (Two) Times a Day.  Dispense: 60 tablet; Refill: 0  -     Ambulatory Referral to Gastroenterology    4. Type 2 diabetes mellitus without complication, without long-term current use of insulin (HCC)  Comments:  Well controlled. Continue Metformin but may benefit from GLP 1 when Triglycerides improve     5. Essential hypertension  Comments:  Continue    6. Mixed dyslipidemia  Comments:  Continue Atorvastatin and Vascepa added per Dr Santos     7. Diarrhea, unspecified type  Comments:  Referral to Gastroenterology ? Metformin although she reports this has been an issue prior to starting Metformin  Orders:  -     Magnesium    8. Neuropathy  Comments:  Trial of Vit B12 injections with low normal Vit B12 level   Orders:  -      cyanocobalamin injection 1,000 mcg  -     cyanocobalamin ([facility] Vitamin B-12) 2000 MCG tablet; Take 1 tablet by mouth Daily.  Dispense: 30 tablet; Refill: 3      Follow Up With Dr Santos in June  Findings and recommendations discussed with Jessica. Reviewed test results. Lifestyle modifications reinforced including nutrition and activity recommendations. She will follow up in June with Dr Santos sooner if problems/concerns occur.  Jessica was given instructions and counseling regarding her condition or for health maintenance advice. Please see specific information pulled into the AVS if appropriate    This document has been electronically signed by:

## 2022-04-21 LAB
BACTERIA UR CULT: NORMAL
BACTERIA UR CULT: NORMAL

## 2022-04-21 NOTE — PROGRESS NOTES
Sent [employer] message    -- Needs to start on vascepa 2 g p.o. twice daily, increase atorvastatin to 20 daily, and resume vitamin D 50,000 weekly

## 2022-04-22 PROBLEM — J06.9 VIRAL UPPER RESPIRATORY TRACT INFECTION: Status: RESOLVED | Noted: 2019-10-23 | Resolved: 2022-04-22

## 2022-04-24 NOTE — ED PROVIDER NOTES
Subjective   C/o abd pain in epigastric area.   She has type 2 diabetes mellitus, Bipolar, hyperlipidemia, and obesity.       History provided by:  Patient   used: No    Abdominal Pain  Pain location:  Generalized  Pain quality: aching    Pain radiates to:  Does not radiate  Pain severity:  Mild  Duration:  1 day  Timing:  Intermittent  Progression:  Worsening  Chronicity:  New  Associated symptoms: no chest pain, no chills, no cough, no diarrhea, no dysuria, no fever, no nausea, no shortness of breath, no sore throat and no vomiting        Review of Systems   Constitutional: Negative for chills and fever.   HENT: Negative for congestion, ear pain and sore throat.    Respiratory: Negative for cough, shortness of breath and wheezing.    Cardiovascular: Negative for chest pain.   Gastrointestinal: Positive for abdominal pain. Negative for diarrhea, nausea and vomiting.   Genitourinary: Negative for dysuria and flank pain.   Skin: Negative for rash.   Neurological: Negative for headaches.   Psychiatric/Behavioral: The patient is not nervous/anxious.    All other systems reviewed and are negative.      Past Medical History:   Diagnosis Date   • Anxiety    • Bipolar 1 disorder (HCC)    • Depression    • GERD (gastroesophageal reflux disease)    • History of bronchitis    • History of ear infections    • History of stomach ulcers    • History of streptococcal infection    • Hypertension    • Urinary tract infection        No Known Allergies    Past Surgical History:   Procedure Laterality Date   • ADENOIDECTOMY     • CHOLECYSTECTOMY     • DENTAL PROCEDURE     • HERNIA REPAIR     • TONSILLECTOMY     • WISDOM TOOTH EXTRACTION         Family History   Problem Relation Age of Onset   • Cancer Mother    • Stroke Mother    • Lung disease Mother    • No Known Problems Father    • Cancer Other    • Heart disease Other    • Stroke Other        Social History     Socioeconomic History   • Marital status: Single  "  Tobacco Use   • Smoking status: Current Every Day Smoker     Packs/day: 0.50     Types: Cigarettes   • Smokeless tobacco: Never Used   • Tobacco comment: \"thinking about it\"   Vaping Use   • Vaping Use: Never used   Substance and Sexual Activity   • Alcohol use: No   • Drug use: No   • Sexual activity: Defer     Birth control/protection: Pill           Objective   Physical Exam  Vitals and nursing note reviewed.   Constitutional:       Appearance: She is well-developed.   HENT:      Head: Normocephalic.   Cardiovascular:      Rate and Rhythm: Normal rate and regular rhythm.   Pulmonary:      Effort: Pulmonary effort is normal.      Breath sounds: Normal breath sounds.   Abdominal:      General: Bowel sounds are normal.      Palpations: Abdomen is soft.      Tenderness: There is no abdominal tenderness.   Musculoskeletal:         General: Normal range of motion.      Cervical back: Neck supple.   Skin:     General: Skin is warm and dry.   Neurological:      Mental Status: She is alert and oriented to person, place, and time.   Psychiatric:         Behavior: Behavior normal.         Thought Content: Thought content normal.         Judgment: Judgment normal.         Procedures           ED Course  ED Course as of 04/24/22 1031   Mon Apr 18, 2022   1400 Pt left with IV in arm and did not let anyone. Xray went in to shoot her xray and she left without telling anyone.  We were able to get in touch with her mother who had her come back.  [LC]      ED Course User Index  [LC] Trish Esteves PA                                                 [facility]    Final diagnoses:   Pain of upper abdomen       ED Disposition  ED Disposition     ED Disposition   AMA    Condition   --    Comment   --             No follow-up provider specified.       Medication List      No changes were made to your prescriptions during this visit.          Trish Esteves PA  04/24/22 1031    "

## 2022-04-25 RX ORDER — ICOSAPENT ETHYL 1000 MG/1
CAPSULE ORAL
Qty: 120 CAPSULE | Refills: 5 | OUTPATIENT
Start: 2022-04-25

## 2022-04-25 NOTE — TELEPHONE ENCOUNTER
Caller: Jessica Harris    Relationship: Self    Best call back number: [phone]    PATIENT IS IN NEED OF A PRIOR AUTH FOR HER MEDICATION   icosapent ethyl (Vascepa) 1 g capsule capsule    [address] - [phone]  - [phone]   [phone]      Please allow 48 hours for the clinical staff to follow up on this request. N/A

## 2022-04-25 NOTE — TELEPHONE ENCOUNTER
Caller: Jessica Harris    Relationship: Self    Best call back number: [phone]    What is the best time to reach you: ANY TIME    Who are you requesting to speak with (clinical staff, provider,  specific staff member): CLINICAL    What was the call regarding: PATIENT WOULD LIKE A CALL UPDATING HER ON THE PROCESS OF HER PRIOR APPROVAL FOR HER MEDICATION icosapent ethyl (Vascepa) 1 g capsule capsulei THAT IS OUTSTANDING.    Do you require a callback: YES

## 2022-04-26 ENCOUNTER — TELEPHONE (OUTPATIENT)
Dept: FAMILY MEDICINE CLINIC | Facility: CLINIC | Age: 37
End: 2022-04-26

## 2022-05-02 ENCOUNTER — TRANSCRIBE ORDERS (OUTPATIENT)
Dept: ADMINISTRATIVE | Facility: HOSPITAL | Age: 37
End: 2022-05-02

## 2022-05-02 DIAGNOSIS — Z01.818 PRE-OPERATIVE CLEARANCE: Primary | ICD-10-CM

## 2022-05-09 ENCOUNTER — PREP FOR SURGERY (OUTPATIENT)
Dept: OTHER | Facility: HOSPITAL | Age: 37
End: 2022-05-09

## 2022-05-09 DIAGNOSIS — D25.2 SUBSEROSAL LEIOMYOMA OF UTERUS: ICD-10-CM

## 2022-05-09 DIAGNOSIS — R93.5 ABNORMAL ENDOMETRIAL ULTRASOUND: Primary | ICD-10-CM

## 2022-05-09 DIAGNOSIS — N83.202 LEFT OVARIAN CYST: ICD-10-CM

## 2022-05-09 RX ORDER — SODIUM CHLORIDE 0.9 % (FLUSH) 0.9 %
10 SYRINGE (ML) INJECTION EVERY 12 HOURS SCHEDULED
Status: CANCELLED | OUTPATIENT
Start: 2022-05-09

## 2022-05-09 RX ORDER — SODIUM CHLORIDE 0.9 % (FLUSH) 0.9 %
10 SYRINGE (ML) INJECTION AS NEEDED
Status: CANCELLED | OUTPATIENT
Start: 2022-05-09

## 2022-05-09 NOTE — H&P
This 36 year old patient presents for MA Notes and . Pre-Op.    History of Present Illness  1.  MA Notes   Last Pap: 10/25/2021    LMP 4/15/2022.   Pt reports stopped Spironolactone 2/2022; pt reports that menstrual cycles returned after stopping.   Denies abnormal vaginal bleeding/discharge, foul odor, UTI s/s, abdominal/pelvic pain.        2.  . Pre-Op   Pt in clinic to schedule for D&C and ovarian cystectomy, Last visit 2/22, we will repeat US today prior to scheduling surgery.    Pt will be starting Vascepa as soon as PA goes through at Dr Grant office in [place]   Most recent labs scanned to chart.       US today:   Uterus 8.19cm  Endo 0.67cm  Fibroid 1.46cm  Left OV cyst 8.89    Endo appears echogenic. Posterior subserosal fibroid noted. Multiple larger follicles seen on the right ovary. Simple cyst seen on what appears to be the left ovary, but is seen more midline due to the size of the cyst. Best measurements possible obtained on the left ovary.     Pt is not having pelvic pain today. Pt scheduled for D&C with left ovarian cystectomy w/ TG.    Discussed weight loss prior to TTC, Pt v/u          Gynecologic History  04/28/2022 08:15 AM  Past Systemic History    Medical History  (Reviewed,updated)  Disease Onset Date Comments   Anxiety     Bipolar     Depression     Diabetes     GERD     High Cholesterol     Hypertension     Insomnia     Ovarian cyst     Polycystic ovary syndrome         Surgical History/Management  (Reviewed,updated)  Management Date Comments   Adenoid removal     Gallbladder     Hernia repair     tonsillectomy       Diagnostics History  Status Study Ordered Completed Interpretation  Result / Report   ordered TRANSVAGINAL US, NON-OB 02/10/2022       ordered TRANSVAGINAL US, NON-OB 10/05/2021       result received TRANSVAGINAL US, NON-OB 04/28/2022       result received TRANSVAGINAL US, NON-OB 02/22/2022 02/22/2022 See module             Problem List  Problem List reviewed.        Medications (active prior to today)  Medication Name Sig Description Start Date Stop Date Refilled Rx Elsewhere   atorvastatin 10 mg tablet take 1 tablet by oral route  every day 10/05/2021 04/28/2022 04/28/2022 N   spironolactone 100 mg tablet take 2 tablet by oral route  every day 10/05/2021 04/28/2022  N   pantoprazole 40 mg tablet,delayed release take 1 tablet by oral route  every day 10/05/2021   N   Latuda 80 mg tablet take 1 tablet by oral route  every day with food (at least 350 calories) 10/05/2021 04/28/2022 04/28/2022 N   quetiapine 100 mg tablet take 1 tablet by oral route  every day 10/05/2021   N   bupropion HCl 100 mg tablet take 1 tablet by oral route 2 times every day 10/05/2021 04/28/2022 04/28/2022 N   metformin 500 mg tablet take 2 tablet by oral route 2 times every day with morning and evening meals 10/05/2021   N   atorvastatin 20 mg tablet take 1 tablet by oral route  every day // 04/28/2022 Y   bupropion HCl 75 mg tablet take 2 tablet by oral route 2 times every day // 04/28/2022 Y   Latuda 60 mg tablet take 1 tablet by oral route  every day with food (at least 350 calories) // 04/28/2022 Y   Pepcid 20 mg tablet take 1 tablet by oral route 2 times every day //   Y     Patient Status   Completed with information received for patient transitioning into care.   Completed with information received for patient in a summary of care record.     Medication Reconciliation  Medications reconciled today.    Medication Reviewed  Adherence Medication Name Sig Desc Elsewhere Status   taking as directed atorvastatin 10 mg tablet take 1 tablet by oral route  every day N Verified   taking as directed Latuda 80 mg tablet take 1 tablet by oral route  every day with food (at least 350 calories) N Verified   taking as directed quetiapine 100 mg tablet take 1 tablet by oral route  every day N Verified   taking as directed bupropion HCl 100 mg tablet take 1 tablet by oral route 2 times every day N Verified    taking as directed pantoprazole 40 mg tablet,delayed release take 1 tablet by oral route  every day N Verified   taking as directed metformin 500 mg tablet take 2 tablet by oral route 2 times every day with morning and evening meals N Verified   taking as directed spironolactone 100 mg tablet take 2 tablet by oral route  every day N Verified     Allergies  Ingredient Reaction (Severity) Medication Name Comment   NO KNOWN ALLERGIES            Family History    (Reviewed, updated)  M    Social History  (Reviewed, updated)  Tobacco use reviewed.    Preferred language is English.      Marital Status/Family/Social Support  Marital status: Single     Tobacco use status: Moderate cigarette smoker (10-19 cigs/day).    Smoking status: Heavy tobacco smoker.    Tobacco Screening  Patient has used tobacco. Patient has used tobacco in the last 30 days. Patient has not used smokeless tobacco in the last 30 days.    Smoking Status  Type Smoking Status Usage Per Day Years Used Pack Years Total Pack Years   Cigarette Heavy tobacco smoker 0.5 Packs 18 9 9     Tobacco Cessation Information  Date Counseled By Order Status Description Code Tobacco Cessation Information   10/05/2021 Andria Kate Tobacco cessation counseling completed   Smoking cessation education   11/10/2021 Andria Kate Tobacco cessation counseling completed   Smoking cessation education   02/02/2022 Andria Kate Tobacco cessation counseling completed   Smoking cessation education   02/22/2022 Arianna Madden Tobacco cessation counseling completed   Smoking cessation education   04/28/2022 Taras Mendoza Tobacco cessation counseling completed   Smoking cessation education       Vaping Use  Screened for vaping? Yes  Status: Not a current user    Tobacco/Vaping Exposure  No passive vaping exposure.  There is passive smoke exposure.    The patient has had exposure to second hand smoke in the home environment.  Tobacco type: Cigarette    Alcohol  There is no history of  alcohol use.     Caffeine  The patient uses caffeine: soda - > 32oz a day.      Review of Systems  System Neg/Pos Details   Reproductive Positive Intermittent pelvic pain.       Vital Signs   Time BP mm/Hg Pulse /min Resp /min Temp F Ht ft Ht in Ht cm Wt lb Wt kg BMI kg/m2 BSA m2 O2 Sat%   8:25 /83 76 18 96.6 5 5 165.1 210 95.254 34.95  98     Measured by  Time Measured by   8:25 AM Taras Mendoza         Screening Summary  The following were reviewed: tobacco use, alcohol use, caffeine use, drugs of abuse and date of last pap        Physical Exam  Exam Findings Details   Constitutional Normal Well developed.   Respiratory Normal Inspection - Normal. Auscultation - Normal. Effort - Normal.   Cardiovascular Normal Rhythm - Regular. Extra sounds - None. Murmurs - None.   Genitourinary * Pelvic deferred. Pap not performed   Skin Normal Inspection - Normal.   Psychiatric Normal Orientation - Oriented to time, place, person & situation. Appropriate mood and affect.       Completed Orders (This Visit)  Order Details Reason Side Interpretation Result Additional Info Initial Treatment Date Region   Patient Health Questionnaire (PHQ-2)    Further testing is not required 0      Tobacco cessation counseling      Smoking cessation education     Pre-Visit Planning             Assessment/Plan  # Detail Type Description    1. Assessment History of ovarian cyst (Z87.42).    Plan Orders The patient had the following order(s) completed today: TRANSVAGINAL US, NON-OB.         2. Assessment Left ovarian cyst (N83.202).    Plan Orders She will be scheduled for LAPAROSCOPY, EXCISE LESIONS. Clinical information/comments: The surgeon scheduled is Joey Andersen DO.         3. Assessment Abnormal endometrial ultrasound (R93.5).    Plan Orders She will be scheduled for HYSTEROSCOPY, W/WO D&C. Clinical information/comments: The surgeon scheduled is Joey Andersen DO.         4. Assessment Subserous leiomyoma of uterus (D25.2).             Diagnostic History Entered Today  Performed Study Interpretation Result   04/28/2022 Pre-Visit Planning         Clinical Guidelines (Reviewed, updated)  Guidelines Status Due Action Last Addressed   Depression screening completed 04/28/2023 Completed on 04/28/2022 04/28/2022   HPV completed 10/06/2026 Completed on 10/06/2021 10/06/2021   PAP completed 10/06/2024 Completed on 10/06/2021 10/06/2021   Pap/HPV testing completed 10/06/2026 Completed on 10/06/2021 10/06/2021   Pre-Visit Planning completed 05/05/2022 Completed on 04/28/2022 04/28/2022       Patient Education  # Patient Education   1. Functional Ovarian Cyst: Care Instructions       Medications (Added, Continued or Stopped today)  Start Date Medication Directions PRN Status PRN Reason Instruction Stop Date   10/05/2021 atorvastatin 10 mg tablet take 1 tablet by oral route  every day N   04/28/2022    atorvastatin 20 mg tablet take 1 tablet by oral route  every day N      10/05/2021 bupropion HCl 100 mg tablet take 1 tablet by oral route 2 times every day N   04/28/2022    bupropion HCl 75 mg tablet take 2 tablet by oral route 2 times every day N       Latuda 60 mg tablet take 1 tablet by oral route  every day with food (at least 350 calories) N      10/05/2021 Latuda 80 mg tablet take 1 tablet by oral route  every day with food (at least 350 calories) N   04/28/2022   10/05/2021 metformin 500 mg tablet take 2 tablet by oral route 2 times every day with morning and evening meals N      10/05/2021 pantoprazole 40 mg tablet,delayed release take 1 tablet by oral route  every day N       Pepcid 20 mg tablet take 1 tablet by oral route 2 times every day N      10/05/2021 quetiapine 100 mg tablet take 1 tablet by oral route  every day N      10/05/2021 spironolactone 100 mg tablet take 2 tablet by oral route  every day N   04/28/2022       To Be Scheduled / Ordered  Status Order Reason Assessment Timeframe Appointment   ordered LAPAROSCOPY, EXCISE LESIONS   N83.202     ordered HYSTEROSCOPY, W/WO D&C  R93.5       Surgery Scheduled  Surgery Details #1:  The patient has a diagnosis of: Left ovarian cyst, N83.202  She is scheduled for: LAPAROSCOPY, EXCISE LESIONS, to be performed by Joey Andersen DO    Additional Details:  Patient's Last BMI: 34.95   Surgery Details #2:  The patient has a diagnosis of: Abnormal endometrial ultrasound, R93.5  She is scheduled for: HYSTEROSCOPY, W/WO D&C, to be performed by Joey Andersen DO    Additional Details:  Patient's Last BMI: 34.95       Orders/Procedures/Instructions/Educations  Office Procedures/Services  HYSTEROSCOPY, W/WO D&C   LAPAROSCOPY, EXCISE LESIONS     Diagnostics/Procedures To Be Scheduled  Order Timeframe   TRANSVAGINAL US, NON-OB        Counseling / Educational Factors  Counseling / educational factors reviewed.

## 2022-05-11 NOTE — DISCHARGE INSTRUCTIONS

## 2022-05-12 ENCOUNTER — DOCUMENTATION (OUTPATIENT)
Dept: INTERNAL MEDICINE | Facility: HOSPITAL | Age: 37
End: 2022-05-12

## 2022-05-13 ENCOUNTER — PRE-ADMISSION TESTING (OUTPATIENT)
Dept: PREADMISSION TESTING | Facility: HOSPITAL | Age: 37
End: 2022-05-13

## 2022-05-13 ENCOUNTER — LAB (OUTPATIENT)
Dept: LAB | Facility: HOSPITAL | Age: 37
End: 2022-05-13

## 2022-05-13 DIAGNOSIS — N83.202 LEFT OVARIAN CYST: ICD-10-CM

## 2022-05-13 DIAGNOSIS — Z01.818 PRE-OPERATIVE CLEARANCE: ICD-10-CM

## 2022-05-13 DIAGNOSIS — R93.5 ABNORMAL ENDOMETRIAL ULTRASOUND: ICD-10-CM

## 2022-05-13 DIAGNOSIS — D25.2 SUBSEROSAL LEIOMYOMA OF UTERUS: ICD-10-CM

## 2022-05-13 LAB
ABO GROUP BLD: NORMAL
ANION GAP SERPL CALCULATED.3IONS-SCNC: 14 MMOL/L (ref 5–15)
BASOPHILS # BLD AUTO: 0.07 10*3/MM3 (ref 0–0.2)
BASOPHILS NFR BLD AUTO: 0.5 % (ref 0–1.5)
BLD GP AB SCN SERPL QL: NEGATIVE
BUN SERPL-MCNC: 8 MG/DL (ref 6–20)
BUN/CREAT SERPL: 10.8 (ref 7–25)
CALCIUM SPEC-SCNC: 9.6 MG/DL (ref 8.6–10.5)
CHLORIDE SERPL-SCNC: 100 MMOL/L (ref 98–107)
CO2 SERPL-SCNC: 23 MMOL/L (ref 22–29)
CREAT SERPL-MCNC: 0.74 MG/DL (ref 0.57–1)
DEPRECATED RDW RBC AUTO: 42.5 FL (ref 37–54)
EGFRCR SERPLBLD CKD-EPI 2021: 109 ML/MIN/1.73
EOSINOPHIL # BLD AUTO: 0.37 10*3/MM3 (ref 0–0.4)
EOSINOPHIL NFR BLD AUTO: 2.7 % (ref 0.3–6.2)
ERYTHROCYTE [DISTWIDTH] IN BLOOD BY AUTOMATED COUNT: 13.8 % (ref 12.3–15.4)
GLUCOSE SERPL-MCNC: 155 MG/DL (ref 65–99)
HCT VFR BLD AUTO: 45.8 % (ref 34–46.6)
HGB BLD-MCNC: 14.3 G/DL (ref 12–15.9)
IMM GRANULOCYTES # BLD AUTO: 0.12 10*3/MM3 (ref 0–0.05)
IMM GRANULOCYTES NFR BLD AUTO: 0.9 % (ref 0–0.5)
LYMPHOCYTES # BLD AUTO: 3.26 10*3/MM3 (ref 0.7–3.1)
LYMPHOCYTES NFR BLD AUTO: 24.2 % (ref 19.6–45.3)
MCH RBC QN AUTO: 26.6 PG (ref 26.6–33)
MCHC RBC AUTO-ENTMCNC: 31.2 G/DL (ref 31.5–35.7)
MCV RBC AUTO: 85.3 FL (ref 79–97)
MONOCYTES # BLD AUTO: 0.76 10*3/MM3 (ref 0.1–0.9)
MONOCYTES NFR BLD AUTO: 5.6 % (ref 5–12)
NEUTROPHILS NFR BLD AUTO: 66.1 % (ref 42.7–76)
NEUTROPHILS NFR BLD AUTO: 8.91 10*3/MM3 (ref 1.7–7)
NRBC BLD AUTO-RTO: 0 /100 WBC (ref 0–0.2)
PLATELET # BLD AUTO: 392 10*3/MM3 (ref 140–450)
PMV BLD AUTO: 9 FL (ref 6–12)
POTASSIUM SERPL-SCNC: 3.5 MMOL/L (ref 3.5–5.2)
QT INTERVAL: 388 MS
QTC INTERVAL: 430 MS
RBC # BLD AUTO: 5.37 10*6/MM3 (ref 3.77–5.28)
RH BLD: POSITIVE
SODIUM SERPL-SCNC: 137 MMOL/L (ref 136–145)
T&S EXPIRATION DATE: NORMAL
WBC NRBC COR # BLD: 13.49 10*3/MM3 (ref 3.4–10.8)

## 2022-05-13 PROCEDURE — 80048 BASIC METABOLIC PNL TOTAL CA: CPT

## 2022-05-13 PROCEDURE — 36415 COLL VENOUS BLD VENIPUNCTURE: CPT

## 2022-05-13 PROCEDURE — 85025 COMPLETE CBC W/AUTO DIFF WBC: CPT

## 2022-05-13 PROCEDURE — 86901 BLOOD TYPING SEROLOGIC RH(D): CPT

## 2022-05-13 PROCEDURE — 93005 ELECTROCARDIOGRAM TRACING: CPT

## 2022-05-13 PROCEDURE — U0004 COV-19 TEST NON-CDC HGH THRU: HCPCS | Performed by: OBSTETRICS & GYNECOLOGY

## 2022-05-13 PROCEDURE — 86900 BLOOD TYPING SEROLOGIC ABO: CPT

## 2022-05-13 PROCEDURE — 86850 RBC ANTIBODY SCREEN: CPT

## 2022-05-13 RX ORDER — ALUMINA, MAGNESIA, AND SIMETHICONE 2400; 2400; 240 MG/30ML; MG/30ML; MG/30ML
10 SUSPENSION ORAL 2 TIMES DAILY PRN
COMMUNITY
End: 2022-08-09

## 2022-05-14 LAB — SARS-COV-2 RNA PNL SPEC NAA+PROBE: NOT DETECTED

## 2022-05-16 ENCOUNTER — ANESTHESIA (OUTPATIENT)
Dept: PERIOP | Facility: HOSPITAL | Age: 37
End: 2022-05-16

## 2022-05-16 ENCOUNTER — HOSPITAL ENCOUNTER (OUTPATIENT)
Facility: HOSPITAL | Age: 37
Setting detail: HOSPITAL OUTPATIENT SURGERY
Discharge: HOME | End: 2022-05-16
Attending: OBSTETRICS & GYNECOLOGY | Admitting: OBSTETRICS & GYNECOLOGY

## 2022-05-16 ENCOUNTER — ANESTHESIA EVENT (OUTPATIENT)
Dept: PERIOP | Facility: HOSPITAL | Age: 37
End: 2022-05-16

## 2022-05-16 ENCOUNTER — APPOINTMENT (OUTPATIENT)
Dept: GENERAL RADIOLOGY | Facility: HOSPITAL | Age: 37
End: 2022-05-16

## 2022-05-16 VITALS
HEIGHT: 65 IN | DIASTOLIC BLOOD PRESSURE: 88 MMHG | SYSTOLIC BLOOD PRESSURE: 122 MMHG | HEART RATE: 92 BPM | BODY MASS INDEX: 35.32 KG/M2 | WEIGHT: 212 LBS | OXYGEN SATURATION: 98 % | RESPIRATION RATE: 12 BRPM | TEMPERATURE: 97.8 F

## 2022-05-16 DIAGNOSIS — D25.2 SUBSEROSAL LEIOMYOMA OF UTERUS: ICD-10-CM

## 2022-05-16 DIAGNOSIS — R93.5 ABNORMAL ABDOMINAL ULTRASOUND: ICD-10-CM

## 2022-05-16 DIAGNOSIS — R93.5 ABNORMAL ENDOMETRIAL ULTRASOUND: ICD-10-CM

## 2022-05-16 DIAGNOSIS — N83.202 LEFT OVARIAN CYST: ICD-10-CM

## 2022-05-16 DIAGNOSIS — N83.202 CYST OF LEFT OVARY: Primary | ICD-10-CM

## 2022-05-16 LAB
ABO GROUP BLD: NORMAL
B-HCG UR QL: NEGATIVE
EXPIRATION DATE: NORMAL
GLUCOSE BLDC GLUCOMTR-MCNC: 124 MG/DL (ref 70–130)
INTERNAL NEGATIVE CONTROL: NEGATIVE
INTERNAL POSITIVE CONTROL: POSITIVE
Lab: NORMAL
RH BLD: POSITIVE

## 2022-05-16 PROCEDURE — 25010000002 MIDAZOLAM PER 1 MG: Performed by: NURSE ANESTHETIST, CERTIFIED REGISTERED

## 2022-05-16 PROCEDURE — 25010000002 PROPOFOL 10 MG/ML EMULSION: Performed by: NURSE ANESTHETIST, CERTIFIED REGISTERED

## 2022-05-16 PROCEDURE — 25010000002 FENTANYL CITRATE (PF) 50 MCG/ML SOLUTION: Performed by: NURSE ANESTHETIST, CERTIFIED REGISTERED

## 2022-05-16 PROCEDURE — 81025 URINE PREGNANCY TEST: CPT | Performed by: OBSTETRICS & GYNECOLOGY

## 2022-05-16 PROCEDURE — 25010000002 CEFOXITIN PER 1 G: Performed by: NURSE PRACTITIONER

## 2022-05-16 PROCEDURE — 25010000002 NEOSTIGMINE 10 MG/10ML SOLUTION: Performed by: NURSE ANESTHETIST, CERTIFIED REGISTERED

## 2022-05-16 PROCEDURE — 86901 BLOOD TYPING SEROLOGIC RH(D): CPT

## 2022-05-16 PROCEDURE — 25010000002 KETOROLAC TROMETHAMINE PER 15 MG: Performed by: NURSE ANESTHETIST, CERTIFIED REGISTERED

## 2022-05-16 PROCEDURE — 82962 GLUCOSE BLOOD TEST: CPT

## 2022-05-16 PROCEDURE — 25010000002 ONDANSETRON PER 1 MG: Performed by: NURSE ANESTHETIST, CERTIFIED REGISTERED

## 2022-05-16 PROCEDURE — 86900 BLOOD TYPING SEROLOGIC ABO: CPT

## 2022-05-16 RX ORDER — ONDANSETRON 2 MG/ML
INJECTION INTRAMUSCULAR; INTRAVENOUS AS NEEDED
Status: DISCONTINUED | OUTPATIENT
Start: 2022-05-16 | End: 2022-05-16 | Stop reason: SURG

## 2022-05-16 RX ORDER — PROPOFOL 10 MG/ML
VIAL (ML) INTRAVENOUS AS NEEDED
Status: DISCONTINUED | OUTPATIENT
Start: 2022-05-16 | End: 2022-05-16 | Stop reason: SURG

## 2022-05-16 RX ORDER — HYDROCODONE BITARTRATE AND ACETAMINOPHEN 5; 325 MG/1; MG/1
1 TABLET ORAL EVERY 4 HOURS PRN
Qty: 10 TABLET | Refills: 0 | Status: SHIPPED | OUTPATIENT
Start: 2022-05-16 | End: 2022-06-20

## 2022-05-16 RX ORDER — TRANEXAMIC ACID 100 MG/ML
INJECTION, SOLUTION INTRAVENOUS AS NEEDED
Status: DISCONTINUED | OUTPATIENT
Start: 2022-05-16 | End: 2022-05-16 | Stop reason: SURG

## 2022-05-16 RX ORDER — MIDAZOLAM HYDROCHLORIDE 1 MG/ML
INJECTION INTRAMUSCULAR; INTRAVENOUS AS NEEDED
Status: DISCONTINUED | OUTPATIENT
Start: 2022-05-16 | End: 2022-05-16 | Stop reason: SURG

## 2022-05-16 RX ORDER — IBUPROFEN 600 MG/1
600 TABLET ORAL EVERY 6 HOURS PRN
Qty: 30 TABLET | Refills: 0 | Status: SHIPPED | OUTPATIENT
Start: 2022-05-16 | End: 2022-06-15

## 2022-05-16 RX ORDER — KETOROLAC TROMETHAMINE 30 MG/ML
30 INJECTION, SOLUTION INTRAMUSCULAR; INTRAVENOUS EVERY 6 HOURS PRN
Status: DISCONTINUED | OUTPATIENT
Start: 2022-05-16 | End: 2022-05-16

## 2022-05-16 RX ORDER — SODIUM CHLORIDE 0.9 % (FLUSH) 0.9 %
10 SYRINGE (ML) INJECTION AS NEEDED
Status: DISCONTINUED | OUTPATIENT
Start: 2022-05-16 | End: 2022-05-16 | Stop reason: HOSPADM

## 2022-05-16 RX ORDER — MAGNESIUM HYDROXIDE 1200 MG/15ML
LIQUID ORAL AS NEEDED
Status: DISCONTINUED | OUTPATIENT
Start: 2022-05-16 | End: 2022-05-16 | Stop reason: HOSPADM

## 2022-05-16 RX ORDER — ROCURONIUM BROMIDE 10 MG/ML
INJECTION, SOLUTION INTRAVENOUS AS NEEDED
Status: DISCONTINUED | OUTPATIENT
Start: 2022-05-16 | End: 2022-05-16 | Stop reason: SURG

## 2022-05-16 RX ORDER — GLYCOPYRROLATE 0.2 MG/ML
INJECTION INTRAMUSCULAR; INTRAVENOUS AS NEEDED
Status: DISCONTINUED | OUTPATIENT
Start: 2022-05-16 | End: 2022-05-16 | Stop reason: SURG

## 2022-05-16 RX ORDER — MEPERIDINE HYDROCHLORIDE 25 MG/ML
12.5 INJECTION INTRAMUSCULAR; INTRAVENOUS; SUBCUTANEOUS
Status: DISCONTINUED | OUTPATIENT
Start: 2022-05-16 | End: 2022-05-16 | Stop reason: HOSPADM

## 2022-05-16 RX ORDER — SODIUM CHLORIDE, SODIUM LACTATE, POTASSIUM CHLORIDE, CALCIUM CHLORIDE 600; 310; 30; 20 MG/100ML; MG/100ML; MG/100ML; MG/100ML
INJECTION, SOLUTION INTRAVENOUS CONTINUOUS PRN
Status: DISCONTINUED | OUTPATIENT
Start: 2022-05-16 | End: 2022-05-16 | Stop reason: SURG

## 2022-05-16 RX ORDER — NEOSTIGMINE METHYLSULFATE 1 MG/ML
INJECTION, SOLUTION INTRAVENOUS AS NEEDED
Status: DISCONTINUED | OUTPATIENT
Start: 2022-05-16 | End: 2022-05-16 | Stop reason: SURG

## 2022-05-16 RX ORDER — SODIUM CHLORIDE, SODIUM LACTATE, POTASSIUM CHLORIDE, CALCIUM CHLORIDE 600; 310; 30; 20 MG/100ML; MG/100ML; MG/100ML; MG/100ML
100 INJECTION, SOLUTION INTRAVENOUS ONCE AS NEEDED
Status: DISCONTINUED | OUTPATIENT
Start: 2022-05-16 | End: 2022-05-16 | Stop reason: HOSPADM

## 2022-05-16 RX ORDER — IBUPROFEN 200 MG
600 TABLET ORAL EVERY 6 HOURS PRN
COMMUNITY
End: 2022-12-03

## 2022-05-16 RX ORDER — SODIUM CHLORIDE, SODIUM LACTATE, POTASSIUM CHLORIDE, CALCIUM CHLORIDE 600; 310; 30; 20 MG/100ML; MG/100ML; MG/100ML; MG/100ML
125 INJECTION, SOLUTION INTRAVENOUS ONCE
Status: COMPLETED | OUTPATIENT
Start: 2022-05-16 | End: 2022-05-16

## 2022-05-16 RX ORDER — MIDAZOLAM HYDROCHLORIDE 1 MG/ML
1 INJECTION INTRAMUSCULAR; INTRAVENOUS
Status: DISCONTINUED | OUTPATIENT
Start: 2022-05-16 | End: 2022-05-16 | Stop reason: HOSPADM

## 2022-05-16 RX ORDER — SODIUM CHLORIDE 0.9 % (FLUSH) 0.9 %
10 SYRINGE (ML) INJECTION EVERY 12 HOURS SCHEDULED
Status: DISCONTINUED | OUTPATIENT
Start: 2022-05-16 | End: 2022-05-16 | Stop reason: HOSPADM

## 2022-05-16 RX ORDER — FENTANYL CITRATE 50 UG/ML
50 INJECTION, SOLUTION INTRAMUSCULAR; INTRAVENOUS
Status: DISCONTINUED | OUTPATIENT
Start: 2022-05-16 | End: 2022-05-16 | Stop reason: HOSPADM

## 2022-05-16 RX ORDER — FENTANYL CITRATE 50 UG/ML
INJECTION, SOLUTION INTRAMUSCULAR; INTRAVENOUS AS NEEDED
Status: DISCONTINUED | OUTPATIENT
Start: 2022-05-16 | End: 2022-05-16 | Stop reason: SURG

## 2022-05-16 RX ORDER — ONDANSETRON 2 MG/ML
4 INJECTION INTRAMUSCULAR; INTRAVENOUS AS NEEDED
Status: DISCONTINUED | OUTPATIENT
Start: 2022-05-16 | End: 2022-05-16 | Stop reason: HOSPADM

## 2022-05-16 RX ORDER — FAMOTIDINE 10 MG/ML
INJECTION, SOLUTION INTRAVENOUS AS NEEDED
Status: DISCONTINUED | OUTPATIENT
Start: 2022-05-16 | End: 2022-05-16 | Stop reason: SURG

## 2022-05-16 RX ORDER — BUPIVACAINE HYDROCHLORIDE 5 MG/ML
INJECTION, SOLUTION PERINEURAL AS NEEDED
Status: DISCONTINUED | OUTPATIENT
Start: 2022-05-16 | End: 2022-05-16 | Stop reason: HOSPADM

## 2022-05-16 RX ORDER — IPRATROPIUM BROMIDE AND ALBUTEROL SULFATE 2.5; .5 MG/3ML; MG/3ML
3 SOLUTION RESPIRATORY (INHALATION) ONCE AS NEEDED
Status: DISCONTINUED | OUTPATIENT
Start: 2022-05-16 | End: 2022-05-16 | Stop reason: HOSPADM

## 2022-05-16 RX ORDER — METOPROLOL TARTRATE 5 MG/5ML
INJECTION INTRAVENOUS AS NEEDED
Status: DISCONTINUED | OUTPATIENT
Start: 2022-05-16 | End: 2022-05-16 | Stop reason: SURG

## 2022-05-16 RX ORDER — OXYCODONE HYDROCHLORIDE AND ACETAMINOPHEN 5; 325 MG/1; MG/1
1 TABLET ORAL ONCE AS NEEDED
Status: DISCONTINUED | OUTPATIENT
Start: 2022-05-16 | End: 2022-05-16 | Stop reason: HOSPADM

## 2022-05-16 RX ORDER — BUPIVACAINE HYDROCHLORIDE AND EPINEPHRINE 5; 5 MG/ML; UG/ML
INJECTION, SOLUTION PERINEURAL AS NEEDED
Status: DISCONTINUED | OUTPATIENT
Start: 2022-05-16 | End: 2022-05-16 | Stop reason: HOSPADM

## 2022-05-16 RX ORDER — KETOROLAC TROMETHAMINE 30 MG/ML
INJECTION, SOLUTION INTRAMUSCULAR; INTRAVENOUS AS NEEDED
Status: DISCONTINUED | OUTPATIENT
Start: 2022-05-16 | End: 2022-05-16 | Stop reason: SURG

## 2022-05-16 RX ADMIN — ROCURONIUM BROMIDE 40 MG: 10 SOLUTION INTRAVENOUS at 07:33

## 2022-05-16 RX ADMIN — TRANEXAMIC ACID 1000 MG: 100 INJECTION, SOLUTION INTRAVENOUS at 08:18

## 2022-05-16 RX ADMIN — CEFOXITIN 2 G: 2 INJECTION, POWDER, FOR SOLUTION INTRAVENOUS at 07:40

## 2022-05-16 RX ADMIN — NEOSTIGMINE 3 MG: 1 INJECTION INTRAVENOUS at 08:39

## 2022-05-16 RX ADMIN — METOPROLOL TARTRATE 2.5 MG: 1 INJECTION, SOLUTION INTRAVENOUS at 08:45

## 2022-05-16 RX ADMIN — SODIUM CHLORIDE, POTASSIUM CHLORIDE, SODIUM LACTATE AND CALCIUM CHLORIDE 125 ML/HR: 600; 310; 30; 20 INJECTION, SOLUTION INTRAVENOUS at 07:05

## 2022-05-16 RX ADMIN — FENTANYL CITRATE 100 MCG: 50 INJECTION INTRAMUSCULAR; INTRAVENOUS at 07:29

## 2022-05-16 RX ADMIN — FAMOTIDINE 20 MG: 10 INJECTION INTRAVENOUS at 07:29

## 2022-05-16 RX ADMIN — ONDANSETRON 4 MG: 2 INJECTION INTRAMUSCULAR; INTRAVENOUS at 07:29

## 2022-05-16 RX ADMIN — SODIUM CHLORIDE, POTASSIUM CHLORIDE, SODIUM LACTATE AND CALCIUM CHLORIDE: 600; 310; 30; 20 INJECTION, SOLUTION INTRAVENOUS at 07:29

## 2022-05-16 RX ADMIN — GLYCOPYRROLATE 0.6 MG: 0.2 INJECTION INTRAMUSCULAR; INTRAVENOUS at 08:39

## 2022-05-16 RX ADMIN — PROPOFOL 150 MG: 10 INJECTION, EMULSION INTRAVENOUS at 07:33

## 2022-05-16 RX ADMIN — MIDAZOLAM 2 MG: 1 INJECTION INTRAMUSCULAR; INTRAVENOUS at 07:29

## 2022-05-16 RX ADMIN — KETOROLAC TROMETHAMINE 30 MG: 30 INJECTION, SOLUTION INTRAMUSCULAR at 08:39

## 2022-05-16 NOTE — ANESTHESIA POSTPROCEDURE EVALUATION
Patient: Jessica Harris    Procedure Summary     Date: 05/16/22 Room / Location: [facility] OR 02 /  [facility] OR    Anesthesia Start: 0730 Anesthesia Stop: 0901    Procedures:       DILATATION AND CURETTAGE HYSTEROSCOPY (N/A Vagina)      OVARIAN CYSTECTOMY WITH EXTENSIVE LYSIS OF ADHESIONS (Left Vagina) Diagnosis: (N83.202   R93.5)    Surgeons: Joey Andersen DO Provider: Maldonado Vanessa MD    Anesthesia Type: general ASA Status: 3          Anesthesia Type: general    Vitals  Vitals Value Taken Time   /88 05/16/22 0931   Temp 97.8 °F (36.6 °C) 05/16/22 0901   Pulse 94 05/16/22 0931   Resp 11 05/16/22 0931   SpO2 93 % 05/16/22 0931           Post Anesthesia Care and Evaluation    Patient location during evaluation: bedside  Patient participation: complete - patient participated  Level of consciousness: awake and alert  Pain score: 1  Pain management: adequate  Airway patency: patent  Anesthetic complications: No anesthetic complications  PONV Status: none  Cardiovascular status: acceptable  Respiratory status: acceptable  Hydration status: acceptable

## 2022-05-16 NOTE — OP NOTE
DILATATION AND CURETTAGE HYSTEROSCOPY, OVARIAN CYST LAPAROSCOPIC DRAINAGE/EXCISION  Procedure Note    Jessica Harris  5/16/2022    Pre-op Diagnosis:   Large left ovarian cyst  Abnormal uterine bleeding    Post-op Diagnosis:     Large left ovarian cyst  Abnormal uterine bleeding    Procedure(s):  Laparoscopic left ovarian cystectomy  Lysis of adhesions  Hysteroscopy  Dilation curettage      Surgeon(s):  Joey Andersen DO    Anesthesia: General    Estimated Blood Loss: minimal    Specimens:  Order Name Source Comment Collection Info Order Time   PREGNANCY, URINE    5/16/2022  6:18 AM     Release to patient   Immediate        ABO/RH SPECIMEN VERIFICATION PREOP   Collected By: Izabella Vega 5/16/2022  6:18 AM     Release to patient   Immediate        TISSUE EXAM, [facility] ([facility], [facility]) Ovary, Left  Collected By: Joey Andersen DO 5/16/2022  8:27 AM     How many specimens?   1          Release to patient   Immediate              Procedure:  The patient was taken to the operating room, given general anesthesia, prepped and draped in the usual sterile fashion.  The bladder was drained with a straight catheter.  A speculum was placed into the vagina and a uterine manipulator was placed.  The surgeon was regloved and attention made to the abdomen.      A one centimeter incision was made above the umbilicus and an OPTIVIEW trocar was placed into the abdomen under direct visualization using the laparoscope.  The abdomen was filled with CO2 gas up to 15mm mercury pressure.  A second eight millimeter trocar was placed suprapubically in the midline.  A third 5 mm trocar was placed in the left lower quadrant.  There were adhesions from the anterior abdominal wall that we took down using the Enseal device.  These were to the area around the umbilicus.  Once we get these down we made attention to the pelvis.  There was a large cyst that occupy the entire pelvis originating from the left ovary.  We made a  small incision into it and suction the contents of the ovarian cyst.  We then used [place] graspers to stripped the cyst wall off of the ovary.  There was an area on the remaining cyst wall that was fairly cystic-appearing and so we used the Enseal device to remove this area.  Everything looked hemostatic at this point.  We then made attention to the right ovary it looked fairly polycystic with some dominant cyst and so we drained the cyst.    We then remove the specimen through the 8 mm port.  It appeared to be a little bit stretched out and so we closed it with a Akin Huynh device with 0 Vicryl.    We removed the CO2 gas and trocars.  The skin incisions were closed with a 4-0 monocryl and surgical adhesive.      Next we did a hysteroscopy.  We placed a speculum into the vagina and grasped the anterior lip of the cervix with a toothed tenaculum.  We dilated the cervix and inserted the hysteroscope.  The endometrium was thickened throughout and somewhat irregular appearing.  We then dilated the cervix some more and did a curettage until a fine gritty texture was noted.  We sent the endometrial curettings to pathology as a specimen.    Sponge, lap and needle counts were correct.       Findings: There was a large left-sided ovarian cyst that looked to be about 10 cm.  The right ovary was polycystic.  There were adhesions from the omentum to the anterior abdominal wall around the area of the umbilicus.    Complications: none    Grafts or Implants: NA    Joey Andersen,      Date: 5/16/2022  Time: 08:43 EDT

## 2022-05-16 NOTE — ANESTHESIA PREPROCEDURE EVALUATION
Anesthesia Evaluation     no history of anesthetic complications:  NPO Solid Status: > 8 hours  NPO Liquid Status: > 8 hours           Airway   Mallampati: II  TM distance: >3 FB  Neck ROM: full  No difficulty expected  Dental - normal exam     Pulmonary - normal exam   (+) a smoker Current Smoked day of surgery, asthma,  Cardiovascular - normal exam    (+) hypertension, hyperlipidemia,       Neuro/Psych  GI/Hepatic/Renal/Endo    (+) morbid obesity, GERD,  renal disease, diabetes mellitus,     Musculoskeletal     Abdominal  - normal exam    Bowel sounds: normal.   Substance History      OB/GYN          Other                        Anesthesia Plan    ASA 3     general     intravenous induction     Anesthetic plan, all risks, benefits, and alternatives have been provided, discussed and informed consent has been obtained with: patient.        CODE STATUS:

## 2022-05-16 NOTE — ANESTHESIA PROCEDURE NOTES
Airway  Urgency: elective    Date/Time: 5/16/2022 7:36 AM  Airway not difficult    General Information and Staff    Patient location during procedure: OR  CRNA/CAA: Carolann Hernandez CRNA    Indications and Patient Condition  Indications for airway management: airway protection    Preoxygenated: yes  MILS maintained throughout  Mask difficulty assessment: 0 - not attempted    Final Airway Details  Final airway type: endotracheal airway      Successful airway: ETT  Cuffed: yes   Successful intubation technique: direct laryngoscopy  Endotracheal tube insertion site: oral  Blade: Keith  Blade size: 2  ETT size (mm): 7.5  Cormack-Lehane Classification: grade I - full view of glottis  Placement verified by: chest auscultation   Measured from: lips  ETT/EBT  to lips (cm): 21  Number of attempts at approach: 1  Assessment: lips, teeth, and gum same as pre-op and atraumatic intubation

## 2022-05-18 LAB — REF LAB TEST METHOD: NORMAL

## 2022-06-02 ENCOUNTER — OFFICE VISIT (OUTPATIENT)
Dept: GASTROENTEROLOGY | Facility: CLINIC | Age: 37
End: 2022-06-02

## 2022-06-02 VITALS
BODY MASS INDEX: 34.99 KG/M2 | OXYGEN SATURATION: 97 % | HEIGHT: 65 IN | SYSTOLIC BLOOD PRESSURE: 140 MMHG | HEART RATE: 108 BPM | DIASTOLIC BLOOD PRESSURE: 98 MMHG | WEIGHT: 210 LBS

## 2022-06-02 DIAGNOSIS — R11.0 NAUSEA: Primary | ICD-10-CM

## 2022-06-02 DIAGNOSIS — K21.9 GASTROESOPHAGEAL REFLUX DISEASE, UNSPECIFIED WHETHER ESOPHAGITIS PRESENT: ICD-10-CM

## 2022-06-02 PROCEDURE — 99204 OFFICE O/P NEW MOD 45 MIN: CPT | Performed by: INTERNAL MEDICINE

## 2022-06-02 RX ORDER — MEDROXYPROGESTERONE ACETATE 5 MG/1
5 TABLET ORAL DAILY
COMMUNITY
End: 2022-06-20

## 2022-06-02 NOTE — PROGRESS NOTES
"Subjective     Jessica Harris is a 36 y.o. female who presents to the office today as a consultation from SHARIF Giang* for evaluation of Heartburn        History of Present Illness:  The patient presents for evaluation of GERD symptoms.  She states she has heartburn if she does not take her Protonix.  She was recently prescribed Pepcid for bloating and belching.  She has had her gallbladder removed.  No weight loss. She initially lost 50lbs on Metformin and switching to \"diet pop\". There is associated nausea but rarely vomits.  She does have alternating diarrhea and constipation which she relates to the Metformin.  No hematemesis or melena.  She complains of intermittent belching and bloating. She describes \"sulfur burps. She also complains of epigastric pain.        Review of Systems:  Review of Systems   Constitutional: Positive for activity change and fatigue. Negative for unexpected weight change.   HENT: Negative for sore throat and trouble swallowing.    Eyes: Negative.    Respiratory: Negative for chest tightness.    Cardiovascular: Negative for chest pain.   Gastrointestinal: Positive for abdominal distention, abdominal pain, constipation, diarrhea, nausea and vomiting. Negative for anal bleeding, blood in stool and rectal pain.   Endocrine: Negative.    Genitourinary: Negative for difficulty urinating.   Musculoskeletal: Positive for back pain. Negative for neck pain.   Skin: Negative.    Allergic/Immunologic: Negative for environmental allergies and food allergies.   Neurological: Positive for headaches. Negative for dizziness.   Hematological: Does not bruise/bleed easily.   Psychiatric/Behavioral: Positive for agitation and sleep disturbance. The patient is nervous/anxious.        Past Medical History:  Past Medical History:   Diagnosis Date   • Anxiety    • Asthma    • Bipolar 1 disorder (HCC)    • Depression    • Diabetes mellitus (HCC)    • Elevated cholesterol    • GERD (gastroesophageal " "reflux disease)    • History of bronchitis    • History of ear infections    • History of stomach ulcers    • History of streptococcal infection    • Hyperlipidemia    • Hypertension    • Insomnia    • Kidney stones    • Urinary tract infection        Past Surgical History:  Past Surgical History:   Procedure Laterality Date   • ADENOIDECTOMY     • CHOLECYSTECTOMY     • D & C HYSTEROSCOPY N/A 05/16/2022    Procedure: DILATATION AND CURETTAGE HYSTEROSCOPY;  Surgeon: Joey Andersen DO;  Location: [facility] OR;  Service: Obstetrics/Gynecology;  Laterality: N/A;   • DENTAL PROCEDURE     • ENDOSCOPY     • HERNIA REPAIR      umbilical   • OVARIAN CYST DRAINAGE/EXCISION Left 05/16/2022    Procedure: OVARIAN CYSTECTOMY WITH EXTENSIVE LYSIS OF ADHESIONS;  Surgeon: Joey Andersen DO;  Location: [facility] OR;  Service: Obstetrics/Gynecology;  Laterality: Left;   • TONSILLECTOMY     • UPPER GASTROINTESTINAL ENDOSCOPY     • WISDOM TOOTH EXTRACTION         Family History:  Family History   Problem Relation Age of Onset   • Cancer Mother    • Stroke Mother    • Lung disease Mother    • No Known Problems Father    • Cancer Other    • Heart disease Other    • Stroke Other        Social History:  Social History     Socioeconomic History   • Marital status: Single   Tobacco Use   • Smoking status: Current Every Day Smoker     Packs/day: 0.50     Types: Cigarettes   • Smokeless tobacco: Never Used   • Tobacco comment: \"thinking about it\"   Vaping Use   • Vaping Use: Never used   Substance and Sexual Activity   • Alcohol use: No   • Drug use: No   • Sexual activity: Defer     Birth control/protection: Pill       Current Medication List:    Current Outpatient Medications:   •  albuterol sulfate  (90 Base) MCG/ACT inhaler, Inhale 2 puffs Every 6 (Six) Hours As Needed for Wheezing., Disp: 18 g, Rfl: 3  •  aluminum-magnesium hydroxide-simethicone (MAALOX MAX) 400-400-40 MG/5ML suspension, Take 10 mL by mouth Every 12 " (Twelve) Hours., Disp: , Rfl:   •  atorvastatin (LIPITOR) 20 MG tablet, Take 1 tablet by mouth Every Night., Disp: 30 tablet, Rfl: 5  •  buPROPion SR (WELLBUTRIN SR) 100 MG 12 hr tablet, Take 1 tablet by mouth 2 (Two) Times a Day., Disp: 180 tablet, Rfl: 1  •  cyanocobalamin ([employer] Vitamin B-12) 2000 MCG tablet, Take 1 tablet by mouth Daily., Disp: 30 tablet, Rfl: 3  •  ergocalciferol (ERGOCALCIFEROL) 1.25 MG (73797 UT) capsule, Take 1 capsule by mouth 1 (One) Time Per Week., Disp: 4 capsule, Rfl: 5  •  famotidine (Pepcid) 20 MG tablet, Take 1 tablet by mouth 2 (Two) Times a Day., Disp: 60 tablet, Rfl: 0  •  Glucose Blood (Blood Glucose Test) strip, 1 Device by Other route Daily. 1 daily, Disp: 100 each, Rfl: 3  •  glucose monitor monitoring kit, 1 each Daily., Disp: 1 each, Rfl: 0  •  icosapent ethyl (Vascepa) 1 g capsule capsule, 2 twice daily, Disp: 120 capsule, Rfl: 5  •  Lancets misc, 1 daily, Disp: 50 each, Rfl: 5  •  lurasidone (LATUDA) 60 MG tablet tablet, Take 1 tablet by mouth Daily., Disp: 90 tablet, Rfl: 1  •  medroxyPROGESTERone (PROVERA) 5 MG tablet, Take 5 mg by mouth Daily., Disp: , Rfl:   •  metFORMIN (GLUCOPHAGE) 500 MG tablet, Take 1 tablet by mouth 2 (Two) Times a Day With Meals., Disp: 180 tablet, Rfl: 3  •  ondansetron ODT (ZOFRAN-ODT) 4 MG disintegrating tablet, , Disp: , Rfl:   •  pantoprazole (PROTONIX) 40 MG EC tablet, Take 1 tablet by mouth Daily., Disp: 90 tablet, Rfl: 3  •  propranolol LA (INDERAL LA) 120 MG 24 hr capsule, Take 1 capsule by mouth Daily., Disp: 90 capsule, Rfl: 3  •  QUEtiapine (SEROquel) 100 MG tablet, Take 1 tablet by mouth every night at bedtime., Disp: 90 tablet, Rfl: 1  •  HYDROcodone-acetaminophen (NORCO) 5-325 MG per tablet, Take 1 tablet by mouth Every 4 (Four) Hours As Needed for Moderate Pain ., Disp: 10 tablet, Rfl: 0  •  ibuprofen (ADVIL,MOTRIN) 200 MG tablet, Take 600 mg by mouth Every 6 (Six) Hours As Needed for Mild Pain ., Disp: , Rfl:   •  ibuprofen  "(ADVIL,MOTRIN) 600 MG tablet, Take 1 tablet by mouth Every 6 (Six) Hours As Needed for Mild Pain., Disp: 30 tablet, Rfl: 0    Allergies:   Patient has no known allergies.    Vitals:  /98   Pulse 108   Ht 165.1 cm (65\")   Wt 95.3 kg (210 lb)   SpO2 97%   BMI 34.95 kg/m²     Physical Exam:  Physical Exam  Constitutional:       Appearance: She is obese.   HENT:      Head: Normocephalic and atraumatic.      Nose: Nose normal. No congestion or rhinorrhea.   Eyes:      General: No scleral icterus.     Extraocular Movements: Extraocular movements intact.      Conjunctiva/sclera: Conjunctivae normal.      Pupils: Pupils are equal, round, and reactive to light.   Cardiovascular:      Rate and Rhythm: Normal rate and regular rhythm.      Pulses: Normal pulses.      Heart sounds: Normal heart sounds.   Pulmonary:      Effort: Pulmonary effort is normal.      Breath sounds: Normal breath sounds.   Abdominal:      General: Abdomen is flat. Bowel sounds are normal. There is no distension.      Palpations: Abdomen is soft. There is no shifting dullness, fluid wave, hepatomegaly, splenomegaly, mass or pulsatile mass.      Tenderness: There is no abdominal tenderness. There is no guarding or rebound.      Hernia: No hernia is present.   Musculoskeletal:         General: No swelling or tenderness.      Cervical back: Normal range of motion and neck supple.   Skin:     General: Skin is warm and dry.      Coloration: Skin is not jaundiced.   Neurological:      General: No focal deficit present.      Mental Status: She is alert and oriented to person, place, and time.   Psychiatric:         Mood and Affect: Mood normal.         Behavior: Behavior normal.         Results Review:  Lab Results:   Admission on 05/16/2022, Discharged on 05/16/2022   Component Date Value Ref Range Status   • ABO Type 05/16/2022 O   Final   • RH type 05/16/2022 Positive   Final   • Glucose 05/16/2022 124  70 - 130 mg/dL Final    Meter: [id number] " : [id number] zenon dye   • HCG, Urine, QL 2022 Negative  Negative Final   • Lot Number [id number]   Final   • Internal Positive Control 2022 Positive  Positive, Passed Final   • Internal Negative Control 2022 Negative  Negative, Passed Final   • Expiration Date 2022   Final   • Reference Lab Report 2022    Final                    Value:[facility]  [address] [place] [address]  Phone: [phone] or [phone]  Fax: [phone]  German Fuentes M.D., Medical Director    PATIENT NAME                           LABORATORY NO.  [id number]  GORDY ECHAVARRIA                      [id number]  [id number]                         AGE              SEX  SSN           CLIENT REF #  [facility] CODY              36      1985  F    [id number]   [id number]    [address]                     REQUESTING M.D.     ATTENDING [place] [address]                   KIRITAZRAOUL  DATE COLLECTED      DATE RECEIVED      DATE REPORTED  2022    DIAGNOSIS:  A.   OVARIAN CYST WALL, LEFT:  Benign serous cystadenoma  B.   ENDOMETRIUM, CURETTINGS:  Focal complex hyperplasia with extensive squamous morular metaplasia,  no atypia identified  Benign endometrial polyp/polyps  Background late secretory endometrium (day 26)  Negative for                           malignancy    CLINICAL HISTORY:  Left ovarian cyst    SPECIMENS RECEIVED:  A.  OVARIAN CYST WALL, LEFT  B.  ENDOMETRIUM, CURETTINGS    MICROSCOPIC DESCRIPTION:  Tissue blocks are prepared and slides are examined microscopically on all  specimens. See diagnosis for details.    Professional interpretation rendered by Luke Cuba M.D., F.C.A.P. at [facility]  [employer], [place], [address] [place] [address].    GROSS DESCRIPTION:  A.  Specimen A received in formalin labeled left ovarian cyst wall is a 6.5 x 6  x 2 cm  aggregate of severely fragmented, gray-tan ovarian cyst wall.  The  cyst wall thickness averages 0.1 cm.  The intact cysts containing clear,  watery fluid.  Papillary excrescences are not identified.  Representative  sections are submitted in 4 cassettes.  B.  Specimen B received in formalin labeled endometrial curettings is a 5 x 4 x  0.3 cm aggregate of fragmented dark red-brown tissue, blood clot, and  mucus.  The specimen is filtered and submitted entirely in 3                           cassettes.  LDP    REVIEWED, DIAGNOSED AND ELECTRONICALLY  SIGNED BY:    Luke Cuba M.D., F.C.A.P.  CPT CODES:  [id number]     Lab on 05/13/2022   Component Date Value Ref Range Status   • COVID19 05/13/2022 Not Detected  Not Detected - Ref. Range Final   Pre-Admission Testing on 05/13/2022   Component Date Value Ref Range Status   • ABO Type 05/13/2022 O   Final   • RH type 05/13/2022 Positive   Final   • Antibody Screen 05/13/2022 Negative   Final   • T&S Expiration Date 05/13/2022 5/16/2022 11:59:59 PM   Final   • WBC 05/13/2022 13.49 (A) 3.40 - 10.80 10*3/mm3 Final   • RBC 05/13/2022 5.37 (A) 3.77 - 5.28 10*6/mm3 Final   • Hemoglobin 05/13/2022 14.3  12.0 - 15.9 g/dL Final   • Hematocrit 05/13/2022 45.8  34.0 - 46.6 % Final   • MCV 05/13/2022 85.3  79.0 - 97.0 fL Final   • MCH 05/13/2022 26.6  26.6 - 33.0 pg Final   • MCHC 05/13/2022 31.2 (A) 31.5 - 35.7 g/dL Final   • RDW 05/13/2022 13.8  12.3 - 15.4 % Final   • RDW-SD 05/13/2022 42.5  37.0 - 54.0 fl Final   • MPV 05/13/2022 9.0  6.0 - 12.0 fL Final   • Platelets 05/13/2022 392  140 - 450 10*3/mm3 Final   • Neutrophil % 05/13/2022 66.1  42.7 - 76.0 % Final   • Lymphocyte % 05/13/2022 24.2  19.6 - 45.3 % Final   • Monocyte % 05/13/2022 5.6  5.0 - 12.0 % Final   • Eosinophil % 05/13/2022 2.7  0.3 - 6.2 % Final   • Basophil % 05/13/2022 0.5  0.0 - 1.5 % Final   • Immature Grans % 05/13/2022 0.9 (A) 0.0 - 0.5 % Final   • Neutrophils, Absolute 05/13/2022 8.91 (A) 1.70 - 7.00  10*3/mm3 Final   • Lymphocytes, Absolute 05/13/2022 3.26 (A) 0.70 - 3.10 10*3/mm3 Final   • Monocytes, Absolute 05/13/2022 0.76  0.10 - 0.90 10*3/mm3 Final   • Eosinophils, Absolute 05/13/2022 0.37  0.00 - 0.40 10*3/mm3 Final   • Basophils, Absolute 05/13/2022 0.07  0.00 - 0.20 10*3/mm3 Final   • Immature Grans, Absolute 05/13/2022 0.12 (A) 0.00 - 0.05 10*3/mm3 Final   • nRBC 05/13/2022 0.0  0.0 - 0.2 /100 WBC Final   • QT Interval 05/13/2022 388  ms Final   • QTC Interval 05/13/2022 430  ms Final   • Glucose 05/13/2022 155 (A) 65 - 99 mg/dL Final   • BUN 05/13/2022 8  6 - 20 mg/dL Final   • Creatinine 05/13/2022 0.74  0.57 - 1.00 mg/dL Final   • Sodium 05/13/2022 137  136 - 145 mmol/L Final   • Potassium 05/13/2022 3.5  3.5 - 5.2 mmol/L Final   • Chloride 05/13/2022 100  98 - 107 mmol/L Final   • CO2 05/13/2022 23.0  22.0 - 29.0 mmol/L Final   • Calcium 05/13/2022 9.6  8.6 - 10.5 mg/dL Final   • BUN/Creatinine Ratio 05/13/2022 10.8  7.0 - 25.0 Final   • Anion Gap 05/13/2022 14.0  5.0 - 15.0 mmol/L Final   • eGFR 05/13/2022 109.0  >60.0 mL/min/1.73 Final    National Kidney Foundation and American Society of Nephrology (ASN) Task Force recommended calculation based on the Chronic Kidney Disease Epidemiology Collaboration (CKD-EPI) equation refit without adjustment for race.       Assessment & Plan     Visit Diagnoses:    ICD-10-CM ICD-9-CM   1. Nausea  R11.0 787.02   2. Gastroesophageal reflux disease, unspecified whether esophagitis present  K21.9 530.81       Plan:  The patient will undergo EGD due to belching and bloating symptoms.  She will continue Protonix.  She may be experiencing a bile acid reflux.           MEDS ORDERED DURING VISIT:  No orders of the defined types were placed in this encounter.      No follow-ups on file.             This document has been electronically signed by Matilde Moura MD   June 2, 2022 14:55 EDT        Part of this note may be an electronic transcription/translation  of spoken language to printed text using the Dragon Dictation System.

## 2022-06-06 ENCOUNTER — TRANSCRIBE ORDERS (OUTPATIENT)
Dept: LAB | Facility: HOSPITAL | Age: 37
End: 2022-06-06

## 2022-06-06 DIAGNOSIS — Z01.818 OTHER SPECIFIED PRE-OPERATIVE EXAMINATION: Primary | ICD-10-CM

## 2022-06-13 ENCOUNTER — HOSPITAL ENCOUNTER (EMERGENCY)
Facility: HOSPITAL | Age: 37
Discharge: HOME | End: 2022-06-13
Attending: STUDENT IN AN ORGANIZED HEALTH CARE EDUCATION/TRAINING PROGRAM | Admitting: STUDENT IN AN ORGANIZED HEALTH CARE EDUCATION/TRAINING PROGRAM

## 2022-06-13 ENCOUNTER — APPOINTMENT (OUTPATIENT)
Dept: ULTRASOUND IMAGING | Facility: HOSPITAL | Age: 37
End: 2022-06-13

## 2022-06-13 VITALS
RESPIRATION RATE: 16 BRPM | DIASTOLIC BLOOD PRESSURE: 92 MMHG | OXYGEN SATURATION: 98 % | WEIGHT: 210 LBS | HEIGHT: 65 IN | SYSTOLIC BLOOD PRESSURE: 131 MMHG | BODY MASS INDEX: 34.99 KG/M2 | HEART RATE: 114 BPM | TEMPERATURE: 99.7 F

## 2022-06-13 DIAGNOSIS — N39.0 URINARY TRACT INFECTION IN FEMALE: Primary | ICD-10-CM

## 2022-06-13 LAB
ALBUMIN SERPL-MCNC: 4.08 G/DL (ref 3.5–5.2)
ALBUMIN/GLOB SERPL: 1 G/DL
ALP SERPL-CCNC: 108 U/L (ref 39–117)
ALT SERPL W P-5'-P-CCNC: 19 U/L (ref 1–33)
AMYLASE SERPL-CCNC: 32 U/L (ref 28–100)
ANION GAP SERPL CALCULATED.3IONS-SCNC: 17.5 MMOL/L (ref 5–15)
AST SERPL-CCNC: 18 U/L (ref 1–32)
B-HCG UR QL: POSITIVE
BACTERIA UR QL AUTO: ABNORMAL /HPF
BASOPHILS # BLD AUTO: 0.06 10*3/MM3 (ref 0–0.2)
BASOPHILS NFR BLD AUTO: 0.4 % (ref 0–1.5)
BILIRUB SERPL-MCNC: 0.2 MG/DL (ref 0–1.2)
BILIRUB UR QL STRIP: NEGATIVE
BUN SERPL-MCNC: 3 MG/DL (ref 6–20)
BUN/CREAT SERPL: 4.1 (ref 7–25)
CALCIUM SPEC-SCNC: 9.7 MG/DL (ref 8.6–10.5)
CHLORIDE SERPL-SCNC: 96 MMOL/L (ref 98–107)
CLARITY UR: ABNORMAL
CO2 SERPL-SCNC: 23.5 MMOL/L (ref 22–29)
COLOR UR: YELLOW
CREAT SERPL-MCNC: 0.73 MG/DL (ref 0.57–1)
CRP SERPL-MCNC: 15.68 MG/DL (ref 0–0.5)
D-LACTATE SERPL-SCNC: 1.8 MMOL/L (ref 0.5–2)
DEPRECATED RDW RBC AUTO: 40.3 FL (ref 37–54)
EGFRCR SERPLBLD CKD-EPI 2021: 110.8 ML/MIN/1.73
EOSINOPHIL # BLD AUTO: 0.06 10*3/MM3 (ref 0–0.4)
EOSINOPHIL NFR BLD AUTO: 0.4 % (ref 0.3–6.2)
ERYTHROCYTE [DISTWIDTH] IN BLOOD BY AUTOMATED COUNT: 14 % (ref 12.3–15.4)
ERYTHROCYTE [SEDIMENTATION RATE] IN BLOOD: 68 MM/HR (ref 0–20)
FLUAV RNA RESP QL NAA+PROBE: NOT DETECTED
FLUBV RNA RESP QL NAA+PROBE: NOT DETECTED
GLOBULIN UR ELPH-MCNC: 4.1 GM/DL
GLUCOSE SERPL-MCNC: 128 MG/DL (ref 65–99)
GLUCOSE UR STRIP-MCNC: ABNORMAL MG/DL
HCG INTACT+B SERPL-ACNC: <0.1 MIU/ML
HCT VFR BLD AUTO: 47 % (ref 34–46.6)
HGB BLD-MCNC: 15.1 G/DL (ref 12–15.9)
HGB UR QL STRIP.AUTO: ABNORMAL
HYALINE CASTS UR QL AUTO: ABNORMAL /LPF
IMM GRANULOCYTES # BLD AUTO: 0.09 10*3/MM3 (ref 0–0.05)
IMM GRANULOCYTES NFR BLD AUTO: 0.7 % (ref 0–0.5)
KETONES UR QL STRIP: NEGATIVE
LEUKOCYTE ESTERASE UR QL STRIP.AUTO: NEGATIVE
LIPASE SERPL-CCNC: 34 U/L (ref 13–60)
LYMPHOCYTES # BLD AUTO: 2.11 10*3/MM3 (ref 0.7–3.1)
LYMPHOCYTES NFR BLD AUTO: 15.7 % (ref 19.6–45.3)
MCH RBC QN AUTO: 26 PG (ref 26.6–33)
MCHC RBC AUTO-ENTMCNC: 32.1 G/DL (ref 31.5–35.7)
MCV RBC AUTO: 80.9 FL (ref 79–97)
MONOCYTES # BLD AUTO: 1.1 10*3/MM3 (ref 0.1–0.9)
MONOCYTES NFR BLD AUTO: 8.2 % (ref 5–12)
NEUTROPHILS NFR BLD AUTO: 74.6 % (ref 42.7–76)
NEUTROPHILS NFR BLD AUTO: 9.99 10*3/MM3 (ref 1.7–7)
NITRITE UR QL STRIP: NEGATIVE
NRBC BLD AUTO-RTO: 0 /100 WBC (ref 0–0.2)
PH UR STRIP.AUTO: 6.5 [PH] (ref 5–8)
PLATELET # BLD AUTO: 373 10*3/MM3 (ref 140–450)
PMV BLD AUTO: 8.9 FL (ref 6–12)
POTASSIUM SERPL-SCNC: 3.6 MMOL/L (ref 3.5–5.2)
PROT SERPL-MCNC: 8.2 G/DL (ref 6–8.5)
PROT UR QL STRIP: ABNORMAL
RBC # BLD AUTO: 5.81 10*6/MM3 (ref 3.77–5.28)
RBC # UR STRIP: ABNORMAL /HPF
REF LAB TEST METHOD: ABNORMAL
SARS-COV-2 RNA RESP QL NAA+PROBE: NOT DETECTED
SODIUM SERPL-SCNC: 137 MMOL/L (ref 136–145)
SP GR UR STRIP: 1.01 (ref 1–1.03)
SQUAMOUS #/AREA URNS HPF: ABNORMAL /HPF
UROBILINOGEN UR QL STRIP: ABNORMAL
WBC # UR STRIP: ABNORMAL /HPF
WBC NRBC COR # BLD: 13.41 10*3/MM3 (ref 3.4–10.8)

## 2022-06-13 PROCEDURE — 85025 COMPLETE CBC W/AUTO DIFF WBC: CPT | Performed by: PHYSICIAN ASSISTANT

## 2022-06-13 PROCEDURE — 85652 RBC SED RATE AUTOMATED: CPT | Performed by: PHYSICIAN ASSISTANT

## 2022-06-13 PROCEDURE — 87636 SARSCOV2 & INF A&B AMP PRB: CPT | Performed by: PHYSICIAN ASSISTANT

## 2022-06-13 PROCEDURE — 83605 ASSAY OF LACTIC ACID: CPT | Performed by: STUDENT IN AN ORGANIZED HEALTH CARE EDUCATION/TRAINING PROGRAM

## 2022-06-13 PROCEDURE — 80053 COMPREHEN METABOLIC PANEL: CPT | Performed by: PHYSICIAN ASSISTANT

## 2022-06-13 PROCEDURE — 76817 TRANSVAGINAL US OBSTETRIC: CPT

## 2022-06-13 PROCEDURE — 25010000002 CEFTRIAXONE PER 250 MG: Performed by: STUDENT IN AN ORGANIZED HEALTH CARE EDUCATION/TRAINING PROGRAM

## 2022-06-13 PROCEDURE — 96365 THER/PROPH/DIAG IV INF INIT: CPT

## 2022-06-13 PROCEDURE — 25010000002 ONDANSETRON PER 1 MG: Performed by: STUDENT IN AN ORGANIZED HEALTH CARE EDUCATION/TRAINING PROGRAM

## 2022-06-13 PROCEDURE — 81025 URINE PREGNANCY TEST: CPT | Performed by: PHYSICIAN ASSISTANT

## 2022-06-13 PROCEDURE — 84702 CHORIONIC GONADOTROPIN TEST: CPT | Performed by: STUDENT IN AN ORGANIZED HEALTH CARE EDUCATION/TRAINING PROGRAM

## 2022-06-13 PROCEDURE — 99284 EMERGENCY DEPT VISIT MOD MDM: CPT

## 2022-06-13 PROCEDURE — 81001 URINALYSIS AUTO W/SCOPE: CPT | Performed by: PHYSICIAN ASSISTANT

## 2022-06-13 PROCEDURE — 82150 ASSAY OF AMYLASE: CPT | Performed by: PHYSICIAN ASSISTANT

## 2022-06-13 PROCEDURE — 83690 ASSAY OF LIPASE: CPT | Performed by: PHYSICIAN ASSISTANT

## 2022-06-13 PROCEDURE — 96375 TX/PRO/DX INJ NEW DRUG ADDON: CPT

## 2022-06-13 PROCEDURE — 87040 BLOOD CULTURE FOR BACTERIA: CPT | Performed by: STUDENT IN AN ORGANIZED HEALTH CARE EDUCATION/TRAINING PROGRAM

## 2022-06-13 PROCEDURE — 36415 COLL VENOUS BLD VENIPUNCTURE: CPT

## 2022-06-13 PROCEDURE — 86140 C-REACTIVE PROTEIN: CPT | Performed by: PHYSICIAN ASSISTANT

## 2022-06-13 RX ORDER — ACETAMINOPHEN 500 MG
1000 TABLET ORAL ONCE
Status: COMPLETED | OUTPATIENT
Start: 2022-06-13 | End: 2022-06-13

## 2022-06-13 RX ORDER — ONDANSETRON 4 MG/1
4 TABLET, ORALLY DISINTEGRATING ORAL EVERY 8 HOURS PRN
Qty: 20 TABLET | Refills: 0 | Status: SHIPPED | OUTPATIENT
Start: 2022-06-13 | End: 2022-06-18

## 2022-06-13 RX ORDER — NITROFURANTOIN 25; 75 MG/1; MG/1
100 CAPSULE ORAL 2 TIMES DAILY
Qty: 13 CAPSULE | Refills: 0 | Status: SHIPPED | OUTPATIENT
Start: 2022-06-13 | End: 2022-06-18

## 2022-06-13 RX ORDER — ONDANSETRON 2 MG/ML
4 INJECTION INTRAMUSCULAR; INTRAVENOUS ONCE
Status: COMPLETED | OUTPATIENT
Start: 2022-06-13 | End: 2022-06-13

## 2022-06-13 RX ADMIN — CEFTRIAXONE 1 G: 1 INJECTION, POWDER, FOR SOLUTION INTRAMUSCULAR; INTRAVENOUS at 22:09

## 2022-06-13 RX ADMIN — SODIUM CHLORIDE 1000 ML: 9 INJECTION, SOLUTION INTRAVENOUS at 22:09

## 2022-06-13 RX ADMIN — ONDANSETRON 4 MG: 2 INJECTION INTRAMUSCULAR; INTRAVENOUS at 23:20

## 2022-06-13 RX ADMIN — ACETAMINOPHEN 1000 MG: 500 TABLET ORAL at 23:19

## 2022-06-13 NOTE — ED NOTES
MEDICAL SCREENING:     Reason for Visit: abdominal pain, fever      Patient initially seen in triage.  The patient was advised further evaluation and diagnostic testing will be needed, some of the treatment and testing will be initiated in the lobby in order to begin the process.  The patient will be returned to the waiting area for the time being and possibly be re-assessed by a subsequent ED provider.  The patient will be brought back to the treatment area in as timely manner as possible.       Miquel Ochoa PA-C  06/13/22 [id number]

## 2022-06-14 ENCOUNTER — OFFICE VISIT (OUTPATIENT)
Dept: PSYCHIATRY | Facility: CLINIC | Age: 37
End: 2022-06-14

## 2022-06-14 DIAGNOSIS — F41.1 GENERALIZED ANXIETY DISORDER: ICD-10-CM

## 2022-06-14 DIAGNOSIS — F51.05 INSOMNIA DUE TO MENTAL DISORDER: ICD-10-CM

## 2022-06-14 DIAGNOSIS — F31.30 BIPOLAR I DISORDER, MOST RECENT EPISODE DEPRESSED: Primary | ICD-10-CM

## 2022-06-14 DIAGNOSIS — Z79.899 MEDICATION MANAGEMENT: ICD-10-CM

## 2022-06-14 DIAGNOSIS — F17.210 TOBACCO DEPENDENCE DUE TO CIGARETTES: ICD-10-CM

## 2022-06-14 PROCEDURE — 99442 PR PHYS/QHP TELEPHONE EVALUATION 11-20 MIN: CPT | Performed by: NURSE PRACTITIONER

## 2022-06-14 NOTE — PROGRESS NOTES
This provider is located at the [facility], [address] [place] [address], [id number]  using a telephone in a secure private environment. The Patient is seen remotely at their home address in [place], using a private telephone.  The patient is unable to be seen through a MyChart Video Visit through [facility] at today's encounter due to technical difficulties, therefore a telephone encounter was conducted. Patient is being evaluated/treated via telehealth by telephone, and stated they are in a secure environment for this session. The patient's condition being diagnosed/treated is appropriate for telemedicine. The provider identified herself as well as her credentials.   The patient, and/or patient's guardian, consent to be seen remotely, and when consent is given they understand that the consent allows for patient identifiable information to be sent to a third party as needed.   They may refuse to be seen remotely at any time. The electronic data is encrypted and password protected, and the patient and/or guardian has been advised of the potential risks to privacy not withstanding such measures.    You have chosen to receive care through a telephone visit. Do you consent to use a telephone visit for your medical care today? Yes  This visit has been rescheduled as a phone visit to comply with patient safety concerns in accordance with CDC recommendations.      Subjective   Jessica Harris is a 36 y.o. female who presents today for follow up    Chief Complaint:  Bipolar disorder    History of Present Illness:  Patient presents as follow up via telephone visit. She reports going to ED last night for treatment of UTI, states she learned she was pregnant. Reports she was scheduled to have a hysterectomy in 2 weeks due to cancer cells and endometriosis. States she is concerned for the baby's safety. States she had stopped all her medications last week due to nausea and vomiting. States she does not wish to  "restart any medications at this time. States she is going to follow up with OB/GYN this week.  She reports sleep is fair due to nausea and vomiting. Reports appetite is poor. She denies SI/HI/AVH.    The following portions of the patient's history were reviewed and updated as appropriate: allergies, current medications, past family history, past medical history, past social history, past surgical history and problem list.      Past Medical History:  Past Medical History:   Diagnosis Date   • Anxiety    • Asthma    • Bipolar 1 disorder (HCC)    • Depression    • Diabetes mellitus (HCC)    • Elevated cholesterol    • GERD (gastroesophageal reflux disease)    • History of bronchitis    • History of ear infections    • History of stomach ulcers    • History of streptococcal infection    • Hyperlipidemia    • Hypertension    • Insomnia    • Kidney stones    • Urinary tract infection        Social History:  Social History     Socioeconomic History   • Marital status: Single   Tobacco Use   • Smoking status: Current Every Day Smoker     Packs/day: 0.50     Types: Cigarettes   • Smokeless tobacco: Never Used   • Tobacco comment: \"thinking about it\"   Vaping Use   • Vaping Use: Never used   Substance and Sexual Activity   • Alcohol use: No   • Drug use: No   • Sexual activity: Defer     Birth control/protection: Pill       Family History:  Family History   Problem Relation Age of Onset   • Cancer Mother    • Stroke Mother    • Lung disease Mother    • No Known Problems Father    • Cancer Other    • Heart disease Other    • Stroke Other        Past Surgical History:  Past Surgical History:   Procedure Laterality Date   • ADENOIDECTOMY     • CHOLECYSTECTOMY     • D & C HYSTEROSCOPY N/A 05/16/2022    Procedure: DILATATION AND CURETTAGE HYSTEROSCOPY;  Surgeon: Joey Andersen DO;  Location: [facility];  Service: Obstetrics/Gynecology;  Laterality: N/A;   • DENTAL PROCEDURE     • ENDOSCOPY     • HERNIA REPAIR      " umbilical   • OVARIAN CYST DRAINAGE/EXCISION Left 05/16/2022    Procedure: OVARIAN CYSTECTOMY WITH EXTENSIVE LYSIS OF ADHESIONS;  Surgeon: Joey Andersen DO;  Location: [facility];  Service: Obstetrics/Gynecology;  Laterality: Left;   • TONSILLECTOMY     • UPPER GASTROINTESTINAL ENDOSCOPY     • WISDOM TOOTH EXTRACTION         Problem List:  Patient Active Problem List   Diagnosis   • Palpitations   • Bipolar disorder, in partial remission, most recent episode mixed ([facility])   • Smoker   • Gastroesophageal reflux disease without esophagitis   • Chronic venous insufficiency   • PCOS (polycystic ovarian syndrome)   • Healthcare maintenance   • Vitamin D deficiency   • Snoring   • History of nephrolithiasis   • Essential hypertension   • Class 1 obesity with serious comorbidity and body mass index (BMI) of 34.0 to 34.9 in adult   • Type 2 diabetes mellitus without complication, without long-term current use of insulin ([facility])   • Mixed hyperlipidemia   • Cyst of left ovary   • Neuropathy       Allergy:   No Known Allergies     Current Medications:   Current Outpatient Medications   Medication Sig Dispense Refill   • albuterol sulfate  (90 Base) MCG/ACT inhaler Inhale 2 puffs Every 6 (Six) Hours As Needed for Wheezing. 18 g 3   • aluminum-magnesium hydroxide-simethicone (MAALOX MAX) 400-400-40 MG/5ML suspension Take 10 mL by mouth Every 12 (Twelve) Hours.     • atorvastatin (LIPITOR) 20 MG tablet Take 1 tablet by mouth Every Night. 30 tablet 5   • buPROPion SR (WELLBUTRIN SR) 100 MG 12 hr tablet Take 1 tablet by mouth 2 (Two) Times a Day. 180 tablet 1   • cyanocobalamin ([employer] Vitamin B-12) 2000 MCG tablet Take 1 tablet by mouth Daily. 30 tablet 3   • ergocalciferol (ERGOCALCIFEROL) 1.25 MG (00846 UT) capsule Take 1 capsule by mouth 1 (One) Time Per Week. 4 capsule 5   • famotidine (Pepcid) 20 MG tablet Take 1 tablet by mouth 2 (Two) Times a Day. 60 tablet 0   • Glucose Blood (Blood Glucose Test) strip 1 Device  by Other route Daily. 1 daily 100 each 3   • glucose monitor monitoring kit 1 each Daily. 1 each 0   • HYDROcodone-acetaminophen (NORCO) 5-325 MG per tablet Take 1 tablet by mouth Every 4 (Four) Hours As Needed for Moderate Pain . 10 tablet 0   • ibuprofen (ADVIL,MOTRIN) 200 MG tablet Take 600 mg by mouth Every 6 (Six) Hours As Needed for Mild Pain .     • ibuprofen (ADVIL,MOTRIN) 600 MG tablet Take 1 tablet by mouth Every 6 (Six) Hours As Needed for Mild Pain. 30 tablet 0   • icosapent ethyl (Vascepa) 1 g capsule capsule 2 twice daily 120 capsule 5   • Lancets misc 1 daily 50 each 5   • lurasidone (LATUDA) 60 MG tablet tablet Take 1 tablet by mouth Daily. 90 tablet 1   • medroxyPROGESTERone (PROVERA) 5 MG tablet Take 5 mg by mouth Daily.     • metFORMIN (GLUCOPHAGE) 500 MG tablet Take 1 tablet by mouth 2 (Two) Times a Day With Meals. 180 tablet 3   • nitrofurantoin, macrocrystal-monohydrate, (MACROBID) 100 MG capsule Take 1 capsule by mouth 2 (Two) Times a Day. 13 capsule 0   • ondansetron ODT (ZOFRAN-ODT) 4 MG disintegrating tablet      • ondansetron ODT (ZOFRAN-ODT) 4 MG disintegrating tablet Place 1 tablet on the tongue Every 8 (Eight) Hours As Needed for Vomiting. 20 tablet 0   • pantoprazole (PROTONIX) 40 MG EC tablet Take 1 tablet by mouth Daily. 90 tablet 3   • propranolol LA (INDERAL LA) 120 MG 24 hr capsule Take 1 capsule by mouth Daily. 90 capsule 3   • QUEtiapine (SEROquel) 100 MG tablet Take 1 tablet by mouth every night at bedtime. 90 tablet 1     No current facility-administered medications for this visit.       Review of Symptoms:    Review of Systems   Constitutional: Positive for fatigue.   HENT: Negative.    Eyes: Negative.    Respiratory: Negative.    Cardiovascular: Negative.    Gastrointestinal: Negative.    Endocrine: Negative.    Genitourinary: Positive for flank pain and urgency.   Skin: Negative.    Neurological: Negative.    Psychiatric/Behavioral: Positive for depressed mood and stress.  Negative for suicidal ideas. The patient is nervous/anxious.          Physical Exam:   There were no vitals taken for this visit.   There is no height or weight on file to calculate BMI.    Due to extenuating circumstances and possible current health risks associated with the patient being present in a clinical setting (with current health restrictions in place in regards to possible COVID 19 transmission/exposure), the patient was seen remotely today via a telephone encounter.  Unable to obtain vital signs due to nature of remote visit.  Height stated at 65 inches.  Weight stated at 210 pounds.         Mental Status Exam:   Hygiene:   Unable to evaluate due to phone visit  Cooperation:  Cooperative  Eye Contact:  Unable to evaluate due to phone visit  Psychomotor Behavior:  Unable to evaluate due to phone visit  Affect:  Appropriate  Mood: normal  Hopelessness: Denies  Speech:  Normal  Thought Process:  Goal directed and Linear  Thought Content:  Normal and Mood congruent  Suicidal:  None  Homicidal:  None  Hallucinations:  None  Delusion:  None  Memory:  Intact  Orientation:  Person, Place, Time and Situation  Reliability:  good  Insight:  Good  Judgement:  Good  Impulse Control:  Good  Physical/Medical Issues:  No      PHQ-Score Total:  PHQ-9 Total Score: 0            Lab Results:   Admission on 06/13/2022, Discharged on 06/13/2022   Component Date Value Ref Range Status   • Glucose 06/13/2022 128 (A) 65 - 99 mg/dL Final   • BUN 06/13/2022 3 (A) 6 - 20 mg/dL Final   • Creatinine 06/13/2022 0.73  0.57 - 1.00 mg/dL Final   • Sodium 06/13/2022 137  136 - 145 mmol/L Final   • Potassium 06/13/2022 3.6  3.5 - 5.2 mmol/L Final    Slight hemolysis detected by analyzer. Results may be affected.   • Chloride 06/13/2022 96 (A) 98 - 107 mmol/L Final   • CO2 06/13/2022 23.5  22.0 - 29.0 mmol/L Final   • Calcium 06/13/2022 9.7  8.6 - 10.5 mg/dL Final   • Total Protein 06/13/2022 8.2  6.0 - 8.5 g/dL Final   • Albumin 06/13/2022  4.08  3.50 - 5.20 g/dL Final   • ALT (SGPT) 06/13/2022 19  1 - 33 U/L Final   • AST (SGOT) 06/13/2022 18  1 - 32 U/L Final   • Alkaline Phosphatase 06/13/2022 108  39 - 117 U/L Final   • Total Bilirubin 06/13/2022 0.2  0.0 - 1.2 mg/dL Final   • Globulin 06/13/2022 4.1  gm/dL Final   • A/G Ratio 06/13/2022 1.0  g/dL Final   • BUN/Creatinine Ratio 06/13/2022 4.1 (A) 7.0 - 25.0 Final   • Anion Gap 06/13/2022 17.5 (A) 5.0 - 15.0 mmol/L Final   • eGFR 06/13/2022 110.8  >60.0 mL/min/1.73 Final    National Kidney Foundation and American Society of Nephrology (ASN) Task Force recommended calculation based on the Chronic Kidney Disease Epidemiology Collaboration (CKD-EPI) equation refit without adjustment for race.   • Lipase 06/13/2022 34  13 - 60 U/L Final   • Amylase 06/13/2022 32  28 - 100 U/L Final   • C-Reactive Protein 06/13/2022 15.68 (A) 0.00 - 0.50 mg/dL Final   • Sed Rate 06/13/2022 68 (A) 0 - 20 mm/hr Final   • HCG, Urine QL 06/13/2022 Positive (A) Negative Final   • COVID19 06/13/2022 Not Detected  Not Detected - Ref. Range Final   • Influenza A PCR 06/13/2022 Not Detected  Not Detected Final   • Influenza B PCR 06/13/2022 Not Detected  Not Detected Final   • Color, UA 06/13/2022 Yellow  Yellow, Straw Final   • Appearance, UA 06/13/2022 Cloudy (A) Clear Final   • pH, UA 06/13/2022 6.5  5.0 - 8.0 Final   • Specific Gravity, UA 06/13/2022 1.012  1.005 - 1.030 Final   • Glucose, UA 06/13/2022 100 mg/dL (Trace) (A) Negative Final   • Ketones, UA 06/13/2022 Negative  Negative Final   • Bilirubin, UA 06/13/2022 Negative  Negative Final   • Blood, UA 06/13/2022 Trace (A) Negative Final   • Protein, UA 06/13/2022 >=300 mg/dL (3+) (A) Negative Final   • Leuk Esterase, UA 06/13/2022 Negative  Negative Final   • Nitrite, UA 06/13/2022 Negative  Negative Final   • Urobilinogen, UA 06/13/2022 0.2 E.U./dL  0.2 - 1.0 E.U./dL Final   • WBC 06/13/2022 13.41 (A) 3.40 - 10.80 10*3/mm3 Final   • RBC 06/13/2022 5.81 (A) 3.77 - 5.28  10*6/mm3 Final   • Hemoglobin 06/13/2022 15.1  12.0 - 15.9 g/dL Final   • Hematocrit 06/13/2022 47.0 (A) 34.0 - 46.6 % Final   • MCV 06/13/2022 80.9  79.0 - 97.0 fL Final   • MCH 06/13/2022 26.0 (A) 26.6 - 33.0 pg Final   • MCHC 06/13/2022 32.1  31.5 - 35.7 g/dL Final   • RDW 06/13/2022 14.0  12.3 - 15.4 % Final   • RDW-SD 06/13/2022 40.3  37.0 - 54.0 fl Final   • MPV 06/13/2022 8.9  6.0 - 12.0 fL Final   • Platelets 06/13/2022 373  140 - 450 10*3/mm3 Final   • Neutrophil % 06/13/2022 74.6  42.7 - 76.0 % Final   • Lymphocyte % 06/13/2022 15.7 (A) 19.6 - 45.3 % Final   • Monocyte % 06/13/2022 8.2  5.0 - 12.0 % Final   • Eosinophil % 06/13/2022 0.4  0.3 - 6.2 % Final   • Basophil % 06/13/2022 0.4  0.0 - 1.5 % Final   • Immature Grans % 06/13/2022 0.7 (A) 0.0 - 0.5 % Final   • Neutrophils, Absolute 06/13/2022 9.99 (A) 1.70 - 7.00 10*3/mm3 Final   • Lymphocytes, Absolute 06/13/2022 2.11  0.70 - 3.10 10*3/mm3 Final   • Monocytes, Absolute 06/13/2022 1.10 (A) 0.10 - 0.90 10*3/mm3 Final   • Eosinophils, Absolute 06/13/2022 0.06  0.00 - 0.40 10*3/mm3 Final   • Basophils, Absolute 06/13/2022 0.06  0.00 - 0.20 10*3/mm3 Final   • Immature Grans, Absolute 06/13/2022 0.09 (A) 0.00 - 0.05 10*3/mm3 Final   • nRBC 06/13/2022 0.0  0.0 - 0.2 /100 WBC Final   • RBC, UA 06/13/2022 6-12 (A) None Seen, 0-2 /HPF Final   • WBC, UA 06/13/2022 6-12 (A) None Seen, 0-2 /HPF Final   • Bacteria, UA 06/13/2022 1+ (A) None Seen /HPF Final   • Squamous Epithelial Cells, UA 06/13/2022 7-12 (A) None Seen, 0-2 /HPF Final   • Hyaline Casts, UA 06/13/2022 None Seen  None Seen /LPF Final   • Methodology 06/13/2022 Automated Microscopy   Final   • HCG Quantitative 06/13/2022 <0.10  mIU/mL Final   • Lactate 06/13/2022 1.8  0.5 - 2.0 mmol/L Final   Admission on 05/16/2022, Discharged on 05/16/2022   Component Date Value Ref Range Status   • ABO Type 05/16/2022 O   Final   • RH type 05/16/2022 Positive   Final   • Glucose 05/16/2022 124  70 - 130 mg/dL Final     Meter: [id number] : [id number] zenon dye   • HCG, Urine, QL 2022 Negative  Negative Final   • Lot Number 2022 [id number]   Final   • Internal Positive Control 2022 Positive  Positive, Passed Final   • Internal Negative Control 2022 Negative  Negative, Passed Final   • Expiration Date 2022   Final   • Reference Lab Report 2022    Final                    Value:[facility]  [address] [place] [address]  Phone: [phone] or [phone]  Fax: [phone]  German Fuentes M.D., Medical Director    PATIENT NAME                           LABORATORY NO.  [id number]  GORDY ECHAVARRIA                      [id number]  [id number]                         AGE              SEX  SSN           CLIENT REF #  [facility]              36      1985  F    [id number]   [id number]    [address]                     REQUESTING M.D.     ATTENDING [place] [address]                   KIRITAZRAOUL  DATE COLLECTED      DATE RECEIVED      DATE REPORTED  2022    DIAGNOSIS:  A.   OVARIAN CYST WALL, LEFT:  Benign serous cystadenoma  B.   ENDOMETRIUM, CURETTINGS:  Focal complex hyperplasia with extensive squamous morular metaplasia,  no atypia identified  Benign endometrial polyp/polyps  Background late secretory endometrium (day 26)  Negative for                           malignancy    CLINICAL HISTORY:  Left ovarian cyst    SPECIMENS RECEIVED:  A.  OVARIAN CYST WALL, LEFT  B.  ENDOMETRIUM, CURETTINGS    MICROSCOPIC DESCRIPTION:  Tissue blocks are prepared and slides are examined microscopically on all  specimens. See diagnosis for details.    Professional interpretation rendered by Luke Cuba M.D., F.C.A.P. at [facility], [place], [address] [place] [address].    GROSS DESCRIPTION:  A.  Specimen A received in formalin labeled left ovarian cyst wall is a 6.5  x 6  x 2 cm aggregate of severely fragmented, gray-tan ovarian cyst wall.  The  cyst wall thickness averages 0.1 cm.  The intact cysts containing clear,  watery fluid.  Papillary excrescences are not identified.  Representative  sections are submitted in 4 cassettes.  B.  Specimen B received in formalin labeled endometrial curettings is a 5 x 4 x  0.3 cm aggregate of fragmented dark red-brown tissue, blood clot, and  mucus.  The specimen is filtered and submitted entirely in 3                           cassettes.  LDP    REVIEWED, DIAGNOSED AND ELECTRONICALLY  SIGNED BY:    Luke Cuba M.D., FKirillC.A.P.  CPT CODES:  [id number]         Assessment & Plan   Diagnoses and all orders for this visit:    1. Bipolar I disorder, most recent episode depressed (HCC) (Primary)    2. Medication management    3. Insomnia due to mental disorder    4. Tobacco dependence due to cigarettes    5. Generalized anxiety disorder      -No medications are prescribed at this time due to pregnancy and patient's choice  -JULIET reviewed and appropriate. Patient counseled on use of controlled substances.   -The benefits of a healthy diet and exercise were discussed with patient, especially the positive effects they have on mental health. Patient encouraged to consider lifestyle modification regarding  diet and exercise patterns to maximize results of mental health treatment.  -Reviewed previous available documentation  -Reviewed most recent available labs   -Jessica Harris  reports that she has been smoking cigarettes. She has been smoking about 0.50 packs per day. She has never used smokeless tobacco.. I have educated her on the risk of diseases from using tobacco products such as cancer, COPD, heart disease, low birth weight and cataracts. I advised her to quit and she is not willing to quit.   I spent 3  minutes counseling the patient.         - Began at 9:10 AM and ended at 9:23 AM    Visit Diagnoses:    ICD-10-CM ICD-9-CM   1. Bipolar I  disorder, most recent episode depressed (HCC)  F31.30 296.50   2. Medication management  Z79.899 V58.69   3. Insomnia due to mental disorder  F51.05 300.9     327.02   4. Tobacco dependence due to cigarettes  F17.210 305.1   5. Generalized anxiety disorder  F41.1 300.02         GOALS:  Short Term Goals: Patient will be compliant with medication, and patient will have no significant medication related side effects.  Patient will be engaged in psychotherapy as indicated.  Patient will report subjective improvement of symptoms.  Long term goals: To stabilize mood and treat/improve subjective symptoms, the patient will stay out of the hospital, the patient will be at an optimal level of functioning, and the patient will take all medications as prescribed.  The patient/guardian verbalized understanding and agreement with goals that were mutually set.      TREATMENT PLAN: Continue supportive psychotherapy efforts and medications as indicated.  Pharmacological and Non-Pharmacological treatment options discussed during today's visit. Patient/Guardian acknowledged and verbally consented with current treatment plan and was educated on the importance of compliance with treatment and follow-up appointments.      MEDICATION ISSUES:    Discussed medication options and treatment plan of prescribed medication as well as the risks, benefits, any black box warnings, and side effects including potential falls, possible impaired driving, and metabolic adversities among others. Patient is agreeable to call the office with any worsening of symptoms or onset of side effects, or if any concerns or questions arise.  The contact information for the office is made available to the patient. Patient is agreeable to call 911 or go to the nearest ER should they begin having any SI/HI, or if any urgent concerns arise. No medication side effects or related complaints today.     MEDS ORDERED DURING VISIT:  No orders of the defined types were placed  in this encounter.      Return if symptoms worsen or fail to improve.         Progress toward goal: Not at goal    Functional Status: No impairment    Prognosis: Guarded with Ongoing Treatment          This document has been electronically signed by JANIE Thorpe  June 14, 2022 16:19 EDT    Part of this note may be an electronic transcription/translation of spoken language to printed text using the Dragon Dictation System.

## 2022-06-14 NOTE — ED PROVIDER NOTES
Subjective   36-year-old female with a past medical history of bipolar disorder, PCOS, and hypertension presents to the ER with a primary complaint of fatigue, malaise, fever, and intermittent abdominal pain.  Patient also noted increased urinary frequency.  Patient denied recent injury or trauma.  She noted a history of pancreatitis as well.  Denied significant nausea or vomiting.  Denied hematemesis or hematochezia.  No changes in bowel habits.  No obvious aggravating or alleviating factors.  Vitals stable          Review of Systems   Constitutional: Positive for fatigue and fever.   Gastrointestinal: Positive for abdominal pain.   Genitourinary: Positive for difficulty urinating.   All other systems reviewed and are negative.      Past Medical History:   Diagnosis Date   • Anxiety    • Asthma    • Bipolar 1 disorder (HCC)    • Depression    • Diabetes mellitus (HCC)    • Elevated cholesterol    • GERD (gastroesophageal reflux disease)    • History of bronchitis    • History of ear infections    • History of stomach ulcers    • History of streptococcal infection    • Hyperlipidemia    • Hypertension    • Insomnia    • Kidney stones    • Urinary tract infection        No Known Allergies    Past Surgical History:   Procedure Laterality Date   • ADENOIDECTOMY     • CHOLECYSTECTOMY     • D & C HYSTEROSCOPY N/A 05/16/2022    Procedure: DILATATION AND CURETTAGE HYSTEROSCOPY;  Surgeon: Joey Andersen DO;  Location: [facility] OR;  Service: Obstetrics/Gynecology;  Laterality: N/A;   • DENTAL PROCEDURE     • ENDOSCOPY     • HERNIA REPAIR      umbilical   • OVARIAN CYST DRAINAGE/EXCISION Left 05/16/2022    Procedure: OVARIAN CYSTECTOMY WITH EXTENSIVE LYSIS OF ADHESIONS;  Surgeon: Joey Andersen DO;  Location: [facility] OR;  Service: Obstetrics/Gynecology;  Laterality: Left;   • TONSILLECTOMY     • UPPER GASTROINTESTINAL ENDOSCOPY     • WISDOM TOOTH EXTRACTION         Family History   Problem Relation Age of Onset  "  • Cancer Mother    • Stroke Mother    • Lung disease Mother    • No Known Problems Father    • Cancer Other    • Heart disease Other    • Stroke Other        Social History     Socioeconomic History   • Marital status: Single   Tobacco Use   • Smoking status: Current Every Day Smoker     Packs/day: 0.50     Types: Cigarettes   • Smokeless tobacco: Never Used   • Tobacco comment: \"thinking about it\"   Vaping Use   • Vaping Use: Never used   Substance and Sexual Activity   • Alcohol use: No   • Drug use: No   • Sexual activity: Defer     Birth control/protection: Pill           Objective   Physical Exam  Constitutional:       General: She is not in acute distress.     Appearance: Normal appearance. She is not ill-appearing.   HENT:      Head: Normocephalic and atraumatic.      Right Ear: External ear normal.      Left Ear: External ear normal.      Nose: Nose normal.      Mouth/Throat:      Mouth: Mucous membranes are moist.   Eyes:      Extraocular Movements: Extraocular movements intact.      Pupils: Pupils are equal, round, and reactive to light.   Cardiovascular:      Rate and Rhythm: Normal rate and regular rhythm.      Heart sounds: No murmur heard.  Pulmonary:      Effort: Pulmonary effort is normal. No respiratory distress.      Breath sounds: Normal breath sounds. No wheezing.   Abdominal:      General: Bowel sounds are normal.      Palpations: Abdomen is soft.      Tenderness: There is no abdominal tenderness.   Musculoskeletal:         General: No deformity or signs of injury. Normal range of motion.      Cervical back: Normal range of motion and neck supple.   Skin:     General: Skin is warm and dry.      Findings: No erythema.   Neurological:      General: No focal deficit present.      Mental Status: She is alert and oriented to person, place, and time. Mental status is at baseline.      Cranial Nerves: No cranial nerve deficit.   Psychiatric:         Mood and Affect: Mood normal.         Behavior: " Behavior normal.         Thought Content: Thought content normal.         Procedures           ED Course  ED Course as of 06/13/22 2330   Mon Jun 13, 2022   2326 CBC notes slight leukocytosis.  CCP unremarkable.  Elevated inflammatory markers noted.  Urinalysis concerning for UTI.  Patient received IV fluid resuscitation and IV antibiotics.  Qualitative hCG positive.  Quantitative less than 0.10.  Transvaginal ultrasound noted no intrauterine pregnancy.  However, follow-up ultrasound imaging was recommended due to echogenic appearance of the endometrium.  Patient be discharged with Macrobid.  Work up and results were discussed throughly with the patient.  The patient will be discharged for further monitoring and management with their PCP.  Red flags, warning signs, worsening symptoms, and when to return to the ER discussed with and understood by the patient.  Patient will follow up with their PCP in a timely manner.  Vitals stable at discharge. [SF]      ED Course User Index  [SF] Tristian Durham,                                                  [facility]    Final diagnoses:   Urinary tract infection in female       ED Disposition  ED Disposition     ED Disposition   Discharge    Condition   Stable    Comment   --             Jamie Santos MD  [address] [facility] [address] [phone]    In 1 week      [facility] Emergency Department  [address] [facility] [address] [phone]    If symptoms worsen    Garrison Rowe MD  [address] [facility] [address]  [facility] [id number] [phone]    In 1 week           Medication List      New Prescriptions    nitrofurantoin (macrocrystal-monohydrate) 100 MG capsule  Commonly known as: MACROBID  Take 1 capsule by mouth 2 (Two) Times a Day.           Where to Get Your Medications      These medications were sent to [facility] [place], [place] - [address] [place] [address] - [phone]  - [phone]   [address] [facility] [address]     Phone: [phone]   · nitrofurantoin (macrocrystal-monohydrate) 100 MG capsule          Tristian Durham DO  06/13/22 0258

## 2022-06-17 ENCOUNTER — OFFICE VISIT (OUTPATIENT)
Dept: FAMILY MEDICINE CLINIC | Facility: CLINIC | Age: 37
End: 2022-06-17

## 2022-06-17 VITALS — HEIGHT: 65 IN | WEIGHT: 210 LBS | BODY MASS INDEX: 34.99 KG/M2

## 2022-06-17 DIAGNOSIS — F31.77 BIPOLAR DISORDER, IN PARTIAL REMISSION, MOST RECENT EPISODE MIXED: ICD-10-CM

## 2022-06-17 DIAGNOSIS — E28.2 PCOS (POLYCYSTIC OVARIAN SYNDROME): ICD-10-CM

## 2022-06-17 DIAGNOSIS — K21.9 GASTROESOPHAGEAL REFLUX DISEASE WITHOUT ESOPHAGITIS: ICD-10-CM

## 2022-06-17 DIAGNOSIS — Z87.442 HISTORY OF NEPHROLITHIASIS: ICD-10-CM

## 2022-06-17 DIAGNOSIS — E66.9 CLASS 1 OBESITY WITH SERIOUS COMORBIDITY AND BODY MASS INDEX (BMI) OF 34.0 TO 34.9 IN ADULT, UNSPECIFIED OBESITY TYPE: ICD-10-CM

## 2022-06-17 DIAGNOSIS — I87.2 CHRONIC VENOUS INSUFFICIENCY: Primary | ICD-10-CM

## 2022-06-17 DIAGNOSIS — J01.10 ACUTE NON-RECURRENT FRONTAL SINUSITIS: ICD-10-CM

## 2022-06-17 DIAGNOSIS — E78.2 MIXED HYPERLIPIDEMIA: ICD-10-CM

## 2022-06-17 DIAGNOSIS — G62.9 NEUROPATHY: ICD-10-CM

## 2022-06-17 DIAGNOSIS — E55.9 VITAMIN D DEFICIENCY: ICD-10-CM

## 2022-06-17 DIAGNOSIS — F17.200 SMOKER: ICD-10-CM

## 2022-06-17 DIAGNOSIS — Z00.00 HEALTHCARE MAINTENANCE: ICD-10-CM

## 2022-06-17 DIAGNOSIS — E11.9 TYPE 2 DIABETES MELLITUS WITHOUT COMPLICATION, WITHOUT LONG-TERM CURRENT USE OF INSULIN: ICD-10-CM

## 2022-06-17 DIAGNOSIS — R06.83 SNORING: ICD-10-CM

## 2022-06-17 DIAGNOSIS — I10 ESSENTIAL HYPERTENSION: ICD-10-CM

## 2022-06-17 PROBLEM — N83.202 CYST OF LEFT OVARY: Status: RESOLVED | Noted: 2021-11-01 | Resolved: 2022-06-17

## 2022-06-17 PROCEDURE — 99214 OFFICE O/P EST MOD 30 MIN: CPT | Performed by: GENERAL PRACTICE

## 2022-06-17 RX ORDER — CEFDINIR 300 MG/1
300 CAPSULE ORAL 2 TIMES DAILY
Qty: 14 CAPSULE | Refills: 0 | Status: SHIPPED | OUTPATIENT
Start: 2022-06-17 | End: 2022-06-24

## 2022-06-17 NOTE — PROGRESS NOTES
Subjective   Jessica Harris is a 36 y.o. female.     You have chosen to receive care through a telehealth visit.  Do you consent to use a video/audio connection for your medical care today? Yes    I did not complete this video visit with the patient using [employer] but rather [employer].    Chief Complaint  Flulike symptoms    History of Present Illness     Flulike Symptoms  She gives a 6 day history of rhinorrhea, nasal congestion, postnasal drip, cough, nausea, vomiting, diarrhea, and fever.  She was seen at [facility] 2 days following the onset of her symptoms at which time COVID and influenza testing were negative.  She denies any further vomiting and there has been some improvement in her diarrhea.  She denies any other upper respiratory tract symptoms and there is no history of any chest pain, shortness of breath, or hemoptysis.  There is no history of any hematemesis, abdominal pain, or hematochezia and there is no history of any rash.  Urine microscopy revealed 6-12 white blood cells and red blood cells along with 1+ bacteria per HPF.  She was ultimately discharged on nitrofurantoin  Lab Results   Component Value Date    WBC 13.41 (H) 06/13/2022    HGB 15.1 06/13/2022    HCT 47.0 (H) 06/13/2022    MCV 80.9 06/13/2022     06/13/2022   .  Pelvic Pain  She continues to have intermittent pelvic pain.  This is described as a sharp pressure.  She denies any associated symptoms at present.  There is no history of any vaginal discharge and she denies any dysuria or hematuria.  Urine beta-hCG done in the emergency room was positive but a quantitative blood level was unmeasurable and a transvaginal ultrasound revealed no evidence of pregnancy.  She is scheduled to undergo a CHEN on 6/22/2022    Diabetes  She continues to deny any paresthesia of the feet, visual disturbances, polydipsia, polyuria, hypoglycemia or foot ulcerations. Evaluation to date has been: hemoglobin A1C. Current treatments: metformin.  She has been  following her diet and exercise plan fairly carefully. Last dilated eye exam more than 1 year   Lab Results   Component Value Date    HGBA1C 6.90 (H) 04/14/2022     Lab Results   Component Value Date    MICROALBUR 96.8 04/14/2022     Dyslipidemia  Her compliance with treatment has been fairly good. She remains on atorvastatin. She experienced GI upset with vascepa  Lab Results   Component Value Date    CHOL 128 02/11/2020    CHLPL 256 (H) 04/14/2022    TRIG 756 (H) 04/14/2022    HDL 35 (L) 04/14/2022    LDL 94 04/14/2022     Hypertension  Home blood pressure readings: not doing. Associated signs and symptoms: peripheral edema. Patient denies: chest pain, dyspnea, or palpitations. Current antihypertensive medications includes propranolol   Lab Results   Component Value Date    GLUCOSE 128 (H) 06/13/2022    BUN 3 (L) 06/13/2022    CREATININE 0.73 06/13/2022    EGFRIFNONA 89 11/14/2021    EGFRIFAFRI 96 01/12/2021    BCR 4.1 (L) 06/13/2022    K 3.6 06/13/2022    CO2 23.5 06/13/2022    CALCIUM 9.7 06/13/2022    PROTENTOTREF 7.3 04/14/2022    ALBUMIN 4.08 06/13/2022    LABIL2 1.9 04/14/2022    AST 18 06/13/2022    ALT 19 06/13/2022     Lab Results   Component Value Date    ALKPHOS 108 06/13/2022     Lab Results   Component Value Date    TSH 1.740 04/14/2022     GERD  She gives a history of intermittent heartburn described as a burning epigastric and retrosternal discomfort.  There is no history of any difficulty swallowing, vomiting, change in her bowel habits, hematochezia or melena and she denies any fever or chills.  She remains on pantoprazole with a good control of her symptoms.    The following portions of the patient's history were reviewed and updated as appropriate: allergies, current medications, past medical history, past social history, past surgical history and problem list.    Review of Systems   Constitutional: Positive for fatigue. Negative for appetite change, chills, fever and unexpected weight change.    HENT: Positive for congestion, postnasal drip, rhinorrhea, sinus pressure and sneezing. Negative for ear pain and sore throat.    Eyes: Negative for visual disturbance.   Respiratory: Positive for cough. Negative for shortness of breath and wheezing.         Occasionally wakes with a feeling she cannot get breath and has been told that she snores   Cardiovascular: Negative for chest pain, palpitations and leg swelling.   Gastrointestinal: Positive for diarrhea and nausea. Negative for abdominal pain, blood in stool, constipation and vomiting.   Endocrine: Negative for polydipsia and polyuria.   Genitourinary: Positive for pelvic pain. Negative for dysuria, hematuria and urgency.   Musculoskeletal: Positive for arthralgias and myalgias. Negative for back pain, joint swelling and neck pain.   Skin: Negative for rash.   Neurological: Positive for headaches. Negative for weakness and numbness.   Psychiatric/Behavioral: Negative for dysphoric mood, sleep disturbance and suicidal ideas. The patient is nervous/anxious.      Objective   Physical Exam  Constitutional:       Comments: Alert and in fair spirits. No apparent distress. No pallor, jaundice, diaphoresis, or cyanosis.   Pulmonary:      Comments: No cough or audible wheezing  Skin:     Coloration: Skin is not cyanotic, jaundiced or pale.   Neurological:      Mental Status: She is alert and oriented to person, place, and time.      Cranial Nerves: Cranial nerves are intact. No cranial nerve deficit or dysarthria.   Psychiatric:         Attention and Perception: Attention normal.         Mood and Affect: Mood normal.         Speech: Speech normal.         Behavior: Behavior normal.         Thought Content: Thought content normal.       Assessment & Plan   Problems Addressed this Visit        Cardiac and Vasculature    Chronic venous insufficiency    Essential hypertension  Encouraged to continue to work on her diet and exercise plan.  Continue current medication     Mixed hyperlipidemia  As above.   Continue current medication.       ENT    Acute non-recurrent frontal sinusitis  Advised regarding symptomatic treatment  Empiric cefdinir.  Instructed to discontinue nitrofurantoin  Advised of the risk of C. difficile colitis and the potential symptoms and signs  Encouraged to report if any worse, any new symptoms, or if not resolving over the next week    Relevant Medications    cefdinir (OMNICEF) 300 MG capsule       Endocrine and Metabolic    Class 1 obesity with serious comorbidity and body mass index (BMI) of 34.0 to 34.9 in adult    PCOS (polycystic ovarian syndrome)  Follow up with gynecology    Type 2 diabetes mellitus without complication, without long-term current use of insulin (HCC)  Diabetes mellitus Type II, under excellent control.   Encouraged to continue to pursue ADA diet  Encouraged aerobic exercise.  Continue current medication    Vitamin D deficiency       Gastrointestinal Abdominal     Gastroesophageal reflux disease without esophagitis       Genitourinary and Reproductive     History of nephrolithiasis       Health Encounters    Healthcare maintenance  Strongly recommended a COVID-19 booster  Also recommend that a Prevnar 20 with her flu shot this fall       Mental Health    Bipolar disorder, in partial remission, most recent episode mixed (HCC)       Neuro    Neuropathy       Sleep    Snoring       Tobacco    Smoker      Diagnoses       Codes Comments    Chronic venous insufficiency    -  Primary ICD-10-CM: I87.2  ICD-9-CM: 459.81     Essential hypertension     ICD-10-CM: I10  ICD-9-CM: 401.9     Mixed hyperlipidemia     ICD-10-CM: E78.2  ICD-9-CM: 272.2     Class 1 obesity with serious comorbidity and body mass index (BMI) of 34.0 to 34.9 in adult, unspecified obesity type     ICD-10-CM: E66.9, Z68.34  ICD-9-CM: 278.00, V85.34     PCOS (polycystic ovarian syndrome)     ICD-10-CM: E28.2  ICD-9-CM: 256.4     Type 2 diabetes mellitus without complication,  without long-term current use of insulin (HCC)     ICD-10-CM: E11.9  ICD-9-CM: 250.00     Vitamin D deficiency     ICD-10-CM: E55.9  ICD-9-CM: 268.9     Gastroesophageal reflux disease without esophagitis     ICD-10-CM: K21.9  ICD-9-CM: 530.81     History of nephrolithiasis     ICD-10-CM: Z87.442  ICD-9-CM: V13.01     Healthcare maintenance     ICD-10-CM: Z00.00  ICD-9-CM: V70.0     Bipolar disorder, in partial remission, most recent episode mixed (HCC)     ICD-10-CM: F31.77  ICD-9-CM: 296.65     Neuropathy     ICD-10-CM: G62.9  ICD-9-CM: 355.9     Snoring     ICD-10-CM: R06.83  ICD-9-CM: 786.09     Smoker     ICD-10-CM: F17.200  ICD-9-CM: 305.1     Acute non-recurrent frontal sinusitis     ICD-10-CM: J01.10  ICD-9-CM: 461.1

## 2022-06-18 LAB
BACTERIA SPEC AEROBE CULT: NORMAL
BACTERIA SPEC AEROBE CULT: NORMAL

## 2022-06-19 ENCOUNTER — PREP FOR SURGERY (OUTPATIENT)
Dept: OTHER | Facility: HOSPITAL | Age: 37
End: 2022-06-19

## 2022-06-19 DIAGNOSIS — N85.01 COMPLEX ENDOMETRIAL HYPERPLASIA: Primary | ICD-10-CM

## 2022-06-19 DIAGNOSIS — N93.9 ABNORMAL UTERINE BLEEDING (AUB): ICD-10-CM

## 2022-06-19 RX ORDER — SODIUM CHLORIDE 0.9 % (FLUSH) 0.9 %
10 SYRINGE (ML) INJECTION AS NEEDED
Status: CANCELLED | OUTPATIENT
Start: 2022-06-22

## 2022-06-19 RX ORDER — SODIUM CHLORIDE 0.9 % (FLUSH) 0.9 %
10 SYRINGE (ML) INJECTION EVERY 12 HOURS SCHEDULED
Status: CANCELLED | OUTPATIENT
Start: 2022-06-22

## 2022-06-20 ENCOUNTER — LAB (OUTPATIENT)
Dept: LAB | Facility: HOSPITAL | Age: 37
End: 2022-06-20

## 2022-06-20 ENCOUNTER — PRE-ADMISSION TESTING (OUTPATIENT)
Dept: PREADMISSION TESTING | Facility: HOSPITAL | Age: 37
End: 2022-06-20

## 2022-06-20 DIAGNOSIS — N85.01 COMPLEX ENDOMETRIAL HYPERPLASIA: ICD-10-CM

## 2022-06-20 DIAGNOSIS — Z01.818 OTHER SPECIFIED PRE-OPERATIVE EXAMINATION: ICD-10-CM

## 2022-06-20 DIAGNOSIS — N93.9 ABNORMAL UTERINE BLEEDING (AUB): ICD-10-CM

## 2022-06-20 LAB
ABO GROUP BLD: NORMAL
BASOPHILS # BLD MANUAL: 0.14 10*3/MM3 (ref 0–0.2)
BASOPHILS NFR BLD MANUAL: 1 % (ref 0–1.5)
BLD GP AB SCN SERPL QL: NEGATIVE
DEPRECATED RDW RBC AUTO: 40.8 FL (ref 37–54)
EOSINOPHIL # BLD MANUAL: 0.28 10*3/MM3 (ref 0–0.4)
EOSINOPHIL NFR BLD MANUAL: 2 % (ref 0.3–6.2)
ERYTHROCYTE [DISTWIDTH] IN BLOOD BY AUTOMATED COUNT: 14 % (ref 12.3–15.4)
HCG SERPL QL: NEGATIVE
HCT VFR BLD AUTO: 42.7 % (ref 34–46.6)
HGB BLD-MCNC: 13.8 G/DL (ref 12–15.9)
HYPOCHROMIA BLD QL: ABNORMAL
LARGE PLATELETS: ABNORMAL
LYMPHOCYTES # BLD MANUAL: 3.76 10*3/MM3 (ref 0.7–3.1)
LYMPHOCYTES NFR BLD MANUAL: 10 % (ref 5–12)
MCH RBC QN AUTO: 26.2 PG (ref 26.6–33)
MCHC RBC AUTO-ENTMCNC: 32.3 G/DL (ref 31.5–35.7)
MCV RBC AUTO: 81 FL (ref 79–97)
MONOCYTES # BLD: 1.39 10*3/MM3 (ref 0.1–0.9)
NEUTROPHILS # BLD AUTO: 8.35 10*3/MM3 (ref 1.7–7)
NEUTROPHILS NFR BLD MANUAL: 57 % (ref 42.7–76)
NEUTS BAND NFR BLD MANUAL: 3 % (ref 0–5)
PLATELET # BLD AUTO: 439 10*3/MM3 (ref 140–450)
PMV BLD AUTO: 8.4 FL (ref 6–12)
RBC # BLD AUTO: 5.27 10*6/MM3 (ref 3.77–5.28)
RH BLD: POSITIVE
SARS-COV-2 RNA PNL SPEC NAA+PROBE: NOT DETECTED
SCAN SLIDE: NORMAL
SMALL PLATELETS BLD QL SMEAR: ABNORMAL
T&S EXPIRATION DATE: NORMAL
VARIANT LYMPHS NFR BLD MANUAL: 27 % (ref 19.6–45.3)
WBC NRBC COR # BLD: 13.92 10*3/MM3 (ref 3.4–10.8)

## 2022-06-20 PROCEDURE — C9803 HOPD COVID-19 SPEC COLLECT: HCPCS

## 2022-06-20 PROCEDURE — 84703 CHORIONIC GONADOTROPIN ASSAY: CPT

## 2022-06-20 PROCEDURE — 86900 BLOOD TYPING SEROLOGIC ABO: CPT

## 2022-06-20 PROCEDURE — 86850 RBC ANTIBODY SCREEN: CPT

## 2022-06-20 PROCEDURE — 36415 COLL VENOUS BLD VENIPUNCTURE: CPT

## 2022-06-20 PROCEDURE — 85007 BL SMEAR W/DIFF WBC COUNT: CPT

## 2022-06-20 PROCEDURE — 85025 COMPLETE CBC W/AUTO DIFF WBC: CPT

## 2022-06-20 PROCEDURE — 86901 BLOOD TYPING SEROLOGIC RH(D): CPT

## 2022-06-20 PROCEDURE — U0004 COV-19 TEST NON-CDC HGH THRU: HCPCS

## 2022-06-20 RX ORDER — SIMETHICONE 80 MG
80 TABLET,CHEWABLE ORAL EVERY 6 HOURS PRN
COMMUNITY
End: 2022-12-03

## 2022-06-20 NOTE — DISCHARGE INSTRUCTIONS
TAKE the following medications the morning of surgery:  All heart or blood pressure medications    HOLD all diabetic medications the morning of surgery as ordered by physician.    Please discontinue all blood thinners and anticoagulants (except aspirin) prior to surgery as per your surgeon and cardiologist instructions.  Aspirin may be continued up to the day prior to surgery.     CHLORHEXIDINE CLOTHS GIVEN WITH INSTRUCTIONS AND FORM TO RETURN TO HOSPITAL, IF APPLICABLE.    6/22/22    0830   ARRIVAL TIME for surgery per Dr. Andersen's office    General Instructions:  Do not eat or drink after midnight: 6/21/22 includes water, mints, or gum. You may brush your teeth.  Dental appliances that are removable must be taken out day of surgery.  Do not smoke, chew tobacco, or drink alcohol.24 hours prior to surgery.Bring medications in original bottles, any inhalers and if applicable your C-PAP/BI-PAP machine.  Bring any papers given to you in the doctor's office.  Wear clean comfortable clothes and socks.  Do not wear contact lenses or make-up. Bring a case for your glasses if applicable.  Bring crutches or walker if applicable.  Leave all other valuables and jewelry at home.    If you were given a blood bank ID arm band remember to bring it with you the day of surgery.    Preventing a Surgical Site Infection:  Shower the night before surgery (unless instructed other wise) using a fresh bar of anti-bacterial soap (such as Dial) and clean washcloth. Dry with a clean towel and dress in clean clothing.  For 2 to 3 days before surgery, avoid shaving with a razor near where you will have surgery because the razor can irritate skin and make it easier to develop an infection. Ask your surgeon if you will be receiving antibiotics prior to surgery.  Make sure you, your family, and all healthcare providers clear their hands with soap and water or an alcohol-based hand  before caring for you or your wound.  If at all  possible, quit smoking as many days before surgery as you can.    Day of surgery:  Upon arrival, a Pre-op nurse and Anesthesiologist will review your health history, obtain vital signs, and answer questions you may have. The only belongings needed at this time will be your home medications and if applicable your C-PAP/BI-PAP machine. If you are staying overnight your family can leave the rest of your belongings in the car and bring them to your room later. A Pre-op nurse will start an IV and you may receive medication in preparation for surgery, including something to help you relax. Your family will be able to see you in the Pre-op area. While you are in surgery your family should notify the waiting room  if they leave the waiting room area and provide a contact phone number.    Please be aware that surgery does come with discomfort. We want to make every effort to control your discomfort so please discuss any uncontrolled symptoms with your nurse. Your doctor will most likely have prescribed pain medications.    If you are going home after surgery you will receive individualized written care instructions before being discharged. A responsible adult must drive you to and from the hospital on the day of surgery and stay with you for 24 hours.    If you are staying overnight following surgery, you will be transported to your hospital room following the recovery period.  [facility] has all private rooms.    If you have any questions please call Pre-Admission Testing at [phone].  Deductibles and co-payments are collected on the day of service. Please be prepared to pay the required co-pay, deductible or deposit on the day of service as defined by your plan.    A RESPONSIBLE PERSON MUST REMAIN IN THE WAITING ROOM DURING YOUR PROCEDURE AND A RESPONSIBLE  MUST BE AVAILABLE UPON YOUR DISCHARGE.

## 2022-06-20 NOTE — H&P
This 36 year old patient presents for Post-Op.    History of Present Illness  1.  Post-Op   Additional information: She is here for postop visit.  She had a D&C that showed complex endometrial hyperplasia.  We discussed the options of progesterone therapy IUD or hysterectomy.  She desires to proceed with hysterectomy.  She definitely does not want any children.  We discussed the procedure risks and benefits.            Screening Tools  Other Screenings  Encounter Date Performed Date Screening Tool Score Severity/ Interpretation MDD Classification Scanned Document   06/06/2022 06/06/2022 Patient Health Questionnaire (PHQ-2) 0 Further testing is not required         Gynecologic History  06/06/2022 01:00 PM  Date of last Pap: 10/05/2021.  Past Systemic History    Medical History  (Reviewed,updated)  Disease Onset Date Comments   Anxiety     Bipolar     Depression     Diabetes     GERD     High Cholesterol     Hypertension     Insomnia     Ovarian cyst     Polycystic ovary syndrome         Surgical History/Management  (Reviewed,updated)  Management Date Comments   Adenoid removal     Gallbladder     Hernia repair     tonsillectomy       Diagnostics History  Status Study Ordered Completed Interpretation  Result / Report   ordered TRANSVAGINAL US, NON-OB 02/10/2022       ordered TRANSVAGINAL US, NON-OB 10/05/2021       result received TRANSVAGINAL US, NON-OB 02/22/2022 02/22/2022 See module     result received TRANSVAGINAL US, NON-OB 04/28/2022 04/28/2022 See module         Problem List  Problem List reviewed.       Medications (active prior to today)  Medication Name Sig Description Start Date Stop Date Refilled Rx Elsewhere   pantoprazole 40 mg tablet,delayed release take 1 tablet by oral route  every day 10/05/2021 06/14/2022  N   quetiapine 100 mg tablet take 1 tablet by oral route  every day 10/05/2021 06/14/2022  N   metformin 500 mg tablet take 2 tablet by oral route 2 times every day with morning and evening meals  10/05/2021 06/14/2022  N   atorvastatin 20 mg tablet take 1 tablet by oral route  every day // 06/14/2022 04/28/2022 Y   bupropion HCl 75 mg tablet take 2 tablet by oral route 2 times every day // 06/06/2022 06/06/2022 Y   Latuda 60 mg tablet take 1 tablet by oral route  every day with food (at least 350 calories) // 06/14/2022 04/28/2022 Y   Pepcid 20 mg tablet take 1 tablet by oral route 2 times every day // 06/14/2022  Y   Gas-X Extra Strength take two capsules every day // 06/14/2022  Y   Fish Oil take 2 capsule every day // 06/14/2022  Y   Vitamin B-12 take two tablets daily // 06/14/2022  Y   Vitamin D2 take 1 capsule by oral route  every week for 8 weeks // 06/14/2022  Y   Provera 10 mg tablet take 1 tablet by oral route  every day 05/24/2022 06/14/2022  N     Patient Status   Completed with information received for patient transitioning into care.   Completed with information received for patient in a summary of care record.     Medication Reconciliation  Medications reconciled today.    Medication Reviewed  Adherence Medication Name Sig Desc Elsewhere Status   taking as directed Provera 10 mg tablet take 1 tablet by oral route  every day N Verified   taking as directed Pepcid 20 mg tablet take 1 tablet by oral route 2 times every day Y Verified   taking as directed atorvastatin 20 mg tablet take 1 tablet by oral route  every day Y Verified   taking as directed bupropion HCl 75 mg tablet take 2 tablet by oral route 2 times every day Y Verified   taking as directed quetiapine 100 mg tablet take 1 tablet by oral route  every day N Verified   taking as directed Gas-X Extra Strength take two capsules every day Y Verified   taking as directed Vitamin B-12 take two tablets daily Y Verified   taking as directed pantoprazole 40 mg tablet,delayed release take 1 tablet by oral route  every day N Verified   taking as directed Fish Oil take 2 capsule every day Y Verified   taking as directed metformin 500 mg tablet take  2 tablet by oral route 2 times every day with morning and evening meals N Verified   taking as directed Latuda 60 mg tablet take 1 tablet by oral route  every day with food (at least 350 calories) Y Verified   taking as directed Vitamin D2 take 1 capsule by oral route  every week for 8 weeks Y Verified   taking as directed bupropion HCl 100 mg tablet take 1 tablet by oral route 2 times every day N Verified     Allergies  Ingredient Reaction (Severity) Medication Name Comment   NO KNOWN ALLERGIES            Family History    (Reviewed, updated)  M    Social History  (Reviewed, updated)  Tobacco use reviewed.    Preferred language is English.      Marital Status/Family/Social Support  Marital status: Single     Tobacco use status: Moderate cigarette smoker (10-19 cigs/day).    Smoking status: Heavy tobacco smoker.    Tobacco Screening  Patient has used tobacco. Patient has used tobacco in the last 30 days. Patient has not used smokeless tobacco in the last 30 days.    Smoking Status  Type Smoking Status Usage Per Day Years Used Pack Years Total Pack Years   Cigarette Heavy tobacco smoker 0.5 Packs 18 9 9     Tobacco Cessation Information  Date Counseled By Order Status Description Code Tobacco Cessation Information   10/05/2021 Andria Kate Tobacco cessation counseling completed   Smoking cessation education   11/10/2021 Andria Kate Tobacco cessation counseling completed   Smoking cessation education   02/02/2022 Andria Kate Tobacco cessation counseling completed   Smoking cessation education   02/22/2022 Arianna Madden Tobacco cessation counseling completed   Smoking cessation education   04/28/2022 Taras Mendoza Tobacco cessation counseling completed   Smoking cessation education   05/24/2022 Andria Kate Tobacco cessation counseling completed   Smoking cessation education   06/06/2022 Taras Mendoza Tobacco cessation counseling completed   Smoking cessation education       Vaping Use  Screened for vaping?  Yes  Status: Not a current user    Tobacco/Vaping Exposure  No passive vaping exposure.    Alcohol  There is a history of alcohol use.    1 drink consumed monthly.    Caffeine  The patient uses caffeine: soda - > 32oz a day.      Review of Systems  System Neg/Pos Details   Constitutional Negative Chills, Fatigue, Fever, Malaise, Night sweats, Weight gain and Weight loss.   ENMT Negative Ear drainage, Hearing loss, Nasal drainage, Otalgia, Sinus pressure and Sore throat.   Eyes Negative Eye discharge, Eye pain and Vision changes.   Respiratory Negative Chronic cough, Cough, Dyspnea, Known TB exposure and Wheezing.   Cardio Negative Chest pain, Claudication, Edema and Irregular heartbeat/palpitations.   GI Negative Abdominal pain, Blood in stool, Change in stool pattern, Constipation, Decreased appetite, Diarrhea, Heartburn, Nausea and Vomiting.    Negative Dysuria, Hematuria, Polyuria (Genitourinary), Urinary frequency, Urinary incontinence and Urinary retention.   Endocrine Negative Cold intolerance, Heat intolerance, Polydipsia and Polyphagia.   Neuro Negative Dizziness, Extremity weakness, Gait disturbance, Headache, Memory impairment, Numbness in extremity, Seizures and Tremors.   Psych Negative Anxiety, Depression and Insomnia.   Integumentary Negative Brittle hair, Brittle nails, Change in shape/size of mole(s), Hair loss, Hirsutism, Hives, Pruritus, Rash and Skin lesion.   MS Negative Back pain, Joint pain, Joint swelling, Muscle weakness and Neck pain.   Hema/Lymph Negative Easy bleeding, Easy bruising and Lymphadenopathy.   Allergic/Immuno Negative Contact allergy, Environmental allergies, Food allergies and Seasonal allergies.   Reproductive Negative Breast discharge and Breast lumps.       Vital Signs   Time BP mm/Hg Pulse /min Resp /min Temp F Ht ft Ht in Ht cm Wt lb Wt kg BMI kg/m2 BSA m2 O2 Sat%   1:08 /83               1:03 /91 113 18 98 5 5 165.1 216.4 98.157 36.01 2.12 95     Measured  by  Time Measured by   1:08 PM Cathy Garber    1:03 PM Taras Mendoza         Physical Exam  Exam Findings Details   Constitutional Normal Well developed.   Neck Exam Normal Palpation - Normal.   Respiratory Normal Inspection - Normal.   Cardiovascular Normal Rhythm - Regular. Extra sounds - None. Murmurs - None.   Abdomen Normal Anterior palpation -  No rebound. No abdominal tenderness. No hepatic enlargement. No hernia.   Genitourinary * Pelvic deferred.   Skin Normal Inspection - Normal.   Psychiatric Normal Orientation - Oriented to time, place, person & situation. Appropriate mood and affect.       Completed Orders (This Visit)  Order Details Reason Side Interpretation Result Additional Info Initial Treatment Date Region   Pre-Visit Planning           Patient Health Questionnaire (PHQ-2)    Further testing is not required 0      Tobacco cessation counseling      Smoking cessation education     Prescribed activity/exercise education           Dietary management education, guidance, and counseling             Assessment/Plan  # Detail Type Description    Assessment Body mass index (BMI) 36.0-36.9, adult (Z68.36).         2. Assessment Complex endometrial hyperplasia (N85.01).    Plan Orders She will be scheduled for HYSTERECTOMY, ROBOTIC TOTAL LAP, BSO W/ REM TUBE(S)/OVARY(S), Next Lab Date is on 06/22/2022. Clinical information/comments: The surgeon scheduled is Joey Andersen DO. bilateral salpingectomy.  pre op 6-20-22 @ 10:30-hosp pt informed  bm.         3. Assessment Abnormal uterine bleeding (AUB) (N93.9).         4. Assessment Encounter for other specified surgical aftercare (Z48.89).         5. Assessment Dietary counseling and surveillance (Z71.3).    Plan Orders Today's instructions / counseling include(s) Dietary management education, guidance, and counseling and Prescribed activity/exercise education .         6. Other Orders Orders not associated to today's assessments.    Plan Orders The  patient had the following order(s) completed today: Patient Health Questionnaire (PHQ-2). Interpretation: Further testing is not required, Result details: 0. Today's instructions / counseling include(s) Tobacco cessation counseling.            Diagnostic History Entered Today  Performed Study Interpretation Result   06/06/2022 Pre-Visit Planning         Patient Education  # Patient Education   1. A Healthy Lifestyle: Care Instructions       Medications (Added, Continued or Stopped today)  Start Date Medication Directions PRN Status PRN Reason Instruction Stop Date    atorvastatin 20 mg tablet take 1 tablet by oral route  every day N   06/14/2022 06/06/2022 bupropion HCl 100 mg tablet take 1 tablet by oral route 2 times every day N   06/14/2022    bupropion HCl 75 mg tablet take 2 tablet by oral route 2 times every day N   06/06/2022    Fish Oil take 2 capsule every day N   06/14/2022    Gas-X Extra Strength take two capsules every day N   06/14/2022    Latuda 60 mg tablet take 1 tablet by oral route  every day with food (at least 350 calories) N   06/14/2022 06/14/2022 Macrobid 100 mg capsule take 1 capsule by oral route  every 12 hours with food N      10/05/2021 metformin 500 mg tablet take 2 tablet by oral route 2 times every day with morning and evening meals N   06/14/2022 06/14/2022 ondansetron 4 mg disintegrating tablet take 2 tablet by oral route  every 12 hours and place on top of the tongue where they will dissolve, then swallow as needed Y      10/05/2021 pantoprazole 40 mg tablet,delayed release take 1 tablet by oral route  every day N   06/14/2022    Pepcid 20 mg tablet take 1 tablet by oral route 2 times every day N   06/14/2022 05/24/2022 Provera 10 mg tablet take 1 tablet by oral route  every day N   06/14/2022   10/05/2021 quetiapine 100 mg tablet take 1 tablet by oral route  every day N   06/14/2022    Vitamin B-12 take two tablets daily N   06/14/2022    Vitamin D2 take 1 capsule by oral  route  every week for 8 weeks N   06/14/2022     Surgery Scheduled  Surgery Details #1:  The patient has a diagnosis of: Complex endometrial hyperplasia, N85.01  She is scheduled for: HYSTERECTOMY, ROBOTIC TOTAL LAP, BSO W/ REM TUBE(S)/OVARY(S), to be performed by Joey Andersen DO    Additional Details:  Comments: bilateral salpingectomy       Orders/Procedures/Instructions/Educations  Office Procedures/Services  HYSTERECTOMY, ROBOTIC TOTAL LAP, BSO W/ REM TUBE(S)/OVARY(S)       Counseling / Educational Factors  Counseling / educational factors reviewed.

## 2022-06-22 ENCOUNTER — HOSPITAL ENCOUNTER (OUTPATIENT)
Facility: HOSPITAL | Age: 37
Discharge: HOME | End: 2022-06-23
Attending: OBSTETRICS & GYNECOLOGY | Admitting: OBSTETRICS & GYNECOLOGY

## 2022-06-22 ENCOUNTER — APPOINTMENT (OUTPATIENT)
Dept: GENERAL RADIOLOGY | Facility: HOSPITAL | Age: 37
End: 2022-06-22

## 2022-06-22 ENCOUNTER — ANESTHESIA (OUTPATIENT)
Dept: PERIOP | Facility: HOSPITAL | Age: 37
End: 2022-06-22

## 2022-06-22 ENCOUNTER — ANESTHESIA EVENT (OUTPATIENT)
Dept: PERIOP | Facility: HOSPITAL | Age: 37
End: 2022-06-22

## 2022-06-22 DIAGNOSIS — N85.01 COMPLEX ENDOMETRIAL HYPERPLASIA: ICD-10-CM

## 2022-06-22 DIAGNOSIS — N93.9 ABNORMAL UTERINE BLEEDING (AUB): ICD-10-CM

## 2022-06-22 LAB
B-HCG UR QL: NEGATIVE
EXPIRATION DATE: NORMAL
GLUCOSE BLDC GLUCOMTR-MCNC: 130 MG/DL (ref 70–130)
GLUCOSE BLDC GLUCOMTR-MCNC: 158 MG/DL (ref 70–130)
INTERNAL NEGATIVE CONTROL: NEGATIVE
INTERNAL POSITIVE CONTROL: POSITIVE
Lab: NORMAL

## 2022-06-22 PROCEDURE — 25010000002 CEFOXITIN PER 1 G: Performed by: NURSE PRACTITIONER

## 2022-06-22 PROCEDURE — 25010000002 KETOROLAC TROMETHAMINE PER 15 MG: Performed by: OBSTETRICS & GYNECOLOGY

## 2022-06-22 PROCEDURE — G0378 HOSPITAL OBSERVATION PER HR: HCPCS

## 2022-06-22 PROCEDURE — 94799 UNLISTED PULMONARY SVC/PX: CPT

## 2022-06-22 PROCEDURE — 25010000002 ROPIVACAINE PER 1 MG: Performed by: ANESTHESIOLOGY

## 2022-06-22 PROCEDURE — 25010000002 BUPRENORPHINE PER 0.1 MG: Performed by: ANESTHESIOLOGY

## 2022-06-22 PROCEDURE — 25010000002 PROPOFOL 10 MG/ML EMULSION: Performed by: NURSE ANESTHETIST, CERTIFIED REGISTERED

## 2022-06-22 PROCEDURE — 81025 URINE PREGNANCY TEST: CPT | Performed by: ANESTHESIOLOGY

## 2022-06-22 PROCEDURE — 25010000002 NEOSTIGMINE 10 MG/10ML SOLUTION: Performed by: NURSE ANESTHETIST, CERTIFIED REGISTERED

## 2022-06-22 PROCEDURE — 25010000002 ONDANSETRON PER 1 MG: Performed by: NURSE ANESTHETIST, CERTIFIED REGISTERED

## 2022-06-22 PROCEDURE — 25010000002 MIDAZOLAM PER 1 MG: Performed by: NURSE ANESTHETIST, CERTIFIED REGISTERED

## 2022-06-22 PROCEDURE — 25010000002 HYDROMORPHONE PER 4 MG: Performed by: OBSTETRICS & GYNECOLOGY

## 2022-06-22 PROCEDURE — 82962 GLUCOSE BLOOD TEST: CPT

## 2022-06-22 PROCEDURE — 25010000002 DEXAMETHASONE PER 1 MG: Performed by: ANESTHESIOLOGY

## 2022-06-22 PROCEDURE — 25010000002 FENTANYL CITRATE (PF) 50 MCG/ML SOLUTION: Performed by: NURSE ANESTHETIST, CERTIFIED REGISTERED

## 2022-06-22 DEVICE — KNOTLESS TISSUE CONTROL DEVICE, UNDYED UNIDIRECTIONAL (ANTIBACTERIAL) SYNTHETIC ABSORBABLE DEVICE
Type: IMPLANTABLE DEVICE | Site: ABDOMEN | Status: FUNCTIONAL
Brand: STRATAFIX

## 2022-06-22 RX ORDER — CEFDINIR 300 MG/1
300 CAPSULE ORAL 2 TIMES DAILY
Status: CANCELLED | OUTPATIENT
Start: 2022-06-22 | End: 2022-06-24

## 2022-06-22 RX ORDER — PROPRANOLOL HCL 60 MG
120 CAPSULE, EXTENDED RELEASE 24HR ORAL DAILY
Status: DISCONTINUED | OUTPATIENT
Start: 2022-06-22 | End: 2022-06-23 | Stop reason: HOSPADM

## 2022-06-22 RX ORDER — ALBUTEROL SULFATE 2.5 MG/3ML
2.5 SOLUTION RESPIRATORY (INHALATION) EVERY 6 HOURS PRN
Status: CANCELLED | OUTPATIENT
Start: 2022-06-22

## 2022-06-22 RX ORDER — KETOROLAC TROMETHAMINE 30 MG/ML
30 INJECTION, SOLUTION INTRAMUSCULAR; INTRAVENOUS EVERY 6 HOURS PRN
Status: DISCONTINUED | OUTPATIENT
Start: 2022-06-22 | End: 2022-06-22 | Stop reason: HOSPADM

## 2022-06-22 RX ORDER — PROPOFOL 10 MG/ML
VIAL (ML) INTRAVENOUS AS NEEDED
Status: DISCONTINUED | OUTPATIENT
Start: 2022-06-22 | End: 2022-06-22 | Stop reason: SURG

## 2022-06-22 RX ORDER — SODIUM CHLORIDE 9 MG/ML
INJECTION, SOLUTION INTRAVENOUS AS NEEDED
Status: DISCONTINUED | OUTPATIENT
Start: 2022-06-22 | End: 2022-06-22 | Stop reason: HOSPADM

## 2022-06-22 RX ORDER — ONDANSETRON 2 MG/ML
INJECTION INTRAMUSCULAR; INTRAVENOUS AS NEEDED
Status: DISCONTINUED | OUTPATIENT
Start: 2022-06-22 | End: 2022-06-22 | Stop reason: SURG

## 2022-06-22 RX ORDER — SODIUM CHLORIDE 0.9 % (FLUSH) 0.9 %
10 SYRINGE (ML) INJECTION EVERY 12 HOURS SCHEDULED
Status: DISCONTINUED | OUTPATIENT
Start: 2022-06-22 | End: 2022-06-22 | Stop reason: HOSPADM

## 2022-06-22 RX ORDER — ALBUTEROL SULFATE 2.5 MG/3ML
2.5 SOLUTION RESPIRATORY (INHALATION)
Status: DISCONTINUED | OUTPATIENT
Start: 2022-06-22 | End: 2022-06-23 | Stop reason: HOSPADM

## 2022-06-22 RX ORDER — PANTOPRAZOLE SODIUM 40 MG/1
40 TABLET, DELAYED RELEASE ORAL DAILY
Status: DISCONTINUED | OUTPATIENT
Start: 2022-06-22 | End: 2022-06-23 | Stop reason: HOSPADM

## 2022-06-22 RX ORDER — ALUMINA, MAGNESIA, AND SIMETHICONE 2400; 2400; 240 MG/30ML; MG/30ML; MG/30ML
10 SUSPENSION ORAL 2 TIMES DAILY PRN
Status: CANCELLED | OUTPATIENT
Start: 2022-06-22

## 2022-06-22 RX ORDER — LURASIDONE HYDROCHLORIDE 60 MG/1
60 TABLET, FILM COATED ORAL DAILY
Status: DISCONTINUED | OUTPATIENT
Start: 2022-06-22 | End: 2022-06-23 | Stop reason: HOSPADM

## 2022-06-22 RX ORDER — OXYCODONE HYDROCHLORIDE AND ACETAMINOPHEN 5; 325 MG/1; MG/1
1 TABLET ORAL EVERY 4 HOURS PRN
Status: DISCONTINUED | OUTPATIENT
Start: 2022-06-22 | End: 2022-06-23 | Stop reason: HOSPADM

## 2022-06-22 RX ORDER — SIMETHICONE 80 MG
80 TABLET,CHEWABLE ORAL EVERY 6 HOURS PRN
Status: CANCELLED | OUTPATIENT
Start: 2022-06-22

## 2022-06-22 RX ORDER — LABETALOL HYDROCHLORIDE 5 MG/ML
5 INJECTION, SOLUTION INTRAVENOUS ONCE
Status: COMPLETED | OUTPATIENT
Start: 2022-06-22 | End: 2022-06-22

## 2022-06-22 RX ORDER — DEXAMETHASONE SODIUM PHOSPHATE 4 MG/ML
INJECTION, SOLUTION INTRA-ARTICULAR; INTRALESIONAL; INTRAMUSCULAR; INTRAVENOUS; SOFT TISSUE
Status: COMPLETED | OUTPATIENT
Start: 2022-06-22 | End: 2022-06-22

## 2022-06-22 RX ORDER — ICOSAPENT ETHYL 1000 MG/1
2 CAPSULE ORAL 2 TIMES DAILY
COMMUNITY
End: 2022-12-03

## 2022-06-22 RX ORDER — BUPROPION HYDROCHLORIDE 100 MG/1
100 TABLET, EXTENDED RELEASE ORAL 2 TIMES DAILY
Status: DISCONTINUED | OUTPATIENT
Start: 2022-06-22 | End: 2022-06-23 | Stop reason: HOSPADM

## 2022-06-22 RX ORDER — IBUPROFEN 600 MG/1
600 TABLET ORAL EVERY 6 HOURS PRN
Status: CANCELLED | OUTPATIENT
Start: 2022-06-22

## 2022-06-22 RX ORDER — SODIUM CHLORIDE 0.9 % (FLUSH) 0.9 %
10 SYRINGE (ML) INJECTION AS NEEDED
Status: DISCONTINUED | OUTPATIENT
Start: 2022-06-22 | End: 2022-06-22 | Stop reason: HOSPADM

## 2022-06-22 RX ORDER — QUETIAPINE FUMARATE 100 MG/1
100 TABLET, FILM COATED ORAL NIGHTLY
Status: CANCELLED | OUTPATIENT
Start: 2022-06-22

## 2022-06-22 RX ORDER — MAGNESIUM HYDROXIDE 1200 MG/15ML
LIQUID ORAL AS NEEDED
Status: DISCONTINUED | OUTPATIENT
Start: 2022-06-22 | End: 2022-06-22 | Stop reason: HOSPADM

## 2022-06-22 RX ORDER — MIDAZOLAM HYDROCHLORIDE 1 MG/ML
1 INJECTION INTRAMUSCULAR; INTRAVENOUS
Status: DISCONTINUED | OUTPATIENT
Start: 2022-06-22 | End: 2022-06-22 | Stop reason: HOSPADM

## 2022-06-22 RX ORDER — KETOROLAC TROMETHAMINE 30 MG/ML
30 INJECTION, SOLUTION INTRAMUSCULAR; INTRAVENOUS EVERY 6 HOURS PRN
Status: DISCONTINUED | OUTPATIENT
Start: 2022-06-22 | End: 2022-06-23 | Stop reason: HOSPADM

## 2022-06-22 RX ORDER — PANTOPRAZOLE SODIUM 40 MG/1
40 TABLET, DELAYED RELEASE ORAL DAILY
Status: CANCELLED | OUTPATIENT
Start: 2022-06-22

## 2022-06-22 RX ORDER — FENTANYL CITRATE 50 UG/ML
INJECTION, SOLUTION INTRAMUSCULAR; INTRAVENOUS AS NEEDED
Status: DISCONTINUED | OUTPATIENT
Start: 2022-06-22 | End: 2022-06-22 | Stop reason: SURG

## 2022-06-22 RX ORDER — IPRATROPIUM BROMIDE AND ALBUTEROL SULFATE 2.5; .5 MG/3ML; MG/3ML
3 SOLUTION RESPIRATORY (INHALATION) ONCE AS NEEDED
Status: DISCONTINUED | OUTPATIENT
Start: 2022-06-22 | End: 2022-06-22 | Stop reason: HOSPADM

## 2022-06-22 RX ORDER — ONDANSETRON 2 MG/ML
4 INJECTION INTRAMUSCULAR; INTRAVENOUS AS NEEDED
Status: DISCONTINUED | OUTPATIENT
Start: 2022-06-22 | End: 2022-06-22 | Stop reason: HOSPADM

## 2022-06-22 RX ORDER — HYDROMORPHONE HYDROCHLORIDE 1 MG/ML
0.5 INJECTION, SOLUTION INTRAMUSCULAR; INTRAVENOUS; SUBCUTANEOUS
Status: DISCONTINUED | OUTPATIENT
Start: 2022-06-22 | End: 2022-06-23 | Stop reason: HOSPADM

## 2022-06-22 RX ORDER — NICOTINE 21 MG/24HR
1 PATCH, TRANSDERMAL 24 HOURS TRANSDERMAL
Status: DISCONTINUED | OUTPATIENT
Start: 2022-06-22 | End: 2022-06-23 | Stop reason: HOSPADM

## 2022-06-22 RX ORDER — ALBUTEROL SULFATE 2.5 MG/3ML
2.5 SOLUTION RESPIRATORY (INHALATION)
Status: DISCONTINUED | OUTPATIENT
Start: 2022-06-22 | End: 2022-06-22

## 2022-06-22 RX ORDER — FENTANYL CITRATE 50 UG/ML
50 INJECTION, SOLUTION INTRAMUSCULAR; INTRAVENOUS
Status: DISCONTINUED | OUTPATIENT
Start: 2022-06-22 | End: 2022-06-22 | Stop reason: HOSPADM

## 2022-06-22 RX ORDER — FAMOTIDINE 10 MG/ML
INJECTION, SOLUTION INTRAVENOUS AS NEEDED
Status: DISCONTINUED | OUTPATIENT
Start: 2022-06-22 | End: 2022-06-22 | Stop reason: SURG

## 2022-06-22 RX ORDER — SODIUM CHLORIDE, SODIUM LACTATE, POTASSIUM CHLORIDE, CALCIUM CHLORIDE 600; 310; 30; 20 MG/100ML; MG/100ML; MG/100ML; MG/100ML
125 INJECTION, SOLUTION INTRAVENOUS ONCE
Status: COMPLETED | OUTPATIENT
Start: 2022-06-22 | End: 2022-06-22

## 2022-06-22 RX ORDER — MEPERIDINE HYDROCHLORIDE 25 MG/ML
12.5 INJECTION INTRAMUSCULAR; INTRAVENOUS; SUBCUTANEOUS
Status: DISCONTINUED | OUTPATIENT
Start: 2022-06-22 | End: 2022-06-22 | Stop reason: HOSPADM

## 2022-06-22 RX ORDER — BUPRENORPHINE HYDROCHLORIDE 0.32 MG/ML
INJECTION INTRAMUSCULAR; INTRAVENOUS
Status: COMPLETED | OUTPATIENT
Start: 2022-06-22 | End: 2022-06-22

## 2022-06-22 RX ORDER — ROCURONIUM BROMIDE 10 MG/ML
INJECTION, SOLUTION INTRAVENOUS AS NEEDED
Status: DISCONTINUED | OUTPATIENT
Start: 2022-06-22 | End: 2022-06-22 | Stop reason: SURG

## 2022-06-22 RX ORDER — FAMOTIDINE 20 MG/1
20 TABLET, FILM COATED ORAL 2 TIMES DAILY
Status: DISCONTINUED | OUTPATIENT
Start: 2022-06-22 | End: 2022-06-23 | Stop reason: HOSPADM

## 2022-06-22 RX ORDER — ATORVASTATIN CALCIUM 20 MG/1
20 TABLET, FILM COATED ORAL NIGHTLY
Status: CANCELLED | OUTPATIENT
Start: 2022-06-22

## 2022-06-22 RX ORDER — NEOSTIGMINE METHYLSULFATE 1 MG/ML
INJECTION, SOLUTION INTRAVENOUS AS NEEDED
Status: DISCONTINUED | OUTPATIENT
Start: 2022-06-22 | End: 2022-06-22 | Stop reason: SURG

## 2022-06-22 RX ORDER — ONDANSETRON 2 MG/ML
4 INJECTION INTRAMUSCULAR; INTRAVENOUS EVERY 6 HOURS PRN
Status: DISCONTINUED | OUTPATIENT
Start: 2022-06-22 | End: 2022-06-23 | Stop reason: HOSPADM

## 2022-06-22 RX ORDER — MIDAZOLAM HYDROCHLORIDE 1 MG/ML
INJECTION INTRAMUSCULAR; INTRAVENOUS AS NEEDED
Status: DISCONTINUED | OUTPATIENT
Start: 2022-06-22 | End: 2022-06-22 | Stop reason: SURG

## 2022-06-22 RX ORDER — OXYCODONE HYDROCHLORIDE AND ACETAMINOPHEN 5; 325 MG/1; MG/1
1 TABLET ORAL ONCE AS NEEDED
Status: DISCONTINUED | OUTPATIENT
Start: 2022-06-22 | End: 2022-06-22 | Stop reason: HOSPADM

## 2022-06-22 RX ORDER — SODIUM CHLORIDE, SODIUM LACTATE, POTASSIUM CHLORIDE, CALCIUM CHLORIDE 600; 310; 30; 20 MG/100ML; MG/100ML; MG/100ML; MG/100ML
INJECTION, SOLUTION INTRAVENOUS CONTINUOUS PRN
Status: DISCONTINUED | OUTPATIENT
Start: 2022-06-22 | End: 2022-06-22 | Stop reason: SURG

## 2022-06-22 RX ORDER — SODIUM CHLORIDE, SODIUM LACTATE, POTASSIUM CHLORIDE, CALCIUM CHLORIDE 600; 310; 30; 20 MG/100ML; MG/100ML; MG/100ML; MG/100ML
100 INJECTION, SOLUTION INTRAVENOUS ONCE AS NEEDED
Status: DISCONTINUED | OUTPATIENT
Start: 2022-06-22 | End: 2022-06-22 | Stop reason: HOSPADM

## 2022-06-22 RX ORDER — GLYCOPYRROLATE 0.2 MG/ML
INJECTION INTRAMUSCULAR; INTRAVENOUS AS NEEDED
Status: DISCONTINUED | OUTPATIENT
Start: 2022-06-22 | End: 2022-06-22 | Stop reason: SURG

## 2022-06-22 RX ORDER — ROPIVACAINE HYDROCHLORIDE 5 MG/ML
INJECTION, SOLUTION EPIDURAL; INFILTRATION; PERINEURAL
Status: COMPLETED | OUTPATIENT
Start: 2022-06-22 | End: 2022-06-22

## 2022-06-22 RX ORDER — METOCLOPRAMIDE HYDROCHLORIDE 5 MG/ML
10 INJECTION INTRAMUSCULAR; INTRAVENOUS EVERY 6 HOURS PRN
Status: DISCONTINUED | OUTPATIENT
Start: 2022-06-22 | End: 2022-06-23 | Stop reason: HOSPADM

## 2022-06-22 RX ORDER — ALBUTEROL SULFATE 90 UG/1
2 AEROSOL, METERED RESPIRATORY (INHALATION) EVERY 6 HOURS PRN
Status: CANCELLED | OUTPATIENT
Start: 2022-06-22

## 2022-06-22 RX ORDER — IBUPROFEN 800 MG/1
800 TABLET ORAL 3 TIMES DAILY
Status: DISCONTINUED | OUTPATIENT
Start: 2022-06-22 | End: 2022-06-23 | Stop reason: HOSPADM

## 2022-06-22 RX ORDER — LANOLIN ALCOHOL/MO/W.PET/CERES
2000 CREAM (GRAM) TOPICAL DAILY
Status: CANCELLED | OUTPATIENT
Start: 2022-06-22

## 2022-06-22 RX ORDER — ONDANSETRON 4 MG/1
4 TABLET, FILM COATED ORAL EVERY 6 HOURS PRN
Status: DISCONTINUED | OUTPATIENT
Start: 2022-06-22 | End: 2022-06-23 | Stop reason: HOSPADM

## 2022-06-22 RX ORDER — LURASIDONE HYDROCHLORIDE 60 MG/1
60 TABLET, FILM COATED ORAL DAILY
Status: CANCELLED | OUTPATIENT
Start: 2022-06-22

## 2022-06-22 RX ORDER — SODIUM CHLORIDE, SODIUM LACTATE, POTASSIUM CHLORIDE, CALCIUM CHLORIDE 600; 310; 30; 20 MG/100ML; MG/100ML; MG/100ML; MG/100ML
125 INJECTION, SOLUTION INTRAVENOUS CONTINUOUS
Status: DISCONTINUED | OUTPATIENT
Start: 2022-06-22 | End: 2022-06-23 | Stop reason: HOSPADM

## 2022-06-22 RX ORDER — PROPRANOLOL HCL 60 MG
120 CAPSULE, EXTENDED RELEASE 24HR ORAL DAILY
Status: CANCELLED | OUTPATIENT
Start: 2022-06-22

## 2022-06-22 RX ORDER — FAMOTIDINE 20 MG/1
20 TABLET, FILM COATED ORAL 2 TIMES DAILY
Status: CANCELLED | OUTPATIENT
Start: 2022-06-22

## 2022-06-22 RX ORDER — QUETIAPINE FUMARATE 100 MG/1
100 TABLET, FILM COATED ORAL NIGHTLY
Status: DISCONTINUED | OUTPATIENT
Start: 2022-06-22 | End: 2022-06-23 | Stop reason: HOSPADM

## 2022-06-22 RX ORDER — BUPROPION HYDROCHLORIDE 100 MG/1
100 TABLET, EXTENDED RELEASE ORAL 2 TIMES DAILY
Status: CANCELLED | OUTPATIENT
Start: 2022-06-22

## 2022-06-22 RX ORDER — NALOXONE HCL 0.4 MG/ML
0.1 VIAL (ML) INJECTION
Status: DISCONTINUED | OUTPATIENT
Start: 2022-06-22 | End: 2022-06-23 | Stop reason: HOSPADM

## 2022-06-22 RX ADMIN — HYDROMORPHONE HYDROCHLORIDE 0.5 MG: 1 INJECTION, SOLUTION INTRAMUSCULAR; INTRAVENOUS; SUBCUTANEOUS at 14:14

## 2022-06-22 RX ADMIN — IBUPROFEN 800 MG: 800 TABLET, FILM COATED ORAL at 21:03

## 2022-06-22 RX ADMIN — NICOTINE 1 PATCH: 14 PATCH, EXTENDED RELEASE TRANSDERMAL at 15:17

## 2022-06-22 RX ADMIN — KETOROLAC TROMETHAMINE 30 MG: 30 INJECTION, SOLUTION INTRAMUSCULAR; INTRAVENOUS at 13:00

## 2022-06-22 RX ADMIN — SODIUM CHLORIDE, POTASSIUM CHLORIDE, SODIUM LACTATE AND CALCIUM CHLORIDE: 600; 310; 30; 20 INJECTION, SOLUTION INTRAVENOUS at 09:21

## 2022-06-22 RX ADMIN — GLYCOPYRROLATE 0.4 MG: 0.2 INJECTION, SOLUTION INTRAMUSCULAR; INTRAVENOUS at 11:12

## 2022-06-22 RX ADMIN — MIDAZOLAM 2 MG: 1 INJECTION INTRAMUSCULAR; INTRAVENOUS at 10:09

## 2022-06-22 RX ADMIN — PANTOPRAZOLE SODIUM 40 MG: 40 TABLET, DELAYED RELEASE ORAL at 14:37

## 2022-06-22 RX ADMIN — PROPRANOLOL HYDROCHLORIDE 120 MG: 60 CAPSULE, EXTENDED RELEASE ORAL at 13:46

## 2022-06-22 RX ADMIN — SODIUM CHLORIDE, POTASSIUM CHLORIDE, SODIUM LACTATE AND CALCIUM CHLORIDE 125 ML/HR: 600; 310; 30; 20 INJECTION, SOLUTION INTRAVENOUS at 15:18

## 2022-06-22 RX ADMIN — PROPOFOL 180 MG: 10 INJECTION, EMULSION INTRAVENOUS at 10:11

## 2022-06-22 RX ADMIN — ALBUTEROL SULFATE 2.5 MG: 2.5 SOLUTION RESPIRATORY (INHALATION) at 19:04

## 2022-06-22 RX ADMIN — CEFOXITIN 2 G: 2 INJECTION, POWDER, FOR SOLUTION INTRAVENOUS at 10:09

## 2022-06-22 RX ADMIN — FENTANYL CITRATE 100 MCG: 50 INJECTION INTRAMUSCULAR; INTRAVENOUS at 10:09

## 2022-06-22 RX ADMIN — ROCURONIUM BROMIDE 40 MG: 10 SOLUTION INTRAVENOUS at 10:11

## 2022-06-22 RX ADMIN — BUPRENORPHINE HYDROCHLORIDE 0.3 MG: 0.32 INJECTION INTRAMUSCULAR; INTRAVENOUS at 10:21

## 2022-06-22 RX ADMIN — ONDANSETRON 4 MG: 2 INJECTION INTRAMUSCULAR; INTRAVENOUS at 10:09

## 2022-06-22 RX ADMIN — SODIUM CHLORIDE, POTASSIUM CHLORIDE, SODIUM LACTATE AND CALCIUM CHLORIDE 125 ML/HR: 600; 310; 30; 20 INJECTION, SOLUTION INTRAVENOUS at 09:51

## 2022-06-22 RX ADMIN — LURASIDONE HYDROCHLORIDE 60 MG: 60 TABLET, FILM COATED ORAL at 14:37

## 2022-06-22 RX ADMIN — SODIUM CHLORIDE, POTASSIUM CHLORIDE, SODIUM LACTATE AND CALCIUM CHLORIDE 125 ML/HR: 600; 310; 30; 20 INJECTION, SOLUTION INTRAVENOUS at 20:22

## 2022-06-22 RX ADMIN — FAMOTIDINE 20 MG: 10 INJECTION INTRAVENOUS at 10:09

## 2022-06-22 RX ADMIN — ROPIVACAINE HYDROCHLORIDE 285 MG: 5 INJECTION, SOLUTION EPIDURAL; INFILTRATION; PERINEURAL at 10:21

## 2022-06-22 RX ADMIN — FAMOTIDINE 20 MG: 20 TABLET, FILM COATED ORAL at 21:03

## 2022-06-22 RX ADMIN — NEOSTIGMINE 3 MG: 1 INJECTION INTRAVENOUS at 11:12

## 2022-06-22 RX ADMIN — METFORMIN HYDROCHLORIDE 500 MG: 500 TABLET ORAL at 17:43

## 2022-06-22 RX ADMIN — DEXAMETHASONE SODIUM PHOSPHATE 8 MG: 4 INJECTION, SOLUTION INTRA-ARTICULAR; INTRALESIONAL; INTRAMUSCULAR; INTRAVENOUS; SOFT TISSUE at 10:21

## 2022-06-22 RX ADMIN — BUPROPION HYDROCHLORIDE 100 MG: 100 TABLET, EXTENDED RELEASE ORAL at 21:03

## 2022-06-22 RX ADMIN — LABETALOL HYDROCHLORIDE 5 MG: 5 INJECTION INTRAVENOUS at 12:08

## 2022-06-22 NOTE — ANESTHESIA PREPROCEDURE EVALUATION
Anesthesia Evaluation     no history of anesthetic complications:  NPO Solid Status: > 8 hours  NPO Liquid Status: > 8 hours           Airway   Mallampati: II  TM distance: >3 FB  Neck ROM: full  No difficulty expected  Dental - normal exam     Pulmonary - normal exam   (+) a smoker Current Smoked day of surgery, asthma,  Cardiovascular - normal exam    (+) hypertension, hyperlipidemia,       Neuro/Psych  GI/Hepatic/Renal/Endo    (+) morbid obesity, GERD,  renal disease, diabetes mellitus,     Musculoskeletal     Abdominal  - normal exam    Bowel sounds: normal.   Substance History      OB/GYN          Other                          Anesthesia Plan    ASA 3     general with block     intravenous induction     Anesthetic plan, risks, benefits, and alternatives have been provided, discussed and informed consent has been obtained with: patient.        CODE STATUS:

## 2022-06-22 NOTE — OP NOTE
TOTAL LAPAROSCOPIC HYSTERECTOMY WITH DAVINCI ROBOT  Procedure Note    Jessica Harris  6/22/2022    Pre-op Diagnosis:   Abnormal uterine bleeding  Complex endometrial hyperplasia    Post-op Diagnosis:     Abnormal uterine bleeding  Complex endometrial hyperplasia    Procedure(s):  ROBOTIC TOTAL LAPAROSCOPIC HYSTERECTOMY WITH BILATERAL SALPINGECTOMY    Surgeon(s):  Joey Andersen DO    Anesthesia: General with Block    Estimated Blood Loss: minimal    Specimens:                Order Name Source Comment Collection Info Order Time   TISSUE EXAM, [facility] ([facility]) Uterus with Ovaries and Fallopian Tubes  Collected By: Joey Andersen DO 6/22/2022 11:04 AM     How many specimens?   1          Release to patient   Immediate              Procedure:  The patient was taken to the operating room after informed written consent was obtained. General anesthesia was induced and the patient was placed in the dorsal lithotomy position. The patient was sterily prepared and draped and a Ward catheter was placed into the bladder. Next the JACOBO manipulator was placed into the uterus with the KOH cup fitting snugly around the cervix. Once this was in place the surgeon was regloved and attention was made to the abdomen.     We made a 1 cm incision above the umbilicus. We inserted an OPTi view trocar into the abdomen under direct visualization using the laparoscope. Next we placed 8mm robotic trochars in the left lower quadrant and another one in the right lower quadrant we placed another trocar between the right-sided port and the midline port. All trochars used were 8mm robotic trochars. We then placed the patient in Trendelenburg and docked the robot. The remainder of the procedure was done under robotic guidance.    There were some omental adhesions to the anterior abdominal wall that we took down using the monopolar scissors.    First we picked up the left fallopian tube and cauterized under it using the  vessel sealer extend. We took the same dissection across the broad ligament and then went across the utero-ovarian ligament and the round ligament. We did all the cauterizing using the PK forceps and cutting using monopolar scissors. We then took the dissection to the point where the broad ligament divided anteriorly and posteriorly. Anteriorly we took the dissection across to make a bladder flap. We took it posteriorly as well skeletonizing the uterine artery. We then doubly cauterized and cut the uterine artery. We then took the bladder down using sharp dissection and the monopolar scissors. We then took serial bites on each side of the uterus taking the cardinal and the uterosacral ligaments. Once we got all the attachments of the uterus down we made a colpotomy anteriorly and carried it all away around. We then delivered the specimen into the vagina and left it there to keep the pneumoperitoneum. Next we made sure everything was hemostatic. We closed the vagina using a 2-0 monocryl V lock suture making sure to get good bites of the vaginal cuff angles and attaching them to the uterosacral ligaments bilaterally. We then ran the rest of the vaginal cuff taking 1 cm bots of vagina. We then re-approximated the peritoneum over the vaginal cuff using the same suture. We irrigated once again and made sure everything was hemostatic. We them placed some Marcaine into the pelvis for postoperative pain control. We then surveyed the remainder of the abdomen and pelvis and it was grossly normal. We removed the CO2 gas and closed the skin incision with a 4-0 monocryl and placed surgical adhesive on the skin.    Findings: There were some adhesions from the omentum to the anterior abdominal wall that were taken down sharply and with cautery.    Complications: none    Grafts or Implants: NA    Joey Andersen DO     Date: 6/22/2022  Time: 12:58 EDT

## 2022-06-22 NOTE — ANESTHESIA PROCEDURE NOTES
"Peripheral Block      Patient reassessed immediately prior to procedure    Patient location during procedure: OR  Start time: 6/22/2022 10:16 AM  Stop time: 6/22/2022 10:21 AM  Reason for block: at surgeon's request and post-op pain management  Performed by  CRNA/CAA: Miki Liang CRNA  Preanesthetic Checklist  Completed: patient identified, IV checked, site marked, risks and benefits discussed, surgical consent, monitors and equipment checked, pre-op evaluation and timeout performed  Prep:  Pt Position: supine  Sterile barriers:cap, gloves, sterile barriers and mask  Prep: ChloraPrep  Patient monitoring: blood pressure monitoring, continuous pulse oximetry and EKG  Procedure    Nursing cardiac assessment comments yes: Sedation, GA, Spinal,Epidural   Performed under: general  Guidance:ultrasound guided    ULTRASOUND INTERPRETATION. Using ultrasound guidance a 20 G (20g 4\" Stimuplex) gauge needle was placed in close proximity to the nerve, at which point, under ultrasound guidance anesthetic was injected in the area of the nerve and spread of the anesthesia was seen on ultrasound in close proximity thereto.  There were no abnormalities seen on ultrasound; a digital image was taken; and the patient tolerated the procedure with no complications. Images:still images obtained    Laterality:Bilateral  Block Type:TAP  Injection Technique:single-shot  Needle Type:short-bevel  Needle Gauge:20 G  Resistance on Injection: none    Medications Used: buprenorphine (BUPRENEX) injection, 0.3 mg  dexamethasone (DECADRON) injection, 8 mg  ropivacaine (NAROPIN) injection 0.5 %, 285 mg  Med administered at 6/22/2022 10:21 AM      Medications  Preservative Free Saline:20ml  Comment:Block Injection:  Total volume divided equally between all 4 injection sites      Post Assessment  Injection Assessment: negative aspiration for heme, incremental injection and no paresthesia on injection  Patient Tolerance:comfortable " throughout block  Complications:no  Additional Notes  The pt was in the supine position under general anesthesia    Under Ultrasound guidance, a BBraun 4inch 360 degree needle was advanced with Normal Saline hydro dissection of tissue.  The Internal Oblique and Transversus Abdominus muscles where visualized.  At or before the aponeurosis of Internal Oblique, local anesthetic spread was visualized in the Transversus Abdominus Plane. Injection was made incrementally with aspiration every 5 mls.  There was no  intravascular injection,  injection pressure was normal, there was no neural injection, and the procedure was completed without difficulty. The same procedure was completed for left and right sided lateral tap blocks.    Under Ultrasound guidance, a Vill[place] 4inch 360 degree needle was advanced with Normal Saline hydro dissection of tissue.  The Rectus and Transversus Abdominus muscles where visualized.  The needle tip was placed between the Transversus Abdominus and rectus abdominus, local anesthetic spread was visualized in the Transversus Abdominus Plane. Injection was made incrementally with aspiration every 5 mls.  There was no  intravascular injection,  injection pressure was normal, there was no neural injection, and the procedure was completed without difficulty. The same procedure was completed for left and right sided subcostal tap blocks. Thank You.

## 2022-06-22 NOTE — ANESTHESIA POSTPROCEDURE EVALUATION
Patient: Jessica Harris    Procedure Summary     Date: 06/22/22 Room / Location:  [facility] 04 /  [facility]    Anesthesia Start: 1009 Anesthesia Stop: 1121    Procedure: ROBOTIC TOTAL LAPAROSCOPIC HYSTERECTOMY WITH BILATERAL SALPINGECTOMY (N/A Abdomen) Diagnosis: (N85.01)    Surgeons: Joey Andersen DO Provider: Maldonado Vanessa MD    Anesthesia Type: general with block ASA Status: 3          Anesthesia Type: general with block    Vitals  Vitals Value Taken Time   /99 06/22/22 1212   Temp 97.7 °F (36.5 °C) 06/22/22 1152   Pulse 93 06/22/22 1212   Resp 16 06/22/22 1152   SpO2 95 % 06/22/22 1152           Post Anesthesia Care and Evaluation    Patient location during evaluation: PHASE II  Patient participation: complete - patient participated  Level of consciousness: awake and alert  Pain score: 0  Pain management: inadequate    Airway patency: patent  Anesthetic complications: No anesthetic complications    Cardiovascular status: acceptable  Respiratory status: acceptable  Hydration status: acceptable

## 2022-06-22 NOTE — ANESTHESIA PROCEDURE NOTES
Airway  Urgency: elective    Date/Time: 6/22/2022 10:13 AM  End Time:6/22/2022 10:13 AM  Airway not difficult    General Information and Staff    Patient location during procedure: OR  CRNA/CAA: Miki Liang CRNA    Indications and Patient Condition  Indications for airway management: airway protection    Preoxygenated: yes  MILS maintained throughout  Mask difficulty assessment: 0 - not attempted    Final Airway Details  Final airway type: endotracheal airway      Successful airway: ETT  Cuffed: yes   Successful intubation technique: direct laryngoscopy  Endotracheal tube insertion site: oral  Blade: Yesika  Blade size: 3  ETT size (mm): 7.0  Cormack-Lehane Classification: grade IIa - partial view of glottis  Placement verified by: chest auscultation, capnometry and palpation of cuff   Cuff volume (mL): 8  Measured from: lips  ETT/EBT  to lips (cm): 22  Number of attempts at approach: 1  Assessment: lips, teeth, and gum same as pre-op and atraumatic intubation

## 2022-06-22 NOTE — PLAN OF CARE
Problem: Adult Inpatient Plan of Care  Goal: Plan of Care Review  Outcome: Ongoing, Progressing  Goal: Patient-Specific Goal (Individualized)  Outcome: Ongoing, Progressing  Goal: Absence of Hospital-Acquired Illness or Injury  Outcome: Ongoing, Progressing  Intervention: Prevent Skin Injury  Description: Perform a screening for skin injury risk, such as pressure or moisture associated skin damage on admission and at regular intervals throughout hospital stay.  Keep all areas of skin (especially folds) clean and dry.  Maintain adequate skin hydration.  Relieve and redistribute pressure and protect bony prominences; implement measures based on patient-specific risk factors.  Match turning and repositioning schedule to clinical condition.  Encourage weight shift frequently; assist with reposition if unable to complete independently.  Float heels off bed; avoid pressure on the Achilles tendon.  Keep skin free from extended contact with medical devices.  Encourage functional activity and mobility, as early as tolerated.  Use aids (e.g., slide boards, mechanical lift) during transfer.  Recent Flowsheet Documentation  Taken 6/22/2022 1215 by Liane Lewis RN  Body Position: position changed independently  Intervention: Prevent and Manage VTE (Venous Thromboembolism) Risk  Description: Assess for VTE (venous thromboembolism) risk.  Encourage and assist with early ambulation.  Initiate and maintain compression or other therapy, as indicated, based on identified risk in accordance with organizational protocol and provider order.  Encourage both active and passive leg exercises while in bed, if unable to ambulate.  Recent Flowsheet Documentation  Taken 6/22/2022 1215 by Liane Lewis RN  Activity Management: activity adjusted per tolerance  Intervention: Prevent Infection  Description: Maintain skin and mucous membrane integrity; promote hand, oral and pulmonary hygiene.  Optimize fluid balance, nutrition, sleep and  glycemic control to maximize infection resistance.  Identify potential sources of infection early to prevent or mitigate progression of infection (e.g., wound, lines, devices).  Evaluate ongoing need for invasive devices; remove promptly when no longer indicated.  Recent Flowsheet Documentation  Taken 6/22/2022 1215 by Liane Lewis RN  Infection Prevention:   rest/sleep promoted   single patient room provided   visitors restricted/screened   personal protective equipment utilized   hand hygiene promoted   environmental surveillance performed   equipment surfaces disinfected  Goal: Optimal Comfort and Wellbeing  Outcome: Ongoing, Progressing  Intervention: Monitor Pain and Promote Comfort  Description: Assess pain level, treatment efficacy and patient response at regular intervals using a consistent pain scale.  Consider the presence and impact of preexisting chronic pain.  Encourage patient and caregiver involvement in pain assessment, interventions and safety measures.  Recent Flowsheet Documentation  Taken 6/22/2022 1414 by Liane Lewis RN  Pain Management Interventions: see MAR  Taken 6/22/2022 1300 by Liane Lewis RN  Pain Management Interventions: see MAR  Intervention: Provide Person-Centered Care  Description: Use a family-focused approach to care.  Develop trust and rapport by proactively providing information, encouraging questions, addressing concerns and offering reassurance.  Acknowledge emotional response to hospitalization.  Recognize and utilize personal coping strategies.  Honor spiritual and cultural preferences.  Recent Flowsheet Documentation  Taken 6/22/2022 1215 by Liane Lewis RN  Trust Relationship/Rapport:   care explained   choices provided   emotional support provided   empathic listening provided   questions answered   questions encouraged   reassurance provided   thoughts/feelings acknowledged  Goal: Readiness for Transition of Care  Outcome: Ongoing, Progressing   Goal  Outcome Evaluation:

## 2022-06-23 ENCOUNTER — READMISSION MANAGEMENT (OUTPATIENT)
Dept: CALL CENTER | Facility: HOSPITAL | Age: 37
End: 2022-06-23

## 2022-06-23 ENCOUNTER — TELEPHONE (OUTPATIENT)
Dept: PSYCHIATRY | Facility: CLINIC | Age: 37
End: 2022-06-23

## 2022-06-23 VITALS
HEIGHT: 65 IN | BODY MASS INDEX: 34.99 KG/M2 | TEMPERATURE: 98.4 F | SYSTOLIC BLOOD PRESSURE: 126 MMHG | RESPIRATION RATE: 18 BRPM | OXYGEN SATURATION: 94 % | DIASTOLIC BLOOD PRESSURE: 87 MMHG | HEART RATE: 87 BPM | WEIGHT: 210 LBS

## 2022-06-23 DIAGNOSIS — F51.05 INSOMNIA DUE TO MENTAL DISORDER: ICD-10-CM

## 2022-06-23 DIAGNOSIS — F31.30 BIPOLAR I DISORDER, MOST RECENT EPISODE DEPRESSED: ICD-10-CM

## 2022-06-23 LAB
DEPRECATED RDW RBC AUTO: 43.2 FL (ref 37–54)
ERYTHROCYTE [DISTWIDTH] IN BLOOD BY AUTOMATED COUNT: 14.2 % (ref 12.3–15.4)
HCT VFR BLD AUTO: 42.8 % (ref 34–46.6)
HGB BLD-MCNC: 13.3 G/DL (ref 12–15.9)
MCH RBC QN AUTO: 26.1 PG (ref 26.6–33)
MCHC RBC AUTO-ENTMCNC: 31.1 G/DL (ref 31.5–35.7)
MCV RBC AUTO: 84.1 FL (ref 79–97)
PLATELET # BLD AUTO: 501 10*3/MM3 (ref 140–450)
PMV BLD AUTO: 9.2 FL (ref 6–12)
RBC # BLD AUTO: 5.09 10*6/MM3 (ref 3.77–5.28)
WBC NRBC COR # BLD: 22.06 10*3/MM3 (ref 3.4–10.8)

## 2022-06-23 PROCEDURE — 94640 AIRWAY INHALATION TREATMENT: CPT

## 2022-06-23 PROCEDURE — G0378 HOSPITAL OBSERVATION PER HR: HCPCS

## 2022-06-23 PROCEDURE — 85027 COMPLETE CBC AUTOMATED: CPT | Performed by: OBSTETRICS & GYNECOLOGY

## 2022-06-23 PROCEDURE — 94799 UNLISTED PULMONARY SVC/PX: CPT

## 2022-06-23 RX ORDER — OXYCODONE HYDROCHLORIDE AND ACETAMINOPHEN 5; 325 MG/1; MG/1
1 TABLET ORAL EVERY 4 HOURS PRN
Qty: 10 TABLET | Refills: 0 | Status: SHIPPED | OUTPATIENT
Start: 2022-06-23 | End: 2022-09-15

## 2022-06-23 RX ORDER — BUPROPION HYDROCHLORIDE 100 MG/1
100 TABLET, EXTENDED RELEASE ORAL 2 TIMES DAILY
Qty: 180 TABLET | Refills: 1 | Status: SHIPPED | OUTPATIENT
Start: 2022-06-23 | End: 2022-08-18 | Stop reason: SDUPTHER

## 2022-06-23 RX ORDER — DOCUSATE CALCIUM 240 MG
240 CAPSULE ORAL DAILY
Qty: 30 CAPSULE | Refills: 1 | Status: SHIPPED | OUTPATIENT
Start: 2022-06-23 | End: 2022-12-03

## 2022-06-23 RX ORDER — IBUPROFEN 600 MG/1
600 TABLET ORAL EVERY 6 HOURS PRN
Qty: 30 TABLET | Refills: 0 | Status: SHIPPED | OUTPATIENT
Start: 2022-06-23 | End: 2022-07-23

## 2022-06-23 RX ORDER — LURASIDONE HYDROCHLORIDE 60 MG/1
60 TABLET, FILM COATED ORAL DAILY
Qty: 90 TABLET | Refills: 1 | Status: SHIPPED | OUTPATIENT
Start: 2022-06-23 | End: 2022-08-18 | Stop reason: SDUPTHER

## 2022-06-23 RX ORDER — QUETIAPINE FUMARATE 100 MG/1
100 TABLET, FILM COATED ORAL
Qty: 90 TABLET | Refills: 1 | Status: SHIPPED | OUTPATIENT
Start: 2022-06-23 | End: 2022-08-18

## 2022-06-23 RX ADMIN — PANTOPRAZOLE SODIUM 40 MG: 40 TABLET, DELAYED RELEASE ORAL at 08:32

## 2022-06-23 RX ADMIN — ALBUTEROL SULFATE 2.5 MG: 2.5 SOLUTION RESPIRATORY (INHALATION) at 07:32

## 2022-06-23 RX ADMIN — METFORMIN HYDROCHLORIDE 500 MG: 500 TABLET ORAL at 08:32

## 2022-06-23 RX ADMIN — FAMOTIDINE 20 MG: 20 TABLET, FILM COATED ORAL at 08:32

## 2022-06-23 RX ADMIN — LURASIDONE HYDROCHLORIDE 60 MG: 60 TABLET, FILM COATED ORAL at 08:32

## 2022-06-23 RX ADMIN — PROPRANOLOL HYDROCHLORIDE 120 MG: 60 CAPSULE, EXTENDED RELEASE ORAL at 08:32

## 2022-06-23 RX ADMIN — BUPROPION HYDROCHLORIDE 100 MG: 100 TABLET, EXTENDED RELEASE ORAL at 08:32

## 2022-06-23 RX ADMIN — IBUPROFEN 800 MG: 800 TABLET, FILM COATED ORAL at 05:34

## 2022-06-23 RX ADMIN — SODIUM CHLORIDE, POTASSIUM CHLORIDE, SODIUM LACTATE AND CALCIUM CHLORIDE 125 ML/HR: 600; 310; 30; 20 INJECTION, SOLUTION INTRAVENOUS at 04:20

## 2022-06-23 NOTE — NURSING NOTE
Ward catheter removed; pt tolerated well. Awaiting first spontaneous void. Pt ambulated independently without difficulty to restroom and back to bed. Pt ambulating out in unit hallway. No complaints at this time. Will continue to follow plan of care.

## 2022-06-23 NOTE — PROGRESS NOTES
"Subjective    The patient has no complaints.  Tolerating oral intake.  Pain is controlled. +ambulation.               Objective     /87 (BP Location: Left arm, Patient Position: Lying)   Pulse 87   Temp 98.4 °F (36.9 °C) (Oral)   Resp 18   Ht 165.1 cm (65\")   Wt 95.3 kg (210 lb)   SpO2 94%   BMI 34.95 kg/m²   General:  alert, appears stated age and cooperative   Abdomen: soft, bowel sounds active, non-tender   Incision:   healing well, no drainage, no erythema, no hernia, no seroma, no swelling, no dehiscence, incision well approximated         Assessment      {doing well:79511::\"Doing well postoperatively.\"     Plan     1. Continue any current medications.  2. Wound care discussed.  "

## 2022-06-23 NOTE — PLAN OF CARE
Goal Outcome Evaluation:  Plan of Care Reviewed With: patient        Progress: improving  Outcome Evaluation: Patient resting in bed. A&Ox4. Lap sites x4 clean and well approximated. Ward catheter present; draining adequate yellow urine. BMx1. Pain managed with scheduled Ibuprofen at this time. Pt tolerating regular diet. No complaints or acute changes. VSS.

## 2022-06-23 NOTE — OUTREACH NOTE
Prep Survey    Flowsheet Row Responses   [facility] patient discharged from? [facility]   Is LACE score < 7 ? Yes   Emergency Room discharge w/ pulse ox? No   Eligibility [facility]   Date of Admission 06/22/22   Date of Discharge 06/23/22   Discharge Disposition Home or Self Care   Discharge diagnosis ROBOTIC TOTAL LAPAROSCOPIC HYSTERECTOMY WITH BILATERAL SALPINGECTOMY   Does the patient have one of the following disease processes/diagnoses(primary or secondary)? General Surgery   Does the patient have Home health ordered? No   Is there a DME ordered? No   Prep survey completed? Yes          ANTONIO ORTA - Registered Nurse

## 2022-06-23 NOTE — DISCHARGE INSTR - ACTIVITY
DON'T lift, push or pull anything over 10 pounds for 6 weeks.  NO tampons, douching or sexual intercourse for 6 weeks.  DO NOT swim, take baths or submerge in water for 6 weeks or until your health care provider says it is safe to do so. Take showers only.

## 2022-06-23 NOTE — DISCHARGE SUMMARY
Date of Discharge: 06/23/22     Discharge Diagnosis:   Complex endometrial hyperplasia  Abnormal uterine bleeding    Problem List:    Complex endometrial hyperplasia      Presenting Problem/History of Present Illness  Complex endometrial hyperplasia [N85.01]      Hospital Course  Patient is a 36 y.o. female presented with the above diagnosis and underwent elective surgery.  She did well.     Procedures Performed  Procedure(s):  ROBOTIC TOTAL LAPAROSCOPIC HYSTERECTOMY WITH BILATERAL SALPINGECTOMY       Consults:   Consults     No orders found for last 30 day(s).          Pertinent Test Results:    Condition on Discharge: Stable  Vital Signs  Temp:  [97.1 °F (36.2 °C)-98.6 °F (37 °C)] 98.4 °F (36.9 °C)  Heart Rate:  [] 87  Resp:  [16-22] 18  BP: (114-165)/() 126/87    Discharge Disposition  Home or Self Care    Discharge Medications     Discharge Medications      New Medications      Instructions Start Date   docusate calcium 240 MG capsule  Commonly known as: SURFAK   240 mg, Oral, Daily      oxyCODONE-acetaminophen 5-325 MG per tablet  Commonly known as: Percocet   1 tablet, Oral, Every 4 Hours PRN         Changes to Medications      Instructions Start Date   ibuprofen 200 MG tablet  Commonly known as: ADVIL,MOTRIN  What changed: Another medication with the same name was added. Make sure you understand how and when to take each.   600 mg, Oral, Every 6 Hours PRN      ibuprofen 600 MG tablet  Commonly known as: ADVIL,MOTRIN  What changed: You were already taking a medication with the same name, and this prescription was added. Make sure you understand how and when to take each.   600 mg, Oral, Every 6 Hours PRN         Continue These Medications      Instructions Start Date   albuterol sulfate  (90 Base) MCG/ACT inhaler  Commonly known as: PROVENTIL HFA;VENTOLIN HFA;PROAIR HFA   2 puffs, Inhalation, Every 6 Hours PRN      aluminum-magnesium hydroxide-simethicone 400-400-40 MG/5ML  suspension  Commonly known as: MAALOX MAX   10 mL, Oral, 2 Times Daily PRN      atorvastatin 20 MG tablet  Commonly known as: LIPITOR   20 mg, Oral, Nightly      buPROPion  MG 12 hr tablet  Commonly known as: WELLBUTRIN SR   100 mg, Oral, 2 Times Daily      cefdinir 300 MG capsule  Commonly known as: OMNICEF   300 mg, Oral, 2 Times Daily      cyanocobalamin 2000 MCG tablet  Commonly known as: [facility] Vitamin B-12   2,000 mcg, Oral, Daily      famotidine 20 MG tablet  Commonly known as: Pepcid   20 mg, Oral, 2 Times Daily      lurasidone HCl 60 MG tablet tablet  Commonly known as: LATUDA   60 mg, Oral, Daily      metFORMIN 500 MG tablet  Commonly known as: GLUCOPHAGE   500 mg, Oral, 2 Times Daily With Meals      pantoprazole 40 MG EC tablet  Commonly known as: PROTONIX   40 mg, Oral, Daily      propranolol  MG 24 hr capsule  Commonly known as: INDERAL LA   120 mg, Oral, Daily      QUEtiapine 100 MG tablet  Commonly known as: SEROquel   100 mg, Oral, Every Night at Bedtime      simethicone 80 MG chewable tablet  Commonly known as: MYLICON   80 mg, Oral, Every 6 Hours PRN      Vascepa 1 g capsule capsule  Generic drug: icosapent ethyl   2 g, Oral, 2 Times Daily             Discharge Diet       Activity at Discharge  Activity Instructions    DON'T lift, push or pull anything over 10 pounds for 6 weeks.  NO tampons, douching or sexual intercourse for 6 weeks.  DO NOT swim, take baths or submerge in water for 6 weeks or until your health care provider says it is safe to do so. Take showers only.           Follow-up Appointments  No future appointments.        Time: Discharge 15 min

## 2022-06-24 ENCOUNTER — TRANSITIONAL CARE MANAGEMENT TELEPHONE ENCOUNTER (OUTPATIENT)
Dept: CALL CENTER | Facility: HOSPITAL | Age: 37
End: 2022-06-24

## 2022-06-24 LAB — REF LAB TEST METHOD: NORMAL

## 2022-06-24 NOTE — OUTREACH NOTE
Call Center TCM Note    Flowsheet Row Responses   [facility] patient discharged from? [facility]   Does the patient have one of the following disease processes/diagnoses(primary or secondary)? General Surgery   TCM attempt successful? Yes   Call start time 1126   Call end time 1130   Discharge diagnosis ROBOTIC TOTAL LAPAROSCOPIC HYSTERECTOMY WITH BILATERAL SALPINGECTOMY   Meds reviewed with patient/caregiver? Yes   Is the patient having any side effects they believe may be caused by any medication additions or changes? No   Does the patient have all medications related to this admission filled (includes all antibiotics, pain medications, etc.) Yes   Is the patient taking all medications as directed (includes completed medication regime)? Yes   Does the patient have a follow up appointment scheduled with their surgeon? Yes   Has the patient kept scheduled appointments due by today? N/A   Comments HOSP [facility] FU appt 6/29/22 @ 3:15 pm.    Has home health visited the patient within 72 hours of discharge? N/A   Psychosocial issues? No   Did the patient receive a copy of their discharge instructions? Yes   Nursing interventions Reviewed instructions with patient   What is the patient's perception of their health status since discharge? New symptoms unrelated to diagnosis   Nursing interventions Nurse provided patient education   Is the patient /caregiver able to teach back basic post-op care? Lifting as instructed by MD in discharge instructions, No tub bath, swimming, or hot tub until instructed by MD, Take showers only when approved by MD-sponge bathe until then, Drive as instructed by MD in discharge instructions   Is the patient/caregiver able to teach back signs and symptoms of incisional infection? Fever, Pus or odor from incision, Incisional warmth, Increased drainage or bleeding, Increased redness, swelling or pain at the incisonal site   Is the patient/caregiver able to teach back steps to recovery at home? Eat a  well-balance diet, Rest and rebuild strength, gradually increase activity, Set small, achievable goals for return to baseline health   If the patient is a current smoker, are they able to teach back resources for cessation? [id number]   Is the patient/caregiver able to teach back the hierarchy of who to call/visit for symptoms/problems? PCP, Specialist, Home health nurse, Urgent Care, ED, 911 Yes   TCM call completed? Yes   Wrap up additional comments Pt reports her sugical site looks fine. She does feel like her resp infection is getting worse . Advised pt johnny inform PCP office.           Jennifer Paez RN    6/24/2022, 11:31 EDT

## 2022-06-27 NOTE — PROGRESS NOTES
Pt stated that she is still coughing, and has a lot of congestion. She went to urgent care today, she was checked for pneumonia but it was negative. They have her antibiotics and steroids.

## 2022-08-09 ENCOUNTER — OFFICE VISIT (OUTPATIENT)
Dept: FAMILY MEDICINE CLINIC | Facility: CLINIC | Age: 37
End: 2022-08-09

## 2022-08-09 DIAGNOSIS — K21.9 GASTROESOPHAGEAL REFLUX DISEASE WITHOUT ESOPHAGITIS: ICD-10-CM

## 2022-08-09 DIAGNOSIS — E55.9 VITAMIN D DEFICIENCY: ICD-10-CM

## 2022-08-09 DIAGNOSIS — Z00.00 HEALTHCARE MAINTENANCE: ICD-10-CM

## 2022-08-09 DIAGNOSIS — E66.9 CLASS 1 OBESITY WITH SERIOUS COMORBIDITY AND BODY MASS INDEX (BMI) OF 34.0 TO 34.9 IN ADULT, UNSPECIFIED OBESITY TYPE: ICD-10-CM

## 2022-08-09 DIAGNOSIS — Z90.710 S/P LAPAROSCOPIC HYSTERECTOMY: ICD-10-CM

## 2022-08-09 DIAGNOSIS — I87.2 CHRONIC VENOUS INSUFFICIENCY: Primary | ICD-10-CM

## 2022-08-09 DIAGNOSIS — R00.2 PALPITATIONS: ICD-10-CM

## 2022-08-09 DIAGNOSIS — E78.2 MIXED HYPERLIPIDEMIA: ICD-10-CM

## 2022-08-09 DIAGNOSIS — F31.77 BIPOLAR DISORDER, IN PARTIAL REMISSION, MOST RECENT EPISODE MIXED: ICD-10-CM

## 2022-08-09 DIAGNOSIS — I10 ESSENTIAL HYPERTENSION: ICD-10-CM

## 2022-08-09 DIAGNOSIS — E11.9 TYPE 2 DIABETES MELLITUS WITHOUT COMPLICATION, WITHOUT LONG-TERM CURRENT USE OF INSULIN: ICD-10-CM

## 2022-08-09 PROBLEM — J01.10 ACUTE NON-RECURRENT FRONTAL SINUSITIS: Status: RESOLVED | Noted: 2021-01-12 | Resolved: 2022-08-09

## 2022-08-09 PROCEDURE — 99214 OFFICE O/P EST MOD 30 MIN: CPT | Performed by: GENERAL PRACTICE

## 2022-08-09 RX ORDER — DIPHENOXYLATE HYDROCHLORIDE AND ATROPINE SULFATE 2.5; .025 MG/1; MG/1
1 TABLET ORAL DAILY
Qty: 30 TABLET | Refills: 5 | Status: SHIPPED | OUTPATIENT
Start: 2022-08-09 | End: 2023-03-14

## 2022-08-09 NOTE — PROGRESS NOTES
Subjective   Jessica Harris is a 36 y.o. female.     Chief Complaint  She returns for a scheduled reassessment of multiple medical problems including diabetes mellitus, hyperlipidemia, essential hypertension, gastroesophageal reflux disease, and a recent laparoscopic hysterectomy    History of Present Illness     Recent Laparoscopic Hysterectomy  She underwent a laparoscopic hysterectomy and bilateral salpingectomy by Dr. Andersen on 6/22/2022.  This was uncomplicated and she feels that she is doing well.  She denies any pain or bleeding at present and there is no history of any fever or chills.  Pathology was reported as showing acute and chronic cervicitis along with complex endometrial hyperplasia, superficial adenomyosis, and benign leiomyomas.  No cytologic atypia was noted  Lab Results   Component Value Date    WBC 22.06 (H) 06/23/2022    HGB 13.3 06/23/2022    HCT 42.8 06/23/2022    MCV 84.1 06/23/2022     (H) 06/23/2022     Type 2 Diabetes Mellitus  She continues to deny any paresthesia of the feet, visual disturbances, polydipsia, polyuria, hypoglycemia or foot ulcerations. Current treatments: metformin.  She has been following her diet and exercise plan fairly carefully. Last dilated eye exam more than 1 year     Hyperlipidemia  Her compliance with treatment has been fairly good. She remains on atorvastatin. She experienced GI upset with vascepa    Essential Hypertension  Home blood pressure readings: not doing. Associated signs and symptoms: peripheral edema.  She continues to deny any chest pain, dyspnea, or recent palpitations. Current antihypertensive medications includes propranolol     GERD  She remains heartburn free on pantoprazole.  There is no history of any difficulty swallowing, vomiting, change in her bowel habits, hematochezia or melena    The following portions of the patient's history were reviewed and updated as appropriate: allergies, current medications, past medical history, past  social history, past surgical history and problem list.    Review of Systems   Constitutional: Positive for fatigue. Negative for appetite change, chills, fever and unexpected weight change.   HENT: Positive for congestion and rhinorrhea. Negative for ear pain, sneezing and sore throat.    Eyes: Negative for visual disturbance.   Respiratory: Positive for cough. Negative for shortness of breath and wheezing.         Occasionally wakes with a feeling she cannot get breath and has been told that she snores   Cardiovascular: Negative for chest pain, palpitations and leg swelling.   Gastrointestinal: Negative for abdominal pain, blood in stool, constipation, diarrhea, nausea and vomiting.   Endocrine: Negative for polydipsia and polyuria.   Genitourinary: Negative for dysuria, hematuria and urgency.   Musculoskeletal: Positive for arthralgias and myalgias. Negative for back pain, joint swelling and neck pain.   Skin: Negative for rash.   Neurological: Positive for headaches. Negative for weakness and numbness.   Psychiatric/Behavioral: Negative for dysphoric mood, sleep disturbance and suicidal ideas. The patient is nervous/anxious.      Objective   Physical Exam  Constitutional:       General: She is not in acute distress.     Appearance: Normal appearance. She is well-developed. She is not diaphoretic.      Comments: Bright and in fair spirits. No apparent distress. No pallor, jaundice, diaphoresis, or cyanosis.   HENT:      Head: Atraumatic.      Right Ear: Tympanic membrane, ear canal and external ear normal.      Left Ear: Tympanic membrane, ear canal and external ear normal.   Eyes:      Conjunctiva/sclera: Conjunctivae normal.   Neck:      Thyroid: No thyroid mass or thyromegaly.      Vascular: No carotid bruit or JVD.      Trachea: Trachea normal. No tracheal deviation.   Cardiovascular:      Rate and Rhythm: Normal rate and regular rhythm.      Heart sounds: Normal heart sounds, S1 normal and S2 normal. No  murmur heard.    No gallop.   Pulmonary:      Effort: Pulmonary effort is normal.      Breath sounds: Normal breath sounds.   Chest:   Breasts:      Right: No supraclavicular adenopathy.      Left: No supraclavicular adenopathy.       Abdominal:      General: Bowel sounds are normal. There is no distension or abdominal bruit.      Palpations: Abdomen is soft. There is no hepatomegaly, splenomegaly or mass.      Tenderness: There is no abdominal tenderness.      Hernia: No hernia is present.   Musculoskeletal:      Right lower leg: No edema.      Left lower leg: No edema.   Lymphadenopathy:      Head:      Right side of head: No submental, submandibular, tonsillar, preauricular, posterior auricular or occipital adenopathy.      Left side of head: No submental, submandibular, tonsillar, preauricular, posterior auricular or occipital adenopathy.      Cervical: No cervical adenopathy.      Upper Body:      Right upper body: No supraclavicular adenopathy.      Left upper body: No supraclavicular adenopathy.   Skin:     General: Skin is warm.      Coloration: Skin is not cyanotic, jaundiced or pale.      Findings: No rash.      Nails: There is no clubbing.   Neurological:      Mental Status: She is alert and oriented to person, place, and time.      Cranial Nerves: No cranial nerve deficit.      Motor: No tremor.      Coordination: Coordination normal.      Gait: Gait normal.   Psychiatric:         Attention and Perception: Attention normal.         Mood and Affect: Mood normal.         Speech: Speech normal.         Behavior: Behavior normal.         Thought Content: Thought content normal.       Assessment & Plan   Problems Addressed this Visit        Cardiac and Vasculature    Chronic venous insufficiency     Essential hypertension   Hypertension: at goal. Evidence of target organ damage: none.  Encouraged to continue to work on diet and exercise plan.   Continue current medication    Relevant Orders    CBC &  Differential    Comprehensive Metabolic Panel    TSH    Mixed hyperlipidemia  As above.   Continue current medication.    Relevant Orders    Comprehensive Metabolic Panel    Lipid Panel    TSH    Palpitations  Remain well controlled with propranolol  Encouraged to report if this should change.       Endocrine and Metabolic    Class 1 obesity with serious comorbidity and body mass index (BMI) of 34.0 to 34.9 in adult        Type 2 diabetes mellitus without complication, without long-term current use of insulin (HCC)  Diabetes mellitus Type II, under excellent control.   Encouraged to continue to pursue ADA diet  Encouraged aerobic exercise.  Continue current medication  Scheduled for updated labs just prior to her return.    Relevant Orders    TSH    Hemoglobin A1c    MicroAlbumin, Urine, Random - Urine, Clean Catch    Vitamin D deficiency    Relevant Orders    Vitamin D 25 Hydroxy       Gastrointestinal Abdominal     Gastroesophageal reflux disease without esophagitis   Symptoms are currently well controlled.  Reminded regarding lifestyle modification.  Continue current medication    Relevant Orders    CBC & Differential       Genitourinary and Reproductive     S/P laparoscopic hysterectomy  Appears to be doing well  Encouraged to report if this should change.  Follow up with gynecology       Health Encounters    Healthcare maintenance  Encouraged again to obtain a COVID-19 booster with one of the mRNA vaccines  Recommended a flu shot when available  We will discuss a Prevnar 20 at her return    Relevant Medications    multivitamin (THERAGRAN) tablet tablet       Mental Health    Bipolar disorder, in partial remission, most recent episode mixed (HCC)    Relevant Orders    TSH      Diagnoses       Codes Comments    Chronic venous insufficiency    -  Primary ICD-10-CM: I87.2  ICD-9-CM: 459.81     Essential hypertension     ICD-10-CM: I10  ICD-9-CM: 401.9     Mixed hyperlipidemia     ICD-10-CM: E78.2  ICD-9-CM: 272.2      Class 1 obesity with serious comorbidity and body mass index (BMI) of 34.0 to 34.9 in adult, unspecified obesity type     ICD-10-CM: E66.9, Z68.34  ICD-9-CM: 278.00, V85.34     Palpitations     ICD-10-CM: R00.2  ICD-9-CM: 785.1     PCOS (polycystic ovarian syndrome)     ICD-10-CM: E28.2  ICD-9-CM: 256.4     Type 2 diabetes mellitus without complication, without long-term current use of insulin (HCC)     ICD-10-CM: E11.9  ICD-9-CM: 250.00     Vitamin D deficiency     ICD-10-CM: E55.9  ICD-9-CM: 268.9     Gastroesophageal reflux disease without esophagitis     ICD-10-CM: K21.9  ICD-9-CM: 530.81     Healthcare maintenance     ICD-10-CM: Z00.00  ICD-9-CM: V70.0     Bipolar disorder, in partial remission, most recent episode mixed (HCC)     ICD-10-CM: F31.77  ICD-9-CM: 296.65     S/P laparoscopic hysterectomy     ICD-10-CM: Z90.710  ICD-9-CM: V88.01

## 2022-08-10 VITALS
RESPIRATION RATE: 14 BRPM | HEART RATE: 92 BPM | TEMPERATURE: 98.6 F | DIASTOLIC BLOOD PRESSURE: 68 MMHG | WEIGHT: 208 LBS | HEIGHT: 65 IN | SYSTOLIC BLOOD PRESSURE: 126 MMHG | OXYGEN SATURATION: 96 % | BODY MASS INDEX: 34.66 KG/M2

## 2022-08-10 PROBLEM — Z90.710 S/P TAH (TOTAL ABDOMINAL HYSTERECTOMY): Status: ACTIVE | Noted: 2022-06-22

## 2022-08-10 PROBLEM — E28.2 PCOS (POLYCYSTIC OVARIAN SYNDROME): Status: RESOLVED | Noted: 2019-03-12 | Resolved: 2022-08-10

## 2022-08-18 ENCOUNTER — OFFICE VISIT (OUTPATIENT)
Dept: PSYCHIATRY | Facility: CLINIC | Age: 37
End: 2022-08-18

## 2022-08-18 DIAGNOSIS — F51.05 INSOMNIA DUE TO MENTAL DISORDER: ICD-10-CM

## 2022-08-18 DIAGNOSIS — F31.30 BIPOLAR I DISORDER, MOST RECENT EPISODE DEPRESSED: ICD-10-CM

## 2022-08-18 PROCEDURE — 99441 PR PHYS/QHP TELEPHONE EVALUATION 5-10 MIN: CPT | Performed by: NURSE PRACTITIONER

## 2022-08-18 RX ORDER — TRAZODONE HYDROCHLORIDE 50 MG/1
50 TABLET ORAL NIGHTLY
Qty: 30 TABLET | Refills: 1 | Status: SHIPPED | OUTPATIENT
Start: 2022-08-18 | End: 2022-10-24

## 2022-08-18 RX ORDER — BUPROPION HYDROCHLORIDE 100 MG/1
100 TABLET, EXTENDED RELEASE ORAL 2 TIMES DAILY
Qty: 180 TABLET | Refills: 1 | Status: SHIPPED | OUTPATIENT
Start: 2022-08-18 | End: 2022-11-17

## 2022-08-18 RX ORDER — LURASIDONE HYDROCHLORIDE 80 MG/1
80 TABLET, FILM COATED ORAL DAILY
Qty: 30 TABLET | Refills: 2 | Status: SHIPPED | OUTPATIENT
Start: 2022-08-18 | End: 2022-11-17 | Stop reason: SDUPTHER

## 2022-08-18 RX ORDER — HYDROXYZINE PAMOATE 25 MG/1
25 CAPSULE ORAL 3 TIMES DAILY PRN
Qty: 90 CAPSULE | Refills: 2 | Status: SHIPPED | OUTPATIENT
Start: 2022-08-18 | End: 2022-11-17 | Stop reason: SDUPTHER

## 2022-08-18 NOTE — PROGRESS NOTES
"This provider is located at the [facility], [address], [address]  using a telephone in a secure private environment. The Patient is seen remotely at their home address in [place], using a private telephone.  The patient is unable to be seen through a MyChart Video Visit through [facility] at today's encounter, therefore a telephone encounter was conducted. Patient is being evaluated/treated via telehealth by telephone, and stated they are in a secure environment for this session. The patient's condition being diagnosed/treated is appropriate for telemedicine. The provider identified herself as well as her credentials.   The patient, and/or patient's guardian, consent to be seen remotely, and when consent is given they understand that the consent allows for patient identifiable information to be sent to a third party as needed.   They may refuse to be seen remotely at any time. The electronic data is encrypted and password protected, and the patient and/or guardian has been advised of the potential risks to privacy not withstanding such measures.    You have chosen to receive care through a telephone visit. Do you consent to use a telephone visit for your medical care today? Yes  This visit has been rescheduled as a phone visit to comply with patient safety concerns in accordance with CDC recommendations.      Subjective   Jessica Harris is a 36 y.o. female who presents today for follow up    Chief Complaint:  Bipolar Disorder    History of Present Illness:  Patient presents as follow up via telephone visit. States she has been having a difficult time due to caring for her 2 step-children. States she is at home with the children \"24-7\". States she is overwhelmed with the children and her apartment being too small.  Reports her fiancé works 6-7 days/week, states she does not believe they could qualify for help with any type of .  States at times she feels \"trapped\".  She reports " "sleep is fair, states she is unable to take quetiapine due to excessive sleepiness and fear of not waking up if the children needed her.  She also reports appetite is fair, states that has improved since last visit as she was dealing with a UTI.  She denies SI/HI/AVH.    The following portions of the patient's history were reviewed and updated as appropriate: allergies, current medications, past family history, past medical history, past social history, past surgical history and problem list.      Past Medical History:  Past Medical History:   Diagnosis Date   • Anxiety    • Asthma    • Bipolar 1 disorder (HCC)    • Depression    • Diabetes mellitus (HCC)    • Elevated cholesterol    • GERD (gastroesophageal reflux disease)    • History of bronchitis    • History of ear infections    • History of stomach ulcers    • History of streptococcal infection    • Hyperlipidemia    • Hypertension    • Insomnia    • Kidney stones    • Urinary tract infection        Social History:  Social History     Socioeconomic History   • Marital status: Single   Tobacco Use   • Smoking status: Current Every Day Smoker     Packs/day: 0.50     Types: Cigarettes   • Smokeless tobacco: Never Used   • Tobacco comment: \"thinking about it\"   Vaping Use   • Vaping Use: Never used   Substance and Sexual Activity   • Alcohol use: No   • Drug use: No   • Sexual activity: Defer     Birth control/protection: Pill       Family History:  Family History   Problem Relation Age of Onset   • Cancer Mother    • Stroke Mother    • Lung disease Mother    • No Known Problems Father    • Cancer Other    • Heart disease Other    • Stroke Other        Past Surgical History:  Past Surgical History:   Procedure Laterality Date   • ADENOIDECTOMY     • CHOLECYSTECTOMY     • D & C HYSTEROSCOPY N/A 05/16/2022    Procedure: DILATATION AND CURETTAGE HYSTEROSCOPY;  Surgeon: Joey Andersen DO;  Location: [facility];  Service: Obstetrics/Gynecology;  Laterality: " N/A;   • DENTAL PROCEDURE     • ENDOSCOPY     • HERNIA REPAIR      umbilical   • OVARIAN CYST DRAINAGE/EXCISION Left 05/16/2022    Procedure: OVARIAN CYSTECTOMY WITH EXTENSIVE LYSIS OF ADHESIONS;  Surgeon: Joey Andersen DO;  Location: [facility] OR;  Service: Obstetrics/Gynecology;  Laterality: Left;   • TONSILLECTOMY     • TOTAL LAPAROSCOPIC HYSTERECTOMY N/A 6/22/2022    Procedure: ROBOTIC TOTAL LAPAROSCOPIC HYSTERECTOMY WITH BILATERAL SALPINGECTOMY;  Surgeon: Joey Andersen DO;  Location: [facility] OR;  Service: Robotics - DaVinci;  Laterality: N/A;   • UPPER GASTROINTESTINAL ENDOSCOPY     • WISDOM TOOTH EXTRACTION         Problem List:  Patient Active Problem List   Diagnosis   • Palpitations   • Bipolar disorder, in partial remission, most recent episode mixed ([facility])   • Smoker   • Gastroesophageal reflux disease without esophagitis   • Chronic venous insufficiency   • Healthcare maintenance   • Vitamin D deficiency   • Snoring   • History of nephrolithiasis   • Essential hypertension   • Class 1 obesity with serious comorbidity and body mass index (BMI) of 34.0 to 34.9 in adult   • Type 2 diabetes mellitus without complication, without long-term current use of insulin ([facility])   • Mixed hyperlipidemia   • Neuropathy   • S/P laparoscopic hysterectomy       Allergy:   No Known Allergies     Current Medications:   Current Outpatient Medications   Medication Sig Dispense Refill   • buPROPion SR (WELLBUTRIN SR) 100 MG 12 hr tablet Take 1 tablet by mouth 2 (Two) Times a Day. 180 tablet 1   • lurasidone HCl (LATUDA) 80 MG tablet tablet Take 1 tablet by mouth Daily. 30 tablet 2   • albuterol sulfate  (90 Base) MCG/ACT inhaler Inhale 2 puffs Every 6 (Six) Hours As Needed for Wheezing. 18 g 3   • atorvastatin (LIPITOR) 20 MG tablet Take 1 tablet by mouth Every Night. 30 tablet 5   • cyanocobalamin ([employer] Vitamin B-12) 2000 MCG tablet Take 1 tablet by mouth Daily. 30 tablet 3   • docusate calcium (SURFAK) 240  MG capsule Take 1 capsule by mouth Daily. 30 capsule 1   • famotidine (Pepcid) 20 MG tablet Take 1 tablet by mouth 2 (Two) Times a Day. 60 tablet 0   • hydrOXYzine pamoate (VISTARIL) 25 MG capsule Take 1 capsule by mouth 3 (Three) Times a Day As Needed for Anxiety. 90 capsule 2   • ibuprofen (ADVIL,MOTRIN) 200 MG tablet Take 600 mg by mouth Every 6 (Six) Hours As Needed for Mild Pain .     • icosapent ethyl (Vascepa) 1 g capsule capsule Take 2 g by mouth 2 (Two) Times a Day.     • metFORMIN (GLUCOPHAGE) 500 MG tablet Take 1 tablet by mouth 2 (Two) Times a Day With Meals. 180 tablet 3   • multivitamin (THERAGRAN) tablet tablet Take 1 tablet by mouth Daily. 30 tablet 5   • oxyCODONE-acetaminophen (Percocet) 5-325 MG per tablet Take 1 tablet by mouth Every 4 (Four) Hours As Needed for Severe Pain. 10 tablet 0   • pantoprazole (PROTONIX) 40 MG EC tablet Take 1 tablet by mouth Daily. 90 tablet 3   • propranolol LA (INDERAL LA) 120 MG 24 hr capsule Take 1 capsule by mouth Daily. 90 capsule 3   • simethicone (MYLICON) 80 MG chewable tablet Chew 80 mg Every 6 (Six) Hours As Needed for Flatulence.     • traZODone (DESYREL) 50 MG tablet Take 1 tablet by mouth Every Night. 30 tablet 1     No current facility-administered medications for this visit.       Review of Symptoms:    Review of Systems   Constitutional: Positive for fatigue.   HENT: Negative.    Eyes: Negative.    Respiratory: Negative.    Cardiovascular: Negative.    Gastrointestinal: Negative.    Endocrine: Negative.    Genitourinary: Negative.    Musculoskeletal: Negative.    Skin: Negative.    Neurological: Negative.    Psychiatric/Behavioral: Positive for agitation, sleep disturbance, depressed mood and stress. Negative for suicidal ideas. The patient is nervous/anxious.          Physical Exam:   There were no vitals taken for this visit.   There is no height or weight on file to calculate BMI.    Due to extenuating circumstances and possible current health risks  associated with the patient being present in a clinical setting (with current health restrictions in place in regards to possible COVID 19 transmission/exposure), the patient was seen remotely today via a telephone encounter.  Unable to obtain vital signs due to nature of remote visit.  Height stated at 65 inches.  Weight stated at 208 pounds.         Mental Status Exam:   Hygiene:   Unable to evaluate  Cooperation:  Cooperative  Eye Contact:  Unable to evaluate  Psychomotor Behavior:  Unable to evaluate  Affect:  Appropriate  Mood: depressed and anxious  Hopelessness: 5  Speech:  Normal  Thought Process:  Linear  Thought Content:  Normal and Mood congruent  Suicidal:  None  Homicidal:  None  Hallucinations:  None  Delusion:  None  Memory:  Intact  Orientation:  Person, Place, Time and Situation  Reliability:  good  Insight:  Good  Judgement:  Good  Impulse Control:  Good  Physical/Medical Issues:  No      PHQ-Score Total:  PHQ-9 Total Score: 14   Patient screened positive for depression based on a PHQ-9 score of 14 on 8/19/2022. Follow-up recommendations include: Prescribed antidepressant medication treatment and Suicide Risk Assessment performed.          Lab Results:   No visits with results within 1 Month(s) from this visit.   Latest known visit with results is:   Admission on 06/22/2022, Discharged on 06/23/2022   Component Date Value Ref Range Status   • HCG, Urine, QL 06/22/2022 Negative  Negative Final   • Lot Number 06/22/2022 [id number]   Final   • Internal Positive Control 06/22/2022 Positive  Positive, Passed Final   • Internal Negative Control 06/22/2022 Negative  Negative, Passed Final   • Expiration Date 06/22/2022 2023-09-30   Final   • Glucose 06/22/2022 130  70 - 130 mg/dL Final    Meter: [id number] : [id number] zenon dye   • Reference Lab Report 06/22/2022    Final                    Value:[facility]  [address] [place] [address]  Phone: [phone] or  [phone]  Fax: [phone]  German Fuentes M.D., Medical Director    PATIENT NAME                           LABORATORY NO.  [id number]  [address]                      [id number]  [id number]                         AGE              SEX  SSN           CLIENT REF #  [facility]              36      1985  F    [id number]   [id number]    [address] [place] [address]                     REQUESTING M.D.     ATTENDING M.D.     [place] [address]                   RAOUL OROZCO  DATE COLLECTED      DATE RECEIVED      DATE REPORTED  2022    DIAGNOSIS:  UTERUS, AND FALLOPIAN TUBES:  CERVIX:  - Acute and chronic cervicitis with erosion and mucosal edema  - Benign endocervical polyp  - Nabothian cysts  ENDOMETRIUM:  - Complex hyperplasia with squamous morules, negative for cytologic atypia  MYOMETRIUM:  - Benign leiomyomas  -                           Superficial adenomyosis  UTERINE SEROSA:  - No significant diagnostic alterations  BILATERAL FALLOPIAN TUBES:  - Benign paratubal cysts    CAM/pah    CLINICAL HISTORY:  Abnormal uterine bleeding, complex endometrial hyperplasia    SPECIMENS RECEIVED:  UTERUS, AND FALLOPIAN TUBES    MICROSCOPIC DESCRIPTION:  Tissue blocks are prepared and slides are examined microscopically. See  diagnosis for details.    Professional interpretation rendered by Mariajose Day M.D., F.C.A.P. at  [facility], [address] [place] [id number].    GROSS DESCRIPTION:  Received in formalin labeled uterus with ovaries and fallopian tubes is an intact  uterine body with attached cervix and bilateral fimbriated fallopian tubes.  The  ovaries are absent.  The uterine body and cervix weigh 70.8 g.  The uterine body  is surfaced by purple, smooth glistening serosa and is 6.5 x 5 x 4 cm. The gray-  purple, smooth ectocervix is 3.2 x 3 cm and the slitlike os is patent, 1.5 cm in  diameter.  The 2.5 cm in                            length tan, corrugated endocervical canal is filled with  a moderate amount of blood-tinged mucus and there are a few scattered mucus  filled cysts ranging from 0.3 to 1.4 cm in greatest dimension.  Attached to the  posterior endocervix is a 1.5 x 0.7 x 0.4 cm tan-pink polyp.  The 4 x 1.5 cm  triangular endometrial cavity is surfaced by red-tan endometrium averaging 0.2  cm in thickness.  No polyps, exophytic or invasive lesions are present.  The tan  myometrium averages 1.5 cm in thickness and has 2 white nodules, 0.3 and 1.5  cm in greatest dimension.  The larger nodule is subserosal and sectioning of each  reveals an unremarkable cut surface without fleshy, necrotic or calcified areas.  The fimbriated tubes average 7 cm in length and sectioning of each reveals an  unremarkable 0.1 cm lumen.  There are a few paratubal cysts measuring up to  0.8 cm in greatest dimension.  Representative sections are submitted as follows:  A1-anterior and posterior cervix; A2-endocervical polyp;                           H6-G3-hqanvj anterior  endometrium submitted sequentially from lower uterine segment to fundus with  a small intramural nodule in block A5; A8-A 12-posterior endomyometrium  submitted entirely, sequentially from lower uterine segment to fundus; A 13-  subserosal nodule; A 14-right fallopian tube with fimbria; a 15-left fallopian  tube with fimbria.  HDM    REVIEWED, DIAGNOSED AND ELECTRONICALLY  SIGNED BY:    Mariajose Day M.D., F.C.A.P.  CPT CODES:  82760     • Glucose 06/22/2022 158 (A) 70 - 130 mg/dL Final    Meter: [id number] : [id number] Willie Bowers   • WBC 06/23/2022 22.06 (A) 3.40 - 10.80 10*3/mm3 Final   • RBC 06/23/2022 5.09  3.77 - 5.28 10*6/mm3 Final   • Hemoglobin 06/23/2022 13.3  12.0 - 15.9 g/dL Final   • Hematocrit 06/23/2022 42.8  34.0 - 46.6 % Final   • MCV 06/23/2022 84.1  79.0 - 97.0 fL Final   • MCH 06/23/2022 26.1 (A) 26.6 - 33.0 pg Final   • MCHC 06/23/2022 31.1 (A) 31.5 - 35.7  g/dL Final   • RDW 06/23/2022 14.2  12.3 - 15.4 % Final   • RDW-SD 06/23/2022 43.2  37.0 - 54.0 fl Final   • MPV 06/23/2022 9.2  6.0 - 12.0 fL Final   • Platelets 06/23/2022 501 (A) 140 - 450 10*3/mm3 Final       Assessment & Plan   Diagnoses and all orders for this visit:    1. Bipolar I disorder, most recent episode depressed (HCC)  -     buPROPion SR (WELLBUTRIN SR) 100 MG 12 hr tablet; Take 1 tablet by mouth 2 (Two) Times a Day.  Dispense: 180 tablet; Refill: 1  -     lurasidone HCl (LATUDA) 80 MG tablet tablet; Take 1 tablet by mouth Daily.  Dispense: 30 tablet; Refill: 2    2. Insomnia due to mental disorder    Other orders  -     hydrOXYzine pamoate (VISTARIL) 25 MG capsule; Take 1 capsule by mouth 3 (Three) Times a Day As Needed for Anxiety.  Dispense: 90 capsule; Refill: 2  -     traZODone (DESYREL) 50 MG tablet; Take 1 tablet by mouth Every Night.  Dispense: 30 tablet; Refill: 1      -Begin trazodone 50 mg nightly for sleep  -Begin hydroxyzine pamoate 25 mg 3 times daily as needed for anxiety  -Increase lurasidone 80 mg daily for mood  Lengthy discussion with patient on the possible side effects of antipsychotic medications including increased cholesterol, increased blood sugar, and possibility of weight gain.  Also discussed the need to monitor lab work associated with this.  The risk of muscle movement disorders with this class of medication was also discussed.  -Continue bupropion  mg twice daily for anxiety and depression  -Discussed with patient importance of self-care and encouraged her to dedicate at least 1 day/week for herself  -Encouraged patient to begin psychotherapy  -JULIET reviewed and appropriate. Patient counseled on use of controlled substances.   -The benefits of a healthy diet and exercise were discussed with patient, especially the positive effects they have on mental health. Patient encouraged to consider lifestyle modification regarding  diet and exercise patterns to maximize  results of mental health treatment.  -Reviewed previous available documentation  -Reviewed most recent available labs   -Jessica Harris  reports that she has been smoking cigarettes. She has been smoking about 0.50 packs per day. She has never used smokeless tobacco.. I have educated her on the risk of diseases from using tobacco products such as cancer, COPD, heart disease and cataracts. I advised her to quit and she is not willing to quit. I spent 3  minutes counseling the patient.         - Began at 3:10 PM and ended at 3:20 PM    Visit Diagnoses:    ICD-10-CM ICD-9-CM   1. Bipolar I disorder, most recent episode depressed (HCC)  F31.30 296.50   2. Insomnia due to mental disorder  F51.05 300.9     327.02         GOALS:  Short Term Goals: Patient will be compliant with medication, and patient will have no significant medication related side effects.  Patient will be engaged in psychotherapy as indicated.  Patient will report subjective improvement of symptoms.  Long term goals: To stabilize mood and treat/improve subjective symptoms, the patient will stay out of the hospital, the patient will be at an optimal level of functioning, and the patient will take all medications as prescribed.  The patient/guardian verbalized understanding and agreement with goals that were mutually set.      TREATMENT PLAN: Continue supportive psychotherapy efforts and medications as indicated.  Pharmacological and Non-Pharmacological treatment options discussed during today's visit. Patient/Guardian acknowledged and verbally consented with current treatment plan and was educated on the importance of compliance with treatment and follow-up appointments.      MEDICATION ISSUES:    Discussed medication options and treatment plan of prescribed medication as well as the risks, benefits, any black box warnings, and side effects including potential falls, possible impaired driving, and metabolic adversities among others. Patient is agreeable  to call the office with any worsening of symptoms or onset of side effects, or if any concerns or questions arise.  The contact information for the office is made available to the patient. Patient is agreeable to call 911 or go to the nearest ER should they begin having any SI/HI, or if any urgent concerns arise. No medication side effects or related complaints today.     MEDS ORDERED DURING VISIT:  New Medications Ordered This Visit   Medications   • buPROPion SR (WELLBUTRIN SR) 100 MG 12 hr tablet     Sig: Take 1 tablet by mouth 2 (Two) Times a Day.     Dispense:  180 tablet     Refill:  1   • lurasidone HCl (LATUDA) 80 MG tablet tablet     Sig: Take 1 tablet by mouth Daily.     Dispense:  30 tablet     Refill:  2   • hydrOXYzine pamoate (VISTARIL) 25 MG capsule     Sig: Take 1 capsule by mouth 3 (Three) Times a Day As Needed for Anxiety.     Dispense:  90 capsule     Refill:  2   • traZODone (DESYREL) 50 MG tablet     Sig: Take 1 tablet by mouth Every Night.     Dispense:  30 tablet     Refill:  1       Return in about 8 weeks (around 10/13/2022), or if symptoms worsen or fail to improve.         Progress toward goal: At goal    Functional Status: No impairment    Prognosis: Guarded with Ongoing Treatment          This document has been electronically signed by JANIE Thorpe  August 19, 2022 12:20 EDT    Part of this note may be an electronic transcription/translation of spoken language to printed text using the Dragon Dictation System.

## 2022-09-15 ENCOUNTER — OFFICE VISIT (OUTPATIENT)
Dept: FAMILY MEDICINE CLINIC | Facility: CLINIC | Age: 37
End: 2022-09-15

## 2022-09-15 VITALS
WEIGHT: 215 LBS | TEMPERATURE: 98.2 F | BODY MASS INDEX: 35.82 KG/M2 | OXYGEN SATURATION: 95 % | DIASTOLIC BLOOD PRESSURE: 70 MMHG | HEIGHT: 65 IN | HEART RATE: 86 BPM | SYSTOLIC BLOOD PRESSURE: 115 MMHG

## 2022-09-15 DIAGNOSIS — L60.0 INGROWN NAIL OF SECOND TOE OF LEFT FOOT: ICD-10-CM

## 2022-09-15 DIAGNOSIS — E11.40 TYPE 2 DIABETES MELLITUS WITH DIABETIC NEUROPATHY, WITHOUT LONG-TERM CURRENT USE OF INSULIN: Primary | ICD-10-CM

## 2022-09-15 PROCEDURE — 99213 OFFICE O/P EST LOW 20 MIN: CPT | Performed by: PHYSICIAN ASSISTANT

## 2022-09-15 NOTE — PROGRESS NOTES
Subjective        Chief Complaint  Peripheral Neuropathy    Subjective      History of Present Illness  Jessica Harris is a 36 y.o. female who presents today to [facility] for complaints of peripheral neuropathy and concern for an ingrown toe nail. Past medical history is significant for type II diabetes mellitus, HTN, HLD, GERD, bipolar disorder, anxiety and depression.    Diabetic neuropathy:   Type II DM, non-insulin requiring:   She reports greater than 1 year of bilateral foot numbness going up into her legs.  Denies any injury to her lower extremities or low back.  No recent falls.  It is symmetrical.  She has a pins-and-needles type discomfort.  Denies any recent acute change.  She has not previously been on any medications for neuropathy that she is aware of.  She was previously started on vitamin B12 supplementation to see if this helped, she denies any improvement.  Her diabetes is managed on metformin.    Concern for ingrown toenail:  She reports pain and redness of her left second toe at the nail.  The nail appears to be growing into the skin.  No warmth or drainage appreciated.  She has seen podiatry in the past for toenail removals.  She reports that Dr. Pandey is no longer seeing diabetic patients.     Essential hypertension:  BP in the office today is 115/70.  She is on propranolol for both hypertension as well as a history of palpitations and tachycardia.       Current Outpatient Medications:   •  albuterol sulfate  (90 Base) MCG/ACT inhaler, Inhale 2 puffs Every 6 (Six) Hours As Needed for Wheezing., Disp: 18 g, Rfl: 3  •  atorvastatin (LIPITOR) 20 MG tablet, Take 1 tablet by mouth Every Night., Disp: 30 tablet, Rfl: 5  •  buPROPion SR (WELLBUTRIN SR) 100 MG 12 hr tablet, Take 1 tablet by mouth 2 (Two) Times a Day., Disp: 180 tablet, Rfl: 1  •  cyanocobalamin ([employer] Vitamin B-12) 2000 MCG tablet, Take 1 tablet by mouth Daily., Disp: 30 tablet, Rfl: 3  •   "docusate calcium (SURFAK) 240 MG capsule, Take 1 capsule by mouth Daily., Disp: 30 capsule, Rfl: 1  •  famotidine (Pepcid) 20 MG tablet, Take 1 tablet by mouth 2 (Two) Times a Day., Disp: 60 tablet, Rfl: 0  •  hydrOXYzine pamoate (VISTARIL) 25 MG capsule, Take 1 capsule by mouth 3 (Three) Times a Day As Needed for Anxiety., Disp: 90 capsule, Rfl: 2  •  ibuprofen (ADVIL,MOTRIN) 200 MG tablet, Take 600 mg by mouth Every 6 (Six) Hours As Needed for Mild Pain ., Disp: , Rfl:   •  icosapent ethyl (VASCEPA) 1 g capsule capsule, Take 2 g by mouth 2 (Two) Times a Day., Disp: , Rfl:   •  lurasidone HCl (LATUDA) 80 MG tablet tablet, Take 1 tablet by mouth Daily., Disp: 30 tablet, Rfl: 2  •  metFORMIN (GLUCOPHAGE) 500 MG tablet, Take 1 tablet by mouth 2 (Two) Times a Day With Meals., Disp: 180 tablet, Rfl: 3  •  multivitamin (THERAGRAN) tablet tablet, Take 1 tablet by mouth Daily., Disp: 30 tablet, Rfl: 5  •  pantoprazole (PROTONIX) 40 MG EC tablet, Take 1 tablet by mouth Daily., Disp: 90 tablet, Rfl: 3  •  propranolol LA (INDERAL LA) 120 MG 24 hr capsule, Take 1 capsule by mouth Daily., Disp: 90 capsule, Rfl: 3  •  simethicone (MYLICON) 80 MG chewable tablet, Chew 80 mg Every 6 (Six) Hours As Needed for Flatulence., Disp: , Rfl:   •  traZODone (DESYREL) 50 MG tablet, Take 1 tablet by mouth Every Night., Disp: 30 tablet, Rfl: 1  •  mupirocin (BACTROBAN) 2 % ointment, Apply 1 application topically to the appropriate area as directed 3 (Three) Times a Day., Disp: 30 g, Rfl: 0      No Known Allergies    Objective     Objective   Vital Signs:  Blood Pressure 115/70   Pulse 86   Temperature 98.2 °F (36.8 °C) (Temporal)   Height 165.1 cm (65\")   Weight 97.5 kg (215 lb)   Oxygen Saturation 95%   Body Mass Index 35.78 kg/m²   Estimated body mass index is 35.78 kg/m² as calculated from the following:    Height as of this encounter: 165.1 cm (65\").    Weight as of this encounter: 97.5 kg (215 lb).    Class 2 Severe Obesity (BMI " ">=35 and <=39.9). Obesity-related health conditions include the following: hypertension, diabetes mellitus, dyslipidemias and GERD. Obesity is unchanged. BMI is is above average; BMI management plan is completed. Information provided in the AVS.    Past Medical History:   Diagnosis Date   • Anxiety    • Asthma    • Bipolar 1 disorder (HCC)    • Depression    • Diabetes mellitus (HCC)    • Elevated cholesterol    • GERD (gastroesophageal reflux disease)    • History of bronchitis    • History of ear infections    • History of stomach ulcers    • History of streptococcal infection    • Hyperlipidemia    • Hypertension    • Insomnia    • Kidney stones    • Urinary tract infection      Past Surgical History:   Procedure Laterality Date   • ADENOIDECTOMY     • CHOLECYSTECTOMY     • D & C HYSTEROSCOPY N/A 05/16/2022    Procedure: DILATATION AND CURETTAGE HYSTEROSCOPY;  Surgeon: Joey Andersen DO;  Location: [facility];  Service: Obstetrics/Gynecology;  Laterality: N/A;   • DENTAL PROCEDURE     • ENDOSCOPY     • HERNIA REPAIR      umbilical   • OVARIAN CYST DRAINAGE/EXCISION Left 05/16/2022    Procedure: OVARIAN CYSTECTOMY WITH EXTENSIVE LYSIS OF ADHESIONS;  Surgeon: Joey Andersen DO;  Location: [facility];  Service: Obstetrics/Gynecology;  Laterality: Left;   • TONSILLECTOMY     • TOTAL LAPAROSCOPIC HYSTERECTOMY N/A 6/22/2022    Procedure: ROBOTIC TOTAL LAPAROSCOPIC HYSTERECTOMY WITH BILATERAL SALPINGECTOMY;  Surgeon: Joey Andersen DO;  Location: [facility];  Service: Robotics - [facility];  Laterality: N/A;   • UPPER GASTROINTESTINAL ENDOSCOPY     • WISDOM TOOTH EXTRACTION       Social History     Socioeconomic History   • Marital status: Single   Tobacco Use   • Smoking status: Current Every Day Smoker     Packs/day: 0.50     Types: Cigarettes   • Smokeless tobacco: Never Used   • Tobacco comment: \"thinking about it\"   Vaping Use   • Vaping Use: Never used   Substance and Sexual Activity   • " Alcohol use: No   • Drug use: No   • Sexual activity: Defer     Birth control/protection: Pill      Physical Exam  Vitals and nursing note reviewed.   Constitutional:       General: She is not in acute distress.     Appearance: She is well-developed. She is not diaphoretic.   HENT:      Head: Normocephalic and atraumatic.   Eyes:      General: No scleral icterus.        Right eye: No discharge.         Left eye: No discharge.      Conjunctiva/sclera: Conjunctivae normal.   Cardiovascular:      Rate and Rhythm: Normal rate and regular rhythm.      Heart sounds: Normal heart sounds. No murmur heard.    No friction rub. No gallop.   Pulmonary:      Effort: Pulmonary effort is normal. No respiratory distress.      Breath sounds: Normal breath sounds. No wheezing or rales.   Chest:      Chest wall: No tenderness.   Musculoskeletal:         General: Normal range of motion.      Cervical back: Normal range of motion and neck supple.   Skin:     General: Skin is warm and dry.      Coloration: Skin is not pale.      Findings: No erythema or rash.      Comments: Ingrown toenail left 2nd. No erythema/drainage. Tenderness noted on palpation.    Neurological:      Mental Status: She is alert and oriented to person, place, and time.   Psychiatric:         Behavior: Behavior normal.        Result Review :  The following data was reviewed by: JODI Wells on 09/15/2022:  Admission on 06/22/2022, Discharged on 06/23/2022   Component Date Value Ref Range Status   • HCG, Urine, QL 06/22/2022 Negative  Negative Final   • Lot Number 06/22/2022 [id number]   Final   • Internal Positive Control 06/22/2022 Positive  Positive, Passed Final   • Internal Negative Control 06/22/2022 Negative  Negative, Passed Final   • Expiration Date 06/22/2022 2023-09-30   Final   • Glucose 06/22/2022 130  70 - 130 mg/dL Final    Meter: [id number] : [id number] zenon dye   • Reference Lab Report 06/22/2022    Final                     Value:[facility]  [address] [place] [address]  Phone: [phone] or [phone]  Fax: [phone]  German Fuentes M.D., Medical Director    PATIENT NAME                           LABORATORY NO.  [id number]  GORDY ECHAVARRIA                      [id number]  [id number]                         AGE              SEX  SSN           CLIENT REF #  [facility]              36      1985  F    [id number]   [id number]    [address] [place] [address]                     REQUESTING M.D.     [place] [address]                   RAOUL OROZCO  DATE COLLECTED      DATE RECEIVED      DATE REPORTED  2022    DIAGNOSIS:  UTERUS, AND FALLOPIAN TUBES:  CERVIX:  - Acute and chronic cervicitis with erosion and mucosal edema  - Benign endocervical polyp  - Nabothian cysts  ENDOMETRIUM:  - Complex hyperplasia with squamous morules, negative for cytologic atypia  MYOMETRIUM:  - Benign leiomyomas  -                           Superficial adenomyosis  UTERINE SEROSA:  - No significant diagnostic alterations  BILATERAL FALLOPIAN TUBES:  - Benign paratubal cysts    CAM/pah    CLINICAL HISTORY:  Abnormal uterine bleeding, complex endometrial hyperplasia    SPECIMENS RECEIVED:  UTERUS, AND FALLOPIAN TUBES    MICROSCOPIC DESCRIPTION:  Tissue blocks are prepared and slides are examined microscopically. See  diagnosis for details.    Professional interpretation rendered by Mariajose Day M.D., F.C.A.P. at  [facility] [employer], [address] [place] [address], [place] [address].    GROSS DESCRIPTION:  Received in formalin labeled uterus with ovaries and fallopian tubes is an intact  uterine body with attached cervix and bilateral fimbriated fallopian tubes.  The  ovaries are absent.  The uterine body and cervix weigh 70.8 g.  The uterine body  is surfaced by purple, smooth glistening serosa and is 6.5 x 5 x 4 cm. The gray-  purple, smooth ectocervix is 3.2  x 3 cm and the slitlike os is patent, 1.5 cm in  diameter.  The 2.5 cm in                           length tan, corrugated endocervical canal is filled with  a moderate amount of blood-tinged mucus and there are a few scattered mucus  filled cysts ranging from 0.3 to 1.4 cm in greatest dimension.  Attached to the  posterior endocervix is a 1.5 x 0.7 x 0.4 cm tan-pink polyp.  The 4 x 1.5 cm  triangular endometrial cavity is surfaced by red-tan endometrium averaging 0.2  cm in thickness.  No polyps, exophytic or invasive lesions are present.  The tan  myometrium averages 1.5 cm in thickness and has 2 white nodules, 0.3 and 1.5  cm in greatest dimension.  The larger nodule is subserosal and sectioning of each  reveals an unremarkable cut surface without fleshy, necrotic or calcified areas.  The fimbriated tubes average 7 cm in length and sectioning of each reveals an  unremarkable 0.1 cm lumen.  There are a few paratubal cysts measuring up to  0.8 cm in greatest dimension.  Representative sections are submitted as follows:  A1-anterior and posterior cervix; A2-endocervical polyp;                           M9-A4-fubwoc anterior  endometrium submitted sequentially from lower uterine segment to fundus with  a small intramural nodule in block A5; A8-A 12-posterior endomyometrium  submitted entirely, sequentially from lower uterine segment to fundus; A 13-  subserosal nodule; A 14-right fallopian tube with fimbria; a 15-left fallopian  tube with fimbria.  HDM    REVIEWED, DIAGNOSED AND ELECTRONICALLY  SIGNED BY:    Mariajose Day M.D., F.C.A.P.  CPT CODES:  82518     • Glucose 06/22/2022 158 (A) 70 - 130 mg/dL Final    Meter: [id number] : [id number] Willie Bowers   • WBC 06/23/2022 22.06 (A) 3.40 - 10.80 10*3/mm3 Final   • RBC 06/23/2022 5.09  3.77 - 5.28 10*6/mm3 Final   • Hemoglobin 06/23/2022 13.3  12.0 - 15.9 g/dL Final   • Hematocrit 06/23/2022 42.8  34.0 - 46.6 % Final   • MCV 06/23/2022 84.1  79.0 - 97.0 fL  Final   • MCH 06/23/2022 26.1 (A) 26.6 - 33.0 pg Final   • MCHC 06/23/2022 31.1 (A) 31.5 - 35.7 g/dL Final   • RDW 06/23/2022 14.2  12.3 - 15.4 % Final   • RDW-SD 06/23/2022 43.2  37.0 - 54.0 fl Final   • MPV 06/23/2022 9.2  6.0 - 12.0 fL Final   • Platelets 06/23/2022 501 (A) 140 - 450 10*3/mm3 Final   Lab on 06/20/2022   Component Date Value Ref Range Status   • COVID19 06/20/2022 Not Detected  Not Detected - Ref. Range Final   Pre-Admission Testing on 06/20/2022   Component Date Value Ref Range Status   • ABO Type 06/20/2022 O   Final   • RH type 06/20/2022 Positive   Final   • Antibody Screen 06/20/2022 Negative   Final   • T&S Expiration Date 06/20/2022 6/23/2022 11:59:59 PM   Final   • HCG Qualitative 06/20/2022 Negative  Negative Final   • WBC 06/20/2022 13.92 (A) 3.40 - 10.80 10*3/mm3 Final   • RBC 06/20/2022 5.27  3.77 - 5.28 10*6/mm3 Final   • Hemoglobin 06/20/2022 13.8  12.0 - 15.9 g/dL Final   • Hematocrit 06/20/2022 42.7  34.0 - 46.6 % Final   • MCV 06/20/2022 81.0  79.0 - 97.0 fL Final   • MCH 06/20/2022 26.2 (A) 26.6 - 33.0 pg Final   • MCHC 06/20/2022 32.3  31.5 - 35.7 g/dL Final   • RDW 06/20/2022 14.0  12.3 - 15.4 % Final   • RDW-SD 06/20/2022 40.8  37.0 - 54.0 fl Final   • MPV 06/20/2022 8.4  6.0 - 12.0 fL Final   • Platelets 06/20/2022 439  140 - 450 10*3/mm3 Final   • Scan Slide 06/20/2022    Final    See Manual Differential Results   • Neutrophil % 06/20/2022 57.0  42.7 - 76.0 % Final   • Lymphocyte % 06/20/2022 27.0  19.6 - 45.3 % Final   • Monocyte % 06/20/2022 10.0  5.0 - 12.0 % Final   • Eosinophil % 06/20/2022 2.0  0.3 - 6.2 % Final   • Basophil % 06/20/2022 1.0  0.0 - 1.5 % Final   • Bands %  06/20/2022 3.0  0.0 - 5.0 % Final   • Neutrophils Absolute 06/20/2022 8.35 (A) 1.70 - 7.00 10*3/mm3 Final   • Lymphocytes Absolute 06/20/2022 3.76 (A) 0.70 - 3.10 10*3/mm3 Final   • Monocytes Absolute 06/20/2022 1.39 (A) 0.10 - 0.90 10*3/mm3 Final   • Eosinophils Absolute 06/20/2022 0.28  0.00 - 0.40  10*3/mm3 Final   • Basophils Absolute 06/20/2022 0.14  0.00 - 0.20 10*3/mm3 Final   • Hypochromia 06/20/2022 Slight/1+  None Seen Final   • Platelet Estimate 06/20/2022 Increased  Normal Final   • Large Platelets 06/20/2022 Slight/1+  None Seen Final          Assessment / Plan         Assessment   Diagnoses and all orders for this visit:    1. Type 2 diabetes mellitus with diabetic neuropathy, without long-term current use of insulin (HCC) (Primary)  • Type 2 diabetes mellitus is currently managed on metformin 500 mg twice daily.  • Hemoglobin A1c was previously 6.9% in April 2022, repeat level as well as routine labs have been ordered.  • Encouraged on working on diet and exercise to make sure that her diabetes is well controlled to help keep her neuropathy from advancing.  • I have also discussed the various options for medical treatment of her neuropathy.  With her history of bipolar disorder currently on multiple antidepressants, she is at risk for side effects as well as psychosis with adding additional antidepressants.  I have explained that with gabapentin and Lyrica, that these are scheduled medications and would require intermittent urine drug screens and pill counts.  I have also explained the potential for addiction with these medications.  They are also not without interactions given her current list of medications. She expressed understanding and wishes to hold off on specific medical therapy for now.   • Should she require adjustments to her current antidepressants, could consider changing to one of the SNRIs or TCAs approved for treatment of diabetic neuropathy as well. She is following with behavioral health for her psychiatric care.     2. Ingrown nail of second toe of left foot  • Will refer to podiatry.   • I have written some bactroban for her to use only if the area starts to have redness, warmth, or drainage prior to her getting in with podiatry.   -     Ambulatory Referral to Podiatry  -      mupirocin (BACTROBAN) 2 % ointment; Apply 1 application topically to the appropriate area as directed 3 (Three) Times a Day.  Dispense: 30 g; Refill: 0       New Medications Ordered This Visit   Medications   • mupirocin (BACTROBAN) 2 % ointment     Sig: Apply 1 application topically to the appropriate area as directed 3 (Three) Times a Day.     Dispense:  30 g     Refill:  0     Health Maintenance  • Recommend annual diabetic eye and foot exam.   • Consider influenza vaccine.   • Consider pap smear if not performed elsewhere.     Follow Up   Return for Next scheduled follow up.    Patient was given instructions and counseling regarding her condition or for health maintenance advice. Please see specific information pulled into the AVS if appropriate.       This document has been electronically signed by JODI Wells   September 15, 2022 15:52 EDT    Dictated Utilizing Dragon Dictation: Part of this note may be an electronic transcription/translation of spoken language to printed text using the Dragon Dictation System.

## 2022-09-15 NOTE — PATIENT INSTRUCTIONS
BMI for Adults  Body mass index (BMI) is a number that is calculated from a person's weight and height. In most adults, the number is used to find how much of an adult's weight is made up of fat. BMI is not as accurate as a direct measure of body fat.  HOW IS BMI CALCULATED?  BMI is calculated by dividing weight in kilograms by height in meters squared. It can also be calculated by dividing weight in pounds by height in inches squared, then multiplying the resulting number by 703. Charts are available to help you find your BMI quickly and easily without doing this calculation.   HOW IS BMI INTERPRETED?  Health care professionals use BMI charts to identify whether an adult is underweight, at a normal weight, or overweight based on the following guidelines:  Underweight: BMI less than 18.5.  Normal weight: BMI between 18.5 and 24.9.  Overweight: BMI between 25 and 29.9.  Obese: BMI of 30 and above.  BMI is usually interpreted the same for males and females.  Weight includes both fat and muscle, so someone with a muscular build, such as an athlete, may have a BMI that is higher than 24.9. In cases like these, BMI may not accurately depict body fat. To determine if excess body fat is the cause of a BMI of 25 or higher, further assessments may need to be done by a health care provider.  WHY IS BMI A USEFUL TOOL?  BMI is used to identify a possible weight problem that may be related to a medical problem or may increase the risk for medical problems. BMI can also be used to promote changes to reach a healthy weight.     This information is not intended to replace advice given to you by your health care provider. Make sure you discuss any questions you have with your health care provider.     Document Released: 08/29/2005 Document Revised: 01/08/2016 Document Reviewed: 05/15/2015  [employer] Interactive Patient Education ©2017 [employer]       Calorie Counting for Weight Loss  Calories are energy you get from the things  you eat and drink. Your body uses this energy to keep you going throughout the day. The number of calories you eat affects your weight. When you eat more calories than your body needs, your body stores the extra calories as fat. When you eat fewer calories than your body needs, your body burns fat to get the energy it needs.  Calorie counting means keeping track of how many calories you eat and drink each day. If you make sure to eat fewer calories than your body needs, you should lose weight. In order for calorie counting to work, you will need to eat the number of calories that are right for you in a day to lose a healthy amount of weight per week. A healthy amount of weight to lose per week is usually 1-2 lb (0.5-0.9 kg). A dietitian can determine how many calories you need in a day and give you suggestions on how to reach your calorie goal.   WHAT IS MY MY PLAN?  My goal is to have __________ calories per day.   If I have this many calories per day, I should lose around __________ pounds per week.  WHAT DO I NEED TO KNOW ABOUT CALORIE COUNTING?  In order to meet your daily calorie goal, you will need to:  Find out how many calories are in each food you would like to eat. Try to do this before you eat.  Decide how much of the food you can eat.  Write down what you ate and how many calories it had. Doing this is called keeping a food log.  WHERE DO I FIND CALORIE INFORMATION?  The number of calories in a food can be found on a Nutrition Facts label. Note that all the information on a label is based on a specific serving of the food. If a food does not have a Nutrition Facts label, try to look up the calories online or ask your dietitian for help.  HOW DO I DECIDE HOW MUCH TO EAT?  To decide how much of the food you can eat, you will need to consider both the number of calories in one serving and the size of one serving. This information can be found on the Nutrition Facts label. If a food does not have a Nutrition  Facts label, look up the information online or ask your dietitian for help.  Remember that calories are listed per serving. If you choose to have more than one serving of a food, you will have to multiply the calories per serving by the amount of servings you plan to eat. For example, the label on a package of bread might say that a serving size is 1 slice and that there are 90 calories in a serving. If you eat 1 slice, you will have eaten 90 calories. If you eat 2 slices, you will have eaten 180 calories.  HOW DO I KEEP A FOOD LOG?  After each meal, record the following information in your food log:  What you ate.  How much of it you ate.  How many calories it had.  Then, add up your calories.  Keep your food log near you, such as in a small notebook in your pocket. Another option is to use a mobile annalee or website. Some programs will calculate calories for you and show you how many calories you have left each time you add an item to the log.  WHAT ARE SOME CALORIE COUNTING TIPS?  Use your calories on foods and drinks that will fill you up and not leave you hungry. Some examples of this include foods like nuts and nut butters, vegetables, lean proteins, and high-fiber foods (more than 5 g fiber per serving).  Eat nutritious foods and avoid empty calories. Empty calories are calories you get from foods or beverages that do not have many nutrients, such as candy and soda. It is better to have a nutritious high-calorie food (such as an avocado) than a food with few nutrients (such as a bag of chips).  Know how many calories are in the foods you eat most often. This way, you do not have to look up how many calories they have each time you eat them.  Look out for foods that may seem like low-calorie foods but are really high-calorie foods, such as baked goods, soda, and fat-free candy.  Pay attention to calories in drinks. Drinks such as sodas, specialty coffee drinks, alcohol, and juices have a lot of calories yet do  not fill you up. Choose low-calorie drinks like water and diet drinks.  Focus your calorie counting efforts on higher calorie items. Logging the calories in a garden salad that contains only vegetables is less important than calculating the calories in a milk shake.  Find a way of tracking calories that works for you. Get creative. Most people who are successful find ways to keep track of how much they eat in a day, even if they do not count every calorie.  WHAT ARE SOME PORTION CONTROL TIPS?  Know how many calories are in a serving. This will help you know how many servings of a certain food you can have.  Use a measuring cup to measure serving sizes. This is helpful when you start out. With time, you will be able to estimate serving sizes for some foods.  Take some time to put servings of different foods on your favorite plates, bowls, and cups so you know what a serving looks like.  Try not to eat straight from a bag or box. Doing this can lead to overeating. Put the amount you would like to eat in a cup or on a plate to make sure you are eating the right portion.  Use smaller plates, glasses, and bowls to prevent overeating. This is a quick and easy way to practice portion control. If your plate is smaller, less food can fit on it.  Try not to multitask while eating, such as watching TV or using your computer. If it is time to eat, sit down at a table and enjoy your food. Doing this will help you to start recognizing when you are full. It will also make you more aware of what and how much you are eating.  HOW CAN I CALORIE COUNT WHEN EATING OUT?  Ask for smaller portion sizes or child-sized portions.  Consider sharing an entree and sides instead of getting your own entree.  If you get your own entree, eat only half. Ask for a box at the beginning of your meal and put the rest of your entree in it so you are not tempted to eat it.  Look for the calories on the menu. If calories are listed, choose the lower  "calorie options.  Choose dishes that include vegetables, fruits, whole grains, low-fat dairy products, and lean protein. Focusing on smart food choices from each of the 5 food groups can help you stay on track at restaurants.  Choose items that are boiled, broiled, grilled, or steamed.  Choose water, milk, unsweetened iced tea, or other drinks without added sugars. If you want an alcoholic beverage, choose a lower calorie option. For example, a regular leandra can have up to 700 calories and a glass of wine has around 150.  Stay away from items that are buttered, battered, fried, or served with cream sauce. Items labeled \"crispy\" are usually fried, unless stated otherwise.  Ask for dressings, sauces, and syrups on the side. These are usually very high in calories, so do not eat much of them.  Watch out for salads. Many people think salads are a healthy option, but this is often not the case. Many salads come with ennis, fried chicken, lots of cheese, fried chips, and dressing. All of these items have a lot of calories. If you want a salad, choose a garden salad and ask for grilled meats or steak. Ask for the dressing on the side, or ask for olive oil and vinegar or lemon to use as dressing.  Estimate how many servings of a food you are given. For example, a serving of cooked rice is ½ cup or about the size of half a tennis ball or one cupcake wrapper. Knowing serving sizes will help you be aware of how much food you are eating at restaurants. The list below tells you how big or small some common portion sizes are based on everyday objects.  1 oz--4 stacked dice.  3 oz--1 deck of cards.  1 tsp--1 dice.  1 Tbsp--½ a Ping-Pong ball.  2 Tbsp--1 Ping-Pong ball.  ½ cup--1 tennis ball or 1 cupcake wrapper.  1 cup--1 baseball.     This information is not intended to replace advice given to you by your health care provider. Make sure you discuss any questions you have with your health care provider.     Document Released: " 12/18/2006 Document Revised: 01/08/2016 Document Reviewed: 10/23/2014  [employer] Interactive Patient Education ©2017 [employer]

## 2022-09-22 DIAGNOSIS — G62.9 NEUROPATHY: Chronic | ICD-10-CM

## 2022-09-22 RX ORDER — LANOLIN ALCOHOL/MO/W.PET/CERES
CREAM (GRAM) TOPICAL
Qty: 60 TABLET | Refills: 5 | Status: SHIPPED | OUTPATIENT
Start: 2022-09-22

## 2022-10-24 RX ORDER — TRAZODONE HYDROCHLORIDE 50 MG/1
TABLET ORAL
Qty: 30 TABLET | Refills: 2 | Status: SHIPPED | OUTPATIENT
Start: 2022-10-24 | End: 2022-11-17 | Stop reason: SDUPTHER

## 2022-11-17 ENCOUNTER — OFFICE VISIT (OUTPATIENT)
Dept: PSYCHIATRY | Facility: CLINIC | Age: 37
End: 2022-11-17

## 2022-11-17 VITALS
BODY MASS INDEX: 36.99 KG/M2 | SYSTOLIC BLOOD PRESSURE: 114 MMHG | HEART RATE: 91 BPM | WEIGHT: 222 LBS | DIASTOLIC BLOOD PRESSURE: 76 MMHG | HEIGHT: 65 IN

## 2022-11-17 DIAGNOSIS — F31.30 BIPOLAR I DISORDER, MOST RECENT EPISODE DEPRESSED: ICD-10-CM

## 2022-11-17 PROCEDURE — 99214 OFFICE O/P EST MOD 30 MIN: CPT | Performed by: NURSE PRACTITIONER

## 2022-11-17 RX ORDER — LURASIDONE HYDROCHLORIDE 80 MG/1
80 TABLET, FILM COATED ORAL DAILY
Qty: 30 TABLET | Refills: 2 | Status: SHIPPED | OUTPATIENT
Start: 2022-11-17 | End: 2023-03-13

## 2022-11-17 RX ORDER — HYDROXYZINE PAMOATE 25 MG/1
25 CAPSULE ORAL 3 TIMES DAILY PRN
Qty: 90 CAPSULE | Refills: 2 | Status: SHIPPED | OUTPATIENT
Start: 2022-11-17 | End: 2023-03-30 | Stop reason: SDUPTHER

## 2022-11-17 RX ORDER — BUPROPION HYDROCHLORIDE 300 MG/1
300 TABLET ORAL EVERY MORNING
Qty: 30 TABLET | Refills: 2 | Status: SHIPPED | OUTPATIENT
Start: 2022-11-17 | End: 2023-03-30 | Stop reason: SDUPTHER

## 2022-11-17 RX ORDER — TRAZODONE HYDROCHLORIDE 50 MG/1
50 TABLET ORAL
Qty: 30 TABLET | Refills: 2 | Status: SHIPPED | OUTPATIENT
Start: 2022-11-17 | End: 2023-03-30 | Stop reason: SDUPTHER

## 2022-11-17 NOTE — PROGRESS NOTES
"Subjective   Jesscia Harris is a 36 y.o. female who presents today for follow up    Chief Complaint:  Bipolar disorder    History of Present Illness: Patient presents as follow up. Reports she continues to struggle with depression. States she feels not having time for herself plays a role with depression. Also states depression tends to increase in the winter months.   She reports sleep is good with medication, she denies nightmares. Reports appetite is good. She denies SI/HI/AVH.     The following portions of the patient's history were reviewed and updated as appropriate: allergies, current medications, past family history, past medical history, past social history, past surgical history and problem list.      Past Medical History:  Past Medical History:   Diagnosis Date   • Anxiety    • Asthma    • Bipolar 1 disorder (HCC)    • Depression    • Diabetes mellitus (HCC)    • Elevated cholesterol    • GERD (gastroesophageal reflux disease)    • History of bronchitis    • History of ear infections    • History of stomach ulcers    • History of streptococcal infection    • Hyperlipidemia    • Hypertension    • Insomnia    • Kidney stones    • Urinary tract infection        Social History:  Social History     Socioeconomic History   • Marital status: Single   Tobacco Use   • Smoking status: Every Day     Packs/day: 0.50     Types: Cigarettes   • Smokeless tobacco: Never   • Tobacco comments:     \"thinking about it\"   Vaping Use   • Vaping Use: Never used   Substance and Sexual Activity   • Alcohol use: No   • Drug use: No   • Sexual activity: Defer     Birth control/protection: Pill       Family History:  Family History   Problem Relation Age of Onset   • Cancer Mother    • Stroke Mother    • Lung disease Mother    • No Known Problems Father    • Cancer Other    • Heart disease Other    • Stroke Other        Past Surgical History:  Past Surgical History:   Procedure Laterality Date   • ADENOIDECTOMY     • " CHOLECYSTECTOMY     • D & C HYSTEROSCOPY N/A 05/16/2022    Procedure: DILATATION AND CURETTAGE HYSTEROSCOPY;  Surgeon: Joey Andersen DO;  Location: [facility] OR;  Service: Obstetrics/Gynecology;  Laterality: N/A;   • DENTAL PROCEDURE     • ENDOSCOPY     • HERNIA REPAIR      umbilical   • OVARIAN CYST DRAINAGE/EXCISION Left 05/16/2022    Procedure: OVARIAN CYSTECTOMY WITH EXTENSIVE LYSIS OF ADHESIONS;  Surgeon: Joey Andersen DO;  Location: [facility] OR;  Service: Obstetrics/Gynecology;  Laterality: Left;   • TONSILLECTOMY     • TOTAL LAPAROSCOPIC HYSTERECTOMY N/A 6/22/2022    Procedure: ROBOTIC TOTAL LAPAROSCOPIC HYSTERECTOMY WITH BILATERAL SALPINGECTOMY;  Surgeon: Joey Andersen DO;  Location: [facility] OR;  Service: Robotics - DaVinci;  Laterality: N/A;   • UPPER GASTROINTESTINAL ENDOSCOPY     • WISDOM TOOTH EXTRACTION         Problem List:  Patient Active Problem List   Diagnosis   • Palpitations   • Bipolar disorder, in partial remission, most recent episode mixed ([facility])   • Smoker   • Gastroesophageal reflux disease without esophagitis   • Chronic venous insufficiency   • Healthcare maintenance   • Vitamin D deficiency   • Snoring   • History of nephrolithiasis   • Essential hypertension   • Class 1 obesity with serious comorbidity and body mass index (BMI) of 34.0 to 34.9 in adult   • Type 2 diabetes mellitus without complication, without long-term current use of insulin ([facility])   • Mixed hyperlipidemia   • Neuropathy   • S/P laparoscopic hysterectomy       Allergy:   No Known Allergies     Current Medications:   Current Outpatient Medications   Medication Sig Dispense Refill   • albuterol sulfate  (90 Base) MCG/ACT inhaler Inhale 2 puffs Every 6 (Six) Hours As Needed for Wheezing. 18 g 3   • atorvastatin (LIPITOR) 20 MG tablet Take 1 tablet by mouth Every Night. 30 tablet 5   • docusate calcium (SURFAK) 240 MG capsule Take 1 capsule by mouth Daily. 30 capsule 1   • famotidine (Pepcid)  20 MG tablet Take 1 tablet by mouth 2 (Two) Times a Day. 60 tablet 0   • hydrOXYzine pamoate (VISTARIL) 25 MG capsule Take 1 capsule by mouth 3 (Three) Times a Day As Needed for Anxiety. 90 capsule 2   • ibuprofen (ADVIL,MOTRIN) 200 MG tablet Take 600 mg by mouth Every 6 (Six) Hours As Needed for Mild Pain .     • icosapent ethyl (VASCEPA) 1 g capsule capsule Take 2 g by mouth 2 (Two) Times a Day.     • Lurasidone HCl (LATUDA) 80 MG tablet tablet Take 1 tablet by mouth Daily. 30 tablet 2   • metFORMIN (GLUCOPHAGE) 500 MG tablet Take 1 tablet by mouth 2 (Two) Times a Day With Meals. 180 tablet 3   • multivitamin (THERAGRAN) tablet tablet Take 1 tablet by mouth Daily. 30 tablet 5   • mupirocin (BACTROBAN) 2 % ointment Apply 1 application topically to the appropriate area as directed 3 (Three) Times a Day. 30 g 0   • pantoprazole (PROTONIX) 40 MG EC tablet Take 1 tablet by mouth Daily. 90 tablet 3   • propranolol LA (INDERAL LA) 120 MG 24 hr capsule Take 1 capsule by mouth Daily. 90 capsule 3   • simethicone (MYLICON) 80 MG chewable tablet Chew 80 mg Every 6 (Six) Hours As Needed for Flatulence.     • traZODone (DESYREL) 50 MG tablet Take 1 tablet by mouth every night at bedtime. 30 tablet 2   • vitamin B-12 (CYANOCOBALAMIN) 1000 MCG tablet TAKE TWO TABLETS BY MOUTH EVERY DAY 60 tablet 5   • buPROPion XL (Wellbutrin XL) 300 MG 24 hr tablet Take 1 tablet by mouth Every Morning. 30 tablet 2     No current facility-administered medications for this visit.       Review of Symptoms:    Review of Systems   Constitutional: Positive for fatigue.   HENT: Negative.    Eyes: Negative.    Respiratory: Negative.    Cardiovascular: Negative.    Gastrointestinal: Negative.    Endocrine: Negative.    Genitourinary: Negative.    Musculoskeletal: Negative.    Skin: Negative.    Neurological: Negative.    Psychiatric/Behavioral: Positive for depressed mood and stress. Negative for suicidal ideas. The patient is nervous/anxious.   "      Objective   Physical Exam:   Blood pressure 114/76, pulse 91, height 165.1 cm (65\"), weight 101 kg (222 lb).  Body mass index is 36.94 kg/m².    Appearance: Well nourished female, appropriately dressed, appears stated age and in no acute distress    Gait, Station, Strength: WNL    Mental Status Exam:   Hygiene:   good  Cooperation:  Cooperative  Eye Contact:  Good  Psychomotor Behavior:  Appropriate  Affect:  Appropriate  Mood: normal  Hopelessness: 4  Speech:  Normal  Thought Process:  Linear  Thought Content:  Mood congruent  Suicidal:  None  Homicidal:  None  Hallucinations:  None  Delusion:  None  Memory:  Intact  Orientation:  Person, Place, Time and Situation  Reliability:  good  Insight:  Good  Judgement:  Fair  Impulse Control:  Good  Physical/Medical Issues:  Yes See med hx     PHQ-Score Total:  PHQ-9 Total Score: 10   Patient screened positive for depression based on a PHQ-9 score of 10 on 11/17/2022. Follow-up recommendations include: Prescribed antidepressant medication treatment and Suicide Risk Assessment performed.        Lab Results:   No visits with results within 1 Month(s) from this visit.   Latest known visit with results is:   Admission on 06/22/2022, Discharged on 06/23/2022   Component Date Value Ref Range Status   • HCG, Urine, QL 06/22/2022 Negative  Negative Final   • Lot Number 06/22/2022 [id number]   Final   • Internal Positive Control 06/22/2022 Positive  Positive, Passed Final   • Internal Negative Control 06/22/2022 Negative  Negative, Passed Final   • Expiration Date 06/22/2022 2023-09-30   Final   • Glucose 06/22/2022 130  70 - 130 mg/dL Final    Meter: [id number] : [id number] zenon dye   • Reference Lab Report 06/22/2022    Final                    Value:[facility]  [address] [place] [address]  Phone: [phone] or [phone]  Fax: [phone]  German Fuentes M.D., Medical Director    PATIENT NAME                           " LABORATORY NO.  [id number]  GORDY ECHAVARRIA                      [id number]  [id number]                         AGE              SEX  SSN           CLIENT REF #  [facility] [place]              36      1985  F    [id number]   [id number]    [address] [place] [address]                     REQUESTING M.D.     [place] [address]                   RAOUL OROZCO  DATE COLLECTED      DATE RECEIVED      DATE REPORTED  2022    DIAGNOSIS:  UTERUS, AND FALLOPIAN TUBES:  CERVIX:  - Acute and chronic cervicitis with erosion and mucosal edema  - Benign endocervical polyp  - Nabothian cysts  ENDOMETRIUM:  - Complex hyperplasia with squamous morules, negative for cytologic atypia  MYOMETRIUM:  - Benign leiomyomas  -                           Superficial adenomyosis  UTERINE SEROSA:  - No significant diagnostic alterations  BILATERAL FALLOPIAN TUBES:  - Benign paratubal cysts    CAM/pah    CLINICAL HISTORY:  Abnormal uterine bleeding, complex endometrial hyperplasia    SPECIMENS RECEIVED:  UTERUS, AND FALLOPIAN TUBES    MICROSCOPIC DESCRIPTION:  Tissue blocks are prepared and slides are examined microscopically. See  diagnosis for details.    Professional interpretation rendered by Mariajose Day M.D., F.C.A.P. at  [facility], [address], [place] [address].    GROSS DESCRIPTION:  Received in formalin labeled uterus with ovaries and fallopian tubes is an intact  uterine body with attached cervix and bilateral fimbriated fallopian tubes.  The  ovaries are absent.  The uterine body and cervix weigh 70.8 g.  The uterine body  is surfaced by purple, smooth glistening serosa and is 6.5 x 5 x 4 cm. The gray-  purple, smooth ectocervix is 3.2 x 3 cm and the slitlike os is patent, 1.5 cm in  diameter.  The 2.5 cm in                           length tan, corrugated endocervical canal is filled with  a moderate amount of blood-tinged mucus and there are a few  scattered mucus  filled cysts ranging from 0.3 to 1.4 cm in greatest dimension.  Attached to the  posterior endocervix is a 1.5 x 0.7 x 0.4 cm tan-pink polyp.  The 4 x 1.5 cm  triangular endometrial cavity is surfaced by red-tan endometrium averaging 0.2  cm in thickness.  No polyps, exophytic or invasive lesions are present.  The tan  myometrium averages 1.5 cm in thickness and has 2 white nodules, 0.3 and 1.5  cm in greatest dimension.  The larger nodule is subserosal and sectioning of each  reveals an unremarkable cut surface without fleshy, necrotic or calcified areas.  The fimbriated tubes average 7 cm in length and sectioning of each reveals an  unremarkable 0.1 cm lumen.  There are a few paratubal cysts measuring up to  0.8 cm in greatest dimension.  Representative sections are submitted as follows:  A1-anterior and posterior cervix; A2-endocervical polyp;                           O2-W5-hvorzj anterior  endometrium submitted sequentially from lower uterine segment to fundus with  a small intramural nodule in block A5; A8-A 12-posterior endomyometrium  submitted entirely, sequentially from lower uterine segment to fundus; A 13-  subserosal nodule; A 14-right fallopian tube with fimbria; a 15-left fallopian  tube with fimbria.  HDM    REVIEWED, DIAGNOSED AND ELECTRONICALLY  SIGNED BY:    Mariajose Day M.D., F.C.A.P.  CPT CODES:  52944     • Glucose 06/22/2022 158 (H)  70 - 130 mg/dL Final    Meter: [id number] : [id number] Willie Bowers   • WBC 06/23/2022 22.06 (H)  3.40 - 10.80 10*3/mm3 Final   • RBC 06/23/2022 5.09  3.77 - 5.28 10*6/mm3 Final   • Hemoglobin 06/23/2022 13.3  12.0 - 15.9 g/dL Final   • Hematocrit 06/23/2022 42.8  34.0 - 46.6 % Final   • MCV 06/23/2022 84.1  79.0 - 97.0 fL Final   • MCH 06/23/2022 26.1 (L)  26.6 - 33.0 pg Final   • MCHC 06/23/2022 31.1 (L)  31.5 - 35.7 g/dL Final   • RDW 06/23/2022 14.2  12.3 - 15.4 % Final   • RDW-SD 06/23/2022 43.2  37.0 - 54.0 fl Final   • MPV  06/23/2022 9.2  6.0 - 12.0 fL Final   • Platelets 06/23/2022 501 (H)  140 - 450 10*3/mm3 Final       Assessment & Plan   Diagnoses and all orders for this visit:    1. Bipolar I disorder, most recent episode depressed (HCC)  -     Lurasidone HCl (LATUDA) 80 MG tablet tablet; Take 1 tablet by mouth Daily.  Dispense: 30 tablet; Refill: 2    Other orders  -     hydrOXYzine pamoate (VISTARIL) 25 MG capsule; Take 1 capsule by mouth 3 (Three) Times a Day As Needed for Anxiety.  Dispense: 90 capsule; Refill: 2  -     traZODone (DESYREL) 50 MG tablet; Take 1 tablet by mouth every night at bedtime.  Dispense: 30 tablet; Refill: 2  -     buPROPion XL (Wellbutrin XL) 300 MG 24 hr tablet; Take 1 tablet by mouth Every Morning.  Dispense: 30 tablet; Refill: 2        -Change formulation of bupropion to extended release and increase dose to 300 mg daily for depression  -Continue trazodone 50 mg nightly for sleep  -Continue hydroxyzine pamoate 25 mg 3 times daily as needed for anxiety  -Continue lurasidone 80 mg daily for mood  Lengthy discussion with patient on the possible side effects of antipsychotic medications including increased cholesterol, increased blood sugar, and possibility of weight gain.  Also discussed the need to monitor lab work associated with this.  The risk of muscle movement disorders with this class of medication was also discussed.  -Discussed with patient importance of self-care and encouraged her to dedicate at least 1 day/week for herself  -Encouraged patient to begin psychotherapy  -JULIET reviewed and appropriate. Patient counseled on use of controlled substances  -The benefits of a healthy diet and exercise were discussed with patient, especially the positive effects they have on mental health. Patient encouraged to consider lifestyle modification regarding  diet and exercise patterns to maximize results of mental health treatment.  -Reviewed previous available documentation  -Reviewed most recent  available labs   -Jessica Harris  reports that she has been smoking cigarettes. She has been smoking an average of .5 packs per day. She has never used smokeless tobacco.. I have educated her on the risk of diseases from using tobacco products such as cancer, COPD, heart disease and cataracts. I advised her to quit and she is not willing to quit. I spent 3  minutes counseling the patient.               Visit Diagnoses:    ICD-10-CM ICD-9-CM   1. Bipolar I disorder, most recent episode depressed ([facility])  F31.30 296.50         TREATMENT PLAN/GOALS: Continue supportive psychotherapy efforts and medications as indicated. Treatment and medication options discussed during today's visit. Patient acknowledged and verbally consented to continue with current treatment plan and was educated on the importance of compliance with treatment and follow-up appointments.    MEDICATION ISSUES:    Discussed medication options and treatment plan of prescribed medication as well as the risks, benefits, and side effects including potential falls, possible impaired driving and metabolic adversities among others. Patient is agreeable to call the office with any worsening of symptoms or onset of side effects. Patient is agreeable to call 911 or go to the nearest ER should he/she begin having SI/HI.     MEDS ORDERED DURING VISIT:  New Medications Ordered This Visit   Medications   • hydrOXYzine pamoate (VISTARIL) 25 MG capsule     Sig: Take 1 capsule by mouth 3 (Three) Times a Day As Needed for Anxiety.     Dispense:  90 capsule     Refill:  2   • Lurasidone HCl (LATUDA) 80 MG tablet tablet     Sig: Take 1 tablet by mouth Daily.     Dispense:  30 tablet     Refill:  2   • traZODone (DESYREL) 50 MG tablet     Sig: Take 1 tablet by mouth every night at bedtime.     Dispense:  30 tablet     Refill:  2   • buPROPion XL (Wellbutrin XL) 300 MG 24 hr tablet     Sig: Take 1 tablet by mouth Every Morning.     Dispense:  30 tablet     Refill:  2        Return in about 8 weeks (around 1/12/2023), or if symptoms worsen or fail to improve.         Prognosis: Guarded dependent on medication/follow up and treatment plan compliance.  Functionality: pt showing improvements in important areas of daily functioning.     Short-term goals: Patient will adhere to medication regimen and note continued improvement in symptoms over the next 3 months.   Long-term goals: Patient will be adherent to medication management and psychotherapy with continued improvement in symptoms over the next 6 months          This document has been electronically signed by JANIE Thorpe   November 17, 2022 12:43 EST    Part of this note may be an electronic transcription/translation of spoken language to printed text using the Dragon Dictation System.

## 2022-11-30 ENCOUNTER — LAB (OUTPATIENT)
Dept: LAB | Facility: HOSPITAL | Age: 37
End: 2022-11-30

## 2022-11-30 DIAGNOSIS — F31.77 BIPOLAR DISORDER, IN PARTIAL REMISSION, MOST RECENT EPISODE MIXED: ICD-10-CM

## 2022-11-30 DIAGNOSIS — E55.9 VITAMIN D DEFICIENCY: ICD-10-CM

## 2022-11-30 DIAGNOSIS — I10 ESSENTIAL HYPERTENSION: ICD-10-CM

## 2022-11-30 DIAGNOSIS — E78.2 MIXED HYPERLIPIDEMIA: ICD-10-CM

## 2022-11-30 DIAGNOSIS — E11.9 TYPE 2 DIABETES MELLITUS WITHOUT COMPLICATION, WITHOUT LONG-TERM CURRENT USE OF INSULIN: ICD-10-CM

## 2022-11-30 DIAGNOSIS — K21.9 GASTROESOPHAGEAL REFLUX DISEASE WITHOUT ESOPHAGITIS: ICD-10-CM

## 2022-11-30 LAB
25(OH)D3 SERPL-MCNC: 19.8 NG/ML (ref 30–100)
ALBUMIN SERPL-MCNC: 4.3 G/DL (ref 3.5–5.2)
ALBUMIN UR-MCNC: 62.1 MG/DL
ALBUMIN/GLOB SERPL: 1.5 G/DL
ALP SERPL-CCNC: 85 U/L (ref 39–117)
ALT SERPL W P-5'-P-CCNC: 35 U/L (ref 1–33)
ANION GAP SERPL CALCULATED.3IONS-SCNC: 13.2 MMOL/L (ref 5–15)
AST SERPL-CCNC: 34 U/L (ref 1–32)
BASOPHILS # BLD AUTO: 0.07 10*3/MM3 (ref 0–0.2)
BASOPHILS NFR BLD AUTO: 0.6 % (ref 0–1.5)
BILIRUB SERPL-MCNC: <0.2 MG/DL (ref 0–1.2)
BUN SERPL-MCNC: 8 MG/DL (ref 6–20)
BUN/CREAT SERPL: 11 (ref 7–25)
CALCIUM SPEC-SCNC: 9.7 MG/DL (ref 8.6–10.5)
CHLORIDE SERPL-SCNC: 97 MMOL/L (ref 98–107)
CHOLEST SERPL-MCNC: 145 MG/DL (ref 0–200)
CO2 SERPL-SCNC: 27.8 MMOL/L (ref 22–29)
CREAT SERPL-MCNC: 0.73 MG/DL (ref 0.57–1)
DEPRECATED RDW RBC AUTO: 38.4 FL (ref 37–54)
EGFRCR SERPLBLD CKD-EPI 2021: 110.8 ML/MIN/1.73
EOSINOPHIL # BLD AUTO: 0.22 10*3/MM3 (ref 0–0.4)
EOSINOPHIL NFR BLD AUTO: 2 % (ref 0.3–6.2)
ERYTHROCYTE [DISTWIDTH] IN BLOOD BY AUTOMATED COUNT: 14 % (ref 12.3–15.4)
GLOBULIN UR ELPH-MCNC: 2.9 GM/DL
GLUCOSE SERPL-MCNC: 172 MG/DL (ref 65–99)
HBA1C MFR BLD: 9.7 % (ref 4.8–5.6)
HCT VFR BLD AUTO: 44 % (ref 34–46.6)
HDLC SERPL-MCNC: 33 MG/DL (ref 40–60)
HGB BLD-MCNC: 14.8 G/DL (ref 12–15.9)
IMM GRANULOCYTES # BLD AUTO: 0.07 10*3/MM3 (ref 0–0.05)
IMM GRANULOCYTES NFR BLD AUTO: 0.6 % (ref 0–0.5)
LDL/HDL RATIO NULL: ABNORMAL
LDLC SERPL CALC-MCNC: 32 MG/DL (ref 0–100)
LYMPHOCYTES # BLD AUTO: 3.44 10*3/MM3 (ref 0.7–3.1)
LYMPHOCYTES NFR BLD AUTO: 30.8 % (ref 19.6–45.3)
MCH RBC QN AUTO: 26.2 PG (ref 26.6–33)
MCHC RBC AUTO-ENTMCNC: 33.6 G/DL (ref 31.5–35.7)
MCV RBC AUTO: 78 FL (ref 79–97)
MONOCYTES # BLD AUTO: 0.75 10*3/MM3 (ref 0.1–0.9)
MONOCYTES NFR BLD AUTO: 6.7 % (ref 5–12)
NEUTROPHILS NFR BLD AUTO: 59.3 % (ref 42.7–76)
NEUTROPHILS NFR BLD AUTO: 6.63 10*3/MM3 (ref 1.7–7)
NRBC BLD AUTO-RTO: 0 /100 WBC (ref 0–0.2)
PLATELET # BLD AUTO: 334 10*3/MM3 (ref 140–450)
PMV BLD AUTO: 9.4 FL (ref 6–12)
POTASSIUM SERPL-SCNC: 4.2 MMOL/L (ref 3.5–5.2)
PROT SERPL-MCNC: 7.2 G/DL (ref 6–8.5)
RBC # BLD AUTO: 5.64 10*6/MM3 (ref 3.77–5.28)
SODIUM SERPL-SCNC: 138 MMOL/L (ref 136–145)
TRIGL SERPL-MCNC: 579 MG/DL (ref 0–150)
TSH SERPL DL<=0.05 MIU/L-ACNC: 2 UIU/ML (ref 0.27–4.2)
VLDLC SERPL-MCNC: 80 MG/DL (ref 5–40)
WBC NRBC COR # BLD: 11.18 10*3/MM3 (ref 3.4–10.8)

## 2022-11-30 PROCEDURE — 80061 LIPID PANEL: CPT

## 2022-11-30 PROCEDURE — 82043 UR ALBUMIN QUANTITATIVE: CPT

## 2022-11-30 PROCEDURE — 84443 ASSAY THYROID STIM HORMONE: CPT

## 2022-11-30 PROCEDURE — 85025 COMPLETE CBC W/AUTO DIFF WBC: CPT

## 2022-11-30 PROCEDURE — 80053 COMPREHEN METABOLIC PANEL: CPT

## 2022-11-30 PROCEDURE — 82306 VITAMIN D 25 HYDROXY: CPT

## 2022-11-30 PROCEDURE — 83036 HEMOGLOBIN GLYCOSYLATED A1C: CPT

## 2022-12-02 ENCOUNTER — OFFICE VISIT (OUTPATIENT)
Dept: FAMILY MEDICINE CLINIC | Facility: CLINIC | Age: 37
End: 2022-12-02

## 2022-12-02 DIAGNOSIS — R80.9 TYPE 2 DIABETES MELLITUS WITH MICROALBUMINURIA, WITHOUT LONG-TERM CURRENT USE OF INSULIN: ICD-10-CM

## 2022-12-02 DIAGNOSIS — E78.2 MIXED HYPERLIPIDEMIA: Primary | ICD-10-CM

## 2022-12-02 DIAGNOSIS — I87.2 CHRONIC VENOUS INSUFFICIENCY: ICD-10-CM

## 2022-12-02 DIAGNOSIS — Z23 ENCOUNTER FOR IMMUNIZATION: ICD-10-CM

## 2022-12-02 DIAGNOSIS — G62.9 NEUROPATHY: ICD-10-CM

## 2022-12-02 DIAGNOSIS — E55.9 VITAMIN D DEFICIENCY: ICD-10-CM

## 2022-12-02 DIAGNOSIS — I10 ESSENTIAL HYPERTENSION: ICD-10-CM

## 2022-12-02 DIAGNOSIS — K21.9 GASTROESOPHAGEAL REFLUX DISEASE WITHOUT ESOPHAGITIS: ICD-10-CM

## 2022-12-02 DIAGNOSIS — Z00.00 HEALTHCARE MAINTENANCE: ICD-10-CM

## 2022-12-02 DIAGNOSIS — F17.200 SMOKER: ICD-10-CM

## 2022-12-02 DIAGNOSIS — E66.01 CLASS 2 SEVERE OBESITY WITH SERIOUS COMORBIDITY AND BODY MASS INDEX (BMI) OF 37.0 TO 37.9 IN ADULT, UNSPECIFIED OBESITY TYPE: ICD-10-CM

## 2022-12-02 DIAGNOSIS — R00.2 PALPITATIONS: ICD-10-CM

## 2022-12-02 DIAGNOSIS — Z87.442 HISTORY OF NEPHROLITHIASIS: ICD-10-CM

## 2022-12-02 DIAGNOSIS — E11.29 TYPE 2 DIABETES MELLITUS WITH MICROALBUMINURIA, WITHOUT LONG-TERM CURRENT USE OF INSULIN: ICD-10-CM

## 2022-12-02 DIAGNOSIS — F31.77 BIPOLAR DISORDER, IN PARTIAL REMISSION, MOST RECENT EPISODE MIXED: ICD-10-CM

## 2022-12-02 PROBLEM — E66.812 CLASS 2 SEVERE OBESITY WITH SERIOUS COMORBIDITY AND BODY MASS INDEX (BMI) OF 37.0 TO 37.9 IN ADULT: Status: ACTIVE | Noted: 2019-03-18

## 2022-12-02 PROCEDURE — 90471 IMMUNIZATION ADMIN: CPT | Performed by: GENERAL PRACTICE

## 2022-12-02 PROCEDURE — 99214 OFFICE O/P EST MOD 30 MIN: CPT | Performed by: GENERAL PRACTICE

## 2022-12-02 PROCEDURE — 90677 PCV20 VACCINE IM: CPT | Performed by: GENERAL PRACTICE

## 2022-12-02 RX ORDER — ERGOCALCIFEROL 1.25 MG/1
50000 CAPSULE ORAL WEEKLY
Qty: 4 CAPSULE | Refills: 5 | Status: SHIPPED | OUTPATIENT
Start: 2022-12-02

## 2022-12-02 RX ORDER — DAPAGLIFLOZIN AND METFORMIN HYDROCHLORIDE 5; 1000 MG/1; MG/1
1 TABLET, FILM COATED, EXTENDED RELEASE ORAL EVERY MORNING
Qty: 30 TABLET | Refills: 5 | Status: SHIPPED | OUTPATIENT
Start: 2022-12-02

## 2022-12-02 RX ORDER — SEMAGLUTIDE 1.34 MG/ML
INJECTION, SOLUTION SUBCUTANEOUS
Qty: 1.5 ML | Refills: 2 | Status: SHIPPED | OUTPATIENT
Start: 2022-12-02 | End: 2023-02-16 | Stop reason: SDUPTHER

## 2022-12-02 NOTE — PROGRESS NOTES
Subjective   Jessica Harris is a 36 y.o. female.     Chief Complaint  She returns for a scheduled reassessment of multiple medical problems including type 2 diabetes mellitus, hyperlipidemia, essential hypertension, and gastroesophageal reflux disease    History of Present Illness     Type 2 Diabetes Mellitus  She continues to deny any paresthesia of the feet, visual disturbances, polydipsia, polyuria, hypoglycemia or foot ulcerations. Current treatments: metformin.  She admits that there is significant room for improvement in her diet and exercise. Last dilated eye exam more than 1 year   Lab Results   Component Value Date    HGBA1C 9.70 (H) 11/30/2022     Lab Results   Component Value Date    MICROALBUR 62.1 11/30/2022     Hyperlipidemia  She remains on atorvastatin. She experienced GI upset with vascepa  Lab Results   Component Value Date    CHOL 145 11/30/2022    CHLPL 256 (H) 04/14/2022    TRIG 579 (H) 11/30/2022    HDL 33 (L) 11/30/2022    LDL 32 11/30/2022     Essential Hypertension  Home blood pressure readings: not doing. Associated signs and symptoms: peripheral edema.  She continues to deny any chest pain, dyspnea, or palpitations.  She remains on propranolol  Lab Results   Component Value Date    GLUCOSE 172 (H) 11/30/2022    BUN 8 11/30/2022    CREATININE 0.73 11/30/2022    EGFRIFNONA 89 11/14/2021    EGFRIFAFRI 96 01/12/2021    BCR 11.0 11/30/2022    K 4.2 11/30/2022    CO2 27.8 11/30/2022    CALCIUM 9.7 11/30/2022    PROTENTOTREF 7.3 04/14/2022    ALBUMIN 4.30 11/30/2022    LABIL2 1.9 04/14/2022    AST 34 (H) 11/30/2022    ALT 35 (H) 11/30/2022     Lab Results   Component Value Date    ALKPHOS 85 11/30/2022     GERD  She remains heartburn free on pantoprazole.  There is no history of any difficulty swallowing, vomiting, change in her bowel habits, hematochezia or melena  Lab Results   Component Value Date    WBC 11.18 (H) 11/30/2022    HGB 14.8 11/30/2022    HCT 44.0 11/30/2022    MCV 78.0 (L)  11/30/2022     11/30/2022      Recent Laparoscopic Hysterectomy  She underwent a laparoscopic hysterectomy and bilateral salpingectomy by Dr. Andersen on 6/22/2022.  She continues to deny any vaginal bleeding.  Pathology was reported as showing acute and chronic cervicitis along with complex endometrial hyperplasia, superficial adenomyosis, and benign leiomyomas.  No cytologic atypia was noted    Labs  Most recent vitamin D 19.8  Lab Results   Component Value Date    TSH 2.000 11/30/2022     The following portions of the patient's history were reviewed and updated as appropriate: allergies, current medications, past medical history, past social history and problem list.    Review of Systems   Constitutional: Positive for fatigue. Negative for appetite change, chills, fever and unexpected weight change.   HENT: Positive for congestion and rhinorrhea. Negative for ear pain, sneezing and sore throat.    Eyes: Negative for visual disturbance.   Respiratory: Positive for cough. Negative for shortness of breath and wheezing.         Occasionally wakes with a feeling she cannot get breath and has been told that she snores   Cardiovascular: Negative for chest pain, palpitations and leg swelling.   Gastrointestinal: Negative for abdominal pain, blood in stool, constipation, diarrhea, nausea and vomiting.   Endocrine: Negative for polydipsia and polyuria.   Genitourinary: Negative for dysuria, hematuria and urgency.   Musculoskeletal: Positive for arthralgias and myalgias. Negative for back pain, joint swelling and neck pain.   Skin: Negative for rash.   Neurological: Positive for headaches. Negative for weakness and numbness.   Psychiatric/Behavioral: Negative for dysphoric mood, sleep disturbance and suicidal ideas. The patient is nervous/anxious.      Objective   Physical Exam  Constitutional:       General: She is not in acute distress.     Appearance: Normal appearance. She is well-developed. She is not  diaphoretic.      Comments: Bright and in fair spirits. No apparent distress. No pallor, jaundice, diaphoresis, or cyanosis.   HENT:      Head: Atraumatic.      Right Ear: Tympanic membrane, ear canal and external ear normal.      Left Ear: Tympanic membrane, ear canal and external ear normal.   Eyes:      Conjunctiva/sclera: Conjunctivae normal.   Neck:      Thyroid: No thyroid mass or thyromegaly.      Vascular: No carotid bruit or JVD.      Trachea: Trachea normal. No tracheal deviation.   Cardiovascular:      Rate and Rhythm: Normal rate and regular rhythm.      Heart sounds: Normal heart sounds, S1 normal and S2 normal. No murmur heard.    No gallop.   Pulmonary:      Effort: Pulmonary effort is normal.      Breath sounds: Normal breath sounds.   Abdominal:      General: Bowel sounds are normal.      Palpations: Abdomen is soft.   Musculoskeletal:      Right lower leg: No edema.      Left lower leg: No edema.   Lymphadenopathy:      Head:      Right side of head: No submental, submandibular, tonsillar, preauricular, posterior auricular or occipital adenopathy.      Left side of head: No submental, submandibular, tonsillar, preauricular, posterior auricular or occipital adenopathy.      Cervical: No cervical adenopathy.      Upper Body:      Right upper body: No supraclavicular adenopathy.      Left upper body: No supraclavicular adenopathy.   Skin:     General: Skin is warm.      Coloration: Skin is not cyanotic, jaundiced or pale.      Findings: No rash.      Nails: There is no clubbing.   Neurological:      Mental Status: She is alert and oriented to person, place, and time.      Cranial Nerves: No cranial nerve deficit.      Motor: No tremor.      Coordination: Coordination normal.      Gait: Gait normal.   Psychiatric:         Attention and Perception: Attention normal.         Mood and Affect: Mood normal.         Speech: Speech normal.         Behavior: Behavior normal.         Thought Content: Thought  content normal.       Assessment & Plan   Problems Addressed this Visit        Cardiac and Vasculature    Chronic venous insufficiency    Essential hypertension   Hypertension: at goal. Evidence of target organ damage: none.  Encouraged to continue to work on diet and exercise plan.   Continue current medication  Given microalbuminuria will likely start on an ACE or ARB at a later date    Relevant Medications    propranolol LA (INDERAL LA) 120 MG 24 hr capsule    Other Relevant Orders    Comprehensive Metabolic Panel    Mixed hyperlipidemia   As above.   Continue current medication.    Relevant Medications    atorvastatin (LIPITOR) 20 MG tablet    Other Relevant Orders    Comprehensive Metabolic Panel    Lipid Panel    Palpitations  Remain well controlled on propranolol  Encouraged to report if this should change.    Relevant Medications    propranolol LA (INDERAL LA) 120 MG 24 hr capsule       Endocrine and Metabolic    Class 2 severe obesity with serious comorbidity and body mass index (BMI) of 37.0 to 37.9 in adult ([facility])    Type 2 diabetes mellitus with microalbuminuria, without long-term current use of insulin ([facility])  Diabetes mellitus Type II, under poor control.   Encouraged to continue to pursue ADA diet  Encouraged aerobic exercise.  Reviewed options going forward and agreed on the addition of empagliflozin and semaglutide  Reminded to undergo an updated dilated eye exam  Scheduled for updated labs just prior to her return    Relevant Medications    dapagliflozin-metformin HCl ER (Xigduo XR) 5-1000 MG tablet    Semaglutide,0.25 or 0.5MG/DOS, (Ozempic, 0.25 or 0.5 MG/DOSE,) 2 MG/1.5ML solution pen-injector    Other Relevant Orders    Comprehensive Metabolic Panel    Hemoglobin A1c    Uric Acid    Vitamin D deficiency  Will start on high-dose supplementation and monitor    Relevant Medications    vitamin D (ERGOCALCIFEROL) 1.25 MG (88425 UT) capsule capsule    Other Relevant Orders    Vitamin D,25-Hydroxy        Gastrointestinal Abdominal     Gastroesophageal reflux disease without esophagitis   Symptoms are currently well controlled.  Encouraged to report if this should change.  Continue current medication    Relevant Medications    pantoprazole (PROTONIX) 40 MG EC tablet       Genitourinary and Reproductive     History of nephrolithiasis    Relevant Orders    Uric Acid       Health Encounters    Healthcare maintenance  Patient has received a flu shot along with an updated bivalent COVID-19 shot.  Prevnar 20 administered       Mental Health    Bipolar disorder, in partial remission, most recent episode mixed ([facility]CC)       Neuro    Neuropathy       Tobacco    Smoker              Diagnoses       Codes Comments    Mixed hyperlipidemia    -  Primary ICD-10-CM: E78.2  ICD-9-CM: 272.2     Essential hypertension     ICD-10-CM: I10  ICD-9-CM: 401.9     Chronic venous insufficiency     ICD-10-CM: I87.2  ICD-9-CM: 459.81     Vitamin D deficiency     ICD-10-CM: E55.9  ICD-9-CM: 268.9     Class 2 severe obesity with serious comorbidity and body mass index (BMI) of 37.0 to 37.9 in adult, unspecified obesity type (HCC)     ICD-10-CM: E66.01, Z68.37  ICD-9-CM: 278.01, V85.37     Gastroesophageal reflux disease without esophagitis     ICD-10-CM: K21.9  ICD-9-CM: 530.81     History of nephrolithiasis     ICD-10-CM: Z87.442  ICD-9-CM: V13.01     Healthcare maintenance     ICD-10-CM: Z00.00  ICD-9-CM: V70.0     Bipolar disorder, in partial remission, most recent episode mixed (HCC)     ICD-10-CM: F31.77  ICD-9-CM: 296.65     Neuropathy     ICD-10-CM: G62.9  ICD-9-CM: 355.9     Smoker     ICD-10-CM: F17.200  ICD-9-CM: 305.1     Encounter for immunization     ICD-10-CM: Z23  ICD-9-CM: V03.89     Type 2 diabetes mellitus with microalbuminuria, without long-term current use of insulin (HCC)     ICD-10-CM: E11.29, R80.9  ICD-9-CM: 250.40, 791.0     Palpitations     ICD-10-CM: R00.2  ICD-9-CM: 785.1

## 2022-12-03 VITALS
OXYGEN SATURATION: 93 % | WEIGHT: 226 LBS | RESPIRATION RATE: 14 BRPM | BODY MASS INDEX: 37.65 KG/M2 | DIASTOLIC BLOOD PRESSURE: 60 MMHG | HEART RATE: 87 BPM | HEIGHT: 65 IN | SYSTOLIC BLOOD PRESSURE: 118 MMHG | TEMPERATURE: 98.6 F

## 2022-12-03 PROBLEM — E11.29 TYPE 2 DIABETES MELLITUS WITH MICROALBUMINURIA, WITHOUT LONG-TERM CURRENT USE OF INSULIN: Status: ACTIVE | Noted: 2019-03-26

## 2022-12-03 PROBLEM — R80.9 TYPE 2 DIABETES MELLITUS WITH MICROALBUMINURIA, WITHOUT LONG-TERM CURRENT USE OF INSULIN: Status: ACTIVE | Noted: 2019-03-26

## 2022-12-03 RX ORDER — PROPRANOLOL HYDROCHLORIDE 120 MG/1
120 CAPSULE, EXTENDED RELEASE ORAL DAILY
Qty: 90 CAPSULE | Refills: 3 | Status: SHIPPED | OUTPATIENT
Start: 2022-12-03

## 2022-12-03 RX ORDER — PANTOPRAZOLE SODIUM 40 MG/1
40 TABLET, DELAYED RELEASE ORAL DAILY
Qty: 90 TABLET | Refills: 3 | Status: SHIPPED | OUTPATIENT
Start: 2022-12-03 | End: 2023-01-16

## 2022-12-03 RX ORDER — ATORVASTATIN CALCIUM 20 MG/1
20 TABLET, FILM COATED ORAL NIGHTLY
Qty: 90 TABLET | Refills: 3 | Status: SHIPPED | OUTPATIENT
Start: 2022-12-03

## 2023-01-16 DIAGNOSIS — K21.9 GASTROESOPHAGEAL REFLUX DISEASE WITHOUT ESOPHAGITIS: ICD-10-CM

## 2023-01-16 RX ORDER — PANTOPRAZOLE SODIUM 40 MG/1
TABLET, DELAYED RELEASE ORAL
Qty: 90 TABLET | Refills: 3 | Status: SHIPPED | OUTPATIENT
Start: 2023-01-16

## 2023-01-17 RX ORDER — FLUTICASONE PROPIONATE AND SALMETEROL XINAFOATE 230; 21 UG/1; UG/1
2 AEROSOL, METERED RESPIRATORY (INHALATION)
Qty: 12 G | Refills: 5 | Status: SHIPPED | OUTPATIENT
Start: 2023-01-17 | End: 2023-01-19 | Stop reason: SDUPTHER

## 2023-01-19 DIAGNOSIS — J45.20 MILD INTERMITTENT ASTHMA, UNSPECIFIED WHETHER COMPLICATED: Chronic | ICD-10-CM

## 2023-01-19 RX ORDER — ALBUTEROL SULFATE 90 UG/1
2 AEROSOL, METERED RESPIRATORY (INHALATION) EVERY 6 HOURS PRN
Qty: 18 G | Refills: 3 | Status: SHIPPED | OUTPATIENT
Start: 2023-01-19

## 2023-01-19 RX ORDER — FLUTICASONE PROPIONATE AND SALMETEROL XINAFOATE 230; 21 UG/1; UG/1
2 AEROSOL, METERED RESPIRATORY (INHALATION)
Qty: 12 G | Refills: 5 | Status: SHIPPED | OUTPATIENT
Start: 2023-01-19

## 2023-01-29 RX ORDER — CEPHALEXIN 250 MG/1
250 CAPSULE ORAL 4 TIMES DAILY
Qty: 28 CAPSULE | Refills: 0 | Status: SHIPPED | OUTPATIENT
Start: 2023-01-29 | End: 2023-02-05

## 2023-02-16 DIAGNOSIS — R80.9 TYPE 2 DIABETES MELLITUS WITH MICROALBUMINURIA, WITHOUT LONG-TERM CURRENT USE OF INSULIN: ICD-10-CM

## 2023-02-16 DIAGNOSIS — E11.29 TYPE 2 DIABETES MELLITUS WITH MICROALBUMINURIA, WITHOUT LONG-TERM CURRENT USE OF INSULIN: ICD-10-CM

## 2023-02-16 RX ORDER — SEMAGLUTIDE 1.34 MG/ML
0.5 INJECTION, SOLUTION SUBCUTANEOUS WEEKLY
Qty: 1.5 ML | Refills: 2 | Status: SHIPPED | OUTPATIENT
Start: 2023-02-16 | End: 2023-02-18

## 2023-02-18 DIAGNOSIS — E11.29 TYPE 2 DIABETES MELLITUS WITH MICROALBUMINURIA, WITHOUT LONG-TERM CURRENT USE OF INSULIN: Primary | ICD-10-CM

## 2023-02-18 DIAGNOSIS — R80.9 TYPE 2 DIABETES MELLITUS WITH MICROALBUMINURIA, WITHOUT LONG-TERM CURRENT USE OF INSULIN: Primary | ICD-10-CM

## 2023-02-18 RX ORDER — SEMAGLUTIDE 1.34 MG/ML
1 INJECTION, SOLUTION SUBCUTANEOUS WEEKLY
Qty: 3 ML | Refills: 5 | Status: SHIPPED | OUTPATIENT
Start: 2023-02-18

## 2023-02-28 RX ORDER — ONDANSETRON 4 MG/1
4 TABLET, ORALLY DISINTEGRATING ORAL EVERY 8 HOURS PRN
Qty: 30 TABLET | Refills: 0 | Status: SHIPPED | OUTPATIENT
Start: 2023-02-28

## 2023-03-13 DIAGNOSIS — F31.30 BIPOLAR I DISORDER, MOST RECENT EPISODE DEPRESSED: ICD-10-CM

## 2023-03-13 RX ORDER — LURASIDONE HYDROCHLORIDE 80 MG/1
TABLET, FILM COATED ORAL
Qty: 30 TABLET | Refills: 2 | Status: SHIPPED | OUTPATIENT
Start: 2023-03-13 | End: 2023-03-30 | Stop reason: SDUPTHER

## 2023-03-14 DIAGNOSIS — Z00.00 HEALTHCARE MAINTENANCE: ICD-10-CM

## 2023-03-30 ENCOUNTER — TELEMEDICINE (OUTPATIENT)
Dept: PSYCHIATRY | Facility: CLINIC | Age: 38
End: 2023-03-30
Payer: MEDICAID

## 2023-03-30 DIAGNOSIS — F17.210 TOBACCO DEPENDENCE DUE TO CIGARETTES: ICD-10-CM

## 2023-03-30 DIAGNOSIS — Z79.899 MEDICATION MANAGEMENT: ICD-10-CM

## 2023-03-30 DIAGNOSIS — F41.1 GENERALIZED ANXIETY DISORDER: ICD-10-CM

## 2023-03-30 DIAGNOSIS — F31.30 BIPOLAR I DISORDER, MOST RECENT EPISODE DEPRESSED: Primary | ICD-10-CM

## 2023-03-30 DIAGNOSIS — F51.05 INSOMNIA DUE TO MENTAL DISORDER: ICD-10-CM

## 2023-03-30 PROCEDURE — 1160F RVW MEDS BY RX/DR IN RCRD: CPT | Performed by: NURSE PRACTITIONER

## 2023-03-30 PROCEDURE — 1159F MED LIST DOCD IN RCRD: CPT | Performed by: NURSE PRACTITIONER

## 2023-03-30 PROCEDURE — 99214 OFFICE O/P EST MOD 30 MIN: CPT | Performed by: NURSE PRACTITIONER

## 2023-03-30 RX ORDER — HYDROXYZINE PAMOATE 25 MG/1
25 CAPSULE ORAL 3 TIMES DAILY PRN
Qty: 90 CAPSULE | Refills: 2 | Status: SHIPPED | OUTPATIENT
Start: 2023-03-30

## 2023-03-30 RX ORDER — BUPROPION HYDROCHLORIDE 300 MG/1
300 TABLET ORAL EVERY MORNING
Qty: 30 TABLET | Refills: 2 | Status: SHIPPED | OUTPATIENT
Start: 2023-03-30

## 2023-03-30 RX ORDER — TRAZODONE HYDROCHLORIDE 50 MG/1
50 TABLET ORAL
Qty: 30 TABLET | Refills: 2 | Status: SHIPPED | OUTPATIENT
Start: 2023-03-30

## 2023-03-30 RX ORDER — LURASIDONE HYDROCHLORIDE 80 MG/1
80 TABLET, FILM COATED ORAL DAILY
Qty: 30 TABLET | Refills: 2 | Status: SHIPPED | OUTPATIENT
Start: 2023-03-30

## 2023-03-30 NOTE — PROGRESS NOTES
This provider is located at the [facility], [address] [place] [address], [id number] using a secure [employer] Video Visit through [employer]. Patient is being seen remotely via telehealth at their home address in [place], and stated they are in a secure environment for this session. The patient's condition being diagnosed/treated is appropriate for telemedicine. The provider identified herself as well as her credentials. The patient, and/or patients guardian, consent to be seen remotely, and when consent is given they understand that the consent allows for patient identifiable information to be sent to a third party as needed. They may refuse to be seen remotely at any time. The electronic data is encrypted and password protected, and the patient and/or guardian has been advised of the potential risks to privacy not withstanding such measures.    Subjective   Jessica Harris is a 37 y.o. female who presents today for follow up    Chief Complaint:  Bipolar disorder    History of Present Illness: Patient presents as follow up via telehealth visit. She reports having some anxiety related to relationship issues with her fiance. States he works from home often and it has taken a toll on the relationship. She reports depression has improved, she has been able to have some time for herself and she is also adjusting to caring for the children. She reports sleep is good with trazodone. Reports appetite is has decreased with new diabetic medication. She denies SI/HI/AVH.    The following portions of the patient's history were reviewed and updated as appropriate: allergies, current medications, past family history, past medical history, past social history, past surgical history and problem list.      Past Medical History:  Past Medical History:   Diagnosis Date   • Anxiety    • Asthma    • Bipolar 1 disorder (HCC)    • Depression    • Diabetes mellitus (HCC)    • Elevated cholesterol    • GERD (gastroesophageal  "reflux disease)    • History of bronchitis    • History of ear infections    • History of stomach ulcers    • History of streptococcal infection    • Hyperlipidemia    • Hypertension    • Insomnia    • Kidney stones    • Urinary tract infection        Social History:  Social History     Socioeconomic History   • Marital status: Single   Tobacco Use   • Smoking status: Every Day     Packs/day: 0.50     Types: Cigarettes   • Smokeless tobacco: Never   • Tobacco comments:     \"thinking about it\"   Vaping Use   • Vaping Use: Never used   Substance and Sexual Activity   • Alcohol use: No   • Drug use: No   • Sexual activity: Defer     Birth control/protection: Pill       Family History:  Family History   Problem Relation Age of Onset   • Cancer Mother    • Stroke Mother    • Lung disease Mother    • No Known Problems Father    • Cancer Other    • Heart disease Other    • Stroke Other        Past Surgical History:  Past Surgical History:   Procedure Laterality Date   • ADENOIDECTOMY     • CHOLECYSTECTOMY     • D & C HYSTEROSCOPY N/A 05/16/2022    Procedure: DILATATION AND CURETTAGE HYSTEROSCOPY;  Surgeon: Joey Andersen DO;  Location: [facility];  Service: Obstetrics/Gynecology;  Laterality: N/A;   • DENTAL PROCEDURE     • ENDOSCOPY     • HERNIA REPAIR      umbilical   • OVARIAN CYST DRAINAGE/EXCISION Left 05/16/2022    Procedure: OVARIAN CYSTECTOMY WITH EXTENSIVE LYSIS OF ADHESIONS;  Surgeon: Joey Andersen DO;  Location: [facility] OR;  Service: Obstetrics/Gynecology;  Laterality: Left;   • TONSILLECTOMY     • TOTAL LAPAROSCOPIC HYSTERECTOMY N/A 6/22/2022    Procedure: ROBOTIC TOTAL LAPAROSCOPIC HYSTERECTOMY WITH BILATERAL SALPINGECTOMY;  Surgeon: Joey Andersen DO;  Location: [facility];  Service: Robotics - DaVinci;  Laterality: N/A;   • UPPER GASTROINTESTINAL ENDOSCOPY     • WISDOM TOOTH EXTRACTION         Problem List:  Patient Active Problem List   Diagnosis   • Palpitations   • Bipolar " disorder, in partial remission, most recent episode mixed ([facility])   • Smoker   • Gastroesophageal reflux disease without esophagitis   • Chronic venous insufficiency   • Healthcare maintenance   • Vitamin D deficiency   • Snoring   • History of nephrolithiasis   • Essential hypertension   • Class 2 severe obesity with serious comorbidity and body mass index (BMI) of 37.0 to 37.9 in adult ([facility])   • Type 2 diabetes mellitus with microalbuminuria, without long-term current use of insulin ([facility])   • Mixed hyperlipidemia   • Neuropathy   • S/P laparoscopic hysterectomy   • Encounter for immunization        Allergy:   No Known Allergies     Current Medications:   Current Outpatient Medications   Medication Sig Dispense Refill   • albuterol sulfate  (90 Base) MCG/ACT inhaler Inhale 2 puffs Every 6 (Six) Hours As Needed for Wheezing. 18 g 3   • atorvastatin (LIPITOR) 20 MG tablet Take 1 tablet by mouth Every Night. 90 tablet 3   • buPROPion XL (Wellbutrin XL) 300 MG 24 hr tablet Take 1 tablet by mouth Every Morning. 30 tablet 2   • dapagliflozin-metformin HCl ER (Xigduo XR) 5-1000 MG tablet Take 1 tablet by mouth Every Morning. 30 tablet 5   • fluticasone-salmeterol (Advair HFA) 230-21 MCG/ACT inhaler Inhale 2 puffs 2 (Two) Times a Day. 12 g 5   • hydrOXYzine pamoate (VISTARIL) 25 MG capsule Take 1 capsule by mouth 3 (Three) Times a Day As Needed for Anxiety. 90 capsule 2   • Lurasidone HCl (Latuda) 80 MG tablet tablet Take 1 tablet by mouth Daily. 30 tablet 2   • Multi-Vitamin tablet TAKE ONE TABLET BY MOUTH EVERY DAY 30 tablet 5   • ondansetron ODT (ZOFRAN-ODT) 4 MG disintegrating tablet Place 1 tablet on the tongue Every 8 (Eight) Hours As Needed for Nausea or Vomiting. 30 tablet 0   • pantoprazole (PROTONIX) 40 MG EC tablet TAKE ONE TABLET BY MOUTH EVERY DAY FOR STOMACH 90 tablet 3   • propranolol LA (INDERAL LA) 120 MG 24 hr capsule Take 1 capsule by mouth Daily. 90 capsule 3   • Semaglutide, 1 MG/DOSE, (Ozempic, 1  MG/DOSE,) 4 MG/3ML solution pen-injector Inject 1 mg under the skin into the appropriate area as directed 1 (One) Time Per Week. 3 mL 5   • traZODone (DESYREL) 50 MG tablet Take 1 tablet by mouth every night at bedtime. 30 tablet 2   • vitamin B-12 (CYANOCOBALAMIN) 1000 MCG tablet TAKE TWO TABLETS BY MOUTH EVERY DAY 60 tablet 5   • vitamin D (ERGOCALCIFEROL) 1.25 MG (65363 UT) capsule capsule Take 1 capsule by mouth 1 (One) Time Per Week. 4 capsule 5     No current facility-administered medications for this visit.       Review of Symptoms:    Review of Systems   Constitutional: Positive for fatigue.   HENT: Negative.    Eyes: Negative.    Respiratory: Negative.    Cardiovascular: Negative.    Gastrointestinal: Negative.    Genitourinary: Negative.    Musculoskeletal: Negative.    Skin: Negative.    Neurological: Negative.    Psychiatric/Behavioral: Positive for sleep disturbance and depressed mood. Negative for suicidal ideas. The patient is nervous/anxious.          Physical Exam:   There were no vitals taken for this visit.  There is no height or weight on file to calculate BMI.    Appearance: Well nourished female, appropriately dressed, appears stated age and in no acute distress  Gait, Station, Strength: PAULINE    Mental Status Exam:   Hygiene:   good  Cooperation:  Cooperative  Eye Contact:  Good  Psychomotor Behavior:  Appropriate  Affect:  Appropriate  Mood: anxious  Hopelessness: 2  Speech:  Normal  Thought Process:  Linear  Thought Content:  Normal and Mood congruent  Suicidal:  None  Homicidal:  None  Hallucinations:  None  Delusion:  None  Memory:  Intact  Orientation:  Person, Place, Time and Situation  Reliability:  good  Insight:  Fair  Judgement:  Fair  Impulse Control:  Good  Physical/Medical Issues:  No      PHQ-Score Total:  PHQ-9 Total Score: 6   Patient screened positive for depression based on a PHQ-9 score of 6 on 3/30/2023. Follow-up recommendations include: Prescribed antidepressant medication  treatment and Suicide Risk Assessment performed.          Lab Results:   No visits with results within 1 Month(s) from this visit.   Latest known visit with results is:   Lab on 11/30/2022   Component Date Value Ref Range Status   • Glucose 11/30/2022 172 (H)  65 - 99 mg/dL Final   • BUN 11/30/2022 8  6 - 20 mg/dL Final   • Creatinine 11/30/2022 0.73  0.57 - 1.00 mg/dL Final   • Sodium 11/30/2022 138  136 - 145 mmol/L Final   • Potassium 11/30/2022 4.2  3.5 - 5.2 mmol/L Final   • Chloride 11/30/2022 97 (L)  98 - 107 mmol/L Final   • CO2 11/30/2022 27.8  22.0 - 29.0 mmol/L Final   • Calcium 11/30/2022 9.7  8.6 - 10.5 mg/dL Final   • Total Protein 11/30/2022 7.2  6.0 - 8.5 g/dL Final   • Albumin 11/30/2022 4.30  3.50 - 5.20 g/dL Final   • ALT (SGPT) 11/30/2022 35 (H)  1 - 33 U/L Final   • AST (SGOT) 11/30/2022 34 (H)  1 - 32 U/L Final   • Alkaline Phosphatase 11/30/2022 85  39 - 117 U/L Final   • Total Bilirubin 11/30/2022 <0.2  0.0 - 1.2 mg/dL Final   • Globulin 11/30/2022 2.9  gm/dL Final   • A/G Ratio 11/30/2022 1.5  g/dL Final   • BUN/Creatinine Ratio 11/30/2022 11.0  7.0 - 25.0 Final   • Anion Gap 11/30/2022 13.2  5.0 - 15.0 mmol/L Final   • eGFR 11/30/2022 110.8  >60.0 mL/min/1.73 Final    National Kidney Foundation and American Society of Nephrology (ASN) Task Force recommended calculation based on the Chronic Kidney Disease Epidemiology Collaboration (CKD-EPI) equation refit without adjustment for race.   • Total Cholesterol 11/30/2022 145  0 - 200 mg/dL Final   • Triglycerides 11/30/2022 579 (H)  0 - 150 mg/dL Final   • HDL Cholesterol 11/30/2022 33 (L)  40 - 60 mg/dL Final   • LDL Cholesterol  11/30/2022 32  0 - 100 mg/dL Final   • VLDL Cholesterol 11/30/2022 80 (H)  5 - 40 mg/dL Final   • LDL/HDL Ratio 11/30/2022 Unable to Calculate   Final   • TSH 11/30/2022 2.000  0.270 - 4.200 uIU/mL Final   • Hemoglobin A1C 11/30/2022 9.70 (H)  4.80 - 5.60 % Final   • Microalbumin, Urine 11/30/2022 62.1  mg/dL Final   •  25 Hydroxy, Vitamin D 11/30/2022 19.8 (L)  30.0 - 100.0 ng/ml Final   • WBC 11/30/2022 11.18 (H)  3.40 - 10.80 10*3/mm3 Final   • RBC 11/30/2022 5.64 (H)  3.77 - 5.28 10*6/mm3 Final   • Hemoglobin 11/30/2022 14.8  12.0 - 15.9 g/dL Final   • Hematocrit 11/30/2022 44.0  34.0 - 46.6 % Final   • MCV 11/30/2022 78.0 (L)  79.0 - 97.0 fL Final   • MCH 11/30/2022 26.2 (L)  26.6 - 33.0 pg Final   • MCHC 11/30/2022 33.6  31.5 - 35.7 g/dL Final   • RDW 11/30/2022 14.0  12.3 - 15.4 % Final   • RDW-SD 11/30/2022 38.4  37.0 - 54.0 fl Final   • MPV 11/30/2022 9.4  6.0 - 12.0 fL Final   • Platelets 11/30/2022 334  140 - 450 10*3/mm3 Final   • Neutrophil % 11/30/2022 59.3  42.7 - 76.0 % Final   • Lymphocyte % 11/30/2022 30.8  19.6 - 45.3 % Final   • Monocyte % 11/30/2022 6.7  5.0 - 12.0 % Final   • Eosinophil % 11/30/2022 2.0  0.3 - 6.2 % Final   • Basophil % 11/30/2022 0.6  0.0 - 1.5 % Final   • Immature Grans % 11/30/2022 0.6 (H)  0.0 - 0.5 % Final   • Neutrophils, Absolute 11/30/2022 6.63  1.70 - 7.00 10*3/mm3 Final   • Lymphocytes, Absolute 11/30/2022 3.44 (H)  0.70 - 3.10 10*3/mm3 Final   • Monocytes, Absolute 11/30/2022 0.75  0.10 - 0.90 10*3/mm3 Final   • Eosinophils, Absolute 11/30/2022 0.22  0.00 - 0.40 10*3/mm3 Final   • Basophils, Absolute 11/30/2022 0.07  0.00 - 0.20 10*3/mm3 Final   • Immature Grans, Absolute 11/30/2022 0.07 (H)  0.00 - 0.05 10*3/mm3 Final   • nRBC 11/30/2022 0.0  0.0 - 0.2 /100 WBC Final       Assessment & Plan   Diagnoses and all orders for this visit:    1. Bipolar I disorder, most recent episode depressed (HCC) (Primary)  -     Lurasidone HCl (Latuda) 80 MG tablet tablet; Take 1 tablet by mouth Daily.  Dispense: 30 tablet; Refill: 2  -     buPROPion XL (Wellbutrin XL) 300 MG 24 hr tablet; Take 1 tablet by mouth Every Morning.  Dispense: 30 tablet; Refill: 2    2. Insomnia due to mental disorder  -     traZODone (DESYREL) 50 MG tablet; Take 1 tablet by mouth every night at bedtime.  Dispense: 30  tablet; Refill: 2    3. Medication management    4. Tobacco dependence due to cigarettes    5. Generalized anxiety disorder  -     Lurasidone HCl (Latuda) 80 MG tablet tablet; Take 1 tablet by mouth Daily.  Dispense: 30 tablet; Refill: 2  -     hydrOXYzine pamoate (VISTARIL) 25 MG capsule; Take 1 capsule by mouth 3 (Three) Times a Day As Needed for Anxiety.  Dispense: 90 capsule; Refill: 2  -     buPROPion XL (Wellbutrin XL) 300 MG 24 hr tablet; Take 1 tablet by mouth Every Morning.  Dispense: 30 tablet; Refill: 2        -Continue bupropion  mg daily for depression  -Continue trazodone 50 mg nightly for sleep  -Continue hydroxyzine pamoate 25 mg 3 times daily as needed for anxiety  -Continue lurasidone 80 mg daily for mood  Lengthy discussion with patient on the possible side effects of antipsychotic medications including increased cholesterol, increased blood sugar, and possibility of weight gain.  Also discussed the need to monitor lab work associated with this.  The risk of muscle movement disorders with this class of medication was also discussed.  -Encouraged patient to begin psychotherapy  -JULIET reviewed and appropriate. Patient counseled on use of controlled substances  -The benefits of a healthy diet and exercise were discussed with patient, especially the positive effects they have on mental health. Patient encouraged to consider lifestyle modification regarding  diet and exercise patterns to maximize results of mental health treatment.  -Reviewed previous available documentation  -Reviewed most recent available labs   -Jessica Harris  reports that she has been smoking cigarettes. She has been smoking an average of .5 packs per day. She has never used smokeless tobacco.. I have educated her on the risk of diseases from using tobacco products such as cancer, COPD, heart disease and cataracts. I advised her to quit and she is not willing to quit. I spent 3  minutes counseling the patient.            Visit Diagnoses:    ICD-10-CM ICD-9-CM   1. Bipolar I disorder, most recent episode depressed (HCC)  F31.30 296.50   2. Insomnia due to mental disorder  F51.05 300.9     327.02   3. Medication management  Z79.899 V58.69   4. Tobacco dependence due to cigarettes  F17.210 305.1   5. Generalized anxiety disorder  F41.1 300.02       TREATMENT PLAN/GOALS: Continue supportive psychotherapy efforts and medications as indicated. Treatment and medication options discussed during today's visit. Patient acknowledged and verbally consented to continue with current treatment plan and was educated on the importance of compliance with treatment and follow-up appointments.    MEDICATION ISSUES:    Discussed medication options and treatment plan of prescribed medication as well as the risks, benefits, and side effects including potential falls, possible impaired driving and metabolic adversities among others. Patient is agreeable to call the office with any worsening of symptoms or onset of side effects. Patient is agreeable to call 911 or go to the nearest ER should he/she begin having SI/HI.     MEDS ORDERED DURING VISIT:  New Medications Ordered This Visit   Medications   • traZODone (DESYREL) 50 MG tablet     Sig: Take 1 tablet by mouth every night at bedtime.     Dispense:  30 tablet     Refill:  2   • Lurasidone HCl (Latuda) 80 MG tablet tablet     Sig: Take 1 tablet by mouth Daily.     Dispense:  30 tablet     Refill:  2   • hydrOXYzine pamoate (VISTARIL) 25 MG capsule     Sig: Take 1 capsule by mouth 3 (Three) Times a Day As Needed for Anxiety.     Dispense:  90 capsule     Refill:  2   • buPROPion XL (Wellbutrin XL) 300 MG 24 hr tablet     Sig: Take 1 tablet by mouth Every Morning.     Dispense:  30 tablet     Refill:  2       Return in about 3 months (around 6/30/2023), or if symptoms worsen or fail to improve.               This document has been electronically signed by JANIE Thorpe  March 30, 2023  09:45 EDT    Part of this note may be an electronic transcription/translation of spoken language to printed text using the Dragon Dictation System.

## 2023-04-11 RX ORDER — PENICILLIN V POTASSIUM 250 MG/1
250 TABLET ORAL 4 TIMES DAILY
Qty: 40 TABLET | Refills: 0 | Status: SHIPPED | OUTPATIENT
Start: 2023-04-11

## 2023-04-17 ENCOUNTER — LAB (OUTPATIENT)
Dept: LAB | Facility: HOSPITAL | Age: 38
End: 2023-04-17
Payer: MEDICAID

## 2023-04-17 DIAGNOSIS — I10 ESSENTIAL HYPERTENSION: ICD-10-CM

## 2023-04-17 DIAGNOSIS — E55.9 VITAMIN D DEFICIENCY: ICD-10-CM

## 2023-04-17 DIAGNOSIS — R80.9 TYPE 2 DIABETES MELLITUS WITH MICROALBUMINURIA, WITHOUT LONG-TERM CURRENT USE OF INSULIN: ICD-10-CM

## 2023-04-17 DIAGNOSIS — Z87.442 HISTORY OF NEPHROLITHIASIS: ICD-10-CM

## 2023-04-17 DIAGNOSIS — E11.29 TYPE 2 DIABETES MELLITUS WITH MICROALBUMINURIA, WITHOUT LONG-TERM CURRENT USE OF INSULIN: ICD-10-CM

## 2023-04-17 DIAGNOSIS — E78.2 MIXED HYPERLIPIDEMIA: ICD-10-CM

## 2023-04-17 PROCEDURE — 83036 HEMOGLOBIN GLYCOSYLATED A1C: CPT

## 2023-04-17 PROCEDURE — 80053 COMPREHEN METABOLIC PANEL: CPT

## 2023-04-17 PROCEDURE — 84550 ASSAY OF BLOOD/URIC ACID: CPT

## 2023-04-17 PROCEDURE — 82306 VITAMIN D 25 HYDROXY: CPT

## 2023-04-17 PROCEDURE — 80061 LIPID PANEL: CPT

## 2023-04-18 LAB
25(OH)D3 SERPL-MCNC: 45.4 NG/ML (ref 30–100)
ALBUMIN SERPL-MCNC: 4.6 G/DL (ref 3.5–5.2)
ALBUMIN/GLOB SERPL: 1.6 G/DL
ALP SERPL-CCNC: 91 U/L (ref 39–117)
ALT SERPL W P-5'-P-CCNC: 25 U/L (ref 1–33)
ANION GAP SERPL CALCULATED.3IONS-SCNC: 15 MMOL/L (ref 5–15)
AST SERPL-CCNC: 22 U/L (ref 1–32)
BILIRUB SERPL-MCNC: 0.3 MG/DL (ref 0–1.2)
BUN SERPL-MCNC: 6 MG/DL (ref 6–20)
BUN/CREAT SERPL: 7.7 (ref 7–25)
CALCIUM SPEC-SCNC: 9.6 MG/DL (ref 8.6–10.5)
CHLORIDE SERPL-SCNC: 98 MMOL/L (ref 98–107)
CHOLEST SERPL-MCNC: 118 MG/DL (ref 0–200)
CO2 SERPL-SCNC: 25 MMOL/L (ref 22–29)
CREAT SERPL-MCNC: 0.78 MG/DL (ref 0.57–1)
EGFRCR SERPLBLD CKD-EPI 2021: 101.7 ML/MIN/1.73
GLOBULIN UR ELPH-MCNC: 2.8 GM/DL
GLUCOSE SERPL-MCNC: 170 MG/DL (ref 65–99)
HBA1C MFR BLD: 7.2 % (ref 4.8–5.6)
HDLC SERPL-MCNC: 34 MG/DL (ref 40–60)
LDLC SERPL CALC-MCNC: 43 MG/DL (ref 0–100)
LDLC/HDLC SERPL: 0.89 {RATIO}
POTASSIUM SERPL-SCNC: 4.2 MMOL/L (ref 3.5–5.2)
PROT SERPL-MCNC: 7.4 G/DL (ref 6–8.5)
SODIUM SERPL-SCNC: 138 MMOL/L (ref 136–145)
TRIGL SERPL-MCNC: 269 MG/DL (ref 0–150)
URATE SERPL-MCNC: 6 MG/DL (ref 2.4–5.7)
VLDLC SERPL-MCNC: 41 MG/DL (ref 5–40)

## 2023-04-19 DIAGNOSIS — E11.29 TYPE 2 DIABETES MELLITUS WITH MICROALBUMINURIA, WITHOUT LONG-TERM CURRENT USE OF INSULIN: ICD-10-CM

## 2023-04-19 DIAGNOSIS — R80.9 TYPE 2 DIABETES MELLITUS WITH MICROALBUMINURIA, WITHOUT LONG-TERM CURRENT USE OF INSULIN: ICD-10-CM

## 2023-04-19 RX ORDER — DAPAGLIFLOZIN AND METFORMIN HYDROCHLORIDE 5; 1000 MG/1; MG/1
TABLET, FILM COATED, EXTENDED RELEASE ORAL
Qty: 60 TABLET | Refills: 5 | Status: SHIPPED | OUTPATIENT
Start: 2023-04-19

## 2023-04-26 ENCOUNTER — PRIOR AUTHORIZATION (OUTPATIENT)
Dept: PSYCHIATRY | Facility: CLINIC | Age: 38
End: 2023-04-26
Payer: MEDICAID

## 2023-04-26 NOTE — TELEPHONE ENCOUNTER
Drug  Latuda 80MG tablets  Form  [employer] [place] Medicaid ePA Form 2017 NCPDP  Original Claim Info  75 Prior Authorization Required    Key: [id number] - [place] Case ID: [id number] - Rx #: [id number]

## 2023-07-31 DIAGNOSIS — E11.29 TYPE 2 DIABETES MELLITUS WITH MICROALBUMINURIA, WITHOUT LONG-TERM CURRENT USE OF INSULIN: Primary | ICD-10-CM

## 2023-07-31 DIAGNOSIS — R80.9 TYPE 2 DIABETES MELLITUS WITH MICROALBUMINURIA, WITHOUT LONG-TERM CURRENT USE OF INSULIN: Primary | ICD-10-CM

## 2023-07-31 RX ORDER — LANCETS 30 GAUGE
EACH MISCELLANEOUS
Qty: 50 EACH | Refills: 5 | Status: SHIPPED | OUTPATIENT
Start: 2023-07-31

## 2023-07-31 RX ORDER — GLUCOSAMINE HCL/CHONDROITIN SU 500-400 MG
CAPSULE ORAL
Qty: 50 EACH | Refills: 5 | Status: SHIPPED | OUTPATIENT
Start: 2023-07-31

## 2023-09-05 RX ORDER — DAPAGLIFLOZIN 10 MG/1
1 TABLET, FILM COATED ORAL EVERY MORNING
Qty: 30 TABLET | Refills: 5 | Status: SHIPPED | OUTPATIENT
Start: 2023-09-05

## 2023-09-13 ENCOUNTER — LAB (OUTPATIENT)
Dept: LAB | Facility: HOSPITAL | Age: 38
End: 2023-09-13
Payer: MEDICAID

## 2023-09-13 DIAGNOSIS — E11.29 TYPE 2 DIABETES MELLITUS WITH MICROALBUMINURIA, WITHOUT LONG-TERM CURRENT USE OF INSULIN: ICD-10-CM

## 2023-09-13 DIAGNOSIS — F31.77 BIPOLAR DISORDER, IN PARTIAL REMISSION, MOST RECENT EPISODE MIXED: ICD-10-CM

## 2023-09-13 DIAGNOSIS — I10 ESSENTIAL HYPERTENSION: ICD-10-CM

## 2023-09-13 DIAGNOSIS — E55.9 VITAMIN D DEFICIENCY: ICD-10-CM

## 2023-09-13 DIAGNOSIS — E78.2 MIXED HYPERLIPIDEMIA: ICD-10-CM

## 2023-09-13 DIAGNOSIS — R80.9 TYPE 2 DIABETES MELLITUS WITH MICROALBUMINURIA, WITHOUT LONG-TERM CURRENT USE OF INSULIN: ICD-10-CM

## 2023-09-13 DIAGNOSIS — G62.9 NEUROPATHY: ICD-10-CM

## 2023-09-13 DIAGNOSIS — K21.9 GASTROESOPHAGEAL REFLUX DISEASE WITHOUT ESOPHAGITIS: ICD-10-CM

## 2023-09-13 LAB
25(OH)D3 SERPL-MCNC: 30.8 NG/ML (ref 30–100)
ALBUMIN SERPL-MCNC: 4.4 G/DL (ref 3.5–5.2)
ALBUMIN UR-MCNC: 6.1 MG/DL
ALBUMIN/GLOB SERPL: 1.4 G/DL
ALP SERPL-CCNC: 110 U/L (ref 39–117)
ALT SERPL W P-5'-P-CCNC: 22 U/L (ref 1–33)
ANION GAP SERPL CALCULATED.3IONS-SCNC: 10.1 MMOL/L (ref 5–15)
AST SERPL-CCNC: 17 U/L (ref 1–32)
BASOPHILS # BLD AUTO: 0.11 10*3/MM3 (ref 0–0.2)
BASOPHILS NFR BLD AUTO: 0.8 % (ref 0–1.5)
BILIRUB SERPL-MCNC: 0.2 MG/DL (ref 0–1.2)
BUN SERPL-MCNC: 7 MG/DL (ref 6–20)
BUN/CREAT SERPL: 8.8 (ref 7–25)
CALCIUM SPEC-SCNC: 9.6 MG/DL (ref 8.6–10.5)
CHLORIDE SERPL-SCNC: 103 MMOL/L (ref 98–107)
CHOLEST SERPL-MCNC: 104 MG/DL (ref 0–200)
CO2 SERPL-SCNC: 24.9 MMOL/L (ref 22–29)
CREAT SERPL-MCNC: 0.8 MG/DL (ref 0.57–1)
DEPRECATED RDW RBC AUTO: 45.6 FL (ref 37–54)
EGFRCR SERPLBLD CKD-EPI 2021: 98.7 ML/MIN/1.73
EOSINOPHIL # BLD AUTO: 0.77 10*3/MM3 (ref 0–0.4)
EOSINOPHIL NFR BLD AUTO: 5.3 % (ref 0.3–6.2)
ERYTHROCYTE [DISTWIDTH] IN BLOOD BY AUTOMATED COUNT: 15.8 % (ref 12.3–15.4)
GLOBULIN UR ELPH-MCNC: 3.2 GM/DL
GLUCOSE SERPL-MCNC: 111 MG/DL (ref 65–99)
HBA1C MFR BLD: 6.1 % (ref 4.8–5.6)
HCT VFR BLD AUTO: 51.4 % (ref 34–46.6)
HDLC SERPL-MCNC: 33 MG/DL (ref 40–60)
HGB BLD-MCNC: 16 G/DL (ref 12–15.9)
IMM GRANULOCYTES # BLD AUTO: 0.11 10*3/MM3 (ref 0–0.05)
IMM GRANULOCYTES NFR BLD AUTO: 0.8 % (ref 0–0.5)
LDLC SERPL CALC-MCNC: 38 MG/DL (ref 0–100)
LDLC/HDLC SERPL: 0.91 {RATIO}
LYMPHOCYTES # BLD AUTO: 3.01 10*3/MM3 (ref 0.7–3.1)
LYMPHOCYTES NFR BLD AUTO: 20.5 % (ref 19.6–45.3)
MCH RBC QN AUTO: 26.1 PG (ref 26.6–33)
MCHC RBC AUTO-ENTMCNC: 31.1 G/DL (ref 31.5–35.7)
MCV RBC AUTO: 83.8 FL (ref 79–97)
MONOCYTES # BLD AUTO: 0.94 10*3/MM3 (ref 0.1–0.9)
MONOCYTES NFR BLD AUTO: 6.4 % (ref 5–12)
NEUTROPHILS NFR BLD AUTO: 66.2 % (ref 42.7–76)
NEUTROPHILS NFR BLD AUTO: 9.71 10*3/MM3 (ref 1.7–7)
NRBC BLD AUTO-RTO: 0 /100 WBC (ref 0–0.2)
PLATELET # BLD AUTO: 375 10*3/MM3 (ref 140–450)
PMV BLD AUTO: 8.8 FL (ref 6–12)
POTASSIUM SERPL-SCNC: 4.1 MMOL/L (ref 3.5–5.2)
PROT SERPL-MCNC: 7.6 G/DL (ref 6–8.5)
RBC # BLD AUTO: 6.13 10*6/MM3 (ref 3.77–5.28)
SODIUM SERPL-SCNC: 138 MMOL/L (ref 136–145)
TRIGL SERPL-MCNC: 205 MG/DL (ref 0–150)
VIT B12 BLD-MCNC: >2000 PG/ML (ref 211–946)
VLDLC SERPL-MCNC: 33 MG/DL (ref 5–40)
WBC NRBC COR # BLD: 14.65 10*3/MM3 (ref 3.4–10.8)

## 2023-09-13 PROCEDURE — 83036 HEMOGLOBIN GLYCOSYLATED A1C: CPT

## 2023-09-13 PROCEDURE — 82607 VITAMIN B-12: CPT

## 2023-09-13 PROCEDURE — 82043 UR ALBUMIN QUANTITATIVE: CPT

## 2023-09-13 PROCEDURE — 80061 LIPID PANEL: CPT

## 2023-09-13 PROCEDURE — 82306 VITAMIN D 25 HYDROXY: CPT

## 2023-09-13 PROCEDURE — 80053 COMPREHEN METABOLIC PANEL: CPT

## 2023-09-13 PROCEDURE — 85025 COMPLETE CBC W/AUTO DIFF WBC: CPT

## 2023-09-26 ENCOUNTER — TELEMEDICINE (OUTPATIENT)
Dept: PSYCHIATRY | Facility: CLINIC | Age: 38
End: 2023-09-26
Payer: MEDICAID

## 2023-09-26 DIAGNOSIS — F51.05 INSOMNIA DUE TO MENTAL DISORDER: ICD-10-CM

## 2023-09-26 DIAGNOSIS — F17.210 TOBACCO DEPENDENCE DUE TO CIGARETTES: ICD-10-CM

## 2023-09-26 DIAGNOSIS — F31.30 BIPOLAR I DISORDER, MOST RECENT EPISODE DEPRESSED: Primary | ICD-10-CM

## 2023-09-26 DIAGNOSIS — F41.1 GENERALIZED ANXIETY DISORDER: ICD-10-CM

## 2023-09-26 DIAGNOSIS — Z79.899 MEDICATION MANAGEMENT: ICD-10-CM

## 2023-09-26 PROCEDURE — 99214 OFFICE O/P EST MOD 30 MIN: CPT | Performed by: NURSE PRACTITIONER

## 2023-09-26 PROCEDURE — 1160F RVW MEDS BY RX/DR IN RCRD: CPT | Performed by: NURSE PRACTITIONER

## 2023-09-26 PROCEDURE — 1159F MED LIST DOCD IN RCRD: CPT | Performed by: NURSE PRACTITIONER

## 2023-09-26 RX ORDER — BUPROPION HYDROCHLORIDE 300 MG/1
300 TABLET ORAL EVERY MORNING
Qty: 30 TABLET | Refills: 2 | Status: SHIPPED | OUTPATIENT
Start: 2023-09-26

## 2023-09-26 RX ORDER — ONDANSETRON 4 MG/1
4 TABLET, ORALLY DISINTEGRATING ORAL EVERY 8 HOURS PRN
Qty: 30 TABLET | Refills: 0 | Status: SHIPPED | OUTPATIENT
Start: 2023-09-26

## 2023-09-26 RX ORDER — TRAZODONE HYDROCHLORIDE 50 MG/1
50 TABLET ORAL
Qty: 30 TABLET | Refills: 2 | Status: SHIPPED | OUTPATIENT
Start: 2023-09-26

## 2023-09-26 RX ORDER — LURASIDONE HYDROCHLORIDE 80 MG/1
80 TABLET, FILM COATED ORAL DAILY
Qty: 30 TABLET | Refills: 2 | Status: SHIPPED | OUTPATIENT
Start: 2023-09-26

## 2023-09-26 RX ORDER — HYDROXYZINE PAMOATE 25 MG/1
25 CAPSULE ORAL 3 TIMES DAILY PRN
Qty: 90 CAPSULE | Refills: 2 | Status: SHIPPED | OUTPATIENT
Start: 2023-09-26

## 2023-09-26 NOTE — PROGRESS NOTES
"This provider is located at the [facility], [address] [place] [address], [id number] using a secure [employer] Video Visit through [email or web address]. Patient is being seen remotely via telehealth at their home address in [place], and stated they are in a secure environment for this session. The patient's condition being diagnosed/treated is appropriate for telemedicine. The provider identified herself as well as her credentials. The patient, and/or patients guardian, consent to be seen remotely, and when consent is given they understand that the consent allows for patient identifiable information to be sent to a third party as needed. They may refuse to be seen remotely at any time. The electronic data is encrypted and password protected, and the patient and/or guardian has been advised of the potential risks to privacy not withstanding such measures.    Subjective   Jessica Harris is a 37 y.o. female who presents today for follow up    Chief Complaint:  Bipolar disorder    History of Present Illness: Patient presents as follow up via telehealth visit. She reports depression has improved. States she feels she is handling stress of being at home with children. States she feels medications are at a \"good level\". Reports sleep is good. Reports appetite is good, she has lost 30 pounds over the past few months and her A1C has improved. Reviewed most recent labs with patient. She denies SI/HI/AVH.    The following portions of the patient's history were reviewed and updated as appropriate: allergies, current medications, past family history, past medical history, past social history, past surgical history and problem list.      Past Medical History:  Past Medical History:   Diagnosis Date    Anxiety     Asthma     Bipolar 1 disorder     Depression     Diabetes mellitus     Elevated cholesterol     GERD (gastroesophageal reflux disease)     History of bronchitis     History of ear infections     History of stomach " "ulcers     History of streptococcal infection     Hyperlipidemia     Hypertension     Insomnia     Kidney stones     Urinary tract infection        Social History:  Social History     Socioeconomic History    Marital status: Single   Tobacco Use    Smoking status: Every Day     Packs/day: 0.50     Types: Cigarettes    Smokeless tobacco: Never    Tobacco comments:     \"thinking about it\"   Vaping Use    Vaping Use: Never used   Substance and Sexual Activity    Alcohol use: No    Drug use: No    Sexual activity: Defer     Birth control/protection: Pill       Family History:  Family History   Problem Relation Age of Onset    Cancer Mother     Stroke Mother     Lung disease Mother     No Known Problems Father     Cancer Other     Heart disease Other     Stroke Other        Past Surgical History:  Past Surgical History:   Procedure Laterality Date    ADENOIDECTOMY      CHOLECYSTECTOMY      D & C HYSTEROSCOPY N/A 05/16/2022    Procedure: DILATATION AND CURETTAGE HYSTEROSCOPY;  Surgeon: Joey Andersen DO;  Location: [facility];  Service: Obstetrics/Gynecology;  Laterality: N/A;    DENTAL PROCEDURE      ENDOSCOPY      HERNIA REPAIR      umbilical    OVARIAN CYST DRAINAGE/EXCISION Left 05/16/2022    Procedure: OVARIAN CYSTECTOMY WITH EXTENSIVE LYSIS OF ADHESIONS;  Surgeon: Joey Andersen DO;  Location: [facility];  Service: Obstetrics/Gynecology;  Laterality: Left;    TONSILLECTOMY      TOTAL LAPAROSCOPIC HYSTERECTOMY N/A 6/22/2022    Procedure: ROBOTIC TOTAL LAPAROSCOPIC HYSTERECTOMY WITH BILATERAL SALPINGECTOMY;  Surgeon: Joey Andersen DO;  Location: [facility];  Service: Robotics - DaVinci;  Laterality: N/A;    UPPER GASTROINTESTINAL ENDOSCOPY      WISDOM TOOTH EXTRACTION         Problem List:  Patient Active Problem List   Diagnosis    Palpitations    Bipolar disorder, in partial remission, most recent episode mixed    Smoker    Gastroesophageal reflux disease without esophagitis    Chronic " venous insufficiency    Healthcare maintenance    Vitamin D deficiency    Snoring    History of nephrolithiasis    Essential hypertension    Class 1 obesity with serious comorbidity and body mass index (BMI) of 33.0 to 33.9 in adult    Type 2 diabetes mellitus with microalbuminuria, without long-term current use of insulin    Mixed hyperlipidemia    Neuropathy    S/P laparoscopic hysterectomy    Encounter for immunization        Allergy:   No Known Allergies     Current Medications:   Current Outpatient Medications   Medication Sig Dispense Refill    albuterol sulfate  (90 Base) MCG/ACT inhaler Inhale 2 puffs Every 6 (Six) Hours As Needed for Wheezing. 18 g 3    atorvastatin (LIPITOR) 20 MG tablet Take 1 tablet by mouth Every Night. 90 tablet 3    buPROPion XL (Wellbutrin XL) 300 MG 24 hr tablet Take 1 tablet by mouth Every Morning. 30 tablet 2    dapagliflozin Propanediol (Farxiga) 10 MG tablet Take 10 mg by mouth Every Morning. 30 tablet 5    Glucose Blood (Blood Glucose Test) strip 1 daily 50 each 5    hydrOXYzine pamoate (VISTARIL) 25 MG capsule Take 1 capsule by mouth 3 (Three) Times a Day As Needed for Anxiety. 90 capsule 2    Lancets misc 1 daily 50 each 5    Lurasidone HCl (Latuda) 80 MG tablet tablet Take 1 tablet by mouth Daily. 30 tablet 2    Multi-Vitamin tablet TAKE ONE TABLET BY MOUTH EVERY DAY 30 tablet 5    ondansetron ODT (ZOFRAN-ODT) 4 MG disintegrating tablet Place 1 tablet on the tongue Every 8 (Eight) Hours As Needed for Nausea or Vomiting. 30 tablet 0    pantoprazole (PROTONIX) 40 MG EC tablet Take 1 tablet by mouth Daily. 90 tablet 3    propranolol LA (INDERAL LA) 80 MG 24 hr capsule Take 1 capsule by mouth Daily. 30 capsule 5    Semaglutide, 1 MG/DOSE, (Ozempic, 1 MG/DOSE,) 4 MG/3ML solution pen-injector Inject 1 mg under the skin into the appropriate area as directed 1 (One) Time Per Week. 3 mL 5    spironolactone (ALDACTONE) 50 MG tablet TAKE ONE TABLET BY MOUTH EVERY DAY FOR FLUID       traZODone (DESYREL) 50 MG tablet Take 1 tablet by mouth every night at bedtime. 30 tablet 2    vitamin B-12 (CYANOCOBALAMIN) 1000 MCG tablet Take 2 tablets by mouth Daily. 180 tablet 3    vitamin D (ERGOCALCIFEROL) 1.25 MG (86208 UT) capsule capsule Take 1 capsule by mouth 1 (One) Time Per Week. 12 capsule 0     No current facility-administered medications for this visit.       Review of Symptoms:    Review of Systems   Constitutional:  Positive for fatigue.   HENT: Negative.     Eyes: Negative.    Respiratory: Negative.     Cardiovascular: Negative.    Gastrointestinal: Negative.    Musculoskeletal: Negative.    Skin: Negative.    Neurological: Negative.    Psychiatric/Behavioral:  Positive for depressed mood. Negative for suicidal ideas. The patient is nervous/anxious.        Physical Exam:   There were no vitals taken for this visit.  There is no height or weight on file to calculate BMI.    Appearance: Well nourished female, appropriately dressed, appears stated age and in no acute distress  Gait, Station, Strength: PAULINE    Mental Status Exam:   Hygiene:   good  Cooperation:  Cooperative  Eye Contact:  Good  Psychomotor Behavior:  Appropriate  Affect:  Appropriate  Mood: normal  Hopelessness: Denies  Speech:  Normal  Thought Process:  Linear  Thought Content:  Mood congruent  Suicidal:  None  Homicidal:  None  Hallucinations:  None  Delusion:  None  Memory:  Intact  Orientation:  Person, Place, Time, and Situation  Reliability:  good  Insight:  Good  Judgement:  Good  Impulse Control:  Good  Physical/Medical Issues:   See med hx      PHQ-Score Total:  PHQ-9 Total Score: 2             Lab Results:   Lab on 09/13/2023   Component Date Value Ref Range Status    Glucose 09/13/2023 111 (H)  65 - 99 mg/dL Final    BUN 09/13/2023 7  6 - 20 mg/dL Final    Creatinine 09/13/2023 0.80  0.57 - 1.00 mg/dL Final    Sodium 09/13/2023 138  136 - 145 mmol/L Final    Potassium 09/13/2023 4.1  3.5 - 5.2 mmol/L Final    Slight  hemolysis detected by analyzer. Results may be affected.    Chloride 09/13/2023 103  98 - 107 mmol/L Final    CO2 09/13/2023 24.9  22.0 - 29.0 mmol/L Final    Calcium 09/13/2023 9.6  8.6 - 10.5 mg/dL Final    Total Protein 09/13/2023 7.6  6.0 - 8.5 g/dL Final    Albumin 09/13/2023 4.4  3.5 - 5.2 g/dL Final    ALT (SGPT) 09/13/2023 22  1 - 33 U/L Final    AST (SGOT) 09/13/2023 17  1 - 32 U/L Final    Alkaline Phosphatase 09/13/2023 110  39 - 117 U/L Final    Total Bilirubin 09/13/2023 0.2  0.0 - 1.2 mg/dL Final    Globulin 09/13/2023 3.2  gm/dL Final    A/G Ratio 09/13/2023 1.4  g/dL Final    BUN/Creatinine Ratio 09/13/2023 8.8  7.0 - 25.0 Final    Anion Gap 09/13/2023 10.1  5.0 - 15.0 mmol/L Final    eGFR 09/13/2023 98.7  >60.0 mL/min/1.73 Final    Total Cholesterol 09/13/2023 104  0 - 200 mg/dL Final    Triglycerides 09/13/2023 205 (H)  0 - 150 mg/dL Final    HDL Cholesterol 09/13/2023 33 (L)  40 - 60 mg/dL Final    LDL Cholesterol  09/13/2023 38  0 - 100 mg/dL Final    VLDL Cholesterol 09/13/2023 33  5 - 40 mg/dL Final    LDL/HDL Ratio 09/13/2023 0.91   Final    Hemoglobin A1C 09/13/2023 6.10 (H)  4.80 - 5.60 % Final    25 Hydroxy, Vitamin D 09/13/2023 30.8  30.0 - 100.0 ng/ml Final    Microalbumin, Urine 09/13/2023 6.1  mg/dL Final    Vitamin B-12 09/13/2023 >2,000 (H)  211 - 946 pg/mL Final    WBC 09/13/2023 14.65 (H)  3.40 - 10.80 10*3/mm3 Final    RBC 09/13/2023 6.13 (H)  3.77 - 5.28 10*6/mm3 Final    Hemoglobin 09/13/2023 16.0 (H)  12.0 - 15.9 g/dL Final    Hematocrit 09/13/2023 51.4 (H)  34.0 - 46.6 % Final    MCV 09/13/2023 83.8  79.0 - 97.0 fL Final    MCH 09/13/2023 26.1 (L)  26.6 - 33.0 pg Final    MCHC 09/13/2023 31.1 (L)  31.5 - 35.7 g/dL Final    RDW 09/13/2023 15.8 (H)  12.3 - 15.4 % Final    RDW-SD 09/13/2023 45.6  37.0 - 54.0 fl Final    MPV 09/13/2023 8.8  6.0 - 12.0 fL Final    Platelets 09/13/2023 375  140 - 450 10*3/mm3 Final    Neutrophil % 09/13/2023 66.2  42.7 - 76.0 % Final    Lymphocyte %  09/13/2023 20.5  19.6 - 45.3 % Final    Monocyte % 09/13/2023 6.4  5.0 - 12.0 % Final    Eosinophil % 09/13/2023 5.3  0.3 - 6.2 % Final    Basophil % 09/13/2023 0.8  0.0 - 1.5 % Final    Immature Grans % 09/13/2023 0.8 (H)  0.0 - 0.5 % Final    Neutrophils, Absolute 09/13/2023 9.71 (H)  1.70 - 7.00 10*3/mm3 Final    Lymphocytes, Absolute 09/13/2023 3.01  0.70 - 3.10 10*3/mm3 Final    Monocytes, Absolute 09/13/2023 0.94 (H)  0.10 - 0.90 10*3/mm3 Final    Eosinophils, Absolute 09/13/2023 0.77 (H)  0.00 - 0.40 10*3/mm3 Final    Basophils, Absolute 09/13/2023 0.11  0.00 - 0.20 10*3/mm3 Final    Immature Grans, Absolute 09/13/2023 0.11 (H)  0.00 - 0.05 10*3/mm3 Final    nRBC 09/13/2023 0.0  0.0 - 0.2 /100 WBC Final       Assessment & Plan   Diagnoses and all orders for this visit:    1. Bipolar I disorder, most recent episode depressed (Primary)  -     buPROPion XL (Wellbutrin XL) 300 MG 24 hr tablet; Take 1 tablet by mouth Every Morning.  Dispense: 30 tablet; Refill: 2  -     Lurasidone HCl (Latuda) 80 MG tablet tablet; Take 1 tablet by mouth Daily.  Dispense: 30 tablet; Refill: 2    2. Generalized anxiety disorder  -     buPROPion XL (Wellbutrin XL) 300 MG 24 hr tablet; Take 1 tablet by mouth Every Morning.  Dispense: 30 tablet; Refill: 2  -     hydrOXYzine pamoate (VISTARIL) 25 MG capsule; Take 1 capsule by mouth 3 (Three) Times a Day As Needed for Anxiety.  Dispense: 90 capsule; Refill: 2  -     Lurasidone HCl (Latuda) 80 MG tablet tablet; Take 1 tablet by mouth Daily.  Dispense: 30 tablet; Refill: 2    3. Insomnia due to mental disorder  -     traZODone (DESYREL) 50 MG tablet; Take 1 tablet by mouth every night at bedtime.  Dispense: 30 tablet; Refill: 2    4. Medication management    5. Tobacco dependence due to cigarettes        -Continue bupropion  mg daily for depression  -Continue trazodone 50 mg nightly for sleep  -Continue hydroxyzine pamoate 25 mg 3 times daily as needed for anxiety  -Continue  lurasidone 80 mg daily for mood  Lengthy discussion with patient on the possible side effects of antipsychotic medications including increased cholesterol, increased blood sugar, and possibility of weight gain.  Also discussed the need to monitor lab work associated with this.  The risk of muscle movement disorders with this class of medication was also discussed.  -Encouraged patient to begin psychotherapy  -JULIET reviewed and appropriate. Patient counseled on use of controlled substances  -The benefits of a healthy diet and exercise were discussed with patient, especially the positive effects they have on mental health. Patient encouraged to consider lifestyle modification regarding  diet and exercise patterns to maximize results of mental health treatment.  -Reviewed previous available documentation  -Reviewed most recent available labs   -Jessica Harris  reports that she has been smoking cigarettes. She has been smoking an average of .5 packs per day. She has never used smokeless tobacco.. I have educated her on the risk of diseases from using tobacco products such as cancer, COPD, heart disease, reproductive problems, low birth weight, and cataracts.   I advised her to quit and she is not willing to quit.  I spent 3  minutes counseling the patient.             Visit Diagnoses:    ICD-10-CM ICD-9-CM   1. Bipolar I disorder, most recent episode depressed  F31.30 296.50   2. Generalized anxiety disorder  F41.1 300.02   3. Insomnia due to mental disorder  F51.05 300.9     327.02   4. Medication management  Z79.899 V58.69   5. Tobacco dependence due to cigarettes  F17.210 305.1       TREATMENT PLAN/GOALS: Continue supportive psychotherapy efforts and medications as indicated. Treatment and medication options discussed during today's visit. Patient acknowledged and verbally consented to continue with current treatment plan and was educated on the importance of compliance with treatment and follow-up  appointments.    MEDICATION ISSUES:    Discussed medication options and treatment plan of prescribed medication as well as the risks, benefits, and side effects including potential falls, possible impaired driving and metabolic adversities among others. Patient is agreeable to call the office with any worsening of symptoms or onset of side effects. Patient is agreeable to call 911 or go to the nearest ER should he/she begin having SI/HI.     MEDS ORDERED DURING VISIT:  New Medications Ordered This Visit   Medications    buPROPion XL (Wellbutrin XL) 300 MG 24 hr tablet     Sig: Take 1 tablet by mouth Every Morning.     Dispense:  30 tablet     Refill:  2    hydrOXYzine pamoate (VISTARIL) 25 MG capsule     Sig: Take 1 capsule by mouth 3 (Three) Times a Day As Needed for Anxiety.     Dispense:  90 capsule     Refill:  2    Lurasidone HCl (Latuda) 80 MG tablet tablet     Sig: Take 1 tablet by mouth Daily.     Dispense:  30 tablet     Refill:  2    traZODone (DESYREL) 50 MG tablet     Sig: Take 1 tablet by mouth every night at bedtime.     Dispense:  30 tablet     Refill:  2       Return in about 3 months (around 12/26/2023), or if symptoms worsen or fail to improve.               This document has been electronically signed by JANIE Snyder  September 26, 2023 09:27 EDT    Part of this note may be an electronic transcription/translation of spoken language to printed text using the Dragon Dictation System.

## 2023-10-02 ENCOUNTER — OFFICE VISIT (OUTPATIENT)
Dept: FAMILY MEDICINE CLINIC | Facility: CLINIC | Age: 38
End: 2023-10-02
Payer: MEDICAID

## 2023-10-02 VITALS
HEIGHT: 65 IN | HEART RATE: 83 BPM | TEMPERATURE: 98.6 F | WEIGHT: 199 LBS | SYSTOLIC BLOOD PRESSURE: 102 MMHG | OXYGEN SATURATION: 97 % | BODY MASS INDEX: 33.15 KG/M2 | DIASTOLIC BLOOD PRESSURE: 58 MMHG | RESPIRATION RATE: 14 BRPM

## 2023-10-02 DIAGNOSIS — Z87.442 HISTORY OF NEPHROLITHIASIS: ICD-10-CM

## 2023-10-02 DIAGNOSIS — Z23 ENCOUNTER FOR IMMUNIZATION: ICD-10-CM

## 2023-10-02 DIAGNOSIS — E55.9 VITAMIN D DEFICIENCY: ICD-10-CM

## 2023-10-02 DIAGNOSIS — K21.9 GASTROESOPHAGEAL REFLUX DISEASE WITHOUT ESOPHAGITIS: ICD-10-CM

## 2023-10-02 DIAGNOSIS — E11.29 TYPE 2 DIABETES MELLITUS WITH MICROALBUMINURIA, WITHOUT LONG-TERM CURRENT USE OF INSULIN: ICD-10-CM

## 2023-10-02 DIAGNOSIS — R80.9 TYPE 2 DIABETES MELLITUS WITH MICROALBUMINURIA, WITHOUT LONG-TERM CURRENT USE OF INSULIN: ICD-10-CM

## 2023-10-02 DIAGNOSIS — R00.2 PALPITATIONS: ICD-10-CM

## 2023-10-02 DIAGNOSIS — I87.2 CHRONIC VENOUS INSUFFICIENCY: Primary | ICD-10-CM

## 2023-10-02 DIAGNOSIS — R06.83 SNORING: ICD-10-CM

## 2023-10-02 DIAGNOSIS — G62.9 NEUROPATHY: ICD-10-CM

## 2023-10-02 DIAGNOSIS — I10 ESSENTIAL HYPERTENSION: ICD-10-CM

## 2023-10-02 DIAGNOSIS — F31.77 BIPOLAR DISORDER, IN PARTIAL REMISSION, MOST RECENT EPISODE MIXED: ICD-10-CM

## 2023-10-02 DIAGNOSIS — F17.200 SMOKER: ICD-10-CM

## 2023-10-02 DIAGNOSIS — E66.9 CLASS 1 OBESITY WITH SERIOUS COMORBIDITY AND BODY MASS INDEX (BMI) OF 33.0 TO 33.9 IN ADULT, UNSPECIFIED OBESITY TYPE: ICD-10-CM

## 2023-10-02 DIAGNOSIS — Z00.00 HEALTHCARE MAINTENANCE: ICD-10-CM

## 2023-10-02 DIAGNOSIS — E78.2 MIXED HYPERLIPIDEMIA: ICD-10-CM

## 2023-10-02 RX ORDER — PROPRANOLOL HCL 60 MG
60 CAPSULE, EXTENDED RELEASE 24HR ORAL DAILY
Qty: 30 CAPSULE | Refills: 5 | Status: SHIPPED | OUTPATIENT
Start: 2023-10-02

## 2023-10-02 RX ORDER — MELATONIN
1000 DAILY
Qty: 30 TABLET | Refills: 5 | Status: SHIPPED | OUTPATIENT
Start: 2023-10-02

## 2023-10-02 NOTE — PROGRESS NOTES
Subjective   Jessica Harris is a 37 y.o. female.     Chief Complaint  She returns for a scheduled reassessment of multiple medical problems including type 2 diabetes mellitus, hyperlipidemia, essential hypertension, and gastroesophageal reflux disease    History of Present Illness     Type 2 Diabetes Mellitus  She continues to deny any paresthesia of the feet, visual disturbances, polydipsia, polyuria, hypoglycemia or foot ulcerations.  She has been following her diet and excise plan.  She is currently on dapagliflozin and semaglutide with no apparent side effects.  She was taken off metformin after experiencing intermittent weak spells with a prompt improvement.  She underwent a diabetic exam within the last year  Lab Results   Component Value Date    HGBA1C 6.10 (H) 09/13/2023     Lab Results   Component Value Date    MICROALBUR 6.1 09/13/2023     Lab Results   Component Value Date    NDCILAUN85 >2,000 (H) 09/13/2023     Hyperlipidemia  She remains on atorvastatin along with OTC fish oil capsules. She experienced GI upset with vascepa  Lab Results   Component Value Date    CHOL 104 09/13/2023    CHLPL 256 (H) 04/14/2022    TRIG 205 (H) 09/13/2023    HDL 33 (L) 09/13/2023    LDL 38 09/13/2023     Essential Hypertension  Home blood pressure readings: not doing.  She denies any recent lower extremity edema, and there is no history of any chest pain, dyspnea, or palpitations.  She is currently taking propranolol along with spironolactone prescribed by her GYN for hirsutism  Lab Results   Component Value Date    GLUCOSE 111 (H) 09/13/2023    BUN 7 09/13/2023    CREATININE 0.80 09/13/2023    EGFRRESULT 122.0 04/14/2022    EGFR 98.7 09/13/2023    BCR 8.8 09/13/2023    K 4.1 09/13/2023    CO2 24.9 09/13/2023    CALCIUM 9.6 09/13/2023    PROTENTOTREF 7.3 04/14/2022    ALBUMIN 4.4 09/13/2023    BILITOT 0.2 09/13/2023    AST 17 09/13/2023    ALT 22 09/13/2023     Lab Results   Component Value Date    ALKPHOS 110 09/13/2023      GERD  She remains heartburn free on pantoprazole.  There is no history of any difficulty swallowing, vomiting, change in her bowel habits, hematochezia or melena  Lab Results   Component Value Date    WBC 14.65 (H) 09/13/2023    HGB 16.0 (H) 09/13/2023    HCT 51.4 (H) 09/13/2023    MCV 83.8 09/13/2023     09/13/2023      Labs  Most recent vitamin D 30.8    The following portions of the patient's history were reviewed and updated as appropriate: allergies, current medications, past medical history, past social history, and problem list.    Review of Systems   Constitutional:  Positive for fatigue. Negative for appetite change, chills, fever and unexpected weight change.   HENT:  Positive for congestion and rhinorrhea. Negative for ear pain, sneezing and sore throat.    Eyes:  Negative for visual disturbance.   Respiratory:  Negative for cough, shortness of breath and wheezing.         She denies any further nocturnal dyspnea or apparent snoring   Cardiovascular:  Negative for chest pain, palpitations and leg swelling.   Gastrointestinal:  Negative for abdominal pain, blood in stool, constipation, diarrhea, nausea and vomiting.   Endocrine: Negative for polydipsia and polyuria.   Genitourinary:  Negative for dysuria, hematuria and urgency.   Musculoskeletal:  Positive for arthralgias and myalgias. Negative for back pain, joint swelling and neck pain.   Skin:  Negative for rash.   Neurological:  Positive for headaches. Negative for weakness and numbness.   Psychiatric/Behavioral:  Negative for dysphoric mood, sleep disturbance and suicidal ideas. The patient is nervous/anxious.      Objective   Physical Exam  Constitutional:       General: She is not in acute distress.     Appearance: Normal appearance. She is well-developed. She is not diaphoretic.      Comments: Bright and in fair spirits. No apparent distress. No pallor, jaundice, diaphoresis, or cyanosis.   HENT:      Head: Atraumatic.      Right Ear:  Tympanic membrane, ear canal and external ear normal.      Left Ear: Tympanic membrane, ear canal and external ear normal.      Mouth/Throat:      Lips: No lesions.      Mouth: Mucous membranes are moist. No oral lesions.      Pharynx: No oropharyngeal exudate or posterior oropharyngeal erythema.   Eyes:      General: Lids are normal.      Extraocular Movements: Extraocular movements intact.      Conjunctiva/sclera: Conjunctivae normal.      Pupils: Pupils are equal.   Neck:      Thyroid: No thyroid mass or thyromegaly.      Vascular: No carotid bruit or JVD.      Trachea: Trachea normal. No tracheal deviation.   Cardiovascular:      Rate and Rhythm: Normal rate and regular rhythm.      Heart sounds: Normal heart sounds, S1 normal and S2 normal. No murmur heard.    No gallop.   Pulmonary:      Effort: Pulmonary effort is normal.      Breath sounds: Normal breath sounds.   Abdominal:      General: Bowel sounds are normal. There is no distension.   Musculoskeletal:      Right lower leg: No edema.      Left lower leg: No edema.   Lymphadenopathy:      Head:      Right side of head: No submental, submandibular, tonsillar, preauricular, posterior auricular or occipital adenopathy.      Left side of head: No submental, submandibular, tonsillar, preauricular, posterior auricular or occipital adenopathy.      Cervical: No cervical adenopathy.      Upper Body:      Right upper body: No supraclavicular adenopathy.      Left upper body: No supraclavicular adenopathy.   Skin:     General: Skin is warm.      Coloration: Skin is not cyanotic, jaundiced or pale.      Findings: No rash.      Nails: There is no clubbing.   Neurological:      Mental Status: She is alert and oriented to person, place, and time.      Cranial Nerves: No cranial nerve deficit, dysarthria or facial asymmetry.      Sensory: No sensory deficit.      Motor: No tremor.      Coordination: Coordination normal.      Gait: Gait normal.   Psychiatric:          Attention and Perception: Attention normal.         Mood and Affect: Mood normal.         Speech: Speech normal.         Behavior: Behavior normal.         Thought Content: Thought content normal.     Assessment & Plan   Problems Addressed this Visit          Cardiac and Vasculature    Chronic venous insufficiency    Essential hypertension   Hypertension:  BP remains a bit low . Evidence of target organ damage: none.  Encouraged to continue to work on diet and exercise plan.   Propranolol LA will be reduced to 60 daily    Relevant Medications    propranolol LA (INDERAL LA) 60 MG 24 hr capsule    Mixed hyperlipidemia  As above.   Continue current medication.    Palpitations  As above.  Encouraged report if any recurrence of her palpitations    Relevant Medications    propranolol LA (INDERAL LA) 60 MG 24 hr capsule       Endocrine and Metabolic    Class 1 obesity with serious comorbidity and body mass index (BMI) of 33.0 to 33.9 in adult    Type 2 diabetes mellitus with microalbuminuria, without long-term current use of insulin  Diabetes mellitus Type II, under excellent control.   Encouraged to continue to pursue ADA diet  Encouraged aerobic exercise.  Continue current medication    Vitamin D deficiency  Continue supplementation with monitoring.    Relevant Medications    cholecalciferol (Vitamin D) 25 MCG (1000 UT) tablet       Gastrointestinal Abdominal     Gastroesophageal reflux disease without esophagitis   Symptoms are currently well controlled.  Encouraged to report if this should change.  Continue current medication       Genitourinary and Reproductive     History of nephrolithiasis       Health Encounters    Healthcare maintenance  Flu shot administered.  Recommended an updated COVID-19 vaccination.    Relevant Orders    Fluzone (or Fluarix & Flulaval for VFC) >6mos (Completed)       Mental Health    Bipolar disorder, in partial remission, most recent episode mixed  Stable.  Supportive therapy.   Continue  current medication..  Follow up with psychiatry       Neuro    Neuropathy       Sleep    Snoring       Tobacco    Smoker                        Diagnoses         Codes Comments    Chronic venous insufficiency    -  Primary ICD-10-CM: I87.2  ICD-9-CM: 459.81     Essential hypertension     ICD-10-CM: I10  ICD-9-CM: 401.9     Mixed hyperlipidemia     ICD-10-CM: E78.2  ICD-9-CM: 272.2     Class 1 obesity with serious comorbidity and body mass index (BMI) of 33.0 to 33.9 in adult, unspecified obesity type     ICD-10-CM: E66.9, Z68.33  ICD-9-CM: 278.00, V85.33     Type 2 diabetes mellitus with microalbuminuria, without long-term current use of insulin     ICD-10-CM: E11.29, R80.9  ICD-9-CM: 250.40, 791.0     Vitamin D deficiency     ICD-10-CM: E55.9  ICD-9-CM: 268.9     Gastroesophageal reflux disease without esophagitis     ICD-10-CM: K21.9  ICD-9-CM: 530.81     History of nephrolithiasis     ICD-10-CM: Z87.442  ICD-9-CM: V13.01     Healthcare maintenance     ICD-10-CM: Z00.00  ICD-9-CM: V70.0     Neuropathy     ICD-10-CM: G62.9  ICD-9-CM: 355.9     Bipolar disorder, in partial remission, most recent episode mixed     ICD-10-CM: F31.77  ICD-9-CM: 296.65     Snoring     ICD-10-CM: R06.83  ICD-9-CM: 786.09     Smoker     ICD-10-CM: F17.200  ICD-9-CM: 305.1     Palpitations     ICD-10-CM: R00.2  ICD-9-CM: 785.1     Encounter for immunization     ICD-10-CM: Z23  ICD-9-CM: V03.89

## 2023-10-16 RX ORDER — AMOXICILLIN AND CLAVULANATE POTASSIUM 875; 125 MG/1; MG/1
1 TABLET, FILM COATED ORAL 2 TIMES DAILY
Qty: 14 TABLET | Refills: 0 | Status: SHIPPED | OUTPATIENT
Start: 2023-10-16

## 2023-10-17 NOTE — TELEPHONE ENCOUNTER
Sent PA request    ----- Message from Jamie Santos MD sent at 4/25/2022  6:04 PM EDT -----  Needs pa on vascepa       p (1480)     HPI:  76M w/ hx of b/l VA stenosis s/p R VA stent in 2016 on ASA/plavix, prior b/l cerebellar infarcts, HTN, HLD, DM2, HLD, HTN, b/l cataracts adm stroke neurology for 3wk hx of R eye vision changes believed to be embolic in nature. Reports periods of blackness in R lower visual field.    NIHSS:0  preMRS:0 (17 Oct 2023 01:05)    Imaging:   CTA w/ severe (70%) BHANU stenosis.    Exam: AOx3, RONDON 5/5. SILT. No appreciated visual field cut.    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 1000 milliLiter(s) IV Continuous <Continuous>  dextrose 5%. 1000 milliLiter(s) IV Continuous <Continuous>  dextrose 50% Injectable 12.5 Gram(s) IV Push once  dextrose 50% Injectable 25 Gram(s) IV Push once  dextrose 50% Injectable 25 Gram(s) IV Push once  dextrose Oral Gel 15 Gram(s) Oral once PRN  famotidine    Tablet 20 milliGRAM(s) Oral daily  folic acid 1 milliGRAM(s) Oral daily  glucagon  Injectable 1 milliGRAM(s) IntraMuscular once  insulin lispro (ADMELOG) corrective regimen sliding scale   SubCutaneous three times a day before meals  insulin lispro (ADMELOG) corrective regimen sliding scale   SubCutaneous at bedtime  pantoprazole    Tablet 40 milliGRAM(s) Oral before breakfast      SOCIAL HISTORY:   Occupation:   Marital Status:     FAMILY HISTORY:  Family history of asthma (Mother)    Family history of heart disease (Father)        ROS: Negative except per HPI    LABS:  PT/INR - ( 16 Oct 2023 19:19 )   PT: 10.2 sec;   INR: 0.97 ratio         PTT - ( 16 Oct 2023 19:19 )  PTT:31.5 sec                        12.0   8.05  )-----------( 287      ( 17 Oct 2023 05:59 )             39.0     10-17    141  |  106  |  18  ----------------------------<  108<H>  4.2   |  24  |  0.92    Ca    9.2      17 Oct 2023 05:55    TPro  7.9  /  Alb  4.7  /  TBili  0.3  /  DBili  x   /  AST  18  /  ALT  23  /  AlkPhos  97  10-16

## 2023-11-04 DIAGNOSIS — R10.13 DYSPEPSIA: ICD-10-CM

## 2023-11-04 DIAGNOSIS — K21.9 GASTROESOPHAGEAL REFLUX DISEASE WITHOUT ESOPHAGITIS: Primary | ICD-10-CM

## 2023-11-08 ENCOUNTER — LAB (OUTPATIENT)
Dept: LAB | Facility: HOSPITAL | Age: 38
End: 2023-11-08
Payer: MEDICAID

## 2023-11-08 DIAGNOSIS — K21.9 GASTROESOPHAGEAL REFLUX DISEASE WITHOUT ESOPHAGITIS: ICD-10-CM

## 2023-11-08 DIAGNOSIS — R10.13 DYSPEPSIA: ICD-10-CM

## 2023-11-08 LAB
BASOPHILS # BLD AUTO: 0.08 10*3/MM3 (ref 0–0.2)
BASOPHILS NFR BLD AUTO: 0.7 % (ref 0–1.5)
DEPRECATED RDW RBC AUTO: 48 FL (ref 37–54)
EOSINOPHIL # BLD AUTO: 0.62 10*3/MM3 (ref 0–0.4)
EOSINOPHIL NFR BLD AUTO: 5.1 % (ref 0.3–6.2)
ERYTHROCYTE [DISTWIDTH] IN BLOOD BY AUTOMATED COUNT: 15.7 % (ref 12.3–15.4)
H PYLORI IGG SER IA-ACNC: NEGATIVE
HCT VFR BLD AUTO: 49.1 % (ref 34–46.6)
HGB BLD-MCNC: 15.2 G/DL (ref 12–15.9)
IMM GRANULOCYTES # BLD AUTO: 0.04 10*3/MM3 (ref 0–0.05)
IMM GRANULOCYTES NFR BLD AUTO: 0.3 % (ref 0–0.5)
LYMPHOCYTES # BLD AUTO: 2.98 10*3/MM3 (ref 0.7–3.1)
LYMPHOCYTES NFR BLD AUTO: 24.5 % (ref 19.6–45.3)
MCH RBC QN AUTO: 26.2 PG (ref 26.6–33)
MCHC RBC AUTO-ENTMCNC: 31 G/DL (ref 31.5–35.7)
MCV RBC AUTO: 84.5 FL (ref 79–97)
MONOCYTES # BLD AUTO: 0.78 10*3/MM3 (ref 0.1–0.9)
MONOCYTES NFR BLD AUTO: 6.4 % (ref 5–12)
NEUTROPHILS NFR BLD AUTO: 63 % (ref 42.7–76)
NEUTROPHILS NFR BLD AUTO: 7.68 10*3/MM3 (ref 1.7–7)
NRBC BLD AUTO-RTO: 0 /100 WBC (ref 0–0.2)
PLATELET # BLD AUTO: 302 10*3/MM3 (ref 140–450)
PMV BLD AUTO: 8.7 FL (ref 6–12)
RBC # BLD AUTO: 5.81 10*6/MM3 (ref 3.77–5.28)
WBC NRBC COR # BLD: 12.18 10*3/MM3 (ref 3.4–10.8)

## 2023-11-08 PROCEDURE — 85025 COMPLETE CBC W/AUTO DIFF WBC: CPT

## 2023-11-08 PROCEDURE — 36415 COLL VENOUS BLD VENIPUNCTURE: CPT

## 2023-11-08 PROCEDURE — 86677 HELICOBACTER PYLORI ANTIBODY: CPT

## 2023-12-14 DIAGNOSIS — R53.82 CHRONIC FATIGUE: ICD-10-CM

## 2023-12-14 DIAGNOSIS — G62.9 NEUROPATHY: ICD-10-CM

## 2023-12-14 DIAGNOSIS — E78.2 MIXED HYPERLIPIDEMIA: ICD-10-CM

## 2023-12-14 DIAGNOSIS — R80.9 TYPE 2 DIABETES MELLITUS WITH MICROALBUMINURIA, WITHOUT LONG-TERM CURRENT USE OF INSULIN: ICD-10-CM

## 2023-12-14 DIAGNOSIS — E11.29 TYPE 2 DIABETES MELLITUS WITH MICROALBUMINURIA, WITHOUT LONG-TERM CURRENT USE OF INSULIN: ICD-10-CM

## 2023-12-14 DIAGNOSIS — I10 ESSENTIAL HYPERTENSION: Primary | ICD-10-CM

## 2023-12-19 ENCOUNTER — TELEMEDICINE (OUTPATIENT)
Dept: PSYCHIATRY | Facility: CLINIC | Age: 38
End: 2023-12-19
Payer: MEDICAID

## 2023-12-19 ENCOUNTER — PRIOR AUTHORIZATION (OUTPATIENT)
Dept: PSYCHIATRY | Facility: CLINIC | Age: 38
End: 2023-12-19

## 2023-12-19 DIAGNOSIS — Z79.899 MEDICATION MANAGEMENT: ICD-10-CM

## 2023-12-19 DIAGNOSIS — F17.210 TOBACCO DEPENDENCE DUE TO CIGARETTES: ICD-10-CM

## 2023-12-19 DIAGNOSIS — F41.1 GENERALIZED ANXIETY DISORDER: ICD-10-CM

## 2023-12-19 DIAGNOSIS — F31.30 BIPOLAR I DISORDER, MOST RECENT EPISODE DEPRESSED: Primary | ICD-10-CM

## 2023-12-19 DIAGNOSIS — F51.05 INSOMNIA DUE TO MENTAL DISORDER: ICD-10-CM

## 2023-12-19 PROCEDURE — 1159F MED LIST DOCD IN RCRD: CPT | Performed by: NURSE PRACTITIONER

## 2023-12-19 PROCEDURE — 99214 OFFICE O/P EST MOD 30 MIN: CPT | Performed by: NURSE PRACTITIONER

## 2023-12-19 PROCEDURE — 1160F RVW MEDS BY RX/DR IN RCRD: CPT | Performed by: NURSE PRACTITIONER

## 2023-12-19 RX ORDER — LURASIDONE HYDROCHLORIDE 120 MG/1
120 TABLET, FILM COATED ORAL DAILY
Qty: 30 TABLET | Refills: 2 | Status: SHIPPED | OUTPATIENT
Start: 2023-12-19

## 2023-12-19 RX ORDER — BUPROPION HYDROCHLORIDE 150 MG/1
150 TABLET, EXTENDED RELEASE ORAL 2 TIMES DAILY
Qty: 60 TABLET | Refills: 2 | Status: SHIPPED | OUTPATIENT
Start: 2023-12-19

## 2023-12-19 NOTE — PROGRESS NOTES
"This provider is located at the [facility], [address] [place] [address], [id number] using a secure [employer] Video Visit through [employer]. Patient is being seen remotely via telehealth at their home address in [place], and stated they are in a secure environment for this session. The patient's condition being diagnosed/treated is appropriate for telemedicine. The provider identified herself as well as her credentials.   The patient, and/or patients guardian, consent to be seen remotely, and when consent is given they understand that the consent allows for patient identifiable information to be sent to a third party as needed.   They may refuse to be seen remotely at any time. The electronic data is encrypted and password protected, and the patient and/or guardian has been advised of the potential risks to privacy not withstanding such measures.    Subjective   Jessica Harris is a 38 y.o. female who presents today for follow up    Chief Complaint:  Bipolar disorder    History of Present Illness: Patient presents as follow up via telehealth visit. She reports having rapid cycling episodes in which she has days that she cannot get out of bed followed by a couple days that she cleans excessively. States she is taking Ozempic but started medication prior to last appointment. States \"I feel like I'm going crazy\". She reports sleep is fair. Reports appetite is good. She denies SI/HI/AVH.    The following portions of the patient's history were reviewed and updated as appropriate: allergies, current medications, past family history, past medical history, past social history, past surgical history and problem list.      Past Medical History:  Past Medical History:   Diagnosis Date    Anxiety     Asthma     Bipolar 1 disorder     Depression     Diabetes mellitus     Elevated cholesterol     GERD (gastroesophageal reflux disease)     History of bronchitis     History of ear infections     History of stomach " "ulcers     History of streptococcal infection     Hyperlipidemia     Hypertension     Insomnia     Kidney stones     Urinary tract infection        Social History:  Social History     Socioeconomic History    Marital status: Single   Tobacco Use    Smoking status: Every Day     Packs/day: .5     Types: Cigarettes    Smokeless tobacco: Never    Tobacco comments:     \"thinking about it\"   Vaping Use    Vaping Use: Never used   Substance and Sexual Activity    Alcohol use: No    Drug use: No    Sexual activity: Defer     Birth control/protection: Pill       Family History:  Family History   Problem Relation Age of Onset    Cancer Mother     Stroke Mother     Lung disease Mother     No Known Problems Father     Cancer Other     Heart disease Other     Stroke Other        Past Surgical History:  Past Surgical History:   Procedure Laterality Date    ADENOIDECTOMY      CHOLECYSTECTOMY      D & C HYSTEROSCOPY N/A 05/16/2022    Procedure: DILATATION AND CURETTAGE HYSTEROSCOPY;  Surgeon: Joey Andersen DO;  Location: [facility];  Service: Obstetrics/Gynecology;  Laterality: N/A;    DENTAL PROCEDURE      ENDOSCOPY      HERNIA REPAIR      umbilical    OVARIAN CYST DRAINAGE/EXCISION Left 05/16/2022    Procedure: OVARIAN CYSTECTOMY WITH EXTENSIVE LYSIS OF ADHESIONS;  Surgeon: Joey Andersen DO;  Location: [facility];  Service: Obstetrics/Gynecology;  Laterality: Left;    TONSILLECTOMY      TOTAL LAPAROSCOPIC HYSTERECTOMY N/A 6/22/2022    Procedure: ROBOTIC TOTAL LAPAROSCOPIC HYSTERECTOMY WITH BILATERAL SALPINGECTOMY;  Surgeon: Joey Andersen DO;  Location: [facility];  Service: Robotics - DaVinci;  Laterality: N/A;    UPPER GASTROINTESTINAL ENDOSCOPY      WISDOM TOOTH EXTRACTION         Problem List:  Patient Active Problem List   Diagnosis    Palpitations    Bipolar disorder, in partial remission, most recent episode mixed    Smoker    Gastroesophageal reflux disease without esophagitis    Chronic venous " insufficiency    Healthcare maintenance    Vitamin D deficiency    Snoring    History of nephrolithiasis    Essential hypertension    Class 1 obesity with serious comorbidity and body mass index (BMI) of 33.0 to 33.9 in adult    Type 2 diabetes mellitus with microalbuminuria, without long-term current use of insulin    Mixed hyperlipidemia    Neuropathy    S/P laparoscopic hysterectomy    Encounter for immunization        Allergy:   No Known Allergies     Current Medications:   Current Outpatient Medications   Medication Sig Dispense Refill    albuterol sulfate  (90 Base) MCG/ACT inhaler Inhale 2 puffs Every 6 (Six) Hours As Needed for Wheezing. 18 g 3    amoxicillin-clavulanate (AUGMENTIN) 875-125 MG per tablet Take 1 tablet by mouth 2 (Two) Times a Day. 14 tablet 0    atorvastatin (LIPITOR) 20 MG tablet Take 1 tablet by mouth Every Night. 90 tablet 3    cholecalciferol (Vitamin D) 25 MCG (1000 UT) tablet Take 1 tablet by mouth Daily. 30 tablet 5    dapagliflozin Propanediol (Farxiga) 10 MG tablet Take 10 mg by mouth Every Morning. 30 tablet 5    Glucose Blood (Blood Glucose Test) strip 1 daily 50 each 5    hydrOXYzine pamoate (VISTARIL) 25 MG capsule Take 1 capsule by mouth 3 (Three) Times a Day As Needed for Anxiety. 90 capsule 2    Lancets misc 1 daily 50 each 5    Lurasidone HCl (Latuda) 120 MG tablet tablet Take 1 tablet by mouth Daily. 30 tablet 2    Multi-Vitamin tablet TAKE ONE TABLET BY MOUTH EVERY DAY 30 tablet 5    ondansetron ODT (ZOFRAN-ODT) 4 MG disintegrating tablet Place 1 tablet on the tongue Every 8 (Eight) Hours As Needed for Nausea or Vomiting. 30 tablet 0    pantoprazole (PROTONIX) 40 MG EC tablet Take 1 tablet by mouth Daily. 90 tablet 3    propranolol LA (INDERAL LA) 60 MG 24 hr capsule Take 1 capsule by mouth Daily. 30 capsule 5    Semaglutide, 1 MG/DOSE, (Ozempic, 1 MG/DOSE,) 4 MG/3ML solution pen-injector Inject 1 mg under the skin into the appropriate area as directed 1 (One) Time  Per Week. 3 mL 5    spironolactone (ALDACTONE) 50 MG tablet TAKE ONE TABLET BY MOUTH EVERY DAY FOR FLUID      traZODone (DESYREL) 50 MG tablet Take 1 tablet by mouth every night at bedtime. 30 tablet 2    vitamin B-12 (CYANOCOBALAMIN) 1000 MCG tablet Take 2 tablets by mouth Daily. 180 tablet 3    buPROPion SR (Wellbutrin SR) 150 MG 12 hr tablet Take 1 tablet by mouth 2 (Two) Times a Day. 60 tablet 2     No current facility-administered medications for this visit.       Review of Symptoms:    Review of Systems   Constitutional:  Positive for fatigue.   HENT: Negative.     Eyes: Negative.    Respiratory: Negative.     Cardiovascular: Negative.    Gastrointestinal: Negative.    Musculoskeletal: Negative.    Skin: Negative.    Neurological: Negative.    Psychiatric/Behavioral:  Positive for sleep disturbance, depressed mood and stress. Negative for suicidal ideas. The patient is nervous/anxious.          Physical Exam:   There were no vitals taken for this visit.  There is no height or weight on file to calculate BMI.    Appearance: Well nourished female, appropriately dressed, appears stated age and in no acute distress  Gait, Station, Strength: PAULINE    Mental Status Exam:   Hygiene:   good  Cooperation:  Cooperative  Eye Contact:  Good  Psychomotor Behavior:  Appropriate  Affect:  Appropriate  Mood: anxious  Hopelessness: 3  Speech:  Normal  Thought Process:  Linear  Thought Content:  Mood congruent  Suicidal:  None  Homicidal:  None  Hallucinations:  None  Delusion:  None  Memory:  Intact  Orientation:  Person, Place, Time, and Situation  Reliability:  good  Insight:  Good  Judgement:  Fair  Impulse Control:  Fair  Physical/Medical Issues:  Yes See med hx      PHQ-Score Total:  PHQ-9 Total Score: (P) 6   Patient screened positive for depression based on a PHQ-9 score of 6 on 12/19/2023. Follow-up recommendations include: Prescribed antidepressant medication treatment and Suicide Risk Assessment  performed.    ANTON-7  Feeling nervous, anxious or on edge: (P) More than half the days  Not being able to stop or control worrying: (P) More than half the days  Worrying too much about different things: (P) More than half the days  Trouble Relaxing: (P) More than half the days  Being so restless that it is hard to sit still: (P) More than half the days  Feeling afraid as if something awful might happen: (P) More than half the days  Becoming easily annoyed or irritable: (P) More than half the days  ANTON 7 Total Score: (P) 14  If you checked any problems, how difficult have these problems made it for you to do your work, take care of things at home, or get along with other people: (P) Extremely difficult        Lab Results:   No visits with results within 1 Month(s) from this visit.   Latest known visit with results is:   Lab on 11/08/2023   Component Date Value Ref Range Status    H. pylori IgG 11/08/2023 Negative  Negative, Equivocal Final    WBC 11/08/2023 12.18 (H)  3.40 - 10.80 10*3/mm3 Final    RBC 11/08/2023 5.81 (H)  3.77 - 5.28 10*6/mm3 Final    Hemoglobin 11/08/2023 15.2  12.0 - 15.9 g/dL Final    Hematocrit 11/08/2023 49.1 (H)  34.0 - 46.6 % Final    MCV 11/08/2023 84.5  79.0 - 97.0 fL Final    MCH 11/08/2023 26.2 (L)  26.6 - 33.0 pg Final    MCHC 11/08/2023 31.0 (L)  31.5 - 35.7 g/dL Final    RDW 11/08/2023 15.7 (H)  12.3 - 15.4 % Final    RDW-SD 11/08/2023 48.0  37.0 - 54.0 fl Final    MPV 11/08/2023 8.7  6.0 - 12.0 fL Final    Platelets 11/08/2023 302  140 - 450 10*3/mm3 Final    Neutrophil % 11/08/2023 63.0  42.7 - 76.0 % Final    Lymphocyte % 11/08/2023 24.5  19.6 - 45.3 % Final    Monocyte % 11/08/2023 6.4  5.0 - 12.0 % Final    Eosinophil % 11/08/2023 5.1  0.3 - 6.2 % Final    Basophil % 11/08/2023 0.7  0.0 - 1.5 % Final    Immature Grans % 11/08/2023 0.3  0.0 - 0.5 % Final    Neutrophils, Absolute 11/08/2023 7.68 (H)  1.70 - 7.00 10*3/mm3 Final    Lymphocytes, Absolute 11/08/2023 2.98  0.70 - 3.10  10*3/mm3 Final    Monocytes, Absolute 11/08/2023 0.78  0.10 - 0.90 10*3/mm3 Final    Eosinophils, Absolute 11/08/2023 0.62 (H)  0.00 - 0.40 10*3/mm3 Final    Basophils, Absolute 11/08/2023 0.08  0.00 - 0.20 10*3/mm3 Final    Immature Grans, Absolute 11/08/2023 0.04  0.00 - 0.05 10*3/mm3 Final    nRBC 11/08/2023 0.0  0.0 - 0.2 /100 WBC Final       Assessment & Plan   Diagnoses and all orders for this visit:    1. Bipolar I disorder, most recent episode depressed (Primary)  -     Lurasidone HCl (Latuda) 120 MG tablet tablet; Take 1 tablet by mouth Daily.  Dispense: 30 tablet; Refill: 2  -     buPROPion SR (Wellbutrin SR) 150 MG 12 hr tablet; Take 1 tablet by mouth 2 (Two) Times a Day.  Dispense: 60 tablet; Refill: 2    2. Generalized anxiety disorder  -     Lurasidone HCl (Latuda) 120 MG tablet tablet; Take 1 tablet by mouth Daily.  Dispense: 30 tablet; Refill: 2    3. Insomnia due to mental disorder    4. Medication management    5. Tobacco dependence due to cigarettes        -Will switch bupropion to SR as Ozempic may be delaying absorption.    -Begin bupropion  mg twice daily for depression  -Increase lurasidone 120 mg daily for mood. Lengthy discussion with patient on the possible side effects of antipsychotic medications including increased cholesterol, increased blood sugar, and possibility of weight gain.  Also discussed the need to monitor lab work associated with this.  The risk of muscle movement disorders with this class of medication was also discussed.  -Continue trazodone 50 mg nightly for sleep  -Continue hydroxyzine pamoate 25 mg 3 times daily as needed for anxiety  -Encouraged patient to begin psychotherapy  -JULIET reviewed and appropriate. Patient counseled on use of controlled substances  -The benefits of a healthy diet and exercise were discussed with patient, especially the positive effects they have on mental health. Patient encouraged to consider lifestyle modification regarding  diet and  exercise patterns to maximize results of mental health treatment.  -Reviewed previous available documentation  -Reviewed most recent available labs   -Jessica Harris  reports that she has been smoking cigarettes. She has been smoking an average of .5 packs per day. She has never used smokeless tobacco.. I have educated her on the risk of diseases from using tobacco products such as cancer, COPD, heart disease, reproductive problems, low birth weight, and cataracts. I advised her to quit and she is not willing to quit.  I spent 3  minutes counseling the patient.             Visit Diagnoses:    ICD-10-CM ICD-9-CM   1. Bipolar I disorder, most recent episode depressed  F31.30 296.50   2. Generalized anxiety disorder  F41.1 300.02   3. Insomnia due to mental disorder  F51.05 300.9     327.02   4. Medication management  Z79.899 V58.69   5. Tobacco dependence due to cigarettes  F17.210 305.1       TREATMENT PLAN/GOALS: Continue supportive psychotherapy efforts and medications as indicated. Treatment and medication options discussed during today's visit. Patient acknowledged and verbally consented to continue with current treatment plan and was educated on the importance of compliance with treatment and follow-up appointments.    MEDICATION ISSUES:    Discussed medication options and treatment plan of prescribed medication as well as the risks, benefits, and side effects including potential falls, possible impaired driving and metabolic adversities among others. Patient is agreeable to call the office with any worsening of symptoms or onset of side effects. Patient is agreeable to call 911 or go to the nearest ER should he/she begin having SI/HI.     MEDS ORDERED DURING VISIT:  New Medications Ordered This Visit   Medications    Lurasidone HCl (Latuda) 120 MG tablet tablet     Sig: Take 1 tablet by mouth Daily.     Dispense:  30 tablet     Refill:  2    buPROPion SR (Wellbutrin SR) 150 MG 12 hr tablet     Sig: Take 1  tablet by mouth 2 (Two) Times a Day.     Dispense:  60 tablet     Refill:  2       Return in about 6 weeks (around 1/30/2024), or if symptoms worsen or fail to improve.               This document has been electronically signed by JANIE Snyder  December 19, 2023 08:35 EST    Part of this note may be an electronic transcription/translation of spoken language to printed text using the Dragon Dictation System.

## 2023-12-19 NOTE — TELEPHONE ENCOUNTER
PA submitted waiting on determination    [employer] (Key: [id number])  PA Case ID #: [id number]

## 2024-01-15 RX ORDER — ONDANSETRON 4 MG/1
4 TABLET, ORALLY DISINTEGRATING ORAL EVERY 8 HOURS PRN
Qty: 30 TABLET | Refills: 0 | Status: SHIPPED | OUTPATIENT
Start: 2024-01-15

## 2024-01-25 ENCOUNTER — TELEMEDICINE (OUTPATIENT)
Dept: FAMILY MEDICINE CLINIC | Facility: CLINIC | Age: 39
End: 2024-01-25
Payer: MEDICAID

## 2024-01-25 DIAGNOSIS — R00.2 PALPITATIONS: ICD-10-CM

## 2024-01-25 DIAGNOSIS — E78.2 MIXED HYPERLIPIDEMIA: ICD-10-CM

## 2024-01-25 DIAGNOSIS — Z87.442 HISTORY OF NEPHROLITHIASIS: ICD-10-CM

## 2024-01-25 DIAGNOSIS — I87.2 CHRONIC VENOUS INSUFFICIENCY: ICD-10-CM

## 2024-01-25 DIAGNOSIS — G62.9 NEUROPATHY: ICD-10-CM

## 2024-01-25 DIAGNOSIS — J45.20 MILD INTERMITTENT ASTHMA, UNSPECIFIED WHETHER COMPLICATED: Chronic | ICD-10-CM

## 2024-01-25 DIAGNOSIS — K21.9 GASTROESOPHAGEAL REFLUX DISEASE WITHOUT ESOPHAGITIS: ICD-10-CM

## 2024-01-25 DIAGNOSIS — F17.200 SMOKER: ICD-10-CM

## 2024-01-25 DIAGNOSIS — G62.9 NEUROPATHY: Chronic | ICD-10-CM

## 2024-01-25 DIAGNOSIS — I10 ESSENTIAL HYPERTENSION: ICD-10-CM

## 2024-01-25 DIAGNOSIS — E66.9 CLASS 1 OBESITY WITH SERIOUS COMORBIDITY AND BODY MASS INDEX (BMI) OF 33.0 TO 33.9 IN ADULT, UNSPECIFIED OBESITY TYPE: ICD-10-CM

## 2024-01-25 DIAGNOSIS — Z00.00 HEALTHCARE MAINTENANCE: ICD-10-CM

## 2024-01-25 DIAGNOSIS — E55.9 VITAMIN D DEFICIENCY: ICD-10-CM

## 2024-01-25 DIAGNOSIS — R06.83 SNORING: ICD-10-CM

## 2024-01-25 DIAGNOSIS — F31.77 BIPOLAR DISORDER, IN PARTIAL REMISSION, MOST RECENT EPISODE MIXED: ICD-10-CM

## 2024-01-25 DIAGNOSIS — R80.9 TYPE 2 DIABETES MELLITUS WITH MICROALBUMINURIA, WITHOUT LONG-TERM CURRENT USE OF INSULIN: ICD-10-CM

## 2024-01-25 DIAGNOSIS — E11.29 TYPE 2 DIABETES MELLITUS WITH MICROALBUMINURIA, WITHOUT LONG-TERM CURRENT USE OF INSULIN: ICD-10-CM

## 2024-01-25 RX ORDER — LANOLIN ALCOHOL/MO/W.PET/CERES
2000 CREAM (GRAM) TOPICAL DAILY
Qty: 60 TABLET | Refills: 5 | Status: SHIPPED | OUTPATIENT
Start: 2024-01-25

## 2024-01-25 RX ORDER — FLUTICASONE PROPIONATE AND SALMETEROL XINAFOATE 230; 21 UG/1; UG/1
2 AEROSOL, METERED RESPIRATORY (INHALATION)
Qty: 12 G | Refills: 5 | Status: SHIPPED | OUTPATIENT
Start: 2024-01-25

## 2024-01-25 RX ORDER — SEMAGLUTIDE 1.34 MG/ML
1 INJECTION, SOLUTION SUBCUTANEOUS WEEKLY
Qty: 3 ML | Refills: 5 | Status: SHIPPED | OUTPATIENT
Start: 2024-01-25

## 2024-01-25 RX ORDER — PANTOPRAZOLE SODIUM 40 MG/1
40 TABLET, DELAYED RELEASE ORAL DAILY
Qty: 30 TABLET | Refills: 5 | Status: SHIPPED | OUTPATIENT
Start: 2024-01-25

## 2024-01-25 RX ORDER — MELATONIN
1000 DAILY
Qty: 30 TABLET | Refills: 5 | Status: SHIPPED | OUTPATIENT
Start: 2024-01-25

## 2024-01-25 RX ORDER — ATORVASTATIN CALCIUM 20 MG/1
20 TABLET, FILM COATED ORAL NIGHTLY
Qty: 30 TABLET | Refills: 5 | Status: SHIPPED | OUTPATIENT
Start: 2024-01-25

## 2024-01-25 RX ORDER — LANCETS 30 GAUGE
EACH MISCELLANEOUS
Qty: 50 EACH | Refills: 5 | Status: SHIPPED | OUTPATIENT
Start: 2024-01-25

## 2024-01-25 RX ORDER — GLUCOSAMINE HCL/CHONDROITIN SU 500-400 MG
CAPSULE ORAL
Qty: 50 EACH | Refills: 5 | Status: SHIPPED | OUTPATIENT
Start: 2024-01-25

## 2024-01-25 RX ORDER — ONDANSETRON 4 MG/1
4 TABLET, ORALLY DISINTEGRATING ORAL EVERY 8 HOURS PRN
Qty: 30 TABLET | Refills: 0 | Status: SHIPPED | OUTPATIENT
Start: 2024-01-25

## 2024-01-25 RX ORDER — PROPRANOLOL HYDROCHLORIDE 20 MG/1
20 TABLET ORAL 2 TIMES DAILY
Qty: 60 TABLET | Refills: 5 | Status: SHIPPED | OUTPATIENT
Start: 2024-01-25

## 2024-01-25 RX ORDER — DOCUSATE SODIUM 100 MG/1
100 CAPSULE, LIQUID FILLED ORAL 2 TIMES DAILY
Qty: 60 CAPSULE | Refills: 5 | Status: SHIPPED | OUTPATIENT
Start: 2024-01-25

## 2024-01-25 RX ORDER — ALBUTEROL SULFATE 90 UG/1
2 AEROSOL, METERED RESPIRATORY (INHALATION) EVERY 6 HOURS PRN
Qty: 18 G | Refills: 5 | Status: SHIPPED | OUTPATIENT
Start: 2024-01-25

## 2024-01-25 NOTE — PROGRESS NOTES
Subjective   Jessica Harris is a 38 y.o. female.     You have chosen to receive care through a telehealth visit.  Do you consent to use a video/audio connection for your medical care today? Yes    Chief Complaint  She returns for a scheduled reassessment of multiple medical problems including type 2 diabetes mellitus, hyperlipidemia, essential hypertension, gastroesophageal reflux disease, and asthma with a recent exacerbation    History of Present Illness     Asthma  She gives a 1 to 2 week history of dry cough, shortness of breath, and wheezing.  The symptoms have been associated with nasal congestion, paramedical pressure, and postnasal drip.  There is no history of any other upper respiratory tract symptoms, and she denies any chest pain, hemoptysis, fever, or chills.  She is currently using her rescue inhaler 2-3 times daily.  She resumed an old prescription for fluticasone-salmeterol last week and feels that it has helped but ran out yesterday.  She continues to smoke    Type 2 Diabetes Mellitus  She continues to deny any paresthesia of the feet, visual disturbances, polydipsia, polyuria, hypoglycemia or foot ulcerations.  She has been following her diet and excise plan.  She is currently on dapagliflozin and semaglutide.  She has experienced nausea and constipation with the latter.  She is currently taking odansetron aand docusate and feels that both have helped some. She was taken off metformin after experiencing intermittent weak spells with a prompt improvement.  She underwent a diabetic exam within the last year    Hyperlipidemia  She remains on atorvastatin along with OTC fish oil capsules. She experienced GI upset with vascepa    Essential Hypertension  Home blood pressure readings: not doing.  She denies any recent lower extremity edema, and there is no history of any chest pain, or palpitations.  She is currently taking propranolol along with spironolactone prescribed by her GYN for hirsutism.  She  has not felt any different with the lower dose of propranolol LA    GERD  She remains heartburn free on pantoprazole.  There is no history of any difficulty swallowing, vomiting, change in her bowel habits, hematochezia or melena    The following portions of the patient's history were reviewed and updated as appropriate: allergies, current medications, past medical history, past social history, and problem list.    Review of Systems   Constitutional:  Positive for fatigue. Negative for chills and fever.   HENT:  Positive for congestion, postnasal drip, rhinorrhea, sinus pressure and sneezing. Negative for ear pain, sore throat and voice change.    Eyes:  Negative for visual disturbance.   Respiratory:  Positive for cough, shortness of breath and wheezing.    Cardiovascular:  Negative for chest pain, palpitations and leg swelling.   Gastrointestinal:  Positive for constipation and nausea. Negative for abdominal pain, blood in stool, diarrhea and vomiting.   Genitourinary:  Negative for dysuria and hematuria.   Musculoskeletal:  Positive for arthralgias and myalgias. Negative for back pain and joint swelling.   Skin:  Negative for rash.   Neurological:  Positive for headache. Negative for dizziness, weakness and numbness.   Psychiatric/Behavioral:  Negative for sleep disturbance and depressed mood. The patient is nervous/anxious.      Objective   Physical Exam  Constitutional:       Comments: Bright and in good spirits. No apparent distress. No pallor, jaundice, diaphoresis, or cyanosis   Pulmonary:      Comments: No cough or audible wheezing  Skin:     Coloration: Skin is not cyanotic, jaundiced or pale.   Neurological:      Mental Status: She is alert and oriented to person, place, and time.      Cranial Nerves: No cranial nerve deficit or dysarthria.   Psychiatric:         Attention and Perception: Attention normal.         Mood and Affect: Mood normal.         Speech: Speech normal.         Behavior: Behavior  normal.         Thought Content: Thought content normal.       Assessment & Plan   Problems Addressed this Visit          Cardiac and Vasculature    Chronic venous insufficiency    Essential hypertension  Encouraged to continue to work on her diet and exercise plan.  Propranolol will be reduced to 20 twice daily  Will continue to monitor    Relevant Medications    propranolol (INDERAL) 20 MG tablet    Other Relevant Orders    CBC & Differential    Comprehensive Metabolic Panel    Mixed hyperlipidemia  As above.   Continue current medication.    Relevant Medications    atorvastatin (LIPITOR) 20 MG tablet    Other Relevant Orders    Comprehensive Metabolic Panel    Lipid Panel    Palpitations  As above.  Encouraged to report if any worse or if any new symptoms or concerns.    Relevant Orders    CBC & Differential    Comprehensive Metabolic Panel    TSH       Endocrine and Metabolic    Class 1 obesity with serious comorbidity and body mass index (BMI) of 33.0 to 33.9 in adult    Type 2 diabetes mellitus with microalbuminuria, without long-term current use of insulin  Diabetes mellitus Type II, under excellent control.   Encouraged to continue to pursue ADA diet  Encouraged aerobic exercise.  Continue current medication  Scheduled for updated labs just prior to her return.    Relevant Medications    docusate sodium (COLACE) 100 MG capsule    dapagliflozin Propanediol (Farxiga) 10 MG tablet    Glucose Blood (Blood Glucose Test) strip    Lancets misc    ondansetron ODT (ZOFRAN-ODT) 4 MG disintegrating tablet    Semaglutide, 1 MG/DOSE, (Ozempic, 1 MG/DOSE,) 4 MG/3ML solution pen-injector    Other Relevant Orders    Comprehensive Metabolic Panel    TSH    Hemoglobin A1c    MicroAlbumin, Urine, Random - Urine, Clean Catch    Vitamin D deficiency    Relevant Medications    cholecalciferol (Vitamin D) 25 MCG (1000 UT) tablet    Other Relevant Orders    Vitamin D,25-Hydroxy       Gastrointestinal Abdominal     Gastroesophageal  reflux disease without esophagitis   Symptoms are currently well controlled.  Continue current medication.    Relevant Medications    pantoprazole (PROTONIX) 40 MG EC tablet    Other Relevant Orders    CBC & Differential       Genitourinary and Reproductive     History of nephrolithiasis       Health Encounters    Healthcare maintenance  Patient has already received a flu and updated COVID-19 shot       Mental Health    Bipolar disorder, in partial remission, most recent episode mixed  Stable.  Supportive therapy.   Continue current medication.  Follow up with psychiatry    Relevant Orders    TSH       Neuro    Neuropathy    Relevant Medications    vitamin B-12 (CYANOCOBALAMIN) 1000 MCG tablet       Sleep    Snoring       Tobacco    Smoker     Other Visit Diagnoses       Mild intermittent asthma  With recent exacerbation associated with a viral URTI  Reminded of the importance of smoking cessation  ICS/LABA will be resumed until 1-2 weeks after her symptoms resolve  Encouraged to report if any worse, any new symptoms, or if not resolving over the next 2-3 weeks    Smoking cessation and Albuteral Inh as needed     Relevant Medications    fluticasone-salmeterol (Advair HFA) 230-21 MCG/ACT inhaler    albuterol sulfate  (90 Base) MCG/ACT inhaler          Diagnoses         Codes Comments    Palpitations     ICD-10-CM: R00.2  ICD-9-CM: 785.1     Mixed hyperlipidemia     ICD-10-CM: E78.2  ICD-9-CM: 272.2     Essential hypertension     ICD-10-CM: I10  ICD-9-CM: 401.9     Chronic venous insufficiency     ICD-10-CM: I87.2  ICD-9-CM: 459.81     Vitamin D deficiency     ICD-10-CM: E55.9  ICD-9-CM: 268.9     Type 2 diabetes mellitus with microalbuminuria, without long-term current use of insulin     ICD-10-CM: E11.29, R80.9  ICD-9-CM: 250.40, 791.0     Class 1 obesity with serious comorbidity and body mass index (BMI) of 33.0 to 33.9 in adult, unspecified obesity type     ICD-10-CM: E66.9, Z68.33  ICD-9-CM: 278.00, V85.33      Gastroesophageal reflux disease without esophagitis     ICD-10-CM: K21.9  ICD-9-CM: 530.81     History of nephrolithiasis     ICD-10-CM: Z87.442  ICD-9-CM: V13.01     Healthcare maintenance     ICD-10-CM: Z00.00  ICD-9-CM: V70.0     Bipolar disorder, in partial remission, most recent episode mixed     ICD-10-CM: F31.77  ICD-9-CM: 296.65     Neuropathy     ICD-10-CM: G62.9  ICD-9-CM: 355.9     Snoring     ICD-10-CM: R06.83  ICD-9-CM: 786.09     Smoker     ICD-10-CM: F17.200  ICD-9-CM: 305.1     Mild intermittent asthma, unspecified whether complicated     ICD-10-CM: J45.20  ICD-9-CM: 493.90 Smoking cessation and Albuteral Inh as needed     Neuropathy     ICD-10-CM: G62.9  ICD-9-CM: 355.9 Trial of Vit B12 injections with low normal Vit B12 level

## 2024-02-05 ENCOUNTER — TELEMEDICINE (OUTPATIENT)
Dept: PSYCHIATRY | Facility: CLINIC | Age: 39
End: 2024-02-05
Payer: MEDICAID

## 2024-02-05 DIAGNOSIS — F51.05 INSOMNIA DUE TO MENTAL DISORDER: ICD-10-CM

## 2024-02-05 DIAGNOSIS — Z79.899 MEDICATION MANAGEMENT: ICD-10-CM

## 2024-02-05 DIAGNOSIS — F17.210 TOBACCO DEPENDENCE DUE TO CIGARETTES: ICD-10-CM

## 2024-02-05 DIAGNOSIS — F31.30 BIPOLAR I DISORDER, MOST RECENT EPISODE DEPRESSED: Primary | ICD-10-CM

## 2024-02-05 DIAGNOSIS — F41.1 GENERALIZED ANXIETY DISORDER: ICD-10-CM

## 2024-02-05 PROCEDURE — 99214 OFFICE O/P EST MOD 30 MIN: CPT | Performed by: NURSE PRACTITIONER

## 2024-02-05 PROCEDURE — 1160F RVW MEDS BY RX/DR IN RCRD: CPT | Performed by: NURSE PRACTITIONER

## 2024-02-05 PROCEDURE — 1159F MED LIST DOCD IN RCRD: CPT | Performed by: NURSE PRACTITIONER

## 2024-02-05 RX ORDER — TRAZODONE HYDROCHLORIDE 50 MG/1
50 TABLET ORAL
Qty: 30 TABLET | Refills: 2 | Status: SHIPPED | OUTPATIENT
Start: 2024-02-05

## 2024-02-05 RX ORDER — LURASIDONE HYDROCHLORIDE 120 MG/1
120 TABLET, FILM COATED ORAL DAILY
Qty: 30 TABLET | Refills: 2 | Status: SHIPPED | OUTPATIENT
Start: 2024-02-05

## 2024-02-05 RX ORDER — BUPROPION HYDROCHLORIDE 150 MG/1
150 TABLET, EXTENDED RELEASE ORAL 2 TIMES DAILY
Qty: 60 TABLET | Refills: 2 | Status: SHIPPED | OUTPATIENT
Start: 2024-02-05

## 2024-02-05 NOTE — PROGRESS NOTES
This provider is located at the [facility], [address] [place] [address], [facility] using a secure [email or web address] Video Visit through [employer]. Patient is being seen remotely via telehealth at their home address in [place], and stated they are in a secure environment for this session. The patient's condition being diagnosed/treated is appropriate for telemedicine. The provider identified herself as well as her credentials.   The patient, and/or patients guardian, consent to be seen remotely, and when consent is given they understand that the consent allows for patient identifiable information to be sent to a third party as needed.   They may refuse to be seen remotely at any time. The electronic data is encrypted and password protected, and the patient and/or guardian has been advised of the potential risks to privacy not withstanding such measures.    Subjective   Jessica Harris is a 38 y.o. female who presents today for follow up    Chief Complaint:  Bipolar disorder    History of Present Illness: Patient presents as follow-up via telehealth visit.  Reports medication adjustment has been helpful with moods, states she can also see a difference since switching from the extended release bupropion.  Reports not feeling as if she is rapid cycling any longer.  Rates anxiety 3/10; rates depression 3/10 with 10 being the worst.  She reports sleep is good with medication.  Reports appetite is good.  She denies SI/HI/AVH.    The following portions of the patient's history were reviewed and updated as appropriate: allergies, current medications, past family history, past medical history, past social history, past surgical history and problem list.      Past Medical History:  Past Medical History:   Diagnosis Date    Anxiety     Asthma     Bipolar 1 disorder     Depression     Diabetes mellitus     Elevated cholesterol     GERD (gastroesophageal reflux disease)     History of bronchitis     History of ear  "infections     History of stomach ulcers     History of streptococcal infection     Hyperlipidemia     Hypertension     Insomnia     Kidney stones     Urinary tract infection        Social History:  Social History     Socioeconomic History    Marital status: Single   Tobacco Use    Smoking status: Every Day     Packs/day: .5     Types: Cigarettes    Smokeless tobacco: Never    Tobacco comments:     \"thinking about it\"   Vaping Use    Vaping Use: Never used   Substance and Sexual Activity    Alcohol use: No    Drug use: No    Sexual activity: Defer     Birth control/protection: Pill       Family History:  Family History   Problem Relation Age of Onset    Cancer Mother     Stroke Mother     Lung disease Mother     No Known Problems Father     Cancer Other     Heart disease Other     Stroke Other        Past Surgical History:  Past Surgical History:   Procedure Laterality Date    ADENOIDECTOMY      CHOLECYSTECTOMY      D & C HYSTEROSCOPY N/A 05/16/2022    Procedure: DILATATION AND CURETTAGE HYSTEROSCOPY;  Surgeon: Joey Andersen DO;  Location: [facility];  Service: Obstetrics/Gynecology;  Laterality: N/A;    DENTAL PROCEDURE      ENDOSCOPY      HERNIA REPAIR      umbilical    OVARIAN CYST DRAINAGE/EXCISION Left 05/16/2022    Procedure: OVARIAN CYSTECTOMY WITH EXTENSIVE LYSIS OF ADHESIONS;  Surgeon: Joey Andersen DO;  Location: [facility];  Service: Obstetrics/Gynecology;  Laterality: Left;    TONSILLECTOMY      TOTAL LAPAROSCOPIC HYSTERECTOMY N/A 6/22/2022    Procedure: ROBOTIC TOTAL LAPAROSCOPIC HYSTERECTOMY WITH BILATERAL SALPINGECTOMY;  Surgeon: Joey Andersen DO;  Location: [facility];  Service: Robotics - DaVinci;  Laterality: N/A;    UPPER GASTROINTESTINAL ENDOSCOPY      WISDOM TOOTH EXTRACTION         Problem List:  Patient Active Problem List   Diagnosis    Palpitations    Bipolar disorder, in partial remission, most recent episode mixed    Smoker    Gastroesophageal reflux disease " without esophagitis    Chronic venous insufficiency    Healthcare maintenance    Vitamin D deficiency    Snoring    History of nephrolithiasis    Essential hypertension    Class 1 obesity with serious comorbidity and body mass index (BMI) of 33.0 to 33.9 in adult    Type 2 diabetes mellitus with microalbuminuria, without long-term current use of insulin    Mixed hyperlipidemia    Neuropathy    S/P laparoscopic hysterectomy    Encounter for immunization        Allergy:   No Known Allergies     Current Medications:   Current Outpatient Medications   Medication Sig Dispense Refill    albuterol sulfate  (90 Base) MCG/ACT inhaler Inhale 2 puffs Every 6 (Six) Hours As Needed for Wheezing. 18 g 5    atorvastatin (LIPITOR) 20 MG tablet Take 1 tablet by mouth Every Night. 30 tablet 5    buPROPion SR (Wellbutrin SR) 150 MG 12 hr tablet Take 1 tablet by mouth 2 (Two) Times a Day. 60 tablet 2    cholecalciferol (Vitamin D) 25 MCG (1000 UT) tablet Take 1 tablet by mouth Daily. 30 tablet 5    dapagliflozin Propanediol (Farxiga) 10 MG tablet Take 10 mg by mouth Every Morning. 30 tablet 5    docusate sodium (COLACE) 100 MG capsule Take 1 capsule by mouth 2 (Two) Times a Day. 60 capsule 5    fluticasone-salmeterol (Advair HFA) 230-21 MCG/ACT inhaler Inhale 2 puffs 2 (Two) Times a Day. 12 g 5    Glucose Blood (Blood Glucose Test) strip 1 daily 50 each 5    hydrOXYzine pamoate (VISTARIL) 25 MG capsule Take 1 capsule by mouth 3 (Three) Times a Day As Needed for Anxiety. 90 capsule 2    Lancets misc 1 daily 50 each 5    Lurasidone HCl (Latuda) 120 MG tablet tablet Take 1 tablet by mouth Daily. 30 tablet 2    Multi-Vitamin tablet TAKE ONE TABLET BY MOUTH EVERY DAY 30 tablet 5    ondansetron ODT (ZOFRAN-ODT) 4 MG disintegrating tablet Place 1 tablet on the tongue Every 8 (Eight) Hours As Needed for Nausea or Vomiting. 30 tablet 0    pantoprazole (PROTONIX) 40 MG EC tablet Take 1 tablet by mouth Daily. 30 tablet 5    propranolol  (INDERAL) 20 MG tablet Take 1 tablet by mouth 2 (Two) Times a Day. 60 tablet 5    Semaglutide, 1 MG/DOSE, (Ozempic, 1 MG/DOSE,) 4 MG/3ML solution pen-injector Inject 1 mg under the skin into the appropriate area as directed 1 (One) Time Per Week. 3 mL 5    spironolactone (ALDACTONE) 50 MG tablet TAKE ONE TABLET BY MOUTH EVERY DAY FOR FLUID      traZODone (DESYREL) 50 MG tablet Take 1 tablet by mouth every night at bedtime. 30 tablet 2    vitamin B-12 (CYANOCOBALAMIN) 1000 MCG tablet Take 2 tablets by mouth Daily. 60 tablet 5     No current facility-administered medications for this visit.       Review of Symptoms:    Review of Systems   Constitutional: Negative.    HENT: Negative.     Eyes: Negative.    Respiratory: Negative.     Cardiovascular: Negative.    Gastrointestinal: Negative.    Musculoskeletal: Negative.    Skin: Negative.    Neurological: Negative.    Psychiatric/Behavioral:  Positive for depressed mood. Negative for suicidal ideas. The patient is nervous/anxious.          Physical Exam:   There were no vitals taken for this visit.  There is no height or weight on file to calculate BMI.    Appearance: Well nourished female, appropriately dressed, appears stated age and in no acute distress  Gait, Station, Strength: PAULINE    Mental Status Exam:   Hygiene:   good  Cooperation:  Cooperative  Eye Contact:  Good  Psychomotor Behavior:  Appropriate  Affect:  Appropriate  Mood: normal  Hopelessness: Denies  Speech:  Normal  Thought Process:  Linear  Thought Content:  Mood congruent  Suicidal:  None  Homicidal:  None  Hallucinations:  None  Delusion:  None  Memory:  Intact  Orientation:  Person, Place, Time, and Situation  Reliability:  good  Insight:  Fair  Judgement:  Good  Impulse Control:  Good  Physical/Medical Issues:   See med hx      PHQ-Score Total:  PHQ-9 Total Score: (P) 3     ANTON-7  Feeling nervous, anxious or on edge: (P) Several days  Not being able to stop or control worrying: (P) Not at  all  Worrying too much about different things: (P) Several days  Trouble Relaxing: (P) Several days  Being so restless that it is hard to sit still: (P) Not at all  Feeling afraid as if something awful might happen: (P) Not at all  Becoming easily annoyed or irritable: (P) Several days  ANTON 7 Total Score: (P) 4  If you checked any problems, how difficult have these problems made it for you to do your work, take care of things at home, or get along with other people: (P) Somewhat difficult        Lab Results:   No visits with results within 1 Month(s) from this visit.   Latest known visit with results is:   Lab on 11/08/2023   Component Date Value Ref Range Status    H. pylori IgG 11/08/2023 Negative  Negative, Equivocal Final    WBC 11/08/2023 12.18 (H)  3.40 - 10.80 10*3/mm3 Final    RBC 11/08/2023 5.81 (H)  3.77 - 5.28 10*6/mm3 Final    Hemoglobin 11/08/2023 15.2  12.0 - 15.9 g/dL Final    Hematocrit 11/08/2023 49.1 (H)  34.0 - 46.6 % Final    MCV 11/08/2023 84.5  79.0 - 97.0 fL Final    MCH 11/08/2023 26.2 (L)  26.6 - 33.0 pg Final    MCHC 11/08/2023 31.0 (L)  31.5 - 35.7 g/dL Final    RDW 11/08/2023 15.7 (H)  12.3 - 15.4 % Final    RDW-SD 11/08/2023 48.0  37.0 - 54.0 fl Final    MPV 11/08/2023 8.7  6.0 - 12.0 fL Final    Platelets 11/08/2023 302  140 - 450 10*3/mm3 Final    Neutrophil % 11/08/2023 63.0  42.7 - 76.0 % Final    Lymphocyte % 11/08/2023 24.5  19.6 - 45.3 % Final    Monocyte % 11/08/2023 6.4  5.0 - 12.0 % Final    Eosinophil % 11/08/2023 5.1  0.3 - 6.2 % Final    Basophil % 11/08/2023 0.7  0.0 - 1.5 % Final    Immature Grans % 11/08/2023 0.3  0.0 - 0.5 % Final    Neutrophils, Absolute 11/08/2023 7.68 (H)  1.70 - 7.00 10*3/mm3 Final    Lymphocytes, Absolute 11/08/2023 2.98  0.70 - 3.10 10*3/mm3 Final    Monocytes, Absolute 11/08/2023 0.78  0.10 - 0.90 10*3/mm3 Final    Eosinophils, Absolute 11/08/2023 0.62 (H)  0.00 - 0.40 10*3/mm3 Final    Basophils, Absolute 11/08/2023 0.08  0.00 - 0.20 10*3/mm3  Final    Immature Grans, Absolute 11/08/2023 0.04  0.00 - 0.05 10*3/mm3 Final    nRBC 11/08/2023 0.0  0.0 - 0.2 /100 WBC Final       Assessment & Plan   Diagnoses and all orders for this visit:    1. Bipolar I disorder, most recent episode depressed (Primary)  -     buPROPion SR (Wellbutrin SR) 150 MG 12 hr tablet; Take 1 tablet by mouth 2 (Two) Times a Day.  Dispense: 60 tablet; Refill: 2  -     Lurasidone HCl (Latuda) 120 MG tablet tablet; Take 1 tablet by mouth Daily.  Dispense: 30 tablet; Refill: 2    2. Generalized anxiety disorder  -     Lurasidone HCl (Latuda) 120 MG tablet tablet; Take 1 tablet by mouth Daily.  Dispense: 30 tablet; Refill: 2    3. Insomnia due to mental disorder  -     traZODone (DESYREL) 50 MG tablet; Take 1 tablet by mouth every night at bedtime.  Dispense: 30 tablet; Refill: 2    4. Tobacco dependence due to cigarettes    5. Medication management        -Continue bupropion  mg twice daily for depression  -Continue lurasidone 120 mg daily for mood. Lengthy discussion with patient on the possible side effects of antipsychotic medications including increased cholesterol, increased blood sugar, and possibility of weight gain.  Also discussed the need to monitor lab work associated with this.  The risk of muscle movement disorders with this class of medication was also discussed.  -Continue trazodone 50 mg nightly for sleep  -Continue hydroxyzine pamoate 25 mg 3 times daily as needed for anxiety  -Encouraged patient to begin psychotherapy  -JULIET reviewed and appropriate. Patient counseled on use of controlled substances  -The benefits of a healthy diet and exercise were discussed with patient, especially the positive effects they have on mental health. Patient encouraged to consider lifestyle modification regarding  diet and exercise patterns to maximize results of mental health treatment.  -Reviewed previous available documentation  -Reviewed most recent available labs   -Jessica ZAPATA  Steven  reports that she has been smoking cigarettes. She has been smoking an average of .5 packs per day. She has never used smokeless tobacco.. I have educated her on the risk of diseases from using tobacco products such as cancer, COPD, heart disease, reproductive problems, low birth weight, and cataracts. I advised her to quit and she is not willing to quit.  I spent 3  minutes counseling the patient.             Visit Diagnoses:    ICD-10-CM ICD-9-CM   1. Bipolar I disorder, most recent episode depressed  F31.30 296.50   2. Generalized anxiety disorder  F41.1 300.02   3. Insomnia due to mental disorder  F51.05 300.9     327.02   4. Tobacco dependence due to cigarettes  F17.210 305.1   5. Medication management  Z79.899 V58.69       TREATMENT PLAN/GOALS: Continue supportive psychotherapy efforts and medications as indicated. Treatment and medication options discussed during today's visit. Patient acknowledged and verbally consented to continue with current treatment plan and was educated on the importance of compliance with treatment and follow-up appointments.    MEDICATION ISSUES:    Discussed medication options and treatment plan of prescribed medication as well as the risks, benefits, and side effects including potential falls, possible impaired driving and metabolic adversities among others. Patient is agreeable to call the office with any worsening of symptoms or onset of side effects. Patient is agreeable to call 911 or go to the nearest ER should he/she begin having SI/HI.     MEDS ORDERED DURING VISIT:  New Medications Ordered This Visit   Medications    buPROPion SR (Wellbutrin SR) 150 MG 12 hr tablet     Sig: Take 1 tablet by mouth 2 (Two) Times a Day.     Dispense:  60 tablet     Refill:  2    Lurasidone HCl (Latuda) 120 MG tablet tablet     Sig: Take 1 tablet by mouth Daily.     Dispense:  30 tablet     Refill:  2    traZODone (DESYREL) 50 MG tablet     Sig: Take 1 tablet by mouth every night at  bedtime.     Dispense:  30 tablet     Refill:  2       Return in about 3 months (around 5/5/2024), or if symptoms worsen or fail to improve.               This document has been electronically signed by JANIE Snyder  February 5, 2024 14:26 EST    Part of this note may be an electronic transcription/translation of spoken language to printed text using the Dragon Dictation System.

## 2024-03-18 DIAGNOSIS — R14.0 BLOATING: Primary | ICD-10-CM

## 2024-03-18 RX ORDER — DICYCLOMINE HYDROCHLORIDE 10 MG/1
10 CAPSULE ORAL 3 TIMES DAILY PRN
Qty: 90 CAPSULE | Refills: 0 | Status: SHIPPED | OUTPATIENT
Start: 2024-03-18

## 2024-04-23 DIAGNOSIS — E11.29 TYPE 2 DIABETES MELLITUS WITH MICROALBUMINURIA, WITHOUT LONG-TERM CURRENT USE OF INSULIN: Primary | ICD-10-CM

## 2024-04-23 DIAGNOSIS — R80.9 TYPE 2 DIABETES MELLITUS WITH MICROALBUMINURIA, WITHOUT LONG-TERM CURRENT USE OF INSULIN: Primary | ICD-10-CM

## 2024-04-23 RX ORDER — ACYCLOVIR 400 MG/1
1 TABLET ORAL ONCE
Qty: 1 EACH | Refills: 0 | Status: SHIPPED | OUTPATIENT
Start: 2024-04-23 | End: 2024-04-23

## 2024-04-23 RX ORDER — ACYCLOVIR 400 MG/1
1 TABLET ORAL
Qty: 3 EACH | Refills: 5 | Status: SHIPPED | OUTPATIENT
Start: 2024-04-23

## 2024-04-23 NOTE — PROGRESS NOTES
DATE OF CONSULTATION:  4/23/2024    REASON FOR REFERRAL: GERD     CHIEF COMPLAINT:  GERD, epigastric pain, abdominal bloating, belching, constipation    HISTORY OF PRESENT ILLNESS:   Jessica Harris is a very pleasant 38 y.o. female who is being seen today for evaluation and treatment of GERD.  Ms. Harris reports GERD has been a chronic issue.  She was previously evaluated by Dr. Albert in June 2022.  At that time, EGD was recommended but was not completed and she did not return for follow-up.  Since her last visit, she has continued to struggle with poorly controlled GERD.  Of note she is s/p cholecystectomy and hiatal hernia repair. At present, she is taking Protonix 40 mg PO daily. She complains of ~1-2 flares per week with burning in her throat/chest, regurgitation, belching and abdominal bloating. She reports flares occur particularly in the evening.  She denies dysphagia. She complains of nausea and vomiting (bile and undigested food)  a few times per month. She takes zofran as needed.  She reports her PCP obtained H. pylori testing which was negative.  She reports being started on Ozempic ~6 months ago for DM II which has exacerbated GERD symptoms.  With Ozempic, she reports losing a few pounds and her blood sugar has been better controlled.  She also reports chronic difficulty with constipation.  She has previously tried MiraLAX which was not helpful.  Without stool softeners, she reports she would go several days without having a BM.  She is currently taking Colace 200 mg p.o. daily and typically has 1-2 BMs per day with stool type IV or VII on BSS.  However, she has associated abdominal bloating and incomplete evacuation of her colon.  She denies having melena or bright red bleeding per rectum. She reports her maternal grandmother had colon cancer in her 70s . She denies having any first degree relatives with colon cancer. She reports she was given bentyl per PCP for generalized abdominal pain which she  "takes ~once per week.  She has no other complaints today.    PAST MEDICAL HISTORY:  Past Medical History:   Diagnosis Date    Anxiety     Asthma     Bipolar 1 disorder     Depression     Diabetes mellitus     Elevated cholesterol     GERD (gastroesophageal reflux disease)     History of bronchitis     History of ear infections     History of stomach ulcers     History of streptococcal infection     Hyperlipidemia     Hypertension     Insomnia     Kidney stones     Urinary tract infection        PAST SURGICAL HISTORY:  Past Surgical History:   Procedure Laterality Date    ADENOIDECTOMY      CHOLECYSTECTOMY      D & C HYSTEROSCOPY N/A 05/16/2022    Procedure: DILATATION AND CURETTAGE HYSTEROSCOPY;  Surgeon: Joey Andersen DO;  Location: [facility];  Service: Obstetrics/Gynecology;  Laterality: N/A;    DENTAL PROCEDURE      ENDOSCOPY      HERNIA REPAIR      umbilical    OVARIAN CYST DRAINAGE/EXCISION Left 05/16/2022    Procedure: OVARIAN CYSTECTOMY WITH EXTENSIVE LYSIS OF ADHESIONS;  Surgeon: Joey Andersen DO;  Location: [facility];  Service: Obstetrics/Gynecology;  Laterality: Left;    TONSILLECTOMY      TOTAL LAPAROSCOPIC HYSTERECTOMY N/A 6/22/2022    Procedure: ROBOTIC TOTAL LAPAROSCOPIC HYSTERECTOMY WITH BILATERAL SALPINGECTOMY;  Surgeon: Joey Andersen DO;  Location: [facility];  Service: Robotics - DaVinci;  Laterality: N/A;    UPPER GASTROINTESTINAL ENDOSCOPY      WISDOM TOOTH EXTRACTION         FAMILY HISTORY:  Family History   Problem Relation Age of Onset    Cancer Mother     Stroke Mother     Lung disease Mother     No Known Problems Father     Cancer Other     Heart disease Other     Stroke Other        SOCIAL HISTORY:  Social History     Socioeconomic History    Marital status: Single   Tobacco Use    Smoking status: Every Day     Current packs/day: 0.50     Types: Cigarettes    Smokeless tobacco: Never    Tobacco comments:     \"thinking about it\"   Vaping Use    Vaping status: " Never Used   Substance and Sexual Activity    Alcohol use: No    Drug use: No    Sexual activity: Defer     Birth control/protection: Pill     MEDICATIONS:  The current medication list was reviewed in the EMR    Current Outpatient Medications:     albuterol sulfate  (90 Base) MCG/ACT inhaler, Inhale 2 puffs Every 6 (Six) Hours As Needed for Wheezing., Disp: 18 g, Rfl: 5    atorvastatin (LIPITOR) 20 MG tablet, Take 1 tablet by mouth Every Night., Disp: 30 tablet, Rfl: 5    buPROPion SR (Wellbutrin SR) 150 MG 12 hr tablet, Take 1 tablet by mouth 2 (Two) Times a Day., Disp: 60 tablet, Rfl: 2    cholecalciferol (Vitamin D) 25 MCG (1000 UT) tablet, Take 1 tablet by mouth Daily., Disp: 30 tablet, Rfl: 5    Continuous Glucose  (Dexcom G7 ) device, Use 1 each 1 (One) Time for 1 dose., Disp: 1 each, Rfl: 0    Continuous Glucose Sensor (Dexcom G7 Sensor) misc, Use 1 each Every 10 (Ten) Days., Disp: 3 each, Rfl: 5    dapagliflozin Propanediol (Farxiga) 10 MG tablet, Take 10 mg by mouth Every Morning., Disp: 30 tablet, Rfl: 5    dicyclomine (BENTYL) 10 MG capsule, Take 1 capsule by mouth 3 (Three) Times a Day As Needed for Abdominal Cramping., Disp: 90 capsule, Rfl: 0    docusate sodium (COLACE) 100 MG capsule, Take 1 capsule by mouth 2 (Two) Times a Day., Disp: 60 capsule, Rfl: 5    fluticasone-salmeterol (Advair HFA) 230-21 MCG/ACT inhaler, Inhale 2 puffs 2 (Two) Times a Day., Disp: 12 g, Rfl: 5    Glucose Blood (Blood Glucose Test) strip, 1 daily, Disp: 50 each, Rfl: 5    hydrOXYzine pamoate (VISTARIL) 25 MG capsule, Take 1 capsule by mouth 3 (Three) Times a Day As Needed for Anxiety., Disp: 90 capsule, Rfl: 2    Lancets misc, 1 daily, Disp: 50 each, Rfl: 5    Lurasidone HCl (Latuda) 120 MG tablet tablet, Take 1 tablet by mouth Daily., Disp: 30 tablet, Rfl: 2    Multi-Vitamin tablet, TAKE ONE TABLET BY MOUTH EVERY DAY, Disp: 30 tablet, Rfl: 5    ondansetron ODT (ZOFRAN-ODT) 4 MG disintegrating tablet,  "Place 1 tablet on the tongue Every 8 (Eight) Hours As Needed for Nausea or Vomiting., Disp: 30 tablet, Rfl: 0    pantoprazole (PROTONIX) 40 MG EC tablet, Take 1 tablet by mouth Daily., Disp: 30 tablet, Rfl: 5    propranolol (INDERAL) 20 MG tablet, Take 1 tablet by mouth 2 (Two) Times a Day., Disp: 60 tablet, Rfl: 5    Semaglutide, 1 MG/DOSE, (Ozempic, 1 MG/DOSE,) 4 MG/3ML solution pen-injector, Inject 1 mg under the skin into the appropriate area as directed 1 (One) Time Per Week., Disp: 3 mL, Rfl: 5    spironolactone (ALDACTONE) 50 MG tablet, TAKE ONE TABLET BY MOUTH EVERY DAY FOR FLUID, Disp: , Rfl:     traZODone (DESYREL) 50 MG tablet, Take 1 tablet by mouth every night at bedtime., Disp: 30 tablet, Rfl: 2    vitamin B-12 (CYANOCOBALAMIN) 1000 MCG tablet, Take 2 tablets by mouth Daily., Disp: 60 tablet, Rfl: 5    ALLERGIES:  No Known Allergies      REVIEW OF SYSTEMS:    A comprehensive 14 point review of systems was performed.  Significant findings as mentioned above.  All other systems reviewed and are negative.        Physical Exam   Vital Signs: /71 (BP Location: Left arm, Patient Position: Sitting, Cuff Size: Large Adult)   Pulse 83   Ht 165.1 cm (65\")   Wt 89.8 kg (198 lb)   BMI 32.95 kg/m²     General: Well developed, well nourished, alert and oriented x 3, in no acute distress.   Head: ATNC   Eyes: PERRL, No evidence of conjunctivitis.   Nose: No nasal discharge.   Mouth: Oral mucosal membranes moist. No oral ulceration or hemorrhages.   Neck: Neck supple. No thyromegaly. No JVD.   Lungs: Clear in all fields to A&P without rales, rhonchi or wheezing.   Heart: Regular rate and rhythm. No murmurs, rubs, or gallops.   Abdomen: Soft. Bowel sounds are normoactive. Nontender with palpation. No Hepatosplenomegaly can be appreciated.   Extremities: No cyanosis or edema.   Neurologic: Grossly non-focal exam    RECENT LABS:  Lab Results   Component Value Date    WBC 12.18 (H) 11/08/2023    HGB 15.2 " 2023    HCT 49.1 (H) 2023    MCV 84.5 2023    RDW 15.7 (H) 2023     2023    NEUTRORELPCT 63.0 2023    LYMPHORELPCT 24.5 2023    MONORELPCT 6.4 2023    EOSRELPCT 5.1 2023    BASORELPCT 0.7 2023    NEUTROABS 7.68 (H) 2023    LYMPHSABS 2.98 2023       Lab Results   Component Value Date     2023    K 4.1 2023    CO2 24.9 2023     2023    BUN 7 2023    CREATININE 0.80 2023    EGFRIFNONA 89 2021    EGFRIFAFRI 96 2021    GLUCOSE 111 (H) 2023    CALCIUM 9.6 2023    ALKPHOS 110 2023    AST 17 2023    ALT 22 2023    BILITOT 0.2 2023    ALBUMIN 4.4 2023    PROTEINTOT 7.6 2023    MG 2.3 2022    PHOS 2.7 10/29/2018     ASSESSMENT & PLAN:  Jessica Harris is a very pleasant 38 y.o. female with    1.  GERD/epigastric pain:  2.  Nausea and vomiting:  3.  Abdominal bloatin.  Belching:    -As above, EGD was previously ordered by Dr. Albert in  but was not completed and patient did not return for follow-up.  EGD was again recommended to evaluate the above chronic/poorly controlled issues.  We discussed risks/benefits and patient is agreeable to proceed.  For now, we will continue Protonix 40 mg p.o. daily.  Will make adjustments to her medication pending EGD findings.  We also discussed how Ozempic can contribute to several GI issues including the above.  She was advised of portion control while taking GLP-1 therapy.  -Patient will return to clinic following EGD.  Will also likely obtain gastric emptying scan if continues to have ongoing symptoms.    5.  Constipation:  -This has been a chronic issue not improved with over-the-counter stool softeners.  Ozempic is likely contributing to worsening symptoms.  Therefore, will start trial of Linzess 145 mcg p.o. daily.      The patient was in agreement with the plan and all questions were  answered to her satisfaction.     Thank you so much for allowing us to participate in the care of Jessica Harris . Please do not hesitate to contact us with any questions or concerns.             Electronically Signed by: JANIE Madison , April 23, 2024 14:30 EDT       CC:   Jamie Santos MD

## 2024-04-24 ENCOUNTER — OFFICE VISIT (OUTPATIENT)
Dept: GASTROENTEROLOGY | Facility: CLINIC | Age: 39
End: 2024-04-24
Payer: MEDICAID

## 2024-04-24 ENCOUNTER — LAB (OUTPATIENT)
Dept: LAB | Facility: HOSPITAL | Age: 39
End: 2024-04-24
Payer: MEDICAID

## 2024-04-24 VITALS
WEIGHT: 198 LBS | HEIGHT: 65 IN | BODY MASS INDEX: 32.99 KG/M2 | HEART RATE: 83 BPM | DIASTOLIC BLOOD PRESSURE: 71 MMHG | SYSTOLIC BLOOD PRESSURE: 102 MMHG

## 2024-04-24 DIAGNOSIS — R10.13 EPIGASTRIC PAIN: ICD-10-CM

## 2024-04-24 DIAGNOSIS — F31.77 BIPOLAR DISORDER, IN PARTIAL REMISSION, MOST RECENT EPISODE MIXED: ICD-10-CM

## 2024-04-24 DIAGNOSIS — R14.0 ABDOMINAL BLOATING: ICD-10-CM

## 2024-04-24 DIAGNOSIS — E55.9 VITAMIN D DEFICIENCY: ICD-10-CM

## 2024-04-24 DIAGNOSIS — I10 ESSENTIAL HYPERTENSION: ICD-10-CM

## 2024-04-24 DIAGNOSIS — K21.9 GASTROESOPHAGEAL REFLUX DISEASE, UNSPECIFIED WHETHER ESOPHAGITIS PRESENT: ICD-10-CM

## 2024-04-24 DIAGNOSIS — R80.9 TYPE 2 DIABETES MELLITUS WITH MICROALBUMINURIA, WITHOUT LONG-TERM CURRENT USE OF INSULIN: ICD-10-CM

## 2024-04-24 DIAGNOSIS — R53.82 CHRONIC FATIGUE: ICD-10-CM

## 2024-04-24 DIAGNOSIS — K59.04 CHRONIC IDIOPATHIC CONSTIPATION: Primary | ICD-10-CM

## 2024-04-24 DIAGNOSIS — E78.2 MIXED HYPERLIPIDEMIA: ICD-10-CM

## 2024-04-24 DIAGNOSIS — R14.2 BELCHING: ICD-10-CM

## 2024-04-24 DIAGNOSIS — E11.29 TYPE 2 DIABETES MELLITUS WITH MICROALBUMINURIA, WITHOUT LONG-TERM CURRENT USE OF INSULIN: ICD-10-CM

## 2024-04-24 DIAGNOSIS — R11.2 NAUSEA AND VOMITING, UNSPECIFIED VOMITING TYPE: ICD-10-CM

## 2024-04-24 DIAGNOSIS — R00.2 PALPITATIONS: ICD-10-CM

## 2024-04-24 DIAGNOSIS — K21.9 GASTROESOPHAGEAL REFLUX DISEASE WITHOUT ESOPHAGITIS: ICD-10-CM

## 2024-04-24 LAB — CHROMATIN AB SERPL-ACNC: <10 IU/ML (ref 0–14)

## 2024-04-24 PROCEDURE — 3078F DIAST BP <80 MM HG: CPT | Performed by: NURSE PRACTITIONER

## 2024-04-24 PROCEDURE — 1159F MED LIST DOCD IN RCRD: CPT | Performed by: NURSE PRACTITIONER

## 2024-04-24 PROCEDURE — 3074F SYST BP LT 130 MM HG: CPT | Performed by: NURSE PRACTITIONER

## 2024-04-24 PROCEDURE — 1160F RVW MEDS BY RX/DR IN RCRD: CPT | Performed by: NURSE PRACTITIONER

## 2024-04-24 PROCEDURE — 99214 OFFICE O/P EST MOD 30 MIN: CPT | Performed by: NURSE PRACTITIONER

## 2024-04-24 PROCEDURE — 86431 RHEUMATOID FACTOR QUANT: CPT

## 2024-04-25 ENCOUNTER — OFFICE VISIT (OUTPATIENT)
Dept: FAMILY MEDICINE CLINIC | Facility: CLINIC | Age: 39
End: 2024-04-25
Payer: MEDICAID

## 2024-04-25 VITALS
OXYGEN SATURATION: 97 % | RESPIRATION RATE: 14 BRPM | SYSTOLIC BLOOD PRESSURE: 118 MMHG | WEIGHT: 196 LBS | TEMPERATURE: 97.8 F | HEIGHT: 65 IN | DIASTOLIC BLOOD PRESSURE: 62 MMHG | HEART RATE: 84 BPM | BODY MASS INDEX: 32.65 KG/M2

## 2024-04-25 DIAGNOSIS — E55.9 VITAMIN D DEFICIENCY: ICD-10-CM

## 2024-04-25 DIAGNOSIS — E78.2 MIXED HYPERLIPIDEMIA: Primary | ICD-10-CM

## 2024-04-25 DIAGNOSIS — I87.2 CHRONIC VENOUS INSUFFICIENCY: ICD-10-CM

## 2024-04-25 DIAGNOSIS — F17.200 SMOKER: ICD-10-CM

## 2024-04-25 DIAGNOSIS — E11.29 TYPE 2 DIABETES MELLITUS WITH MICROALBUMINURIA, WITHOUT LONG-TERM CURRENT USE OF INSULIN: ICD-10-CM

## 2024-04-25 DIAGNOSIS — Z87.442 HISTORY OF NEPHROLITHIASIS: ICD-10-CM

## 2024-04-25 DIAGNOSIS — G62.9 NEUROPATHY: ICD-10-CM

## 2024-04-25 DIAGNOSIS — I10 ESSENTIAL HYPERTENSION: ICD-10-CM

## 2024-04-25 DIAGNOSIS — Z00.00 HEALTHCARE MAINTENANCE: ICD-10-CM

## 2024-04-25 DIAGNOSIS — K21.9 GASTROESOPHAGEAL REFLUX DISEASE WITHOUT ESOPHAGITIS: ICD-10-CM

## 2024-04-25 DIAGNOSIS — E66.9 CLASS 1 OBESITY WITH SERIOUS COMORBIDITY AND BODY MASS INDEX (BMI) OF 32.0 TO 32.9 IN ADULT, UNSPECIFIED OBESITY TYPE: ICD-10-CM

## 2024-04-25 DIAGNOSIS — R06.83 SNORING: ICD-10-CM

## 2024-04-25 DIAGNOSIS — R80.9 TYPE 2 DIABETES MELLITUS WITH MICROALBUMINURIA, WITHOUT LONG-TERM CURRENT USE OF INSULIN: ICD-10-CM

## 2024-04-25 DIAGNOSIS — F31.77 BIPOLAR DISORDER, IN PARTIAL REMISSION, MOST RECENT EPISODE MIXED: ICD-10-CM

## 2024-04-25 LAB
25(OH)D3+25(OH)D2 SERPL-MCNC: 33.8 NG/ML (ref 30–100)
ALBUMIN SERPL-MCNC: 4.6 G/DL (ref 3.5–5.2)
ALBUMIN/GLOB SERPL: 1.8 G/DL
ALP SERPL-CCNC: 99 U/L (ref 39–117)
ALT SERPL-CCNC: 19 U/L (ref 1–33)
ANA SER QL: NEGATIVE
AST SERPL-CCNC: 14 U/L (ref 1–32)
BASOPHILS # BLD AUTO: 0.1 10*3/MM3 (ref 0–0.2)
BASOPHILS NFR BLD AUTO: 0.7 % (ref 0–1.5)
BILIRUB SERPL-MCNC: 0.2 MG/DL (ref 0–1.2)
BUN SERPL-MCNC: 10 MG/DL (ref 6–20)
BUN/CREAT SERPL: 11.2 (ref 7–25)
CALCIUM SERPL-MCNC: 9.9 MG/DL (ref 8.6–10.5)
CHLORIDE SERPL-SCNC: 100 MMOL/L (ref 98–107)
CHOLEST SERPL-MCNC: 121 MG/DL (ref 0–200)
CK SERPL-CCNC: 51 U/L (ref 20–180)
CO2 SERPL-SCNC: 27 MMOL/L (ref 22–29)
CREAT SERPL-MCNC: 0.89 MG/DL (ref 0.57–1)
CRP SERPL-MCNC: 0.6 MG/DL (ref 0–0.5)
EGFRCR SERPLBLD CKD-EPI 2021: 86.3 ML/MIN/1.73
EOSINOPHIL # BLD AUTO: 0.57 10*3/MM3 (ref 0–0.4)
EOSINOPHIL NFR BLD AUTO: 4.3 % (ref 0.3–6.2)
ERYTHROCYTE [DISTWIDTH] IN BLOOD BY AUTOMATED COUNT: 14.1 % (ref 12.3–15.4)
ERYTHROCYTE [SEDIMENTATION RATE] IN BLOOD BY WESTERGREN METHOD: 16 MM/HR (ref 0–20)
GLOBULIN SER CALC-MCNC: 2.6 GM/DL
GLUCOSE SERPL-MCNC: 94 MG/DL (ref 65–99)
HBA1C MFR BLD: 5.7 % (ref 4.8–5.6)
HCT VFR BLD AUTO: 53 % (ref 34–46.6)
HDLC SERPL-MCNC: 35 MG/DL (ref 40–60)
HGB BLD-MCNC: 17.2 G/DL (ref 12–15.9)
IMM GRANULOCYTES # BLD AUTO: 0.08 10*3/MM3 (ref 0–0.05)
IMM GRANULOCYTES NFR BLD AUTO: 0.6 % (ref 0–0.5)
LDLC SERPL CALC-MCNC: 56 MG/DL (ref 0–100)
LYMPHOCYTES # BLD AUTO: 3.15 10*3/MM3 (ref 0.7–3.1)
LYMPHOCYTES NFR BLD AUTO: 23.6 % (ref 19.6–45.3)
MCH RBC QN AUTO: 27.6 PG (ref 26.6–33)
MCHC RBC AUTO-ENTMCNC: 32.5 G/DL (ref 31.5–35.7)
MCV RBC AUTO: 84.9 FL (ref 79–97)
MICROALBUMIN UR-MCNC: 28 UG/ML
MONOCYTES # BLD AUTO: 1.07 10*3/MM3 (ref 0.1–0.9)
MONOCYTES NFR BLD AUTO: 8 % (ref 5–12)
NEUTROPHILS # BLD AUTO: 8.37 10*3/MM3 (ref 1.7–7)
NEUTROPHILS NFR BLD AUTO: 62.8 % (ref 42.7–76)
NRBC BLD AUTO-RTO: 0 /100 WBC (ref 0–0.2)
PLATELET # BLD AUTO: 300 10*3/MM3 (ref 140–450)
POTASSIUM SERPL-SCNC: 4 MMOL/L (ref 3.5–5.2)
PROT SERPL-MCNC: 7.2 G/DL (ref 6–8.5)
RBC # BLD AUTO: 6.24 10*6/MM3 (ref 3.77–5.28)
SODIUM SERPL-SCNC: 137 MMOL/L (ref 136–145)
TRIGL SERPL-MCNC: 182 MG/DL (ref 0–150)
TSH SERPL DL<=0.005 MIU/L-ACNC: 2.82 UIU/ML (ref 0.27–4.2)
URATE SERPL-MCNC: 4.5 MG/DL (ref 2.4–5.7)
VLDLC SERPL CALC-MCNC: 30 MG/DL (ref 5–40)
WBC # BLD AUTO: 13.34 10*3/MM3 (ref 3.4–10.8)

## 2024-04-25 PROCEDURE — 99214 OFFICE O/P EST MOD 30 MIN: CPT | Performed by: GENERAL PRACTICE

## 2024-04-25 PROCEDURE — 3078F DIAST BP <80 MM HG: CPT | Performed by: GENERAL PRACTICE

## 2024-04-25 PROCEDURE — 3074F SYST BP LT 130 MM HG: CPT | Performed by: GENERAL PRACTICE

## 2024-04-25 NOTE — PROGRESS NOTES
Subjective   Jessica Harris is a 38 y.o. female.     Chief Complaint  She returns for a scheduled reassessment of multiple medical problems including asthma, type 2 diabetes mellitus, hyperlipidemia, essential hypertension, and gastroesophageal reflux disease    History of Present Illness     Asthma  She has been using fluticasone-salmeterol as prescribed with a significant improvement in her cough, shortness of breath, and wheezing.  She finds it helpful to use her albuterol 5 to 10 minutes before, but is otherwise not using this.  She denies any upper respiratory tract symptoms at present, and there is no history of any chest pain, hemoptysis, fever, or chills.  She continues to smoke    Type 2 Diabetes Mellitus  She has had intermittent mild hypoglycemia over the last few months.  There is no history of any paresthesia of the feet, visual disturbances, polydipsia, polyuria, or foot ulcerations.  She has been following her diet and excise plan, and remains on dapagliflozin and semaglutide.  She continues to have intermittent nausea, bloating, and constipation since starting the latter.  She was taken off metformin after experiencing intermittent weak spells with a prompt improvement.  She underwent a diabetic exam within the last year.  She had blood drawn yesterday but there are no results available    Hyperlipidemia  She remains on atorvastatin along with OTC fish oil capsules. She experienced GI upset with vascepa    Essential Hypertension  She continues to deny any lower extremity edema, and there is no history of any chest pain or recent palpitations.  She remains on propranolol along with spironolactone prescribed by her GYN for hirsutism.     GERD  She has had intermittent heartburn despite taking pantoprazole consistently.  This has been associated with occasional regurgitation..  There is no history of any difficulty swallowing, or carli vomiting.  She underwent a GI reassessment yesterday and an EGD is  planned    The following portions of the patient's history were reviewed and updated as appropriate: allergies, current medications, past medical history, past social history, and problem list.    Review of Systems   Constitutional:  Positive for fatigue. Negative for chills and fever.   HENT:  Negative for congestion, ear pain, postnasal drip, rhinorrhea, sinus pressure, sneezing, sore throat and voice change.    Eyes:  Negative for visual disturbance.   Respiratory:  Positive for cough, shortness of breath and wheezing.    Cardiovascular:  Negative for chest pain, palpitations and leg swelling.   Gastrointestinal:  Positive for abdominal distention, constipation, nausea and GERD. Negative for abdominal pain, blood in stool, diarrhea and vomiting.   Endocrine: Negative for polydipsia and polyphagia.   Genitourinary:  Negative for dysuria and hematuria.   Musculoskeletal:  Positive for arthralgias and myalgias. Negative for back pain and joint swelling.   Skin:  Negative for rash.   Neurological:  Positive for headache. Negative for dizziness, weakness and numbness.   Psychiatric/Behavioral:  Negative for sleep disturbance and depressed mood. The patient is nervous/anxious.      Objective   Physical Exam  Constitutional:       General: She is not in acute distress.     Appearance: Normal appearance. She is well-developed. She is not diaphoretic.      Comments: Accompanied by 2 of her stepchildren.  Bright and in fair spirits. No apparent distress. No pallor, jaundice, diaphoresis, or cyanosis.   HENT:      Head: Atraumatic.      Right Ear: Tympanic membrane, ear canal and external ear normal.      Left Ear: Tympanic membrane, ear canal and external ear normal.      Mouth/Throat:      Lips: No lesions.      Mouth: Mucous membranes are moist. No oral lesions.      Pharynx: No oropharyngeal exudate or posterior oropharyngeal erythema.   Eyes:      General: Lids are normal.      Extraocular Movements: Extraocular  movements intact.      Conjunctiva/sclera: Conjunctivae normal.      Pupils: Pupils are equal.   Neck:      Thyroid: No thyroid mass or thyromegaly.      Vascular: No carotid bruit or JVD.      Trachea: Trachea normal. No tracheal deviation.   Cardiovascular:      Rate and Rhythm: Normal rate and regular rhythm.      Heart sounds: Normal heart sounds, S1 normal and S2 normal. No murmur heard.     No gallop.   Pulmonary:      Effort: Pulmonary effort is normal.      Breath sounds: Normal breath sounds.   Abdominal:      General: Bowel sounds are normal. There is no distension.   Musculoskeletal:      Right lower leg: No edema.      Left lower leg: No edema.   Lymphadenopathy:      Head:      Right side of head: No submental, submandibular, tonsillar, preauricular, posterior auricular or occipital adenopathy.      Left side of head: No submental, submandibular, tonsillar, preauricular, posterior auricular or occipital adenopathy.      Cervical: No cervical adenopathy.      Upper Body:      Right upper body: No supraclavicular adenopathy.      Left upper body: No supraclavicular adenopathy.   Skin:     General: Skin is warm.      Coloration: Skin is not cyanotic, jaundiced or pale.      Findings: No rash.      Nails: There is no clubbing.   Neurological:      Mental Status: She is alert and oriented to person, place, and time.      Cranial Nerves: No cranial nerve deficit, dysarthria or facial asymmetry.      Sensory: No sensory deficit.      Motor: No tremor.      Coordination: Coordination normal.      Gait: Gait normal.   Psychiatric:         Attention and Perception: Attention normal.         Mood and Affect: Mood normal.         Speech: Speech normal.         Behavior: Behavior normal.         Thought Content: Thought content normal.       Assessment & Plan   Problems Addressed this Visit          Cardiac and Vasculature    Chronic venous insufficiency  Reminded regarding lifestyle modification    Essential  hypertension   Hypertension: at goal. Evidence of target organ damage: none.  Encouraged to continue to work on diet and exercise plan.   Continue current medication    Mixed hyperlipidemia  As above.   Continue current medication.       Endocrine and Metabolic    Class 1 obesity with serious comorbidity and body mass index (BMI) of 32.0 to 32.9 in adult    Type 2 diabetes mellitus with microalbuminuria, without long-term current use of insulin  Diabetes mellitus Type II, under excellent control.   Encouraged to continue to pursue ADA diet  Encouraged aerobic exercise.  Patient is aware of the potential GI side effects associated with semaglutide, but does not want to stop it unless absolutely necessary.  Will try to obtain results from yesterday's labs    Vitamin D deficiency       Gastrointestinal Abdominal     Gastroesophageal reflux disease without esophagitis   Symptoms are currently  intermittent  Reminded regarding lifestyle modification.  Continue current medication.  Follow up with GI        Genitourinary and Reproductive     History of nephrolithiasis       Health Encounters    Healthcare maintenance  Patient received an updated COVID-19 shot in the fall       Mental Health    Bipolar disorder, in partial remission, most recent episode mixed  Stable.  Supportive therapy.   Continue current medication.  Follow up with psychiatry       Neuro    Neuropathy       Sleep    Snoring       Tobacco    Smoker     Diagnoses         Codes Comments    Mixed hyperlipidemia    -  Primary ICD-10-CM: E78.2  ICD-9-CM: 272.2     Essential hypertension     ICD-10-CM: I10  ICD-9-CM: 401.9     Vitamin D deficiency     ICD-10-CM: E55.9  ICD-9-CM: 268.9     Type 2 diabetes mellitus with microalbuminuria, without long-term current use of insulin     ICD-10-CM: E11.29, R80.9  ICD-9-CM: 250.40, 791.0     Class 1 obesity with serious comorbidity and body mass index (BMI) of 32.0 to 32.9 in adult, unspecified obesity type      ICD-10-CM: E66.9, Z68.32  ICD-9-CM: 278.00, V85.32     Gastroesophageal reflux disease without esophagitis     ICD-10-CM: K21.9  ICD-9-CM: 530.81     Healthcare maintenance     ICD-10-CM: Z00.00  ICD-9-CM: V70.0     Bipolar disorder, in partial remission, most recent episode mixed     ICD-10-CM: F31.77  ICD-9-CM: 296.65     Neuropathy     ICD-10-CM: G62.9  ICD-9-CM: 355.9     Snoring     ICD-10-CM: R06.83  ICD-9-CM: 786.09     Smoker     ICD-10-CM: F17.200  ICD-9-CM: 305.1     History of nephrolithiasis     ICD-10-CM: Z87.442  ICD-9-CM: V13.01     Chronic venous insufficiency     ICD-10-CM: I87.2  ICD-9-CM: 459.81

## 2024-04-26 DIAGNOSIS — D75.1 POLYCYTHEMIA: Primary | ICD-10-CM

## 2024-04-29 PROBLEM — K21.9 GASTROESOPHAGEAL REFLUX DISEASE: Status: ACTIVE | Noted: 2024-04-24

## 2024-04-29 PROBLEM — R14.0 ABDOMINAL BLOATING: Status: ACTIVE | Noted: 2024-04-24

## 2024-04-29 PROBLEM — R14.2 BELCHING: Status: ACTIVE | Noted: 2024-04-24

## 2024-04-29 PROBLEM — R10.13 EPIGASTRIC PAIN: Status: ACTIVE | Noted: 2024-04-24

## 2024-05-02 ENCOUNTER — PRIOR AUTHORIZATION (OUTPATIENT)
Dept: FAMILY MEDICINE CLINIC | Facility: CLINIC | Age: 39
End: 2024-05-02
Payer: MEDICAID

## 2024-05-06 ENCOUNTER — TELEMEDICINE (OUTPATIENT)
Dept: PSYCHIATRY | Facility: CLINIC | Age: 39
End: 2024-05-06
Payer: MEDICAID

## 2024-05-06 DIAGNOSIS — F31.30 BIPOLAR I DISORDER, MOST RECENT EPISODE DEPRESSED: Primary | ICD-10-CM

## 2024-05-06 DIAGNOSIS — F51.05 INSOMNIA DUE TO MENTAL DISORDER: ICD-10-CM

## 2024-05-06 DIAGNOSIS — Z79.899 MEDICATION MANAGEMENT: ICD-10-CM

## 2024-05-06 DIAGNOSIS — F17.210 TOBACCO DEPENDENCE DUE TO CIGARETTES: ICD-10-CM

## 2024-05-06 DIAGNOSIS — F41.1 GENERALIZED ANXIETY DISORDER: ICD-10-CM

## 2024-05-06 PROCEDURE — 99214 OFFICE O/P EST MOD 30 MIN: CPT | Performed by: NURSE PRACTITIONER

## 2024-05-06 PROCEDURE — 1160F RVW MEDS BY RX/DR IN RCRD: CPT | Performed by: NURSE PRACTITIONER

## 2024-05-06 PROCEDURE — 1159F MED LIST DOCD IN RCRD: CPT | Performed by: NURSE PRACTITIONER

## 2024-05-06 RX ORDER — TRAZODONE HYDROCHLORIDE 50 MG/1
50 TABLET ORAL
Qty: 30 TABLET | Refills: 2 | Status: SHIPPED | OUTPATIENT
Start: 2024-05-06

## 2024-05-06 RX ORDER — LURASIDONE HYDROCHLORIDE 120 MG/1
120 TABLET, FILM COATED ORAL DAILY
Qty: 30 TABLET | Refills: 2 | Status: SHIPPED | OUTPATIENT
Start: 2024-05-06

## 2024-05-06 RX ORDER — BUPROPION HYDROCHLORIDE 150 MG/1
150 TABLET, EXTENDED RELEASE ORAL 2 TIMES DAILY
Qty: 60 TABLET | Refills: 2 | Status: SHIPPED | OUTPATIENT
Start: 2024-05-06

## 2024-05-06 RX ORDER — HYDROXYZINE PAMOATE 25 MG/1
25 CAPSULE ORAL 3 TIMES DAILY PRN
Qty: 90 CAPSULE | Refills: 2 | Status: SHIPPED | OUTPATIENT
Start: 2024-05-06

## 2024-05-07 DIAGNOSIS — Z00.00 HEALTHCARE MAINTENANCE: ICD-10-CM

## 2024-05-07 DIAGNOSIS — G62.9 NEUROPATHY: ICD-10-CM

## 2024-05-07 RX ORDER — LANOLIN ALCOHOL/MO/W.PET/CERES
2000 CREAM (GRAM) TOPICAL DAILY
Qty: 60 TABLET | Refills: 5 | Status: SHIPPED | OUTPATIENT
Start: 2024-05-07

## 2024-05-07 RX ORDER — DIPHENOXYLATE HYDROCHLORIDE AND ATROPINE SULFATE 2.5; .025 MG/1; MG/1
1 TABLET ORAL DAILY
Qty: 30 TABLET | Refills: 5 | Status: SHIPPED | OUTPATIENT
Start: 2024-05-07

## 2024-05-09 DIAGNOSIS — R80.9 TYPE 2 DIABETES MELLITUS WITH MICROALBUMINURIA, WITHOUT LONG-TERM CURRENT USE OF INSULIN: ICD-10-CM

## 2024-05-09 DIAGNOSIS — E11.29 TYPE 2 DIABETES MELLITUS WITH MICROALBUMINURIA, WITHOUT LONG-TERM CURRENT USE OF INSULIN: ICD-10-CM

## 2024-05-10 RX ORDER — ONDANSETRON 4 MG/1
4 TABLET, ORALLY DISINTEGRATING ORAL EVERY 8 HOURS PRN
Qty: 30 TABLET | Refills: 0 | Status: SHIPPED | OUTPATIENT
Start: 2024-05-10

## 2024-05-14 DIAGNOSIS — I10 ESSENTIAL HYPERTENSION: ICD-10-CM

## 2024-05-14 RX ORDER — PROPRANOLOL HYDROCHLORIDE 20 MG/1
20 TABLET ORAL 2 TIMES DAILY
Qty: 60 TABLET | Refills: 5 | Status: SHIPPED | OUTPATIENT
Start: 2024-05-14

## 2024-05-25 RX ORDER — FLUCONAZOLE 150 MG/1
150 TABLET ORAL DAILY PRN
Qty: 3 TABLET | Refills: 0 | Status: SHIPPED | OUTPATIENT
Start: 2024-05-25

## 2024-05-28 ENCOUNTER — LAB (OUTPATIENT)
Dept: LAB | Facility: HOSPITAL | Age: 39
End: 2024-05-28
Payer: MEDICAID

## 2024-05-28 DIAGNOSIS — D75.1 POLYCYTHEMIA: ICD-10-CM

## 2024-05-29 LAB
BASOPHILS # BLD AUTO: 0.1 10*3/MM3 (ref 0–0.2)
BASOPHILS NFR BLD AUTO: 0.8 % (ref 0–1.5)
DEPRECATED RDW RBC AUTO: 48.2 FL (ref 37–54)
EOSINOPHIL # BLD AUTO: 0.41 10*3/MM3 (ref 0–0.4)
EOSINOPHIL NFR BLD AUTO: 3.1 % (ref 0.3–6.2)
ERYTHROCYTE [DISTWIDTH] IN BLOOD BY AUTOMATED COUNT: 14.8 % (ref 12.3–15.4)
HCT VFR BLD AUTO: 52.3 % (ref 34–46.6)
HGB BLD-MCNC: 16.4 G/DL (ref 12–15.9)
IMM GRANULOCYTES # BLD AUTO: 0.08 10*3/MM3 (ref 0–0.05)
IMM GRANULOCYTES NFR BLD AUTO: 0.6 % (ref 0–0.5)
LYMPHOCYTES # BLD AUTO: 4.19 10*3/MM3 (ref 0.7–3.1)
LYMPHOCYTES NFR BLD AUTO: 31.5 % (ref 19.6–45.3)
MCH RBC QN AUTO: 28.1 PG (ref 26.6–33)
MCHC RBC AUTO-ENTMCNC: 31.4 G/DL (ref 31.5–35.7)
MCV RBC AUTO: 89.6 FL (ref 79–97)
MONOCYTES # BLD AUTO: 1.08 10*3/MM3 (ref 0.1–0.9)
MONOCYTES NFR BLD AUTO: 8.1 % (ref 5–12)
NEUTROPHILS NFR BLD AUTO: 55.9 % (ref 42.7–76)
NEUTROPHILS NFR BLD AUTO: 7.44 10*3/MM3 (ref 1.7–7)
NRBC BLD AUTO-RTO: 0 /100 WBC (ref 0–0.2)
PLATELET # BLD AUTO: 316 10*3/MM3 (ref 140–450)
PMV BLD AUTO: 9.4 FL (ref 6–12)
RBC # BLD AUTO: 5.84 10*6/MM3 (ref 3.77–5.28)
WBC NRBC COR # BLD AUTO: 13.3 10*3/MM3 (ref 3.4–10.8)

## 2024-06-04 ENCOUNTER — ANESTHESIA (OUTPATIENT)
Dept: PERIOP | Facility: HOSPITAL | Age: 39
End: 2024-06-04
Payer: MEDICAID

## 2024-06-04 ENCOUNTER — ANESTHESIA EVENT (OUTPATIENT)
Dept: PERIOP | Facility: HOSPITAL | Age: 39
End: 2024-06-04
Payer: MEDICAID

## 2024-06-04 ENCOUNTER — HOSPITAL ENCOUNTER (OUTPATIENT)
Facility: HOSPITAL | Age: 39
Setting detail: HOSPITAL OUTPATIENT SURGERY
Discharge: HOME | End: 2024-06-04
Attending: INTERNAL MEDICINE | Admitting: INTERNAL MEDICINE
Payer: MEDICAID

## 2024-06-04 VITALS
BODY MASS INDEX: 28.63 KG/M2 | RESPIRATION RATE: 18 BRPM | DIASTOLIC BLOOD PRESSURE: 78 MMHG | TEMPERATURE: 97.3 F | OXYGEN SATURATION: 95 % | HEART RATE: 89 BPM | HEIGHT: 70 IN | WEIGHT: 200 LBS | SYSTOLIC BLOOD PRESSURE: 108 MMHG

## 2024-06-04 DIAGNOSIS — R14.2 BELCHING: ICD-10-CM

## 2024-06-04 DIAGNOSIS — R10.13 EPIGASTRIC PAIN: ICD-10-CM

## 2024-06-04 DIAGNOSIS — R14.0 ABDOMINAL BLOATING: ICD-10-CM

## 2024-06-04 DIAGNOSIS — K21.9 GASTROESOPHAGEAL REFLUX DISEASE, UNSPECIFIED WHETHER ESOPHAGITIS PRESENT: ICD-10-CM

## 2024-06-04 PROCEDURE — 25810000003 LACTATED RINGERS PER 1000 ML: Performed by: ANESTHESIOLOGY

## 2024-06-04 PROCEDURE — 43239 EGD BIOPSY SINGLE/MULTIPLE: CPT | Performed by: INTERNAL MEDICINE

## 2024-06-04 PROCEDURE — 25010000002 PROPOFOL 200 MG/20ML EMULSION: Performed by: NURSE ANESTHETIST, CERTIFIED REGISTERED

## 2024-06-04 RX ORDER — SODIUM CHLORIDE, SODIUM LACTATE, POTASSIUM CHLORIDE, CALCIUM CHLORIDE 600; 310; 30; 20 MG/100ML; MG/100ML; MG/100ML; MG/100ML
100 INJECTION, SOLUTION INTRAVENOUS ONCE AS NEEDED
Status: DISCONTINUED | OUTPATIENT
Start: 2024-06-04 | End: 2024-06-04 | Stop reason: HOSPADM

## 2024-06-04 RX ORDER — MEPERIDINE HYDROCHLORIDE 25 MG/ML
12.5 INJECTION INTRAMUSCULAR; INTRAVENOUS; SUBCUTANEOUS
Status: DISCONTINUED | OUTPATIENT
Start: 2024-06-04 | End: 2024-06-04 | Stop reason: HOSPADM

## 2024-06-04 RX ORDER — MIDAZOLAM HYDROCHLORIDE 1 MG/ML
1 INJECTION INTRAMUSCULAR; INTRAVENOUS
Status: DISCONTINUED | OUTPATIENT
Start: 2024-06-04 | End: 2024-06-04 | Stop reason: HOSPADM

## 2024-06-04 RX ORDER — KETOROLAC TROMETHAMINE 30 MG/ML
30 INJECTION, SOLUTION INTRAMUSCULAR; INTRAVENOUS EVERY 6 HOURS PRN
Status: DISCONTINUED | OUTPATIENT
Start: 2024-06-04 | End: 2024-06-04 | Stop reason: HOSPADM

## 2024-06-04 RX ORDER — SODIUM CHLORIDE 0.9 % (FLUSH) 0.9 %
10 SYRINGE (ML) INJECTION AS NEEDED
Status: DISCONTINUED | OUTPATIENT
Start: 2024-06-04 | End: 2024-06-04 | Stop reason: HOSPADM

## 2024-06-04 RX ORDER — IPRATROPIUM BROMIDE AND ALBUTEROL SULFATE 2.5; .5 MG/3ML; MG/3ML
3 SOLUTION RESPIRATORY (INHALATION) ONCE AS NEEDED
Status: DISCONTINUED | OUTPATIENT
Start: 2024-06-04 | End: 2024-06-04 | Stop reason: HOSPADM

## 2024-06-04 RX ORDER — SODIUM CHLORIDE, SODIUM LACTATE, POTASSIUM CHLORIDE, CALCIUM CHLORIDE 600; 310; 30; 20 MG/100ML; MG/100ML; MG/100ML; MG/100ML
125 INJECTION, SOLUTION INTRAVENOUS ONCE
Status: COMPLETED | OUTPATIENT
Start: 2024-06-04 | End: 2024-06-04

## 2024-06-04 RX ORDER — SODIUM CHLORIDE 0.9 % (FLUSH) 0.9 %
10 SYRINGE (ML) INJECTION EVERY 12 HOURS SCHEDULED
Status: DISCONTINUED | OUTPATIENT
Start: 2024-06-04 | End: 2024-06-04 | Stop reason: HOSPADM

## 2024-06-04 RX ORDER — PROPOFOL 10 MG/ML
INJECTION, EMULSION INTRAVENOUS CONTINUOUS PRN
Status: DISCONTINUED | OUTPATIENT
Start: 2024-06-04 | End: 2024-06-04 | Stop reason: SURG

## 2024-06-04 RX ORDER — LIDOCAINE HYDROCHLORIDE 20 MG/ML
INJECTION, SOLUTION EPIDURAL; INFILTRATION; INTRACAUDAL; PERINEURAL AS NEEDED
Status: DISCONTINUED | OUTPATIENT
Start: 2024-06-04 | End: 2024-06-04 | Stop reason: SURG

## 2024-06-04 RX ORDER — SODIUM CHLORIDE 9 MG/ML
40 INJECTION, SOLUTION INTRAVENOUS AS NEEDED
Status: DISCONTINUED | OUTPATIENT
Start: 2024-06-04 | End: 2024-06-04 | Stop reason: HOSPADM

## 2024-06-04 RX ORDER — OXYCODONE HYDROCHLORIDE AND ACETAMINOPHEN 5; 325 MG/1; MG/1
1 TABLET ORAL ONCE AS NEEDED
Status: DISCONTINUED | OUTPATIENT
Start: 2024-06-04 | End: 2024-06-04 | Stop reason: HOSPADM

## 2024-06-04 RX ORDER — FENTANYL CITRATE 50 UG/ML
50 INJECTION, SOLUTION INTRAMUSCULAR; INTRAVENOUS
Status: DISCONTINUED | OUTPATIENT
Start: 2024-06-04 | End: 2024-06-04 | Stop reason: HOSPADM

## 2024-06-04 RX ORDER — ONDANSETRON 2 MG/ML
4 INJECTION INTRAMUSCULAR; INTRAVENOUS AS NEEDED
Status: DISCONTINUED | OUTPATIENT
Start: 2024-06-04 | End: 2024-06-04 | Stop reason: HOSPADM

## 2024-06-04 RX ADMIN — SODIUM CHLORIDE, POTASSIUM CHLORIDE, SODIUM LACTATE AND CALCIUM CHLORIDE: 600; 310; 30; 20 INJECTION, SOLUTION INTRAVENOUS at 10:42

## 2024-06-04 RX ADMIN — PROPOFOL 200 MCG/KG/MIN: 10 INJECTION, EMULSION INTRAVENOUS at 10:42

## 2024-06-04 RX ADMIN — LIDOCAINE HYDROCHLORIDE 60 MG: 20 INJECTION, SOLUTION EPIDURAL; INFILTRATION; INTRACAUDAL; PERINEURAL at 10:42

## 2024-06-04 NOTE — ANESTHESIA PREPROCEDURE EVALUATION
Anesthesia Evaluation     Patient summary reviewed and Nursing notes reviewed   no history of anesthetic complications:   NPO Solid Status: > 8 hours  NPO Liquid Status: > 8 hours           Airway   Mallampati: II  TM distance: >3 FB  Neck ROM: full  No difficulty expected  Dental - normal exam     Pulmonary - normal exam    breath sounds clear to auscultation  (+) a smoker Current, Smoked day of surgery, cigarettes, asthma,  Cardiovascular - normal exam    ECG reviewed  Rhythm: regular  Rate: normal    (+) hypertension 2 medications or greater, hyperlipidemia      Neuro/Psych  (+) psychiatric history Anxiety, Depression and Bipolar  GI/Hepatic/Renal/Endo    (+) morbid obesity, GERD, renal disease- stones, diabetes mellitus type 2    Musculoskeletal (-) negative ROS    Abdominal  - normal exam    Bowel sounds: normal.   Substance History - negative use     OB/GYN negative ob/gyn ROS         Other - negative ROS                         Anesthesia Plan    ASA 3     general   total IV anesthesia  intravenous induction     Anesthetic plan, risks, benefits, and alternatives have been provided, discussed and informed consent has been obtained with: patient.    Use of blood products discussed with patient  Consented to blood products.        CODE STATUS:

## 2024-06-04 NOTE — ANESTHESIA POSTPROCEDURE EVALUATION
Patient: Jessica Harris    Procedure Summary       Date: 06/04/24 Room / Location: [facility] OR  /  [facility] OR    Anesthesia Start: 1042 Anesthesia Stop: 1054    Procedure: ESOPHAGOGASTRODUODENOSCOPY WITH BIOPSY (Esophagus) Diagnosis:       Gastroesophageal reflux disease, unspecified whether esophagitis present      Epigastric pain      Abdominal bloating      Belching      (Gastroesophageal reflux disease, unspecified whether esophagitis present [K21.9])      (Epigastric pain [R10.13])      (Abdominal bloating [R14.0])      (Belching [R14.2])    Surgeons: Matilde Moura MD Provider: German Parham MD    Anesthesia Type: general ASA Status: 3            Anesthesia Type: general    Vitals  Vitals Value Taken Time   /75 06/04/24 1120   Temp 97.3 °F (36.3 °C) 06/04/24 1055   Pulse 82 06/04/24 1121   Resp 18 06/04/24 1115   SpO2 94 % 06/04/24 1121   Vitals shown include unfiled device data.        Post Anesthesia Care and Evaluation    Patient location during evaluation: PACU  Patient participation: complete - patient participated  Level of consciousness: awake  Pain score: 1  Pain management: adequate    Airway patency: patent  Anesthetic complications: No anesthetic complications  PONV Status: controlled  Cardiovascular status: acceptable and blood pressure returned to baseline  Respiratory status: acceptable and room air  Hydration status: acceptable    Comments: Patient comfortable with discharge at this time.

## 2024-06-04 NOTE — H&P
[facility]    Patient Identification:  Name:  Jessica Harris  Age:  38 y.o.  Sex:  female  :  1985  MRN:  [id number]   Visit Number:  [id number]  Primary Care Physician:  Jamie Santos MD     Chief complaint: GERD, nausea and vomiting, abdominal bloating, belching    History of presenting illness: Ms. Harris is a 38 y.o. female who presents today for EGD.  She reports GERD has been a chronic issue.  She was previously evaluated by Dr. Albert in 2022.  At that time, EGD was recommended but was not completed and she did not return for follow-up. She  represented to the clinic in 2024 with complaints of poorly controlled GERD despite taking Protonix 40 mg p.o. daily.  Of note, she is s/p cholecystectomy.  She has been having at least 2 flares per week with burning in her throat/chest, regurgitation, belching and abdominal bloating.  She denies having dysphagia.  She also has nausea and vomiting of bile and undigested food a few times per month.  She takes Zofran as needed.  Of note, she has been on Ozempic for diabetes mellitus type 2 which has exacerbated her GERD symptoms.  Patient has been holding Ozempic for 2 weeks prior to EGD with improvement in the above symptoms.  She has no other complaints today.    Review of Systems   Constitutional: Negative.    HENT: Negative.     Eyes: Negative.    Respiratory: Negative.     Cardiovascular: Negative.    Gastrointestinal:  Positive for abdominal distention, nausea and vomiting. Negative for abdominal pain, anal bleeding, blood in stool, constipation, diarrhea and rectal pain.        GERD, belching    Endocrine: Negative.    Genitourinary: Negative.    Musculoskeletal: Negative.    Allergic/Immunologic: Negative.    Neurological: Negative.    Hematological: Negative.    Psychiatric/Behavioral: Negative.          Past Medical History:   Diagnosis Date    Anxiety     Asthma     Bipolar 1  "disorder     Depression     Diabetes mellitus     Elevated cholesterol     GERD (gastroesophageal reflux disease)     History of bronchitis     History of ear infections     History of stomach ulcers     History of streptococcal infection     Hyperlipidemia     Hypertension     Insomnia     Kidney stones     Urinary tract infection      Past Surgical History:   Procedure Laterality Date    ADENOIDECTOMY      CHOLECYSTECTOMY      D & C HYSTEROSCOPY N/A 05/16/2022    Procedure: DILATATION AND CURETTAGE HYSTEROSCOPY;  Surgeon: Joey Andersen DO;  Location: [facility];  Service: Obstetrics/Gynecology;  Laterality: N/A;    DENTAL PROCEDURE      ENDOSCOPY      HERNIA REPAIR      umbilical    OVARIAN CYST DRAINAGE/EXCISION Left 05/16/2022    Procedure: OVARIAN CYSTECTOMY WITH EXTENSIVE LYSIS OF ADHESIONS;  Surgeon: Joey Andersen DO;  Location: [facility];  Service: Obstetrics/Gynecology;  Laterality: Left;    TONSILLECTOMY      TOTAL LAPAROSCOPIC HYSTERECTOMY N/A 6/22/2022    Procedure: ROBOTIC TOTAL LAPAROSCOPIC HYSTERECTOMY WITH BILATERAL SALPINGECTOMY;  Surgeon: Joey Andersne DO;  Location: [facility];  Service: Robotics - [place]inci;  Laterality: N/A;    UPPER GASTROINTESTINAL ENDOSCOPY      WISDOM TOOTH EXTRACTION       Family History   Problem Relation Age of Onset    Cancer Mother     Stroke Mother     Lung disease Mother     No Known Problems Father     Cancer Other     Heart disease Other     Stroke Other      Social History     Socioeconomic History    Marital status: Single   Tobacco Use    Smoking status: Every Day     Current packs/day: 0.50     Types: Cigarettes    Smokeless tobacco: Never    Tobacco comments:     \"thinking about it\"   Vaping Use    Vaping status: Never Used   Substance and Sexual Activity    Alcohol use: No    Drug use: No    Sexual activity: Defer     Birth control/protection: Pill       Allergies:  Patient has no known allergies.    Prior to Admission Medications  "      Prescriptions Last Dose Informant Patient Reported? Taking?    albuterol sulfate  (90 Base) MCG/ACT inhaler 6/3/2024  No Yes    Inhale 2 puffs Every 6 (Six) Hours As Needed for Wheezing.    atorvastatin (LIPITOR) 20 MG tablet 6/3/2024  No Yes    Take 1 tablet by mouth Every Night.    buPROPion SR (Wellbutrin SR) 150 MG 12 hr tablet 6/3/2024  No Yes    Take 1 tablet by mouth 2 (Two) Times a Day.    cholecalciferol (Vitamin D) 25 MCG (1000 UT) tablet 6/3/2024  No Yes    Take 1 tablet by mouth Daily.    Continuous Glucose Sensor (Dexcom G7 Sensor) misc 6/3/2024  No Yes    Use 1 each Every 10 (Ten) Days.    dapagliflozin Propanediol (Farxiga) 10 MG tablet 6/3/2024  No Yes    Take 10 mg by mouth Every Morning.    docusate sodium (COLACE) 100 MG capsule 6/3/2024  No Yes    Take 1 capsule by mouth 2 (Two) Times a Day.    fluticasone-salmeterol (Advair HFA) 230-21 MCG/ACT inhaler 6/3/2024  No Yes    Inhale 2 puffs 2 (Two) Times a Day.    Glucose Blood (Blood Glucose Test) strip 6/3/2024  No Yes    1 daily    hydrOXYzine pamoate (VISTARIL) 25 MG capsule 6/3/2024  No Yes    Take 1 capsule by mouth 3 (Three) Times a Day As Needed for Anxiety.    Lancets misc 6/3/2024  No Yes    1 daily    linaclotide (Linzess) 145 MCG capsule capsule 6/3/2024  No Yes    Take 1 capsule by mouth Daily. 30 minutes before meals on an empty stomach.    Lurasidone HCl (Latuda) 120 MG tablet tablet 6/3/2024  No Yes    Take 1 tablet by mouth Daily.    Multi-Vitamin tablet tablet 6/3/2024  No Yes    Take 1 tablet by mouth Daily.    Multi-Vitamin tablet 6/3/2024  No Yes    TAKE ONE TABLET BY MOUTH EVERY DAY    ondansetron ODT (ZOFRAN-ODT) 4 MG disintegrating tablet 6/3/2024  No Yes    Place 1 tablet on the tongue Every 8 (Eight) Hours As Needed for Nausea or Vomiting.    pantoprazole (PROTONIX) 40 MG EC tablet 6/3/2024  No Yes    Take 1 tablet by mouth Daily.    propranolol (INDERAL) 20 MG tablet 6/3/2024  No Yes    Take 1 tablet by mouth 2  "(Two) Times a Day.    spironolactone (ALDACTONE) 50 MG tablet 6/3/2024  Yes Yes    TAKE ONE TABLET BY MOUTH EVERY DAY FOR FLUID    traZODone (DESYREL) 50 MG tablet 6/3/2024  No Yes    Take 1 tablet by mouth every night at bedtime.    vitamin B-12 (CYANOCOBALAMIN) 1000 MCG tablet 6/3/2024  No Yes    Take 2 tablets by mouth Daily.    Semaglutide, 1 MG/DOSE, (Ozempic, 1 MG/DOSE,) 4 MG/3ML solution pen-injector 5/21/2024  No No    Inject 1 mg under the skin into the appropriate area as directed 1 (One) Time Per Week.          Vital Signs:     Vitals:    06/04/24 1013   BP: 114/75   BP Location: Left arm   Patient Position: Sitting   Pulse: 88   Resp: 18   Temp: 98.6 °F (37 °C)   TempSrc: Oral   SpO2: 93%   Weight: 90.7 kg (200 lb)   Height: 177.8 cm (70\")      Body mass index is 28.7 kg/m².    Physical Exam:  Constitutional:  Alert and oriented. Well developed and well nourished, in no acute distress.  HENT:  Head: Normocephalic and atraumatic.  Mouth:  Moist mucous membranes.  OP clear, mmm  Eyes:  Conjunctivae and EOM are normal.  Pupils are equal, round, and reactive to light.  No scleral icterus.  Neck:  Neck supple.  No JVD present.    Cardiovascular:  RRR, no MRG.  Pulmonary/Chest:  CTAB, unlabored.   Abdominal:  Soft.  Bowel sounds are normal.  No distension and no tenderness.   Musculoskeletal:  No edema, no tenderness, and no deformity.   Neurological:  MS as above, grossly nonfocal exam   Psychiatric:  Normal mood and affect.  Behavior is normal.  Judgment and thought content normal.       Results from last 7 days   Lab Units 05/29/24  1147   WBC 10*3/mm3 13.30*   HEMOGLOBIN g/dL 16.4*   HEMATOCRIT % 52.3*   MCV fL 89.6   MCHC g/dL 31.4*   PLATELETS 10*3/mm3 316         Assessment and Plan:  Proceed with EGD for evaluation of GERD, nausea and vomiting and abdominal bloating.     JANIE Madison  06/04/24  10:09 EDT  "

## 2024-06-06 ENCOUNTER — TELEPHONE (OUTPATIENT)
Dept: GASTROENTEROLOGY | Facility: CLINIC | Age: 39
End: 2024-06-06
Payer: MEDICAID

## 2024-06-06 LAB — REF LAB TEST METHOD: NORMAL

## 2024-06-06 NOTE — PROGRESS NOTES
During your recent upper endoscopy, biopsies were taken of the esophagus.  Biopsies revealed mild reflux esophagitis.  Biopsies of the stomach were negative for H. pylori gastritis.  Please continue Protonix 40 mg once daily.

## 2024-07-11 DIAGNOSIS — K58.1 IRRITABLE BOWEL SYNDROME WITH CONSTIPATION: Primary | ICD-10-CM

## 2024-07-31 DIAGNOSIS — R80.9 TYPE 2 DIABETES MELLITUS WITH MICROALBUMINURIA, WITHOUT LONG-TERM CURRENT USE OF INSULIN: ICD-10-CM

## 2024-07-31 DIAGNOSIS — L68.0 HIRSUTISM: ICD-10-CM

## 2024-07-31 DIAGNOSIS — E11.29 TYPE 2 DIABETES MELLITUS WITH MICROALBUMINURIA, WITHOUT LONG-TERM CURRENT USE OF INSULIN: ICD-10-CM

## 2024-07-31 DIAGNOSIS — E78.2 MIXED HYPERLIPIDEMIA: ICD-10-CM

## 2024-07-31 DIAGNOSIS — G62.9 NEUROPATHY: ICD-10-CM

## 2024-07-31 DIAGNOSIS — I10 ESSENTIAL HYPERTENSION: Primary | ICD-10-CM

## 2024-07-31 DIAGNOSIS — E55.9 VITAMIN D DEFICIENCY: ICD-10-CM

## 2024-07-31 DIAGNOSIS — D75.1 POLYCYTHEMIA: ICD-10-CM

## 2024-08-09 DIAGNOSIS — F51.05 INSOMNIA DUE TO MENTAL DISORDER: ICD-10-CM

## 2024-08-09 DIAGNOSIS — F31.30 BIPOLAR I DISORDER, MOST RECENT EPISODE DEPRESSED: ICD-10-CM

## 2024-08-09 RX ORDER — TRAZODONE HYDROCHLORIDE 50 MG/1
50 TABLET ORAL
Qty: 30 TABLET | Refills: 0 | Status: SHIPPED | OUTPATIENT
Start: 2024-08-09

## 2024-08-09 RX ORDER — BUPROPION HYDROCHLORIDE 150 MG/1
150 TABLET, EXTENDED RELEASE ORAL 2 TIMES DAILY
Qty: 60 TABLET | Refills: 2 | Status: SHIPPED | OUTPATIENT
Start: 2024-08-09

## 2024-08-09 NOTE — TELEPHONE ENCOUNTER
Dr Saavedra patient can you please cover this request    For information on Fall & Injury Prevention, visit [email or web address]

## 2024-08-21 RX ORDER — BROMPHENIRAMINE MALEATE, PSEUDOEPHEDRINE HYDROCHLORIDE, AND DEXTROMETHORPHAN HYDROBROMIDE 2; 30; 10 MG/5ML; MG/5ML; MG/5ML
5 SYRUP ORAL 4 TIMES DAILY PRN
Qty: 473 ML | Refills: 0 | Status: SHIPPED | OUTPATIENT
Start: 2024-08-21

## 2024-08-21 RX ORDER — IBUPROFEN 600 MG/1
600 TABLET ORAL 3 TIMES DAILY PRN
Qty: 30 TABLET | Refills: 0 | Status: SHIPPED | OUTPATIENT
Start: 2024-08-21

## 2024-08-22 ENCOUNTER — TELEMEDICINE (OUTPATIENT)
Dept: PSYCHIATRY | Facility: CLINIC | Age: 39
End: 2024-08-22
Payer: MEDICAID

## 2024-08-22 DIAGNOSIS — F41.1 GENERALIZED ANXIETY DISORDER: ICD-10-CM

## 2024-08-22 DIAGNOSIS — F51.05 INSOMNIA DUE TO MENTAL DISORDER: ICD-10-CM

## 2024-08-22 DIAGNOSIS — F31.30 BIPOLAR I DISORDER, MOST RECENT EPISODE DEPRESSED: Primary | ICD-10-CM

## 2024-08-22 DIAGNOSIS — Z79.899 MEDICATION MANAGEMENT: ICD-10-CM

## 2024-08-22 DIAGNOSIS — F17.210 TOBACCO DEPENDENCE DUE TO CIGARETTES: ICD-10-CM

## 2024-08-22 PROCEDURE — 1160F RVW MEDS BY RX/DR IN RCRD: CPT | Performed by: NURSE PRACTITIONER

## 2024-08-22 PROCEDURE — 99214 OFFICE O/P EST MOD 30 MIN: CPT | Performed by: NURSE PRACTITIONER

## 2024-08-22 PROCEDURE — 1159F MED LIST DOCD IN RCRD: CPT | Performed by: NURSE PRACTITIONER

## 2024-08-22 RX ORDER — TRAZODONE HYDROCHLORIDE 50 MG/1
50 TABLET ORAL
Qty: 30 TABLET | Refills: 2 | Status: SHIPPED | OUTPATIENT
Start: 2024-08-22

## 2024-08-22 RX ORDER — LURASIDONE HYDROCHLORIDE 120 MG/1
120 TABLET, FILM COATED ORAL DAILY
Qty: 30 TABLET | Refills: 2 | Status: SHIPPED | OUTPATIENT
Start: 2024-08-22

## 2024-08-22 RX ORDER — BUPROPION HYDROCHLORIDE 150 MG/1
150 TABLET, EXTENDED RELEASE ORAL 2 TIMES DAILY
Qty: 60 TABLET | Refills: 2 | Status: SHIPPED | OUTPATIENT
Start: 2024-08-22

## 2024-08-22 NOTE — PROGRESS NOTES
This provider is located at the [facility], [address] [place] [address], [address] using a secure MyChart Video Visit through [employer]. Patient is being seen remotely via telehealth at their home address in [place], and stated they are in a secure environment for this session. The patient's condition being diagnosed/treated is appropriate for telemedicine. The provider identified herself as well as her credentials.   The patient, and/or patients guardian, consent to be seen remotely, and when consent is given they understand that the consent allows for patient identifiable information to be sent to a third party as needed.   They may refuse to be seen remotely at any time. The electronic data is encrypted and password protected, and the patient and/or guardian has been advised of the potential risks to privacy not withstanding such measures.    Subjective   Jessica Harris is a 38 y.o. female who presents today for follow up    Chief Complaint:  Bipolar disorder    History of Present Illness: Patient presents as follow-up via telehealth visit.  States she has been struggling with increase of depression recently and feels this is situational.  Reports feeling that her and boyfriends current home is too small for everyone however that he are not at a place that they are able to move.  States she feels more depressed when she is at home.  Reports previously being in therapy, states she does not feel that it is a good fit for her.  She reports sleep is good.  Reports appetite is good.  She denies SI/HI/AVH.    The following portions of the patient's history were reviewed and updated as appropriate: allergies, current medications, past family history, past medical history, past social history, past surgical history and problem list.      Past Medical History:  Past Medical History:   Diagnosis Date    Anxiety     Asthma     Bipolar 1 disorder     Depression     Diabetes mellitus     Elevated  "cholesterol     GERD (gastroesophageal reflux disease)     History of bronchitis     History of ear infections     History of stomach ulcers     History of streptococcal infection     Hyperlipidemia     Hypertension     Insomnia     Kidney stones     Urinary tract infection        Social History:  Social History     Socioeconomic History    Marital status: Single   Tobacco Use    Smoking status: Every Day     Current packs/day: 0.50     Types: Cigarettes    Smokeless tobacco: Never    Tobacco comments:     \"thinking about it\"   Vaping Use    Vaping status: Never Used   Substance and Sexual Activity    Alcohol use: No    Drug use: No    Sexual activity: Defer     Birth control/protection: Pill       Family History:  Family History   Problem Relation Age of Onset    Cancer Mother     Stroke Mother     Lung disease Mother     No Known Problems Father     Cancer Other     Heart disease Other     Stroke Other        Past Surgical History:  Past Surgical History:   Procedure Laterality Date    ADENOIDECTOMY      CHOLECYSTECTOMY      D & C HYSTEROSCOPY N/A 05/16/2022    Procedure: DILATATION AND CURETTAGE HYSTEROSCOPY;  Surgeon: Joey Andersen DO;  Location: [facility];  Service: Obstetrics/Gynecology;  Laterality: N/A;    DENTAL PROCEDURE      ENDOSCOPY      ENDOSCOPY N/A 6/4/2024    Procedure: ESOPHAGOGASTRODUODENOSCOPY WITH BIOPSY;  Surgeon: Matilde Moura MD;  Location: [facility];  Service: Gastroenterology;  Laterality: N/A;    HERNIA REPAIR      umbilical    OVARIAN CYST DRAINAGE/EXCISION Left 05/16/2022    Procedure: OVARIAN CYSTECTOMY WITH EXTENSIVE LYSIS OF ADHESIONS;  Surgeon: Joey Andersen DO;  Location: [facility];  Service: Obstetrics/Gynecology;  Laterality: Left;    TONSILLECTOMY      TOTAL LAPAROSCOPIC HYSTERECTOMY N/A 6/22/2022    Procedure: ROBOTIC TOTAL LAPAROSCOPIC HYSTERECTOMY WITH BILATERAL SALPINGECTOMY;  Surgeon: Joey Andresen DO;  Location: [facility] OR;  " Service: Robotics - [place];  Laterality: N/A;    UPPER GASTROINTESTINAL ENDOSCOPY      WISDOM TOOTH EXTRACTION         Problem List:  Patient Active Problem List   Diagnosis    Palpitations    Bipolar disorder, in partial remission, most recent episode mixed    Smoker    Gastroesophageal reflux disease without esophagitis    Chronic venous insufficiency    Healthcare maintenance    Vitamin D deficiency    Snoring    History of nephrolithiasis    Essential hypertension    Class 1 obesity with serious comorbidity and body mass index (BMI) of 32.0 to 32.9 in adult    Type 2 diabetes mellitus with microalbuminuria, without long-term current use of insulin    Mixed hyperlipidemia    Neuropathy    S/P laparoscopic hysterectomy    Encounter for immunization    Gastroesophageal reflux disease    Epigastric pain    Abdominal bloating    Belching        Allergy:   No Known Allergies     Current Medications:   Current Outpatient Medications   Medication Sig Dispense Refill    albuterol sulfate  (90 Base) MCG/ACT inhaler Inhale 2 puffs Every 6 (Six) Hours As Needed for Wheezing. 18 g 5    atorvastatin (LIPITOR) 20 MG tablet Take 1 tablet by mouth Every Night. 30 tablet 5    brompheniramine-pseudoephedrine-DM 30-2-10 MG/5ML syrup Take 5 mL by mouth 4 (Four) Times a Day As Needed for Allergies. 473 mL 0    buPROPion SR (Wellbutrin SR) 150 MG 12 hr tablet Take 1 tablet by mouth 2 (Two) Times a Day. 60 tablet 2    cholecalciferol (Vitamin D) 25 MCG (1000 UT) tablet Take 1 tablet by mouth Daily. 30 tablet 5    Continuous Glucose Sensor (Dexcom G7 Sensor) misc Use 1 each Every 10 (Ten) Days. 3 each 5    dapagliflozin Propanediol (Farxiga) 10 MG tablet Take 10 mg by mouth Every Morning. 30 tablet 5    docusate sodium (COLACE) 100 MG capsule Take 1 capsule by mouth 2 (Two) Times a Day. 60 capsule 5    fluticasone-salmeterol (Advair HFA) 230-21 MCG/ACT inhaler Inhale 2 puffs 2 (Two) Times a Day. 12 g 5    Glucose Blood (Blood  Glucose Test) strip 1 daily 50 each 5    hydrOXYzine pamoate (VISTARIL) 25 MG capsule Take 1 capsule by mouth 3 (Three) Times a Day As Needed for Anxiety. 90 capsule 2    ibuprofen (ADVIL,MOTRIN) 600 MG tablet Take 1 tablet by mouth 3 (Three) Times a Day As Needed for Moderate Pain or Fever. 30 tablet 0    Lancets misc 1 daily 50 each 5    linaclotide (Linzess) 290 MCG capsule capsule Take 1 capsule by mouth Daily. 30 minutes before meals on an empty stomach. 30 capsule 5    Lurasidone HCl (Latuda) 120 MG tablet tablet Take 1 tablet by mouth Daily. 30 tablet 2    Multi-Vitamin tablet tablet Take 1 tablet by mouth Daily. 30 tablet 5    Multi-Vitamin tablet TAKE ONE TABLET BY MOUTH EVERY DAY 30 tablet 5    Nirmatrelvir & Ritonavir, 300mg/100mg, (PAXLOVID) Take 3 tablets by mouth 2 (Two) Times a Day. 30 each 0    ondansetron ODT (ZOFRAN-ODT) 4 MG disintegrating tablet Place 1 tablet on the tongue Every 8 (Eight) Hours As Needed for Nausea or Vomiting. 30 tablet 0    pantoprazole (PROTONIX) 40 MG EC tablet Take 1 tablet by mouth Daily. 30 tablet 5    propranolol (INDERAL) 20 MG tablet Take 1 tablet by mouth 2 (Two) Times a Day. 60 tablet 5    Semaglutide, 1 MG/DOSE, (Ozempic, 1 MG/DOSE,) 4 MG/3ML solution pen-injector Inject 1 mg under the skin into the appropriate area as directed 1 (One) Time Per Week. 3 mL 5    spironolactone (ALDACTONE) 50 MG tablet TAKE ONE TABLET BY MOUTH EVERY DAY FOR FLUID      traZODone (DESYREL) 50 MG tablet Take 1 tablet by mouth every night at bedtime. 30 tablet 2    vitamin B-12 (CYANOCOBALAMIN) 1000 MCG tablet Take 2 tablets by mouth Daily. 60 tablet 5     No current facility-administered medications for this visit.       Review of Symptoms:    Review of Systems   Constitutional:  Positive for fatigue.   HENT: Negative.     Eyes: Negative.    Respiratory: Negative.     Cardiovascular: Negative.    Gastrointestinal: Negative.    Skin: Negative.    Neurological: Negative.     Psychiatric/Behavioral:  Positive for sleep disturbance, depressed mood and stress. Negative for suicidal ideas. The patient is nervous/anxious.          Physical Exam:   There were no vitals taken for this visit.  There is no height or weight on file to calculate BMI.    Appearance: Well nourished female, appropriately dressed, appears stated age and in no acute distress  Gait, Station, Strength: PAULINE    Mental Status Exam:   Hygiene:   good  Cooperation:  Cooperative  Eye Contact:  Good  Psychomotor Behavior:  Appropriate  Affect:  Appropriate  Mood: normal  Hopelessness: Denies  Speech:  Normal  Thought Process:  Linear  Thought Content:  Mood congruent  Suicidal:  None  Homicidal:  None  Hallucinations:  None  Delusion:  None  Memory:  Intact  Orientation:  Person, Place, Time, and Situation  Reliability:  fair  Insight:  Fair  Judgement:  Fair  Impulse Control:  Fair  Physical/Medical Issues:   see med hx      PHQ-Score Total:  PHQ-9 Total Score: (P) 5   Patient screened positive for depression based on a PHQ-9 score of 5 on 2024. Follow-up recommendations include: Prescribed antidepressant medication treatment and Suicide Risk Assessment performed.          Lab Results:   No visits with results within 1 Month(s) from this visit.   Latest known visit with results is:   Admission on 2024, Discharged on 2024   Component Date Value Ref Range Status    Reference Lab Report 2024    Final                    Value:[facility]  [address] [place] [address]  Phone: [phone] or [phone]  Fax: [phone]  German Fuentes M.D., Medical Director    PATIENT NAME                                     LABORATORY NO.  [id number]   GORDY ECHAVARRIAKirill                                [id number]  [id number]                                 AGE                    SEX   N              CLIENT REF #  [facility]                      38        1985      F    "  [id number]      [id number]    [address] [place] [address]                             REQUESTING M.D.           ATTENDING [place] [address]                           CHALO PANG  DATE COLLECTED            DATE RECEIVED          DATE REPORTED  06/04/2024 06/04/2024 06/06/2024    DIAGNOSIS:  A.     GASTRIC ANTRUM, BIOPSY:  Mild chronic gastritis with reactive features  No intestinal metaplasia or dysplasia identified  No Helicobacter pylori-like organisms identified                           on routine histologic sections  B.     ESOPHAGUS, BIOPSY, DISTAL:  Reactive esophageal mucosa with features of reflux (1 eosinophil per  high-power field)  Minute fragment of benign gastric type mucosa with chronic inflammation  No intestinal metaplasia or dysplasia identified    CAM    CLINICAL HISTORY:  Gastroesophageal reflux disease, unspecified whether esophagitis present,  epigastric pain, abdominal bloating, belching    SPECIMENS RECEIVED:  A.    GASTRIC ANTRUM, BIOPSY  B.    ESOPHAGUS, BIOPSY, DISTAL    MICROSCOPIC DESCRIPTION:  Tissue blocks are prepared and slides are examined microscopically on all  specimens. See diagnosis for details.    Professional interpretation rendered by Mariajose Day M.D., F.C.A.P. at  [facility], [address] [facility], [place] [address].    GROSS DESCRIPTION:  A.    Labeled as \"antrum biopsy\", consisting of 2 pieces of tan soft tissue  measuring 0.7 x 0.3 x 0.2 cm, submitted entirely in 1 cassette.  SOG  B.    Labeled as \"distal esophagus                           biopsy\", consisting of 2 pieces of tan soft  tissue measuring 0.8 x 0.4 x 0.1 cm, submitted entirely in 1 cassette.    REVIEWED, DIAGNOSED AND ELECTRONICALLY  SIGNED BY:    Mariajose Day M.D., F.C.A.P.  CPT CODES:  88305x2         Assessment & Plan   Diagnoses and all orders for this visit:    1. Bipolar I disorder, most recent episode depressed (Primary)  -     buPROPion SR " (Wellbutrin SR) 150 MG 12 hr tablet; Take 1 tablet by mouth 2 (Two) Times a Day.  Dispense: 60 tablet; Refill: 2  -     Lurasidone HCl (Latuda) 120 MG tablet tablet; Take 1 tablet by mouth Daily.  Dispense: 30 tablet; Refill: 2    2. Generalized anxiety disorder  -     Lurasidone HCl (Latuda) 120 MG tablet tablet; Take 1 tablet by mouth Daily.  Dispense: 30 tablet; Refill: 2    3. Insomnia due to mental disorder  -     traZODone (DESYREL) 50 MG tablet; Take 1 tablet by mouth every night at bedtime.  Dispense: 30 tablet; Refill: 2    4. Tobacco dependence due to cigarettes    5. Medication management        -Continue bupropion  mg twice daily for depression  -Continue lurasidone 120 mg daily for mood. Lengthy discussion with patient on the possible side effects of antipsychotic medications including increased cholesterol, increased blood sugar, and possibility of weight gain.  Also discussed the need to monitor lab work associated with this.  The risk of muscle movement disorders with this class of medication was also discussed.  -Continue trazodone 50 mg nightly for sleep  -Continue hydroxyzine pamoate 25 mg 3 times daily as needed for anxiety  -Encouraged patient to begin psychotherapy  -JULIET reviewed and appropriate. Patient counseled on use of controlled substances  -The benefits of a healthy diet and exercise were discussed with patient, especially the positive effects they have on mental health. Patient encouraged to consider lifestyle modification regarding  diet and exercise patterns to maximize results of mental health treatment.  -Reviewed previous available documentation  -Reviewed most recent available labs   -Jessica Harris  reports that she has been smoking cigarettes. She has never used smokeless tobacco. I have educated her on the risk of diseases from using tobacco products such as cancer, COPD, heart disease, reproductive problems, low birth weight, cataracts, and arterial disease. I  advised her to quit and she is not willing to quit. I spent 3  minutes counseling the patient.             Visit Diagnoses:    ICD-10-CM ICD-9-CM   1. Bipolar I disorder, most recent episode depressed  F31.30 296.50   2. Generalized anxiety disorder  F41.1 300.02   3. Insomnia due to mental disorder  F51.05 300.9     327.02   4. Tobacco dependence due to cigarettes  F17.210 305.1   5. Medication management  Z79.899 V58.69       TREATMENT PLAN/GOALS: Continue supportive psychotherapy efforts and medications as indicated. Treatment and medication options discussed during today's visit. Patient acknowledged and verbally consented to continue with current treatment plan and was educated on the importance of compliance with treatment and follow-up appointments.    MEDICATION ISSUES:    Discussed medication options and treatment plan of prescribed medication as well as the risks, benefits, and side effects including potential falls, possible impaired driving and metabolic adversities among others. Patient is agreeable to call the office with any worsening of symptoms or onset of side effects. Patient is agreeable to call 911 or go to the nearest ER should he/she begin having SI/HI.     MEDS ORDERED DURING VISIT:  New Medications Ordered This Visit   Medications    buPROPion SR (Wellbutrin SR) 150 MG 12 hr tablet     Sig: Take 1 tablet by mouth 2 (Two) Times a Day.     Dispense:  60 tablet     Refill:  2    Lurasidone HCl (Latuda) 120 MG tablet tablet     Sig: Take 1 tablet by mouth Daily.     Dispense:  30 tablet     Refill:  2    traZODone (DESYREL) 50 MG tablet     Sig: Take 1 tablet by mouth every night at bedtime.     Dispense:  30 tablet     Refill:  2       Return in about 3 months (around 11/22/2024), or if symptoms worsen or fail to improve.               This document has been electronically signed by JANIE Snyder  August 22, 2024 16:05 EDT    Part of this note may be an electronic  transcription/translation of spoken language to printed text using the Dragon Dictation System.

## 2024-08-30 ENCOUNTER — HOSPITAL ENCOUNTER (OUTPATIENT)
Dept: GENERAL RADIOLOGY | Facility: HOSPITAL | Age: 39
Discharge: HOME | End: 2024-08-30
Payer: MEDICAID

## 2024-08-30 ENCOUNTER — LAB (OUTPATIENT)
Dept: LAB | Facility: HOSPITAL | Age: 39
End: 2024-08-30
Payer: MEDICAID

## 2024-08-30 DIAGNOSIS — D75.1 POLYCYTHEMIA: ICD-10-CM

## 2024-08-30 DIAGNOSIS — R05.9 COUGH, UNSPECIFIED TYPE: ICD-10-CM

## 2024-08-30 DIAGNOSIS — L68.0 HIRSUTISM: ICD-10-CM

## 2024-08-30 DIAGNOSIS — R80.9 TYPE 2 DIABETES MELLITUS WITH MICROALBUMINURIA, WITHOUT LONG-TERM CURRENT USE OF INSULIN: ICD-10-CM

## 2024-08-30 DIAGNOSIS — E78.2 MIXED HYPERLIPIDEMIA: ICD-10-CM

## 2024-08-30 DIAGNOSIS — F17.200 SMOKER: ICD-10-CM

## 2024-08-30 DIAGNOSIS — F17.200 SMOKER: Primary | ICD-10-CM

## 2024-08-30 DIAGNOSIS — E11.29 TYPE 2 DIABETES MELLITUS WITH MICROALBUMINURIA, WITHOUT LONG-TERM CURRENT USE OF INSULIN: ICD-10-CM

## 2024-08-30 DIAGNOSIS — I10 ESSENTIAL HYPERTENSION: ICD-10-CM

## 2024-08-30 DIAGNOSIS — E55.9 VITAMIN D DEFICIENCY: ICD-10-CM

## 2024-08-30 LAB
25(OH)D3 SERPL-MCNC: 33.3 NG/ML (ref 30–100)
ALBUMIN SERPL-MCNC: 4.3 G/DL (ref 3.5–5.2)
ALBUMIN UR-MCNC: <1.2 MG/DL
ALBUMIN/GLOB SERPL: 1.5 G/DL
ALP SERPL-CCNC: 83 U/L (ref 39–117)
ALT SERPL W P-5'-P-CCNC: 29 U/L (ref 1–33)
ANION GAP SERPL CALCULATED.3IONS-SCNC: 12.1 MMOL/L (ref 5–15)
AST SERPL-CCNC: 22 U/L (ref 1–32)
BASOPHILS # BLD AUTO: 0.07 10*3/MM3 (ref 0–0.2)
BASOPHILS NFR BLD AUTO: 0.6 % (ref 0–1.5)
BILIRUB SERPL-MCNC: 0.3 MG/DL (ref 0–1.2)
BUN SERPL-MCNC: 7 MG/DL (ref 6–20)
BUN/CREAT SERPL: 10 (ref 7–25)
CALCIUM SPEC-SCNC: 9.8 MG/DL (ref 8.6–10.5)
CHLORIDE SERPL-SCNC: 101 MMOL/L (ref 98–107)
CHOLEST SERPL-MCNC: 132 MG/DL (ref 0–200)
CO2 SERPL-SCNC: 24.9 MMOL/L (ref 22–29)
CREAT SERPL-MCNC: 0.7 MG/DL (ref 0.57–1)
DEPRECATED RDW RBC AUTO: 40.8 FL (ref 37–54)
EGFRCR SERPLBLD CKD-EPI 2021: 115.1 ML/MIN/1.73
EOSINOPHIL # BLD AUTO: 0.43 10*3/MM3 (ref 0–0.4)
EOSINOPHIL NFR BLD AUTO: 3.6 % (ref 0.3–6.2)
ERYTHROCYTE [DISTWIDTH] IN BLOOD BY AUTOMATED COUNT: 13.5 % (ref 12.3–15.4)
GLOBULIN UR ELPH-MCNC: 2.9 GM/DL
GLUCOSE SERPL-MCNC: 66 MG/DL (ref 65–99)
HBA1C MFR BLD: 6 % (ref 4.8–5.6)
HCT VFR BLD AUTO: 49.6 % (ref 34–46.6)
HDLC SERPL-MCNC: 38 MG/DL (ref 40–60)
HGB BLD-MCNC: 16.4 G/DL (ref 12–15.9)
IMM GRANULOCYTES # BLD AUTO: 0.07 10*3/MM3 (ref 0–0.05)
IMM GRANULOCYTES NFR BLD AUTO: 0.6 % (ref 0–0.5)
LDLC SERPL CALC-MCNC: 53 MG/DL (ref 0–100)
LDLC/HDLC SERPL: 1.09 {RATIO}
LYMPHOCYTES # BLD AUTO: 3.21 10*3/MM3 (ref 0.7–3.1)
LYMPHOCYTES NFR BLD AUTO: 26.6 % (ref 19.6–45.3)
MCH RBC QN AUTO: 27.8 PG (ref 26.6–33)
MCHC RBC AUTO-ENTMCNC: 33.1 G/DL (ref 31.5–35.7)
MCV RBC AUTO: 84.2 FL (ref 79–97)
MONOCYTES # BLD AUTO: 1.07 10*3/MM3 (ref 0.1–0.9)
MONOCYTES NFR BLD AUTO: 8.9 % (ref 5–12)
NEUTROPHILS NFR BLD AUTO: 59.7 % (ref 42.7–76)
NEUTROPHILS NFR BLD AUTO: 7.22 10*3/MM3 (ref 1.7–7)
NRBC BLD AUTO-RTO: 0 /100 WBC (ref 0–0.2)
PLATELET # BLD AUTO: 301 10*3/MM3 (ref 140–450)
PMV BLD AUTO: 8.9 FL (ref 6–12)
POTASSIUM SERPL-SCNC: 4.2 MMOL/L (ref 3.5–5.2)
PROT SERPL-MCNC: 7.2 G/DL (ref 6–8.5)
RBC # BLD AUTO: 5.89 10*6/MM3 (ref 3.77–5.28)
SODIUM SERPL-SCNC: 138 MMOL/L (ref 136–145)
TESTOST SERPL-MCNC: 61.6 NG/DL (ref 8.4–48.1)
TRIGL SERPL-MCNC: 263 MG/DL (ref 0–150)
TSH SERPL DL<=0.05 MIU/L-ACNC: 0.96 UIU/ML (ref 0.27–4.2)
VIT B12 BLD-MCNC: 1601 PG/ML (ref 211–946)
VLDLC SERPL-MCNC: 41 MG/DL (ref 5–40)
WBC NRBC COR # BLD AUTO: 12.07 10*3/MM3 (ref 3.4–10.8)

## 2024-08-30 PROCEDURE — 83036 HEMOGLOBIN GLYCOSYLATED A1C: CPT

## 2024-08-30 PROCEDURE — 85025 COMPLETE CBC W/AUTO DIFF WBC: CPT

## 2024-08-30 PROCEDURE — 82306 VITAMIN D 25 HYDROXY: CPT

## 2024-08-30 PROCEDURE — 80053 COMPREHEN METABOLIC PANEL: CPT

## 2024-08-30 PROCEDURE — 82043 UR ALBUMIN QUANTITATIVE: CPT

## 2024-08-30 PROCEDURE — 82607 VITAMIN B-12: CPT

## 2024-08-30 PROCEDURE — 71046 X-RAY EXAM CHEST 2 VIEWS: CPT

## 2024-08-30 PROCEDURE — 82668 ASSAY OF ERYTHROPOIETIN: CPT | Performed by: GENERAL PRACTICE

## 2024-08-30 PROCEDURE — 84403 ASSAY OF TOTAL TESTOSTERONE: CPT

## 2024-08-30 PROCEDURE — 80061 LIPID PANEL: CPT

## 2024-08-30 PROCEDURE — 84443 ASSAY THYROID STIM HORMONE: CPT

## 2024-08-30 RX ORDER — PREDNISONE 20 MG/1
TABLET ORAL
Qty: 10 TABLET | Refills: 0 | Status: SHIPPED | OUTPATIENT
Start: 2024-08-30 | End: 2024-09-09

## 2024-09-02 DIAGNOSIS — R80.9 TYPE 2 DIABETES MELLITUS WITH MICROALBUMINURIA, WITHOUT LONG-TERM CURRENT USE OF INSULIN: ICD-10-CM

## 2024-09-02 DIAGNOSIS — E11.29 TYPE 2 DIABETES MELLITUS WITH MICROALBUMINURIA, WITHOUT LONG-TERM CURRENT USE OF INSULIN: ICD-10-CM

## 2024-09-02 RX ORDER — SEMAGLUTIDE 1.34 MG/ML
1 INJECTION, SOLUTION SUBCUTANEOUS WEEKLY
Qty: 3 ML | Refills: 5 | Status: CANCELLED | OUTPATIENT
Start: 2024-09-02

## 2024-09-06 ENCOUNTER — OFFICE VISIT (OUTPATIENT)
Dept: FAMILY MEDICINE CLINIC | Facility: CLINIC | Age: 39
End: 2024-09-06
Payer: MEDICAID

## 2024-09-06 VITALS
HEART RATE: 93 BPM | BODY MASS INDEX: 29.35 KG/M2 | DIASTOLIC BLOOD PRESSURE: 64 MMHG | WEIGHT: 205 LBS | HEIGHT: 70 IN | TEMPERATURE: 98.2 F | OXYGEN SATURATION: 95 % | RESPIRATION RATE: 15 BRPM | SYSTOLIC BLOOD PRESSURE: 118 MMHG

## 2024-09-06 DIAGNOSIS — I10 ESSENTIAL HYPERTENSION: ICD-10-CM

## 2024-09-06 DIAGNOSIS — F31.77 BIPOLAR DISORDER, IN PARTIAL REMISSION, MOST RECENT EPISODE MIXED: ICD-10-CM

## 2024-09-06 DIAGNOSIS — I87.2 CHRONIC VENOUS INSUFFICIENCY: ICD-10-CM

## 2024-09-06 DIAGNOSIS — R80.9 TYPE 2 DIABETES MELLITUS WITH MICROALBUMINURIA, WITHOUT LONG-TERM CURRENT USE OF INSULIN: ICD-10-CM

## 2024-09-06 DIAGNOSIS — G62.9 NEUROPATHY: ICD-10-CM

## 2024-09-06 DIAGNOSIS — E78.2 MIXED HYPERLIPIDEMIA: Primary | ICD-10-CM

## 2024-09-06 DIAGNOSIS — E55.9 VITAMIN D DEFICIENCY: ICD-10-CM

## 2024-09-06 DIAGNOSIS — R06.83 SNORING: ICD-10-CM

## 2024-09-06 DIAGNOSIS — J45.41 MODERATE PERSISTENT ASTHMA WITH ACUTE EXACERBATION: ICD-10-CM

## 2024-09-06 DIAGNOSIS — Z00.00 HEALTHCARE MAINTENANCE: ICD-10-CM

## 2024-09-06 DIAGNOSIS — E66.3 OVERWEIGHT (BMI 25.0-29.9): ICD-10-CM

## 2024-09-06 DIAGNOSIS — F17.200 SMOKER: ICD-10-CM

## 2024-09-06 DIAGNOSIS — K21.9 GASTROESOPHAGEAL REFLUX DISEASE WITHOUT ESOPHAGITIS: ICD-10-CM

## 2024-09-06 DIAGNOSIS — J45.20 MILD INTERMITTENT ASTHMA, UNSPECIFIED WHETHER COMPLICATED: ICD-10-CM

## 2024-09-06 DIAGNOSIS — E11.29 TYPE 2 DIABETES MELLITUS WITH MICROALBUMINURIA, WITHOUT LONG-TERM CURRENT USE OF INSULIN: ICD-10-CM

## 2024-09-06 PROBLEM — R14.0 ABDOMINAL BLOATING: Status: RESOLVED | Noted: 2024-04-24 | Resolved: 2024-09-06

## 2024-09-06 PROBLEM — R14.2 BELCHING: Status: RESOLVED | Noted: 2024-04-24 | Resolved: 2024-09-06

## 2024-09-06 PROBLEM — R10.13 EPIGASTRIC PAIN: Status: RESOLVED | Noted: 2024-04-24 | Resolved: 2024-09-06

## 2024-09-06 PROCEDURE — 3074F SYST BP LT 130 MM HG: CPT | Performed by: GENERAL PRACTICE

## 2024-09-06 PROCEDURE — 3044F HG A1C LEVEL LT 7.0%: CPT | Performed by: GENERAL PRACTICE

## 2024-09-06 PROCEDURE — 99215 OFFICE O/P EST HI 40 MIN: CPT | Performed by: GENERAL PRACTICE

## 2024-09-06 PROCEDURE — 3078F DIAST BP <80 MM HG: CPT | Performed by: GENERAL PRACTICE

## 2024-09-06 RX ORDER — ACYCLOVIR 400 MG/1
1 TABLET ORAL
Qty: 3 EACH | Refills: 5 | Status: SHIPPED | OUTPATIENT
Start: 2024-09-06

## 2024-09-06 RX ORDER — ATORVASTATIN CALCIUM 20 MG/1
20 TABLET, FILM COATED ORAL NIGHTLY
Qty: 30 TABLET | Refills: 5 | Status: SHIPPED | OUTPATIENT
Start: 2024-09-06

## 2024-09-06 RX ORDER — PROPRANOLOL HYDROCHLORIDE 20 MG/1
20 TABLET ORAL 2 TIMES DAILY
Qty: 60 TABLET | Refills: 5 | Status: SHIPPED | OUTPATIENT
Start: 2024-09-06

## 2024-09-06 RX ORDER — FLUTICASONE PROPIONATE AND SALMETEROL XINAFOATE 230; 21 UG/1; UG/1
2 AEROSOL, METERED RESPIRATORY (INHALATION)
Qty: 12 G | Refills: 5 | Status: SHIPPED | OUTPATIENT
Start: 2024-09-06

## 2024-09-06 RX ORDER — LANOLIN ALCOHOL/MO/W.PET/CERES
2000 CREAM (GRAM) TOPICAL DAILY
Qty: 60 TABLET | Refills: 5 | Status: SHIPPED | OUTPATIENT
Start: 2024-09-06

## 2024-09-06 RX ORDER — DAPAGLIFLOZIN 10 MG/1
1 TABLET, FILM COATED ORAL EVERY MORNING
Qty: 30 TABLET | Refills: 5 | Status: SHIPPED | OUTPATIENT
Start: 2024-09-06

## 2024-09-06 RX ORDER — DIPHENOXYLATE HYDROCHLORIDE AND ATROPINE SULFATE 2.5; .025 MG/1; MG/1
1 TABLET ORAL DAILY
Qty: 30 TABLET | Refills: 5 | Status: SHIPPED | OUTPATIENT
Start: 2024-09-06

## 2024-09-06 RX ORDER — SEMAGLUTIDE 1.34 MG/ML
INJECTION, SOLUTION SUBCUTANEOUS
Qty: 3 ML | Refills: 5 | OUTPATIENT
Start: 2024-09-06

## 2024-09-06 RX ORDER — CHOLECALCIFEROL (VITAMIN D3) 25 MCG
1000 TABLET ORAL DAILY
Qty: 30 TABLET | Refills: 5 | Status: SHIPPED | OUTPATIENT
Start: 2024-09-06

## 2024-09-06 RX ORDER — SEMAGLUTIDE 1.34 MG/ML
1 INJECTION, SOLUTION SUBCUTANEOUS WEEKLY
Qty: 3 ML | Refills: 5 | Status: SHIPPED | OUTPATIENT
Start: 2024-09-06

## 2024-09-06 RX ORDER — PREDNISONE 20 MG/1
TABLET ORAL
Qty: 10 TABLET | Refills: 0 | Status: SHIPPED | OUTPATIENT
Start: 2024-09-06 | End: 2024-09-16

## 2024-09-06 RX ORDER — ALBUTEROL SULFATE 90 UG/1
2 AEROSOL, METERED RESPIRATORY (INHALATION) EVERY 6 HOURS PRN
Qty: 18 G | Refills: 5 | Status: SHIPPED | OUTPATIENT
Start: 2024-09-06

## 2024-09-06 RX ORDER — PANTOPRAZOLE SODIUM 40 MG/1
40 TABLET, DELAYED RELEASE ORAL DAILY
Qty: 30 TABLET | Refills: 5 | Status: SHIPPED | OUTPATIENT
Start: 2024-09-06

## 2024-09-06 RX ORDER — DOCUSATE SODIUM 100 MG/1
100 CAPSULE, LIQUID FILLED ORAL 2 TIMES DAILY
Qty: 60 CAPSULE | Refills: 5 | Status: SHIPPED | OUTPATIENT
Start: 2024-09-06

## 2024-09-13 RX ORDER — DOXYCYCLINE 100 MG/1
100 CAPSULE ORAL 2 TIMES DAILY
Qty: 20 CAPSULE | Refills: 0 | Status: SHIPPED | OUTPATIENT
Start: 2024-09-13 | End: 2024-09-23

## 2024-09-18 RX ORDER — ONDANSETRON 4 MG/1
4 TABLET, FILM COATED ORAL EVERY 8 HOURS PRN
Qty: 60 TABLET | Refills: 0 | Status: SHIPPED | OUTPATIENT
Start: 2024-09-18

## 2024-11-19 ENCOUNTER — TELEMEDICINE (OUTPATIENT)
Dept: PSYCHIATRY | Facility: CLINIC | Age: 39
End: 2024-11-19
Payer: MEDICAID

## 2024-11-19 DIAGNOSIS — F17.210 TOBACCO DEPENDENCE DUE TO CIGARETTES: ICD-10-CM

## 2024-11-19 DIAGNOSIS — F31.30 BIPOLAR I DISORDER, MOST RECENT EPISODE DEPRESSED: Primary | ICD-10-CM

## 2024-11-19 DIAGNOSIS — R06.83 SNORING: Primary | ICD-10-CM

## 2024-11-19 DIAGNOSIS — F51.05 INSOMNIA DUE TO MENTAL DISORDER: ICD-10-CM

## 2024-11-19 DIAGNOSIS — F41.1 GENERALIZED ANXIETY DISORDER: ICD-10-CM

## 2024-11-19 DIAGNOSIS — G47.8 NON-RESTORATIVE SLEEP: ICD-10-CM

## 2024-11-19 DIAGNOSIS — Z79.899 MEDICATION MANAGEMENT: ICD-10-CM

## 2024-11-19 PROCEDURE — 1159F MED LIST DOCD IN RCRD: CPT | Performed by: NURSE PRACTITIONER

## 2024-11-19 PROCEDURE — 99214 OFFICE O/P EST MOD 30 MIN: CPT | Performed by: NURSE PRACTITIONER

## 2024-11-19 PROCEDURE — 1160F RVW MEDS BY RX/DR IN RCRD: CPT | Performed by: NURSE PRACTITIONER

## 2024-11-19 RX ORDER — LURASIDONE HYDROCHLORIDE 120 MG/1
120 TABLET, FILM COATED ORAL DAILY
Qty: 30 TABLET | Refills: 2 | Status: SHIPPED | OUTPATIENT
Start: 2024-11-19

## 2024-11-19 RX ORDER — BUPROPION HYDROCHLORIDE 150 MG/1
150 TABLET, EXTENDED RELEASE ORAL 2 TIMES DAILY
Qty: 60 TABLET | Refills: 2 | Status: SHIPPED | OUTPATIENT
Start: 2024-11-19

## 2024-11-19 RX ORDER — TRAZODONE HYDROCHLORIDE 50 MG/1
50 TABLET, FILM COATED ORAL
Qty: 30 TABLET | Refills: 2 | Status: SHIPPED | OUTPATIENT
Start: 2024-11-19

## 2024-11-19 NOTE — PROGRESS NOTES
This provider is located at the [facility], [address] [place] [address], [id number] using a secure [employer] Video Visit through [employer]. Patient is being seen remotely via telehealth at their home address in [place], and stated they are in a secure environment for this session. The patient's condition being diagnosed/treated is appropriate for telemedicine. The provider identified herself as well as her credentials.   The patient, and/or patients guardian, consent to be seen remotely, and when consent is given they understand that the consent allows for patient identifiable information to be sent to a third party as needed.   They may refuse to be seen remotely at any time. The electronic data is encrypted and password protected, and the patient and/or guardian has been advised of the potential risks to privacy not withstanding such measures.  Mode of Visit: Video  Location of patient: -HOME-  Location of provider: +[facility]+  You have chosen to receive care through a telehealth visit.  The patient has signed the video visit consent form.  The visit included audio and video interaction. No technical issues occurred during this visit.    Subjective   Jessica Harris is a 38 y.o. female who presents today for follow up    Chief Complaint:  Bipolar disorder    History of Present Illness: Patient presents as follow-up via telehealth visit.  Reports medications continue to work well.  States she has been struggling with some anxiety and feels that it is primarily situational.  States her and her  has had mechanical issues with their vehicles and have not been able to move into a bigger home.  Rates depression 3/10 with 10 being the worst.  She does report hypersomnia however states her sleep is broken up into 3-4-hour increments.  Reports appetite is good.  She denies SI/HI/AVH.    The following portions of the patient's history were reviewed and updated as appropriate: allergies, current  "medications, past family history, past medical history, past social history, past surgical history and problem list.      Past Medical History:  Past Medical History:   Diagnosis Date    Anxiety     Asthma     Bipolar 1 disorder     Depression     Diabetes mellitus     Elevated cholesterol     GERD (gastroesophageal reflux disease)     History of bronchitis     History of ear infections     History of stomach ulcers     History of streptococcal infection     Hyperlipidemia     Hypertension     Insomnia     Kidney stones     Urinary tract infection        Social History:  Social History     Socioeconomic History    Marital status: Single   Tobacco Use    Smoking status: Every Day     Current packs/day: 0.50     Types: Cigarettes    Smokeless tobacco: Never    Tobacco comments:     \"thinking about it\"   Vaping Use    Vaping status: Never Used   Substance and Sexual Activity    Alcohol use: No    Drug use: No    Sexual activity: Defer     Birth control/protection: Pill       Family History:  Family History   Problem Relation Age of Onset    Cancer Mother     Stroke Mother     Lung disease Mother     No Known Problems Father     Cancer Other     Heart disease Other     Stroke Other        Past Surgical History:  Past Surgical History:   Procedure Laterality Date    ADENOIDECTOMY      CHOLECYSTECTOMY      D & C HYSTEROSCOPY N/A 05/16/2022    Procedure: DILATATION AND CURETTAGE HYSTEROSCOPY;  Surgeon: Joey Andersen DO;  Location: [facility];  Service: Obstetrics/Gynecology;  Laterality: N/A;    DENTAL PROCEDURE      ENDOSCOPY      ENDOSCOPY N/A 6/4/2024    Procedure: ESOPHAGOGASTRODUODENOSCOPY WITH BIOPSY;  Surgeon: Matilde Moura MD;  Location: [facility];  Service: Gastroenterology;  Laterality: N/A;    HERNIA REPAIR      umbilical    OVARIAN CYST DRAINAGE/EXCISION Left 05/16/2022    Procedure: OVARIAN CYSTECTOMY WITH EXTENSIVE LYSIS OF ADHESIONS;  Surgeon: Joey Andersen DO;  Location: [facility];  Service: Obstetrics/Gynecology;  Laterality: Left;    TONSILLECTOMY      TOTAL LAPAROSCOPIC HYSTERECTOMY N/A 6/22/2022    Procedure: ROBOTIC TOTAL LAPAROSCOPIC HYSTERECTOMY WITH BILATERAL SALPINGECTOMY;  Surgeon: Joey Andersen DO;  Location:  [facility];  Service: [facility];  Laterality: N/A;    UPPER GASTROINTESTINAL ENDOSCOPY      WISDOM TOOTH EXTRACTION         Problem List:  Patient Active Problem List   Diagnosis    Palpitations    Bipolar disorder, in partial remission, most recent episode mixed    Smoker    Gastroesophageal reflux disease without esophagitis    Chronic venous insufficiency    Healthcare maintenance    Vitamin D deficiency    Snoring    History of nephrolithiasis    Essential hypertension    Overweight (BMI 25.0-29.9)    Type 2 diabetes mellitus with microalbuminuria, without long-term current use of insulin    Mixed hyperlipidemia    Neuropathy    S/P laparoscopic hysterectomy    Encounter for immunization    Moderate persistent asthma with acute exacerbation        Allergy:   No Known Allergies     Current Medications:   Current Outpatient Medications   Medication Sig Dispense Refill    albuterol sulfate  (90 Base) MCG/ACT inhaler Inhale 2 puffs Every 6 (Six) Hours As Needed for Wheezing. 18 g 5    atorvastatin (LIPITOR) 20 MG tablet Take 1 tablet by mouth Every Night. 30 tablet 5    buPROPion SR (Wellbutrin SR) 150 MG 12 hr tablet Take 1 tablet by mouth 2 (Two) Times a Day. 60 tablet 2    cholecalciferol (Vitamin D) 25 MCG (1000 UT) tablet Take 1 tablet by mouth Daily. 30 tablet 5    Continuous Glucose Sensor (Dexcom G7 Sensor) misc Use 1 each Every 10 (Ten) Days. 3 each 5    dapagliflozin Propanediol (Farxiga) 10 MG tablet Take 10 mg by mouth Every Morning. 30 tablet 5    docusate sodium (COLACE) 100 MG capsule Take 1 capsule by mouth 2 (Two) Times a Day. 60 capsule 5    fluticasone-salmeterol (Advair HFA) 230-21 MCG/ACT inhaler Inhale 2 puffs 2 (Two) Times  a Day. 12 g 5    Glucose Blood (Blood Glucose Test) strip 1 daily 50 each 5    hydrOXYzine pamoate (VISTARIL) 25 MG capsule Take 1 capsule by mouth 3 (Three) Times a Day As Needed for Anxiety. 90 capsule 2    Lancets misc 1 daily 50 each 5    linaclotide (Linzess) 290 MCG capsule capsule Take 1 capsule by mouth Daily. 30 minutes before meals on an empty stomach. 30 capsule 5    Lurasidone HCl (Latuda) 120 MG tablet tablet Take 1 tablet by mouth Daily. 30 tablet 2    Multi-Vitamin tablet tablet Take 1 tablet by mouth Daily. 30 tablet 5    ondansetron (ZOFRAN) 4 MG tablet Take 1 tablet by mouth Every 8 (Eight) Hours As Needed for Nausea or Vomiting. 60 tablet 0    pantoprazole (PROTONIX) 40 MG EC tablet Take 1 tablet by mouth Daily. 30 tablet 5    propranolol (INDERAL) 20 MG tablet Take 1 tablet by mouth 2 (Two) Times a Day. 60 tablet 5    Semaglutide, 1 MG/DOSE, (Ozempic, 1 MG/DOSE,) 4 MG/3ML solution pen-injector Inject 1 mg under the skin into the appropriate area as directed 1 (One) Time Per Week. 3 mL 5    spironolactone (ALDACTONE) 50 MG tablet TAKE ONE TABLET BY MOUTH EVERY DAY FOR FLUID      traZODone (DESYREL) 50 MG tablet Take 1 tablet by mouth every night at bedtime. 30 tablet 2    vitamin B-12 (CYANOCOBALAMIN) 1000 MCG tablet Take 2 tablets by mouth Daily. 60 tablet 5     No current facility-administered medications for this visit.       Review of Symptoms:    Review of Systems   Constitutional:  Positive for fatigue.   HENT: Negative.     Eyes: Negative.    Respiratory: Negative.     Cardiovascular: Negative.    Gastrointestinal: Negative.    Neurological: Negative.    Psychiatric/Behavioral:  Positive for sleep disturbance, depressed mood and stress. Negative for suicidal ideas. The patient is nervous/anxious.          Physical Exam:   There were no vitals taken for this visit.  There is no height or weight on file to calculate BMI.    Appearance: Well nourished female, appropriately dressed, appears  stated age and in no acute distress  Gait, Station, Strength: PAULINE    Mental Status Exam:   Hygiene:   good  Cooperation:  Cooperative  Eye Contact:  Good  Psychomotor Behavior:  Appropriate  Affect:  Appropriate  Mood: normal  Hopelessness: Denies  Speech:  Normal  Thought Process:  Linear  Thought Content:  Mood congruent  Suicidal:  None  Homicidal:  None  Hallucinations:  None  Delusion:  None  Memory:  Intact  Orientation:  Person, Place, Time, and Situation  Reliability:  good  Insight:  Good  Judgement:  Fair  Impulse Control:  Fair  Physical/Medical Issues:   see med hx      PHQ-Score Total:  PHQ-9 Total Score: (Patient-Rptd) 5   Patient screened positive for depression based on a PHQ-9 score of 5 on 11/19/2024. Follow-up recommendations include: Prescribed antidepressant medication treatment and Suicide Risk Assessment performed.          Lab Results:   No visits with results within 1 Month(s) from this visit.   Latest known visit with results is:   Lab on 08/30/2024   Component Date Value Ref Range Status    Glucose 08/30/2024 66  65 - 99 mg/dL Final    BUN 08/30/2024 7  6 - 20 mg/dL Final    Creatinine 08/30/2024 0.70  0.57 - 1.00 mg/dL Final    Sodium 08/30/2024 138  136 - 145 mmol/L Final    Potassium 08/30/2024 4.2  3.5 - 5.2 mmol/L Final    Chloride 08/30/2024 101  98 - 107 mmol/L Final    CO2 08/30/2024 24.9  22.0 - 29.0 mmol/L Final    Calcium 08/30/2024 9.8  8.6 - 10.5 mg/dL Final    Total Protein 08/30/2024 7.2  6.0 - 8.5 g/dL Final    Albumin 08/30/2024 4.3  3.5 - 5.2 g/dL Final    ALT (SGPT) 08/30/2024 29  1 - 33 U/L Final    AST (SGOT) 08/30/2024 22  1 - 32 U/L Final    Alkaline Phosphatase 08/30/2024 83  39 - 117 U/L Final    Total Bilirubin 08/30/2024 0.3  0.0 - 1.2 mg/dL Final    Globulin 08/30/2024 2.9  gm/dL Final    A/G Ratio 08/30/2024 1.5  g/dL Final    BUN/Creatinine Ratio 08/30/2024 10.0  7.0 - 25.0 Final    Anion Gap 08/30/2024 12.1  5.0 - 15.0 mmol/L Final    eGFR 08/30/2024 115.1   >60.0 mL/min/1.73 Final    Total Cholesterol 08/30/2024 132  0 - 200 mg/dL Final    Triglycerides 08/30/2024 263 (H)  0 - 150 mg/dL Final    HDL Cholesterol 08/30/2024 38 (L)  40 - 60 mg/dL Final    LDL Cholesterol  08/30/2024 53  0 - 100 mg/dL Final    VLDL Cholesterol 08/30/2024 41 (H)  5 - 40 mg/dL Final    LDL/HDL Ratio 08/30/2024 1.09   Final    TSH 08/30/2024 0.961  0.270 - 4.200 uIU/mL Final    Hemoglobin A1C 08/30/2024 6.00 (H)  4.80 - 5.60 % Final    25 Hydroxy, Vitamin D 08/30/2024 33.3  30.0 - 100.0 ng/ml Final    Vitamin B-12 08/30/2024 1,601 (H)  211 - 946 pg/mL Final    Microalbumin, Urine 08/30/2024 <1.2  mg/dL Final    Testosterone, Total 08/30/2024 61.60 (H)  8.40 - 48.10 ng/dL Final    WBC 08/30/2024 12.07 (H)  3.40 - 10.80 10*3/mm3 Final    RBC 08/30/2024 5.89 (H)  3.77 - 5.28 10*6/mm3 Final    Hemoglobin 08/30/2024 16.4 (H)  12.0 - 15.9 g/dL Final    Hematocrit 08/30/2024 49.6 (H)  34.0 - 46.6 % Final    MCV 08/30/2024 84.2  79.0 - 97.0 fL Final    MCH 08/30/2024 27.8  26.6 - 33.0 pg Final    MCHC 08/30/2024 33.1  31.5 - 35.7 g/dL Final    RDW 08/30/2024 13.5  12.3 - 15.4 % Final    RDW-SD 08/30/2024 40.8  37.0 - 54.0 fl Final    MPV 08/30/2024 8.9  6.0 - 12.0 fL Final    Platelets 08/30/2024 301  140 - 450 10*3/mm3 Final    Neutrophil % 08/30/2024 59.7  42.7 - 76.0 % Final    Lymphocyte % 08/30/2024 26.6  19.6 - 45.3 % Final    Monocyte % 08/30/2024 8.9  5.0 - 12.0 % Final    Eosinophil % 08/30/2024 3.6  0.3 - 6.2 % Final    Basophil % 08/30/2024 0.6  0.0 - 1.5 % Final    Immature Grans % 08/30/2024 0.6 (H)  0.0 - 0.5 % Final    Neutrophils, Absolute 08/30/2024 7.22 (H)  1.70 - 7.00 10*3/mm3 Final    Lymphocytes, Absolute 08/30/2024 3.21 (H)  0.70 - 3.10 10*3/mm3 Final    Monocytes, Absolute 08/30/2024 1.07 (H)  0.10 - 0.90 10*3/mm3 Final    Eosinophils, Absolute 08/30/2024 0.43 (H)  0.00 - 0.40 10*3/mm3 Final    Basophils, Absolute 08/30/2024 0.07  0.00 - 0.20 10*3/mm3 Final    Immature Grans,  Absolute 08/30/2024 0.07 (H)  0.00 - 0.05 10*3/mm3 Final    nRBC 08/30/2024 0.0  0.0 - 0.2 /100 WBC Final       Assessment & Plan   Diagnoses and all orders for this visit:    1. Bipolar I disorder, most recent episode depressed (Primary)  -     buPROPion SR (Wellbutrin SR) 150 MG 12 hr tablet; Take 1 tablet by mouth 2 (Two) Times a Day.  Dispense: 60 tablet; Refill: 2  -     Lurasidone HCl (Latuda) 120 MG tablet tablet; Take 1 tablet by mouth Daily.  Dispense: 30 tablet; Refill: 2    2. Generalized anxiety disorder  -     Lurasidone HCl (Latuda) 120 MG tablet tablet; Take 1 tablet by mouth Daily.  Dispense: 30 tablet; Refill: 2    3. Insomnia due to mental disorder  -     traZODone (DESYREL) 50 MG tablet; Take 1 tablet by mouth every night at bedtime.  Dispense: 30 tablet; Refill: 2    4. Tobacco dependence due to cigarettes    5. Medication management        -Continue bupropion  mg twice daily for depression  -Continue lurasidone 120 mg daily for mood. Lengthy discussion with patient on the possible side effects of antipsychotic medications including increased cholesterol, increased blood sugar, and possibility of weight gain.  Also discussed the need to monitor lab work associated with this.  The risk of muscle movement disorders with this class of medication was also discussed.  -Continue trazodone 50 mg nightly for sleep  -Continue hydroxyzine pamoate 25 mg 3 times daily as needed for anxiety  -Encouraged patient to begin psychotherapy  -JULIET reviewed and appropriate. Patient counseled on use of controlled substances  -The benefits of a healthy diet and exercise were discussed with patient, especially the positive effects they have on mental health. Patient encouraged to consider lifestyle modification regarding  diet and exercise patterns to maximize results of mental health treatment.  -Reviewed previous available documentation  -Reviewed most recent available labs   -Jessica Harris  reports that she  has been smoking cigarettes. She has never used smokeless tobacco. I have educated her on the risk of diseases from using tobacco products such as cancer, COPD, heart disease, reproductive problems, low birth weight, cataracts, and arterial disease. I advised her to quit and she is not willing to quit. I spent 3  minutes counseling the patient.             Visit Diagnoses:    ICD-10-CM ICD-9-CM   1. Bipolar I disorder, most recent episode depressed  F31.30 296.50   2. Generalized anxiety disorder  F41.1 300.02   3. Insomnia due to mental disorder  F51.05 300.9     327.02   4. Tobacco dependence due to cigarettes  F17.210 305.1   5. Medication management  Z79.899 V58.69       TREATMENT PLAN/GOALS: Continue supportive psychotherapy efforts and medications as indicated. Treatment and medication options discussed during today's visit. Patient acknowledged and verbally consented to continue with current treatment plan and was educated on the importance of compliance with treatment and follow-up appointments.    MEDICATION ISSUES:    Discussed medication options and treatment plan of prescribed medication as well as the risks, benefits, and side effects including potential falls, possible impaired driving and metabolic adversities among others. Patient is agreeable to call the office with any worsening of symptoms or onset of side effects. Patient is agreeable to call 911 or go to the nearest ER should he/she begin having SI/HI.     MEDS ORDERED DURING VISIT:  New Medications Ordered This Visit   Medications    buPROPion SR (Wellbutrin SR) 150 MG 12 hr tablet     Sig: Take 1 tablet by mouth 2 (Two) Times a Day.     Dispense:  60 tablet     Refill:  2    Lurasidone HCl (Latuda) 120 MG tablet tablet     Sig: Take 1 tablet by mouth Daily.     Dispense:  30 tablet     Refill:  2    traZODone (DESYREL) 50 MG tablet     Sig: Take 1 tablet by mouth every night at bedtime.     Dispense:  30 tablet     Refill:  2       Return in  about 3 months (around 2/19/2025).               This document has been electronically signed by JANIE Snyder  November 19, 2024 11:02 EST    Part of this note may be an electronic transcription/translation of spoken language to printed text using the Dragon Dictation System.

## 2024-12-03 ENCOUNTER — TELEMEDICINE (OUTPATIENT)
Dept: FAMILY MEDICINE CLINIC | Facility: CLINIC | Age: 39
End: 2024-12-03
Payer: MEDICAID

## 2024-12-03 DIAGNOSIS — H92.09 EAR ACHE: Primary | ICD-10-CM

## 2024-12-03 PROCEDURE — 99213 OFFICE O/P EST LOW 20 MIN: CPT | Performed by: PHYSICIAN ASSISTANT

## 2024-12-03 PROCEDURE — 1160F RVW MEDS BY RX/DR IN RCRD: CPT | Performed by: PHYSICIAN ASSISTANT

## 2024-12-03 PROCEDURE — 3044F HG A1C LEVEL LT 7.0%: CPT | Performed by: PHYSICIAN ASSISTANT

## 2024-12-03 PROCEDURE — 1159F MED LIST DOCD IN RCRD: CPT | Performed by: PHYSICIAN ASSISTANT

## 2024-12-03 NOTE — PROGRESS NOTES
Subjective        You have chosen to receive care through a telehealth visit.  Do you consent to use a video/audio connection for your medical care today? Yes The patient is at their home.  I am at the [facility] clinic in [place].      Chief Complaint  Earache (/)    Subjective      Jessica Harris is a 38 y.o. female who presents today to [facility] for Earache (/). Past medical history significant for type 2 diabetes mellitus, asthma, hyperlipidemia, hypertension, GERD.    Earache (/):  She reports about 3 days of feeling poorly with sinus congestion, sinus headaches, sinus pressure, and left-sided ear pain.  Denies any fever or chills.  No nausea, vomiting, diarrhea.  Denies any cough at this time.  She reports her children have been ill with similar symptoms and they have tested negative for COVID, flu, and strep.  Her main issue bothering her is her left-sided ear pain.  She was unable to come in for a visit today as she does not have transportation.  Denies any recent swimming or water exposure other than with bathing.      Current Outpatient Medications:     albuterol sulfate  (90 Base) MCG/ACT inhaler, Inhale 2 puffs Every 6 (Six) Hours As Needed for Wheezing., Disp: 18 g, Rfl: 5    amoxicillin-clavulanate (AUGMENTIN) 875-125 MG per tablet, Take 1 tablet by mouth Every 12 (Twelve) Hours for 10 days., Disp: 20 tablet, Rfl: 0    atorvastatin (LIPITOR) 20 MG tablet, Take 1 tablet by mouth Every Night., Disp: 30 tablet, Rfl: 5    buPROPion SR (Wellbutrin SR) 150 MG 12 hr tablet, Take 1 tablet by mouth 2 (Two) Times a Day., Disp: 60 tablet, Rfl: 2    cholecalciferol (Vitamin D) 25 MCG (1000 UT) tablet, Take 1 tablet by mouth Daily., Disp: 30 tablet, Rfl: 5    Continuous Glucose Sensor (Dexcom G7 Sensor) misc, Use 1 each Every 10 (Ten) Days., Disp: 3 each, Rfl: 5    dapagliflozin Propanediol (Farxiga) 10 MG tablet, Take 10 mg by mouth Every  Morning., Disp: 30 tablet, Rfl: 5    docusate sodium (COLACE) 100 MG capsule, Take 1 capsule by mouth 2 (Two) Times a Day., Disp: 60 capsule, Rfl: 5    fluticasone-salmeterol (Advair HFA) 230-21 MCG/ACT inhaler, Inhale 2 puffs 2 (Two) Times a Day., Disp: 12 g, Rfl: 5    Glucose Blood (Blood Glucose Test) strip, 1 daily, Disp: 50 each, Rfl: 5    hydrOXYzine pamoate (VISTARIL) 25 MG capsule, Take 1 capsule by mouth 3 (Three) Times a Day As Needed for Anxiety., Disp: 90 capsule, Rfl: 2    Lancets misc, 1 daily, Disp: 50 each, Rfl: 5    linaclotide (Linzess) 290 MCG capsule capsule, Take 1 capsule by mouth Daily. 30 minutes before meals on an empty stomach., Disp: 30 capsule, Rfl: 5    Lurasidone HCl (Latuda) 120 MG tablet tablet, Take 1 tablet by mouth Daily., Disp: 30 tablet, Rfl: 2    Multi-Vitamin tablet tablet, Take 1 tablet by mouth Daily., Disp: 30 tablet, Rfl: 5    ondansetron (ZOFRAN) 4 MG tablet, Take 1 tablet by mouth Every 8 (Eight) Hours As Needed for Nausea or Vomiting., Disp: 60 tablet, Rfl: 0    pantoprazole (PROTONIX) 40 MG EC tablet, Take 1 tablet by mouth Daily., Disp: 30 tablet, Rfl: 5    propranolol (INDERAL) 20 MG tablet, Take 1 tablet by mouth 2 (Two) Times a Day., Disp: 60 tablet, Rfl: 5    Semaglutide, 1 MG/DOSE, (Ozempic, 1 MG/DOSE,) 4 MG/3ML solution pen-injector, Inject 1 mg under the skin into the appropriate area as directed 1 (One) Time Per Week., Disp: 3 mL, Rfl: 5    spironolactone (ALDACTONE) 50 MG tablet, TAKE ONE TABLET BY MOUTH EVERY DAY FOR FLUID, Disp: , Rfl:     traZODone (DESYREL) 50 MG tablet, Take 1 tablet by mouth every night at bedtime., Disp: 30 tablet, Rfl: 2    vitamin B-12 (CYANOCOBALAMIN) 1000 MCG tablet, Take 2 tablets by mouth Daily., Disp: 60 tablet, Rfl: 5      No Known Allergies    Objective     Objective   Vital Signs:  There were no vitals taken for this visit.  Estimated body mass index is 29.41 kg/m² as calculated from the following:    Height as of 9/6/24:  "177.8 cm (70\").    Weight as of 9/6/24: 93 kg (205 lb).    Past Medical History:   Diagnosis Date    Anxiety     Asthma     Bipolar 1 disorder     Depression     Diabetes mellitus     Elevated cholesterol     GERD (gastroesophageal reflux disease)     History of bronchitis     History of ear infections     History of stomach ulcers     History of streptococcal infection     Hyperlipidemia     Hypertension     Insomnia     Kidney stones     Urinary tract infection      Past Surgical History:   Procedure Laterality Date    ADENOIDECTOMY      CHOLECYSTECTOMY      D & C HYSTEROSCOPY N/A 05/16/2022    Procedure: DILATATION AND CURETTAGE HYSTEROSCOPY;  Surgeon: Joey Andersen DO;  Location: [facility];  Service: Obstetrics/Gynecology;  Laterality: N/A;    DENTAL PROCEDURE      ENDOSCOPY      ENDOSCOPY N/A 6/4/2024    Procedure: ESOPHAGOGASTRODUODENOSCOPY WITH BIOPSY;  Surgeon: Matilde Moura MD;  Location: [facility];  Service: Gastroenterology;  Laterality: N/A;    HERNIA REPAIR      umbilical    OVARIAN CYST DRAINAGE/EXCISION Left 05/16/2022    Procedure: OVARIAN CYSTECTOMY WITH EXTENSIVE LYSIS OF ADHESIONS;  Surgeon: Joey Andersen DO;  Location: [facility];  Service: Obstetrics/Gynecology;  Laterality: Left;    TONSILLECTOMY      TOTAL LAPAROSCOPIC HYSTERECTOMY N/A 6/22/2022    Procedure: ROBOTIC TOTAL LAPAROSCOPIC HYSTERECTOMY WITH BILATERAL SALPINGECTOMY;  Surgeon: Joey Andersen DO;  Location: [facility];  Service: Robotics - DaVinci;  Laterality: N/A;    UPPER GASTROINTESTINAL ENDOSCOPY      WISDOM TOOTH EXTRACTION       Social History     Socioeconomic History    Marital status: Single   Tobacco Use    Smoking status: Every Day     Current packs/day: 0.50     Types: Cigarettes    Smokeless tobacco: Never    Tobacco comments:     \"thinking about it\"   Vaping Use    Vaping status: Never Used   Substance and Sexual Activity    Alcohol use: No    Drug use: No    Sexual activity: " Defer     Birth control/protection: Pill      Physical Exam  Constitutional:       General: She is not in acute distress.     Appearance: Normal appearance. She is not ill-appearing, toxic-appearing or diaphoretic.   HENT:      Head: Normocephalic and atraumatic.      Nose: Congestion present.   Eyes:      General: No scleral icterus.        Right eye: No discharge.         Left eye: No discharge.   Musculoskeletal:      Cervical back: Normal range of motion.   Skin:     General: Skin is dry.   Neurological:      Mental Status: She is alert and oriented to person, place, and time.   Psychiatric:         Behavior: Behavior normal.        Result Review :  The following data was reviewed by: JODI Wells on 12/03/2024:  Hemoglobin A1C   Date Value Ref Range Status   08/30/2024 6.00 (H) 4.80 - 5.60 % Final     TSH   Date Value Ref Range Status   08/30/2024 0.961 0.270 - 4.200 uIU/mL Final     HDL Cholesterol   Date Value Ref Range Status   08/30/2024 38 (L) 40 - 60 mg/dL Final     LDL Cholesterol    Date Value Ref Range Status   08/30/2024 53 0 - 100 mg/dL Final     LDL Chol Calc (NIH)   Date Value Ref Range Status   04/24/2024 56 0 - 100 mg/dL Final     Triglycerides   Date Value Ref Range Status   08/30/2024 263 (H) 0 - 150 mg/dL Final     Total Cholesterol   Date Value Ref Range Status   04/24/2024 121 0 - 200 mg/dL Final     Comment:     Cholesterol Reference Ranges  (U.S. Department of Health and Human Services ATP III  Classifications)  Desirable          <200 mg/dL  Borderline High    200-239 mg/dL  High Risk          >240 mg/dL  Triglyceride Reference Ranges  (U.S. Department of Health and Human Services ATP III  Classifications)  Normal           <150 mg/dL  Borderline High  150-199 mg/dL  High             200-499 mg/dL  Very High        >500 mg/dL  HDL Reference Ranges  (U.S. Department of Health and Human Services ATP III  Classifications)  Low     <40 mg/dl (major risk factor for CHD)  High    >60  mg/dl ('negative' risk factor for CHD)  LDL Reference Ranges  (U.S. Department of Health and Human Services ATP III  Classifications)  Optimal          <100 mg/dL  Near Optimal     100-129 mg/dL  Borderline High  130-159 mg/dL  High             160-189 mg/dL  Very High        >189 mg/dL             Assessment / Plan         Assessment   Diagnoses and all orders for this visit:    1. Ear ache (Primary)  We discussed that it is difficult to completely assess the ear over a video visit.  Given symptoms of recent sinus congestion and sinus headaches, would likely be an inner ear infection.  Will cover with amoxicillin 875-125 mg twice daily x 10 days.  Strongly encouraged that if symptoms persist or worsening, to come into the clinic to have her ear evaluated in person.  She expressed understanding and agreed.  Continue Claritin, Flonase.  Return to clinic if no improvement noted or if symptoms are worsening.   -     amoxicillin-clavulanate (AUGMENTIN) 875-125 MG per tablet; Take 1 tablet by mouth Every 12 (Twelve) Hours for 10 days.  Dispense: 20 tablet; Refill: 0     New Medications Ordered This Visit   Medications    amoxicillin-clavulanate (AUGMENTIN) 875-125 MG per tablet     Sig: Take 1 tablet by mouth Every 12 (Twelve) Hours for 10 days.     Dispense:  20 tablet     Refill:  0     Follow Up   Return if symptoms worsen or fail to improve.    Patient was given instructions and counseling regarding her condition or for health maintenance advice. Please see specific information pulled into the AVS if appropriate.       This document has been electronically signed by JODI Wells   December 3, 2024 16:57 EST    Dictated Utilizing Dragon Dictation: Part of this note may be an electronic transcription/translation of spoken language to printed text using the Dragon Dictation System.

## 2024-12-27 ENCOUNTER — OFFICE VISIT (OUTPATIENT)
Dept: FAMILY MEDICINE CLINIC | Facility: CLINIC | Age: 39
End: 2024-12-27
Payer: MEDICAID

## 2024-12-27 VITALS
DIASTOLIC BLOOD PRESSURE: 70 MMHG | RESPIRATION RATE: 14 BRPM | HEIGHT: 70 IN | OXYGEN SATURATION: 94 % | HEART RATE: 93 BPM | BODY MASS INDEX: 31.5 KG/M2 | TEMPERATURE: 96.9 F | SYSTOLIC BLOOD PRESSURE: 90 MMHG | WEIGHT: 220 LBS

## 2024-12-27 DIAGNOSIS — J02.9 SORE THROAT: ICD-10-CM

## 2024-12-27 DIAGNOSIS — R09.89 UPPER RESPIRATORY SYMPTOM: Primary | ICD-10-CM

## 2024-12-27 DIAGNOSIS — J45.41 MODERATE PERSISTENT ASTHMA WITH ACUTE EXACERBATION: Chronic | ICD-10-CM

## 2024-12-27 LAB
B PARAPERT DNA SPEC QL NAA+PROBE: NOT DETECTED
B PERT DNA SPEC QL NAA+PROBE: NOT DETECTED
C PNEUM DNA NPH QL NAA+NON-PROBE: NOT DETECTED
EXPIRATION DATE: NORMAL
FLUAV SUBTYP SPEC NAA+PROBE: NOT DETECTED
FLUBV RNA ISLT QL NAA+PROBE: NOT DETECTED
HADV DNA SPEC NAA+PROBE: NOT DETECTED
HCOV 229E RNA SPEC QL NAA+PROBE: NOT DETECTED
HCOV HKU1 RNA SPEC QL NAA+PROBE: NOT DETECTED
HCOV NL63 RNA SPEC QL NAA+PROBE: NOT DETECTED
HCOV OC43 RNA SPEC QL NAA+PROBE: NOT DETECTED
HMPV RNA NPH QL NAA+NON-PROBE: NOT DETECTED
HPIV1 RNA ISLT QL NAA+PROBE: NOT DETECTED
HPIV2 RNA SPEC QL NAA+PROBE: NOT DETECTED
HPIV3 RNA NPH QL NAA+PROBE: NOT DETECTED
HPIV4 P GENE NPH QL NAA+PROBE: NOT DETECTED
INTERNAL CONTROL: NORMAL
Lab: NORMAL
M PNEUMO IGG SER IA-ACNC: NOT DETECTED
RHINOVIRUS RNA SPEC NAA+PROBE: NOT DETECTED
RSV RNA NPH QL NAA+NON-PROBE: NOT DETECTED
S PYO RRNA THROAT QL PROBE: NEGATIVE
SARS-COV-2 RNA RESP QL NAA+PROBE: NOT DETECTED

## 2024-12-27 PROCEDURE — 87651 STREP A DNA AMP PROBE: CPT | Performed by: NURSE PRACTITIONER

## 2024-12-27 PROCEDURE — 3078F DIAST BP <80 MM HG: CPT | Performed by: NURSE PRACTITIONER

## 2024-12-27 PROCEDURE — 99213 OFFICE O/P EST LOW 20 MIN: CPT | Performed by: NURSE PRACTITIONER

## 2024-12-27 PROCEDURE — 0202U NFCT DS 22 TRGT SARS-COV-2: CPT | Performed by: NURSE PRACTITIONER

## 2024-12-27 PROCEDURE — 1160F RVW MEDS BY RX/DR IN RCRD: CPT | Performed by: NURSE PRACTITIONER

## 2024-12-27 PROCEDURE — 1159F MED LIST DOCD IN RCRD: CPT | Performed by: NURSE PRACTITIONER

## 2024-12-27 PROCEDURE — 86403 PARTICLE AGGLUT ANTBDY SCRN: CPT | Performed by: NURSE PRACTITIONER

## 2024-12-27 PROCEDURE — 3074F SYST BP LT 130 MM HG: CPT | Performed by: NURSE PRACTITIONER

## 2024-12-27 PROCEDURE — 3044F HG A1C LEVEL LT 7.0%: CPT | Performed by: NURSE PRACTITIONER

## 2024-12-27 RX ORDER — IBUPROFEN 800 MG/1
800 TABLET, FILM COATED ORAL EVERY 6 HOURS PRN
Qty: 30 TABLET | Refills: 1 | Status: SHIPPED | OUTPATIENT
Start: 2024-12-27

## 2024-12-27 RX ORDER — BROMPHENIRAMINE MALEATE, PSEUDOEPHEDRINE HYDROCHLORIDE, AND DEXTROMETHORPHAN HYDROBROMIDE 2; 30; 10 MG/5ML; MG/5ML; MG/5ML
5 SYRUP ORAL 3 TIMES DAILY PRN
Qty: 120 ML | Refills: 1 | Status: SHIPPED | OUTPATIENT
Start: 2024-12-27

## 2024-12-27 NOTE — PROGRESS NOTES
"      History of Present Illness  Jessica Harris is a 39 y.o. female presents to [facility] today complaining of upper respiratory symptoms which started approximately 3 days ago.  Her son has been recently diagnosed with strep throat.  She has been diagnosed with asthma and does continue to smoke.  Upper Respiratory Symptoms  The current episode started in the past 7 days. The maximum temperature recorded prior to her arrival was 100.4 - 100.9 F. The fever has been present for 1 to 2 days. Associated symptoms include congestion, coughing (Productive), nausea (Intermittent), rhinorrhea and a sore throat. She has tried NSAIDs for the symptoms.     The following portions of the patient's history were reviewed and updated as appropriate: allergies, current medications, past family history, past medical history, past social history, past surgical history and problem list.    Review of Systems   Constitutional:  Positive for appetite change, fatigue and fever. Negative for activity change and chills.   HENT:  Positive for congestion, rhinorrhea and sore throat. Negative for sinus pressure, sinus pain, sneezing and trouble swallowing.    Eyes:  Negative for pain, discharge, redness and itching.   Respiratory:  Positive for cough (Productive). Negative for shortness of breath and wheezing.    Cardiovascular:  Negative for chest pain.   Gastrointestinal:  Positive for nausea (Intermittent). Negative for vomiting.   Skin:  Negative for color change and rash.   Allergic/Immunologic: Positive for environmental allergies.   Neurological:  Positive for dizziness.   Hematological:  Negative for adenopathy.     Vital signs:  BP 90/70 (BP Location: Left arm, Patient Position: Sitting, Cuff Size: Adult)   Pulse 93   Temp 96.9 °F (36.1 °C) (Temporal)   Resp 14   Ht 177.8 cm (70\")   Wt 99.8 kg (220 lb)   LMP  (LMP Unknown) Comment: pcos  SpO2 94%   BMI 31.57 kg/m²     Physical Exam  Vitals " and nursing note reviewed.   Constitutional:       General: She is not in acute distress.     Appearance: She is well-developed.   HENT:      Head: Normocephalic.      Right Ear: A middle ear effusion is present. Tympanic membrane is not erythematous or bulging.      Left Ear: A middle ear effusion is present. Tympanic membrane is not erythematous or bulging.      Nose: Congestion and rhinorrhea present.      Right Turbinates: Swollen.      Left Turbinates: Swollen.      Right Sinus: No maxillary sinus tenderness or frontal sinus tenderness.      Left Sinus: No maxillary sinus tenderness or frontal sinus tenderness.      Mouth/Throat:      Mouth: Mucous membranes are moist.      Pharynx: Posterior oropharyngeal erythema (Very mild) present. No oropharyngeal exudate.   Eyes:      General: No scleral icterus.        Right eye: No discharge.         Left eye: No discharge.      Conjunctiva/sclera: Conjunctivae normal.   Cardiovascular:      Rate and Rhythm: Normal rate and regular rhythm.      Heart sounds: Normal heart sounds. No murmur heard.     No friction rub.   Pulmonary:      Effort: Pulmonary effort is normal. No respiratory distress.      Breath sounds: Normal breath sounds. No decreased breath sounds, wheezing, rhonchi or rales.      Comments: Intermittent cough during visit  Musculoskeletal:         General: No tenderness.      Cervical back: Neck supple.   Lymphadenopathy:      Head:      Right side of head: No tonsillar or preauricular adenopathy.      Left side of head: No tonsillar or preauricular adenopathy.      Cervical: No cervical adenopathy.   Skin:     General: Skin is warm and dry.      Capillary Refill: Capillary refill takes less than 2 seconds.      Findings: No erythema or rash.   Neurological:      Mental Status: She is alert and oriented to person, place, and time.   Psychiatric:         Mood and Affect: Mood and affect normal.         Speech: Speech normal.         Behavior: Behavior is  cooperative.         Thought Content: Thought content normal.     Result Review :  Results for orders placed or performed in visit on 12/27/24   Respiratory Panel PCR w/COVID-19(SARS-CoV-2) IVIS/CAMRON/LUCIE/PAD/COR/LILY In-House, NP Swab in UTM/VTM, 2 HR TAT - Swab, Nasopharynx    Collection Time: 12/27/24  3:33 PM    Specimen: Nasopharynx; Swab   Result Value Ref Range    ADENOVIRUS, PCR Not Detected Not Detected    Coronavirus 229E Not Detected Not Detected    Coronavirus HKU1 Not Detected Not Detected    Coronavirus NL63 Not Detected Not Detected    Coronavirus OC43 Not Detected Not Detected    COVID19 Not Detected Not Detected - Ref. Range    Human Metapneumovirus Not Detected Not Detected    Human Rhinovirus/Enterovirus Not Detected Not Detected    Influenza A PCR Not Detected Not Detected    Influenza B PCR Not Detected Not Detected    Parainfluenza Virus 1 Not Detected Not Detected    Parainfluenza Virus 2 Not Detected Not Detected    Parainfluenza Virus 3 Not Detected Not Detected    Parainfluenza Virus 4 Not Detected Not Detected    RSV, PCR Not Detected Not Detected    Bordetella pertussis pcr Not Detected Not Detected    Bordetella parapertussis PCR Not Detected Not Detected    Chlamydophila pneumoniae PCR Not Detected Not Detected    Mycoplasma pneumo by PCR Not Detected Not Detected   POCT Strep A, molecular    Collection Time: 12/27/24  3:33 PM    Specimen: Swab   Result Value Ref Range    POC Strep A, Molecular Negative Negative    Internal Control Passed Passed    Lot Number [id number]     Expiration Date 01-03-27        BMI is >= 30 and <35. (Class 1 Obesity). The following options were offered after discussion;: Information on healthy weight added to patient's after visit summary.    Assessment & Plan     Diagnoses and all orders for this visit:    1. Upper respiratory symptom (Primary)  Comments:  Respiratory panel completed.  Results discussed with her via [email or web address]  Orders:  -     Respiratory Panel PCR  w/COVID-19(SARS-CoV-2) [facility]/LUCIE/PAD/COR/LILY In-House, NP Swab in UTM/VTM, 2 HR TAT - Swab, Nasopharynx; Future  -     Respiratory Panel PCR w/COVID-19(SARS-CoV-2) [facility]/CAMRON/LUCIE/PAD/COR/LILY In-House, NP Swab in UTM/VTM, 2 HR TAT - Swab, Nasopharynx  -     brompheniramine-pseudoephedrine-DM 30-2-10 MG/5ML syrup; Take 5 mL by mouth 3 (Three) Times a Day As Needed for Allergies, Congestion or Cough.  Dispense: 120 mL; Refill: 1    2. Sore throat  Comments:  Reviewed rapid strep test which was negative  Orders:  -     POCT Strep A, molecular  -     ibuprofen (ADVIL,MOTRIN) 800 MG tablet; Take 1 tablet by mouth Every 6 (Six) Hours As Needed for Mild Pain, Moderate Pain or Fever.  Dispense: 30 tablet; Refill: 1    3. Moderate persistent asthma with acute exacerbation  Comments:  Continue Advair and albuterol HFA    Follow Up If symptoms worsen or do not improve.    Findings and recommendations discussed with Jessica. Reviewed respiratory panel results with her via [employer].  Discussed that she had been prescribed Augmentin earlier in December therefore would like to postpone additional antibiotic usage at this time due to the side effects.  Counseled regarding supportive care measures.  Signs and symptoms of concern reviewed and if occur to seek further evaluation or if symptoms worsen or do not improve.  Jessica was given instructions and counseling regarding her condition or for health maintenance advice. Please see specific information pulled into the AVS if appropriate.    This document has been electronically signed by:

## 2025-01-05 DIAGNOSIS — E55.9 VITAMIN D DEFICIENCY: ICD-10-CM

## 2025-01-05 DIAGNOSIS — E78.2 MIXED HYPERLIPIDEMIA: ICD-10-CM

## 2025-01-05 DIAGNOSIS — I10 ESSENTIAL HYPERTENSION: Primary | ICD-10-CM

## 2025-01-05 DIAGNOSIS — R80.9 TYPE 2 DIABETES MELLITUS WITH MICROALBUMINURIA, WITHOUT LONG-TERM CURRENT USE OF INSULIN: ICD-10-CM

## 2025-01-05 DIAGNOSIS — E11.29 TYPE 2 DIABETES MELLITUS WITH MICROALBUMINURIA, WITHOUT LONG-TERM CURRENT USE OF INSULIN: ICD-10-CM

## 2025-01-27 ENCOUNTER — LAB (OUTPATIENT)
Dept: LAB | Facility: HOSPITAL | Age: 40
End: 2025-01-27
Payer: MEDICAID

## 2025-01-27 ENCOUNTER — TELEPHONE (OUTPATIENT)
Dept: PSYCHIATRY | Facility: CLINIC | Age: 40
End: 2025-01-27
Payer: MEDICAID

## 2025-01-27 DIAGNOSIS — E11.29 TYPE 2 DIABETES MELLITUS WITH MICROALBUMINURIA, WITHOUT LONG-TERM CURRENT USE OF INSULIN: ICD-10-CM

## 2025-01-27 DIAGNOSIS — E78.2 MIXED HYPERLIPIDEMIA: ICD-10-CM

## 2025-01-27 DIAGNOSIS — R80.9 TYPE 2 DIABETES MELLITUS WITH MICROALBUMINURIA, WITHOUT LONG-TERM CURRENT USE OF INSULIN: ICD-10-CM

## 2025-01-27 DIAGNOSIS — I10 ESSENTIAL HYPERTENSION: ICD-10-CM

## 2025-01-27 LAB
ALBUMIN SERPL-MCNC: 4.1 G/DL (ref 3.5–5.2)
ALBUMIN UR-MCNC: 2.5 MG/DL
ALBUMIN/GLOB SERPL: 1.4 G/DL
ALP SERPL-CCNC: 81 U/L (ref 39–117)
ALT SERPL W P-5'-P-CCNC: 28 U/L (ref 1–33)
ANION GAP SERPL CALCULATED.3IONS-SCNC: 10.3 MMOL/L (ref 5–15)
AST SERPL-CCNC: 23 U/L (ref 1–32)
BASOPHILS # BLD AUTO: 0.09 10*3/MM3 (ref 0–0.2)
BASOPHILS NFR BLD AUTO: 0.9 % (ref 0–1.5)
BILIRUB SERPL-MCNC: <0.2 MG/DL (ref 0–1.2)
BUN SERPL-MCNC: 10 MG/DL (ref 6–20)
BUN/CREAT SERPL: 13.9 (ref 7–25)
CALCIUM SPEC-SCNC: 9.5 MG/DL (ref 8.6–10.5)
CHLORIDE SERPL-SCNC: 98 MMOL/L (ref 98–107)
CHOLEST SERPL-MCNC: 124 MG/DL (ref 0–200)
CO2 SERPL-SCNC: 27.7 MMOL/L (ref 22–29)
CREAT SERPL-MCNC: 0.72 MG/DL (ref 0.57–1)
DEPRECATED RDW RBC AUTO: 40.8 FL (ref 37–54)
EGFRCR SERPLBLD CKD-EPI 2021: 110.6 ML/MIN/1.73
EOSINOPHIL # BLD AUTO: 0.34 10*3/MM3 (ref 0–0.4)
EOSINOPHIL NFR BLD AUTO: 3.3 % (ref 0.3–6.2)
ERYTHROCYTE [DISTWIDTH] IN BLOOD BY AUTOMATED COUNT: 13 % (ref 12.3–15.4)
GLOBULIN UR ELPH-MCNC: 2.9 GM/DL
GLUCOSE SERPL-MCNC: 121 MG/DL (ref 65–99)
HBA1C MFR BLD: 7.5 % (ref 4.8–5.6)
HCT VFR BLD AUTO: 51.2 % (ref 34–46.6)
HDLC SERPL-MCNC: 31 MG/DL (ref 40–60)
HGB BLD-MCNC: 16.1 G/DL (ref 12–15.9)
IMM GRANULOCYTES # BLD AUTO: 0.06 10*3/MM3 (ref 0–0.05)
IMM GRANULOCYTES NFR BLD AUTO: 0.6 % (ref 0–0.5)
LDLC SERPL CALC-MCNC: 57 MG/DL (ref 0–100)
LDLC/HDLC SERPL: 1.55 {RATIO}
LYMPHOCYTES # BLD AUTO: 3.21 10*3/MM3 (ref 0.7–3.1)
LYMPHOCYTES NFR BLD AUTO: 31 % (ref 19.6–45.3)
MCH RBC QN AUTO: 27.2 PG (ref 26.6–33)
MCHC RBC AUTO-ENTMCNC: 31.4 G/DL (ref 31.5–35.7)
MCV RBC AUTO: 86.3 FL (ref 79–97)
MONOCYTES # BLD AUTO: 0.9 10*3/MM3 (ref 0.1–0.9)
MONOCYTES NFR BLD AUTO: 8.7 % (ref 5–12)
NEUTROPHILS NFR BLD AUTO: 5.74 10*3/MM3 (ref 1.7–7)
NEUTROPHILS NFR BLD AUTO: 55.5 % (ref 42.7–76)
NRBC BLD AUTO-RTO: 0 /100 WBC (ref 0–0.2)
PLATELET # BLD AUTO: 309 10*3/MM3 (ref 140–450)
PMV BLD AUTO: 9.5 FL (ref 6–12)
POTASSIUM SERPL-SCNC: 4.2 MMOL/L (ref 3.5–5.2)
PROT SERPL-MCNC: 7 G/DL (ref 6–8.5)
RBC # BLD AUTO: 5.93 10*6/MM3 (ref 3.77–5.28)
SODIUM SERPL-SCNC: 136 MMOL/L (ref 136–145)
TRIGL SERPL-MCNC: 224 MG/DL (ref 0–150)
VLDLC SERPL-MCNC: 36 MG/DL (ref 5–40)
WBC NRBC COR # BLD AUTO: 10.34 10*3/MM3 (ref 3.4–10.8)

## 2025-01-27 PROCEDURE — 83036 HEMOGLOBIN GLYCOSYLATED A1C: CPT

## 2025-01-27 PROCEDURE — 85025 COMPLETE CBC W/AUTO DIFF WBC: CPT

## 2025-01-27 PROCEDURE — 80061 LIPID PANEL: CPT

## 2025-01-27 PROCEDURE — 82043 UR ALBUMIN QUANTITATIVE: CPT

## 2025-01-27 PROCEDURE — 80053 COMPREHEN METABOLIC PANEL: CPT

## 2025-01-27 NOTE — TELEPHONE ENCOUNTER
I do not feel she has ADHD, there is also situational factors that can be contributing to those symptoms. With the racing thoughts and spending large amounts of money, that is associated with bipolar disorder which medication can be adjusted. As far as the symptoms she reports with ADHD, high anxiety and stress can mimic the difficulties with focus. My suggestion is to begin therapy to help with dealing with stress.

## 2025-01-27 NOTE — TELEPHONE ENCOUNTER
Patient comes into office states she knows she tested negative for adhd but feels she needs medication. States she has all the signs and symptoms of it. States she doesn't sleep. States she has no motivation. States she doesn't know if she needs to be taken off bipolar medicine. States she randomly bought a lizard for several hundred dollars just to give it away. States she has racing thoughts She is easily irritated. Patient has pulled up a list of signs of adhd and states she suffers from all symptoms listed. Patient state she has an appointment next month but feels she may need to be seen sooner to discuss but she needs to be put on adhd medication

## 2025-01-28 ENCOUNTER — OFFICE VISIT (OUTPATIENT)
Dept: FAMILY MEDICINE CLINIC | Facility: CLINIC | Age: 40
End: 2025-01-28
Payer: MEDICAID

## 2025-01-28 DIAGNOSIS — K21.9 GASTROESOPHAGEAL REFLUX DISEASE WITHOUT ESOPHAGITIS: ICD-10-CM

## 2025-01-28 DIAGNOSIS — Z87.442 HISTORY OF NEPHROLITHIASIS: ICD-10-CM

## 2025-01-28 DIAGNOSIS — G62.9 NEUROPATHY: ICD-10-CM

## 2025-01-28 DIAGNOSIS — I87.2 CHRONIC VENOUS INSUFFICIENCY: ICD-10-CM

## 2025-01-28 DIAGNOSIS — F17.200 SMOKER: ICD-10-CM

## 2025-01-28 DIAGNOSIS — Z00.00 HEALTHCARE MAINTENANCE: ICD-10-CM

## 2025-01-28 DIAGNOSIS — G47.33 OSA (OBSTRUCTIVE SLEEP APNEA): ICD-10-CM

## 2025-01-28 DIAGNOSIS — E66.811 CLASS 1 OBESITY WITH SERIOUS COMORBIDITY AND BODY MASS INDEX (BMI) OF 31.0 TO 31.9 IN ADULT, UNSPECIFIED OBESITY TYPE: ICD-10-CM

## 2025-01-28 DIAGNOSIS — E55.9 VITAMIN D DEFICIENCY: ICD-10-CM

## 2025-01-28 DIAGNOSIS — J45.41 MODERATE PERSISTENT ASTHMA WITH ACUTE EXACERBATION: ICD-10-CM

## 2025-01-28 DIAGNOSIS — I10 ESSENTIAL HYPERTENSION: ICD-10-CM

## 2025-01-28 DIAGNOSIS — R80.9 TYPE 2 DIABETES MELLITUS WITH MICROALBUMINURIA, WITHOUT LONG-TERM CURRENT USE OF INSULIN: ICD-10-CM

## 2025-01-28 DIAGNOSIS — J45.20 MILD INTERMITTENT ASTHMA, UNSPECIFIED WHETHER COMPLICATED: ICD-10-CM

## 2025-01-28 DIAGNOSIS — F31.77 BIPOLAR DISORDER, IN PARTIAL REMISSION, MOST RECENT EPISODE MIXED: ICD-10-CM

## 2025-01-28 DIAGNOSIS — E78.2 MIXED HYPERLIPIDEMIA: Primary | ICD-10-CM

## 2025-01-28 DIAGNOSIS — E11.29 TYPE 2 DIABETES MELLITUS WITH MICROALBUMINURIA, WITHOUT LONG-TERM CURRENT USE OF INSULIN: ICD-10-CM

## 2025-01-28 PROCEDURE — 99214 OFFICE O/P EST MOD 30 MIN: CPT | Performed by: GENERAL PRACTICE

## 2025-01-28 PROCEDURE — 3051F HG A1C>EQUAL 7.0%<8.0%: CPT | Performed by: GENERAL PRACTICE

## 2025-01-28 PROCEDURE — 3074F SYST BP LT 130 MM HG: CPT | Performed by: GENERAL PRACTICE

## 2025-01-28 PROCEDURE — 3078F DIAST BP <80 MM HG: CPT | Performed by: GENERAL PRACTICE

## 2025-01-28 NOTE — PROGRESS NOTES
Subjective   Jessica Harris is a 39 y.o. female.     Chief Complaint  She returns for a scheduled reassessment of multiple medical problems asthma, type 2 diabetes mellitus, hyperlipidemia, essential hypertension, and gastroesophageal reflux disease including    History of Present Illness     Asthma  She feels that her cough, shortness of breath, and wheezing are at baseline.  She denies any chest pain, hemoptysis, fever, chills, or night sweats.  Chest x-ray performed on 8/30/2024 was unremarkable. She remains on inhaled fluticasone-salmeterol and is currently using her rescue inhaler once or twice weekly.  She continues to smoke.  Sleep study performed on 1/6/2025 was consistent with ANNY and she is currently being fit for a CPAP  Lab Results   Component Value Date    WBC 10.34 01/27/2025    HGB 16.1 (H) 01/27/2025    HCT 51.2 (H) 01/27/2025    MCV 86.3 01/27/2025     01/27/2025     Type 2 Diabetes Mellitus  She denies any recent hypoglycemia.  She had to discontinue semaglutide due to GI issues and has noted a steady increase in her glucose.  She continues to deny any paresthesia of the feet, visual disturbances, polydipsia, polyuria, or foot ulcerations.  She has been following her diet and excise plan, and remains on dapagliflozin.  She was taken off metformin after experiencing intermittent weak spells with a prompt improvement.  She underwent a diabetic exam within the last year.    Lab Results   Component Value Date    HGBA1C 7.50 (H) 01/27/2025     Lab Results   Component Value Date    MICROALBUR 2.5 01/27/2025     Hyperlipidemia  She remains on atorvastatin along with OTC fish oil capsules. She experienced GI upset with vascepa  Lab Results   Component Value Date    CHOL 124 01/27/2025    CHLPL 121 04/24/2024    TRIG 224 (H) 01/27/2025    HDL 31 (L) 01/27/2025    LDL 57 01/27/2025     Essential Hypertension  She continues to deny any lower extremity edema, and there is no history of any chest pain  or recent palpitations.  She remains on propranolol   Lab Results   Component Value Date    GLUCOSE 121 (H) 01/27/2025    BUN 10 01/27/2025    CREATININE 0.72 01/27/2025     01/27/2025    K 4.2 01/27/2025    CL 98 01/27/2025    CALCIUM 9.5 01/27/2025    PROTEINTOT 7.0 01/27/2025    ALBUMIN 4.1 01/27/2025    ALT 28 01/27/2025    AST 23 01/27/2025    ALKPHOS 81 01/27/2025    BILITOT <0.2 01/27/2025    GLOB 2.9 01/27/2025    AGRATIO 1.4 01/27/2025    BCR 13.9 01/27/2025    ANIONGAP 10.3 01/27/2025    EGFR 110.6 01/27/2025     GERD  She continues to have occasional heartburn.  She continues to deny any recent regurgitation, and there is no history of any difficulty swallowing, or carli vomiting.  She underwent an EGD on 6/4/2024 with evidence of a small hiatal hernia and mild erythema of the gastric mucosa around the antrum.  Biopsies revealed reactive esophageal mucosa and evidence of mild chronic gastritis.    The following portions of the patient's history were reviewed and updated as appropriate: allergies, current medications, past medical history, past social history, and problem list.    Review of Systems   Constitutional:  Positive for fatigue. Negative for chills and fever.   HENT:  Negative for congestion, ear pain, postnasal drip, rhinorrhea, sinus pressure, sneezing, sore throat and voice change.    Eyes:  Negative for visual disturbance.   Respiratory:  Positive for cough, shortness of breath and wheezing.    Cardiovascular:  Negative for chest pain, palpitations and leg swelling.   Gastrointestinal:  Positive for GERD. Negative for abdominal distention, abdominal pain, blood in stool, constipation, diarrhea, nausea and vomiting.   Endocrine: Negative for polydipsia and polyphagia.   Genitourinary:  Negative for dysuria and hematuria.   Musculoskeletal:  Positive for arthralgias and myalgias. Negative for back pain and joint swelling.   Skin:  Negative for rash.   Neurological:  Positive for headache.  Negative for dizziness, weakness and numbness.   Psychiatric/Behavioral:  Negative for sleep disturbance and depressed mood. The patient is nervous/anxious.      Objective   Physical Exam  Constitutional:       General: She is not in acute distress.     Appearance: Normal appearance. She is well-developed. She is not diaphoretic.      Comments: Accompanied by her stepson.  Bright and in fair spirits. No apparent distress. No pallor, jaundice, diaphoresis, or cyanosis.   HENT:      Head: Atraumatic.      Right Ear: Tympanic membrane, ear canal and external ear normal.      Left Ear: Tympanic membrane, ear canal and external ear normal.      Mouth/Throat:      Lips: No lesions.      Mouth: Mucous membranes are moist. No oral lesions.      Pharynx: No oropharyngeal exudate or posterior oropharyngeal erythema.   Eyes:      General: Lids are normal.      Extraocular Movements: Extraocular movements intact.      Conjunctiva/sclera: Conjunctivae normal.      Pupils: Pupils are equal.   Neck:      Thyroid: No thyroid mass or thyromegaly.      Vascular: No carotid bruit or JVD.      Trachea: Trachea normal. No tracheal deviation.   Cardiovascular:      Rate and Rhythm: Normal rate and regular rhythm.      Heart sounds: Normal heart sounds, S1 normal and S2 normal. No murmur heard.     No gallop.   Pulmonary:      Effort: Pulmonary effort is normal.      Breath sounds: Examination of the right-lower field reveals decreased breath sounds. Examination of the left-lower field reveals decreased breath sounds. Decreased breath sounds and wheezing (diffuse - mild) present. No rhonchi.   Abdominal:      General: Bowel sounds are normal. There is no distension.   Musculoskeletal:      Right lower leg: No edema.      Left lower leg: No edema.   Lymphadenopathy:      Head:      Right side of head: No submental, submandibular, tonsillar, preauricular, posterior auricular or occipital adenopathy.      Left side of head: No submental,  submandibular, tonsillar, preauricular, posterior auricular or occipital adenopathy.      Cervical: No cervical adenopathy.      Upper Body:      Right upper body: No supraclavicular adenopathy.      Left upper body: No supraclavicular adenopathy.   Skin:     General: Skin is warm.      Coloration: Skin is not cyanotic, jaundiced or pale.      Findings: No rash.      Nails: There is no clubbing.   Neurological:      Mental Status: She is alert and oriented to person, place, and time.      Cranial Nerves: No cranial nerve deficit, dysarthria or facial asymmetry.      Sensory: No sensory deficit.      Motor: No tremor.      Coordination: Coordination normal.      Gait: Gait normal.   Psychiatric:         Attention and Perception: Attention normal.         Mood and Affect: Mood normal.         Speech: Speech normal.         Behavior: Behavior normal.         Thought Content: Thought content normal.       Assessment & Plan   Problems Addressed this Visit          Cardiac and Vasculature    Chronic venous insufficiency    Essential hypertension   Hypertension: at goal. Evidence of target organ damage: none.  Encouraged to continue to work on diet and exercise plan.   Continue current medication    Relevant Medications    propranolol (INDERAL) 20 MG tablet    Mixed hyperlipidemia   As above.   Continue current medication.    Relevant Medications    atorvastatin (LIPITOR) 20 MG tablet       Endocrine and Metabolic    Class 1 obesity with serious comorbidity and body mass index (BMI) of 31.0 to 31.9 in adult    Type 2 diabetes mellitus with microalbuminuria, without long-term current use of insulin  Diabetes mellitus Type II, under fair control.   Encouraged to continue to pursue ADA diet  Encouraged aerobic exercise.  Reviewed options and agreed on a trial of tirzepatide if approved by her insurance  Will check a fingerstick A1c at her return    Relevant Medications    Tirzepatide (Mounjaro) 2.5 MG/0.5ML solution  auto-injector    dapagliflozin Propanediol (Farxiga) 10 MG tablet    Continuous Glucose Sensor (Dexcom G7 Sensor) misc    Vitamin D deficiency  Continue supplementation with monitoring.    Relevant Medications    cholecalciferol (Vitamin D) 25 MCG (1000 UT) tablet       Gastrointestinal Abdominal     Gastroesophageal reflux disease without esophagitis   Symptoms are currently stable.  Continue current medication.    Relevant Medications    pantoprazole (PROTONIX) 40 MG EC tablet       Genitourinary and Reproductive     History of nephrolithiasis       Health Encounters    Healthcare maintenance   Recommended COVID and influenza vaccinations       Mental Health    Bipolar disorder, in partial remission, most recent episode mixed  Stable.  Supportive therapy.   Continue current medication.  Follow up with psychiatry       Neuro    Neuropathy       Pulmonary and Pneumonias    Moderate persistent asthma with acute exacerbation  Moderate persistent asthma. The patient is not currently having an exacerbation. In general, the patient's disease is well controlled.  Importance of smoking cessation reinforced.  Continue current medication    Relevant Medications    fluticasone-salmeterol (Advair HFA) 230-21 MCG/ACT inhaler    albuterol sulfate  (90 Base) MCG/ACT inhaler       Sleep    ANNY  Encouraged to follow-up with CPAP       Tobacco    Smoker     Other Visit Diagnoses       Mild intermittent asthma, unspecified whether complicated        Smoking cessation and Albuteral Inh as needed     Relevant Medications    fluticasone-salmeterol (Advair HFA) 230-21 MCG/ACT inhaler    albuterol sulfate  (90 Base) MCG/ACT inhaler          Diagnoses         Codes Comments    Mixed hyperlipidemia    -  Primary ICD-10-CM: E78.2  ICD-9-CM: 272.2     Essential hypertension     ICD-10-CM: I10  ICD-9-CM: 401.9     Chronic venous insufficiency     ICD-10-CM: I87.2  ICD-9-CM: 459.81     Vitamin D deficiency     ICD-10-CM:  E55.9  ICD-9-CM: 268.9     Type 2 diabetes mellitus with microalbuminuria, without long-term current use of insulin     ICD-10-CM: E11.29, R80.9  ICD-9-CM: 250.40, 791.0     Class 1 obesity with serious comorbidity and body mass index (BMI) of 31.0 to 31.9 in adult, unspecified obesity type     ICD-10-CM: E66.811, Z68.31  ICD-9-CM: 278.00, V85.31     Gastroesophageal reflux disease without esophagitis     ICD-10-CM: K21.9  ICD-9-CM: 530.81     History of nephrolithiasis     ICD-10-CM: Z87.442  ICD-9-CM: V13.01     Healthcare maintenance     ICD-10-CM: Z00.00  ICD-9-CM: V70.0     Bipolar disorder, in partial remission, most recent episode mixed     ICD-10-CM: F31.77  ICD-9-CM: 296.65     Neuropathy     ICD-10-CM: G62.9  ICD-9-CM: 355.9     Moderate persistent asthma with acute exacerbation     ICD-10-CM: J45.41  ICD-9-CM: 493.92     Snoring     ICD-10-CM: R06.83  ICD-9-CM: 786.09     Smoker     ICD-10-CM: F17.200  ICD-9-CM: 305.1     Mild intermittent asthma, unspecified whether complicated     ICD-10-CM: J45.20  ICD-9-CM: 493.90 Smoking cessation and Albuteral Inh as needed

## 2025-01-29 VITALS
HEIGHT: 70 IN | HEART RATE: 96 BPM | OXYGEN SATURATION: 92 % | RESPIRATION RATE: 15 BRPM | SYSTOLIC BLOOD PRESSURE: 124 MMHG | DIASTOLIC BLOOD PRESSURE: 68 MMHG | TEMPERATURE: 98.6 F | WEIGHT: 221 LBS | BODY MASS INDEX: 31.64 KG/M2

## 2025-01-29 PROBLEM — G47.33 OSA (OBSTRUCTIVE SLEEP APNEA): Status: ACTIVE | Noted: 2019-03-13

## 2025-01-29 RX ORDER — ATORVASTATIN CALCIUM 20 MG/1
20 TABLET, FILM COATED ORAL NIGHTLY
Qty: 30 TABLET | Refills: 5 | Status: SHIPPED | OUTPATIENT
Start: 2025-01-29

## 2025-01-29 RX ORDER — ALBUTEROL SULFATE 90 UG/1
2 INHALANT RESPIRATORY (INHALATION) EVERY 6 HOURS PRN
Qty: 18 G | Refills: 5 | Status: SHIPPED | OUTPATIENT
Start: 2025-01-29

## 2025-01-29 RX ORDER — PANTOPRAZOLE SODIUM 40 MG/1
40 TABLET, DELAYED RELEASE ORAL DAILY
Qty: 30 TABLET | Refills: 5 | Status: SHIPPED | OUTPATIENT
Start: 2025-01-29

## 2025-01-29 RX ORDER — CHOLECALCIFEROL (VITAMIN D3) 25 MCG
1000 TABLET ORAL DAILY
Qty: 30 TABLET | Refills: 5 | Status: SHIPPED | OUTPATIENT
Start: 2025-01-29

## 2025-01-29 RX ORDER — ACYCLOVIR 400 MG/1
1 TABLET ORAL
Qty: 3 EACH | Refills: 5 | Status: SHIPPED | OUTPATIENT
Start: 2025-01-29

## 2025-01-29 RX ORDER — PROPRANOLOL HCL 20 MG
20 TABLET ORAL 2 TIMES DAILY
Qty: 60 TABLET | Refills: 5 | Status: SHIPPED | OUTPATIENT
Start: 2025-01-29

## 2025-01-29 RX ORDER — DAPAGLIFLOZIN 10 MG/1
1 TABLET, FILM COATED ORAL EVERY MORNING
Qty: 30 TABLET | Refills: 5 | Status: SHIPPED | OUTPATIENT
Start: 2025-01-29

## 2025-01-29 RX ORDER — FLUTICASONE PROPIONATE AND SALMETEROL XINAFOATE 230; 21 UG/1; UG/1
2 AEROSOL, METERED RESPIRATORY (INHALATION)
Qty: 12 G | Refills: 5 | Status: SHIPPED | OUTPATIENT
Start: 2025-01-29

## 2025-01-29 RX ORDER — TIRZEPATIDE 2.5 MG/.5ML
2.5 INJECTION, SOLUTION SUBCUTANEOUS WEEKLY
Qty: 2 ML | Refills: 0 | Status: SHIPPED | OUTPATIENT
Start: 2025-01-29

## 2025-02-03 ENCOUNTER — TELEPHONE (OUTPATIENT)
Dept: FAMILY MEDICINE CLINIC | Facility: CLINIC | Age: 40
End: 2025-02-03
Payer: MEDICAID

## 2025-02-03 NOTE — TELEPHONE ENCOUNTER
PA request sent       ----- Message from Jamie Santos sent at 1/29/2025  3:46 PM EST -----  Please try to PA Mounjaro  Diagnosis-type 2 diabetes mellitus.  Did well with Ozempic but unable to tolerate

## 2025-02-10 DIAGNOSIS — J45.41 MODERATE PERSISTENT ASTHMA WITH ACUTE EXACERBATION: Primary | ICD-10-CM

## 2025-02-10 RX ORDER — ALBUTEROL SULFATE 0.83 MG/ML
2.5 SOLUTION RESPIRATORY (INHALATION) EVERY 4 HOURS PRN
Qty: 360 ML | Refills: 5 | Status: SHIPPED | OUTPATIENT
Start: 2025-02-10

## 2025-02-17 ENCOUNTER — TELEMEDICINE (OUTPATIENT)
Dept: PSYCHIATRY | Facility: CLINIC | Age: 40
End: 2025-02-17
Payer: MEDICAID

## 2025-02-17 DIAGNOSIS — F31.30 BIPOLAR I DISORDER, MOST RECENT EPISODE DEPRESSED: Primary | ICD-10-CM

## 2025-02-17 DIAGNOSIS — F41.1 GENERALIZED ANXIETY DISORDER: ICD-10-CM

## 2025-02-17 DIAGNOSIS — F51.05 INSOMNIA DUE TO MENTAL DISORDER: ICD-10-CM

## 2025-02-17 DIAGNOSIS — Z79.899 MEDICATION MANAGEMENT: ICD-10-CM

## 2025-02-17 PROCEDURE — 1159F MED LIST DOCD IN RCRD: CPT | Performed by: NURSE PRACTITIONER

## 2025-02-17 PROCEDURE — 99214 OFFICE O/P EST MOD 30 MIN: CPT | Performed by: NURSE PRACTITIONER

## 2025-02-17 PROCEDURE — 1160F RVW MEDS BY RX/DR IN RCRD: CPT | Performed by: NURSE PRACTITIONER

## 2025-02-17 RX ORDER — BUPROPION HYDROCHLORIDE 300 MG/1
300 TABLET ORAL EVERY MORNING
Qty: 30 TABLET | Refills: 0 | Status: SHIPPED | OUTPATIENT
Start: 2025-02-17

## 2025-02-17 RX ORDER — LURASIDONE HYDROCHLORIDE 120 MG/1
120 TABLET, FILM COATED ORAL DAILY
Qty: 30 TABLET | Refills: 0 | Status: SHIPPED | OUTPATIENT
Start: 2025-02-17

## 2025-02-17 RX ORDER — BUPROPION HYDROCHLORIDE 150 MG/1
150 TABLET ORAL EVERY MORNING
Qty: 30 TABLET | Refills: 0 | Status: SHIPPED | OUTPATIENT
Start: 2025-02-17

## 2025-02-17 RX ORDER — TRAZODONE HYDROCHLORIDE 50 MG/1
50 TABLET, FILM COATED ORAL
Qty: 30 TABLET | Refills: 2 | Status: SHIPPED | OUTPATIENT
Start: 2025-02-17

## 2025-02-17 NOTE — PROGRESS NOTES
This provider is located at the [facility], [address] [place] [address], [id number] using a secure for; to (do)t Video Visit through [employer]. Patient is being seen remotely via telehealth in [place], and stated they are in a secure environment for this session. The patient's condition being diagnosed/treated is appropriate for telemedicine. The provider identified herself as well as her credentials.   The patient, and/or patients guardian, consent to be seen remotely, and when consent is given they understand that the consent allows for patient identifiable information to be sent to a third party as needed.   They may refuse to be seen remotely at any time. The electronic data is encrypted and password protected, and the patient and/or guardian has been advised of the potential risks to privacy not withstanding such measures.  Mode of Visit: Video  Location of patient: -HOME-  Location of provider: +[facility]+  You have chosen to receive care through a telehealth visit.  The patient has signed the video visit consent form.  The visit included audio and video interaction. No technical issues occurred during this visit.    Subjective   Jessica Harris is a 39 y.o. female who presents today for follow up    Chief Complaint:  Bipolar disorder    History of Present Illness: Patient presents as follow up via telehealth visit. She reports struggling with increase of depression and low motivation. Reports she is currently sick but symptoms have been ongoing for a couple months. Reports bupropion extended release worked better than twice daily dosing. She reports sleep is poor, contributes this to sickness. Reports appetite is good. She denies SI/HI/AVH.    The following portions of the patient's history were reviewed and updated as appropriate: allergies, current medications, past family history, past medical history, past social history, past surgical history and problem list.      Past Medical  "History:  Past Medical History:   Diagnosis Date    Anxiety     Asthma     Bipolar 1 disorder     Depression     Diabetes mellitus     Elevated cholesterol     GERD (gastroesophageal reflux disease)     History of bronchitis     History of ear infections     History of stomach ulcers     History of streptococcal infection     Hyperlipidemia     Hypertension     Insomnia     Kidney stones     ANNY (obstructive sleep apnea) 3/13/2019    Urinary tract infection        Social History:  Social History     Socioeconomic History    Marital status: Single   Tobacco Use    Smoking status: Every Day     Current packs/day: 0.50     Types: Cigarettes     Passive exposure: Current    Smokeless tobacco: Never    Tobacco comments:     \"thinking about it\"   Vaping Use    Vaping status: Never Used   Substance and Sexual Activity    Alcohol use: No    Drug use: No    Sexual activity: Defer     Birth control/protection: Pill       Family History:  Family History   Problem Relation Age of Onset    Cancer Mother     Stroke Mother     Lung disease Mother     No Known Problems Father     Cancer Other     Heart disease Other     Stroke Other        Past Surgical History:  Past Surgical History:   Procedure Laterality Date    ADENOIDECTOMY      CHOLECYSTECTOMY      D & C HYSTEROSCOPY N/A 05/16/2022    Procedure: DILATATION AND CURETTAGE HYSTEROSCOPY;  Surgeon: Joey Andersen DO;  Location: [facility];  Service: Obstetrics/Gynecology;  Laterality: N/A;    DENTAL PROCEDURE      ENDOSCOPY      ENDOSCOPY N/A 6/4/2024    Procedure: ESOPHAGOGASTRODUODENOSCOPY WITH BIOPSY;  Surgeon: Matilde Moura MD;  Location: [facility] OR;  Service: Gastroenterology;  Laterality: N/A;    HERNIA REPAIR      umbilical    OVARIAN CYST DRAINAGE/EXCISION Left 05/16/2022    Procedure: OVARIAN CYSTECTOMY WITH EXTENSIVE LYSIS OF ADHESIONS;  Surgeon: Joey Andersen DO;  Location: [facility];  Service: Obstetrics/Gynecology;  Laterality: Left;    " TONSILLECTOMY      TOTAL LAPAROSCOPIC HYSTERECTOMY N/A 6/22/2022    Procedure: ROBOTIC TOTAL LAPAROSCOPIC HYSTERECTOMY WITH BILATERAL SALPINGECTOMY;  Surgeon: Joey Andersen DO;  Location: [facility];  Service: Robotics - DaVinci;  Laterality: N/A;    UPPER GASTROINTESTINAL ENDOSCOPY      WISDOM TOOTH EXTRACTION         Problem List:  Patient Active Problem List   Diagnosis    Palpitations    Bipolar disorder, in partial remission, most recent episode mixed    Smoker    Gastroesophageal reflux disease without esophagitis    Chronic venous insufficiency    Healthcare maintenance    Vitamin D deficiency    ANNY (obstructive sleep apnea)    History of nephrolithiasis    Essential hypertension    Class 1 obesity with serious comorbidity and body mass index (BMI) of 31.0 to 31.9 in adult    Type 2 diabetes mellitus with microalbuminuria, without long-term current use of insulin    Mixed hyperlipidemia    Neuropathy    S/P laparoscopic hysterectomy    Encounter for immunization    Moderate persistent asthma with acute exacerbation        Allergy:   No Known Allergies     Current Medications:   Current Outpatient Medications   Medication Sig Dispense Refill    albuterol (PROVENTIL) (2.5 MG/3ML) 0.083% nebulizer solution Take 2.5 mg by nebulization Every 4 (Four) Hours As Needed for Shortness of Air or Wheezing. 360 mL 5    albuterol sulfate  (90 Base) MCG/ACT inhaler Inhale 2 puffs Every 6 (Six) Hours As Needed for Wheezing. 18 g 5    atorvastatin (LIPITOR) 20 MG tablet Take 1 tablet by mouth Every Night. 30 tablet 5    cholecalciferol (Vitamin D) 25 MCG (1000 UT) tablet Take 1 tablet by mouth Daily. 30 tablet 5    Continuous Glucose Sensor (Dexcom G7 Sensor) misc Use 1 each Every 10 (Ten) Days. 3 each 5    dapagliflozin Propanediol (Farxiga) 10 MG tablet Take 10 mg by mouth Every Morning. 30 tablet 5    docusate sodium (COLACE) 100 MG capsule Take 1 capsule by mouth 2 (Two) Times a Day. 60 capsule 5     fluticasone-salmeterol (Advair HFA) 230-21 MCG/ACT inhaler Inhale 2 puffs 2 (Two) Times a Day. 12 g 5    Glucose Blood (Blood Glucose Test) strip 1 daily 50 each 5    hydrOXYzine pamoate (VISTARIL) 25 MG capsule Take 1 capsule by mouth 3 (Three) Times a Day As Needed for Anxiety. 90 capsule 2    ibuprofen (ADVIL,MOTRIN) 800 MG tablet Take 1 tablet by mouth Every 6 (Six) Hours As Needed for Mild Pain, Moderate Pain or Fever. 30 tablet 1    Lancets misc 1 daily 50 each 5    linaclotide (Linzess) 290 MCG capsule capsule Take 1 capsule by mouth Daily. 30 minutes before meals on an empty stomach. 30 capsule 5    Lurasidone HCl (Latuda) 120 MG tablet tablet Take 1 tablet by mouth Daily. 30 tablet 0    Multi-Vitamin tablet tablet Take 1 tablet by mouth Daily. 30 tablet 5    ondansetron (ZOFRAN) 4 MG tablet Take 1 tablet by mouth Every 8 (Eight) Hours As Needed for Nausea or Vomiting. 60 tablet 0    pantoprazole (PROTONIX) 40 MG EC tablet Take 1 tablet by mouth Daily. 30 tablet 5    propranolol (INDERAL) 20 MG tablet Take 1 tablet by mouth 2 (Two) Times a Day. 60 tablet 5    Tirzepatide (Mounjaro) 2.5 MG/0.5ML solution auto-injector Inject 2.5 mg under the skin into the appropriate area as directed 1 (One) Time Per Week. 2 mL 0    traZODone (DESYREL) 50 MG tablet Take 1 tablet by mouth every night at bedtime. 30 tablet 2    vitamin B-12 (CYANOCOBALAMIN) 1000 MCG tablet Take 2 tablets by mouth Daily. 60 tablet 5    buPROPion XL (Wellbutrin XL) 150 MG 24 hr tablet Take 1 tablet by mouth Every Morning. 30 tablet 0    buPROPion XL (Wellbutrin XL) 300 MG 24 hr tablet Take 1 tablet by mouth Every Morning. 30 tablet 0     No current facility-administered medications for this visit.       Review of Symptoms:    Review of Systems   Constitutional:  Positive for fatigue.   HENT:  Positive for congestion.    Eyes: Negative.    Respiratory:  Positive for cough.    Cardiovascular: Negative.    Gastrointestinal: Negative.     Musculoskeletal:  Positive for myalgias.   Neurological: Negative.    Psychiatric/Behavioral:  Positive for sleep disturbance and depressed mood. Negative for suicidal ideas. The patient is nervous/anxious.          Physical Exam:   There were no vitals taken for this visit.  There is no height or weight on file to calculate BMI.    Appearance: Well nourished female, appropriately dressed, appears stated age and in no acute distress  Gait, Station, Strength: PAULINE    Mental Status Exam:   Hygiene:   good  Cooperation:  Cooperative  Eye Contact:  Good  Psychomotor Behavior:  Appropriate  Affect:  Appropriate  Mood: depressed  Hopelessness: Denies  Speech:  Normal  Thought Process:  Linear  Thought Content:  Mood congruent  Suicidal:  None  Homicidal:  None  Hallucinations:  None  Delusion:  None  Memory:  Intact  Orientation:  Person, Place, Time, and Situation  Reliability:  good  Insight:  Fair  Judgement:  Fair  Impulse Control:  Fair  Physical/Medical Issues:   see med hx      PHQ-Score Total:  PHQ-9 Total Score: (Patient-Rptd) 5   Patient screened positive for depression based on a PHQ-9 score of 5 on 2/17/2025. Follow-up recommendations include: Prescribed antidepressant medication treatment and Suicide Risk Assessment performed.          Lab Results:   Lab on 01/27/2025   Component Date Value Ref Range Status    Glucose 01/27/2025 121 (H)  65 - 99 mg/dL Final    BUN 01/27/2025 10  6 - 20 mg/dL Final    Creatinine 01/27/2025 0.72  0.57 - 1.00 mg/dL Final    Sodium 01/27/2025 136  136 - 145 mmol/L Final    Potassium 01/27/2025 4.2  3.5 - 5.2 mmol/L Final    Chloride 01/27/2025 98  98 - 107 mmol/L Final    CO2 01/27/2025 27.7  22.0 - 29.0 mmol/L Final    Calcium 01/27/2025 9.5  8.6 - 10.5 mg/dL Final    Total Protein 01/27/2025 7.0  6.0 - 8.5 g/dL Final    Albumin 01/27/2025 4.1  3.5 - 5.2 g/dL Final    ALT (SGPT) 01/27/2025 28  1 - 33 U/L Final    AST (SGOT) 01/27/2025 23  1 - 32 U/L Final    Alkaline  Phosphatase 01/27/2025 81  39 - 117 U/L Final    Total Bilirubin 01/27/2025 <0.2  0.0 - 1.2 mg/dL Final    Globulin 01/27/2025 2.9  gm/dL Final    A/G Ratio 01/27/2025 1.4  g/dL Final    BUN/Creatinine Ratio 01/27/2025 13.9  7.0 - 25.0 Final    Anion Gap 01/27/2025 10.3  5.0 - 15.0 mmol/L Final    eGFR 01/27/2025 110.6  >60.0 mL/min/1.73 Final    Total Cholesterol 01/27/2025 124  0 - 200 mg/dL Final    Triglycerides 01/27/2025 224 (H)  0 - 150 mg/dL Final    HDL Cholesterol 01/27/2025 31 (L)  40 - 60 mg/dL Final    LDL Cholesterol  01/27/2025 57  0 - 100 mg/dL Final    VLDL Cholesterol 01/27/2025 36  5 - 40 mg/dL Final    LDL/HDL Ratio 01/27/2025 1.55   Final    Hemoglobin A1C 01/27/2025 7.50 (H)  4.80 - 5.60 % Final    Microalbumin, Urine 01/27/2025 2.5  mg/dL Final    WBC 01/27/2025 10.34  3.40 - 10.80 10*3/mm3 Final    RBC 01/27/2025 5.93 (H)  3.77 - 5.28 10*6/mm3 Final    Hemoglobin 01/27/2025 16.1 (H)  12.0 - 15.9 g/dL Final    Hematocrit 01/27/2025 51.2 (H)  34.0 - 46.6 % Final    MCV 01/27/2025 86.3  79.0 - 97.0 fL Final    MCH 01/27/2025 27.2  26.6 - 33.0 pg Final    MCHC 01/27/2025 31.4 (L)  31.5 - 35.7 g/dL Final    RDW 01/27/2025 13.0  12.3 - 15.4 % Final    RDW-SD 01/27/2025 40.8  37.0 - 54.0 fl Final    MPV 01/27/2025 9.5  6.0 - 12.0 fL Final    Platelets 01/27/2025 309  140 - 450 10*3/mm3 Final    Neutrophil % 01/27/2025 55.5  42.7 - 76.0 % Final    Lymphocyte % 01/27/2025 31.0  19.6 - 45.3 % Final    Monocyte % 01/27/2025 8.7  5.0 - 12.0 % Final    Eosinophil % 01/27/2025 3.3  0.3 - 6.2 % Final    Basophil % 01/27/2025 0.9  0.0 - 1.5 % Final    Immature Grans % 01/27/2025 0.6 (H)  0.0 - 0.5 % Final    Neutrophils, Absolute 01/27/2025 5.74  1.70 - 7.00 10*3/mm3 Final    Lymphocytes, Absolute 01/27/2025 3.21 (H)  0.70 - 3.10 10*3/mm3 Final    Monocytes, Absolute 01/27/2025 0.90  0.10 - 0.90 10*3/mm3 Final    Eosinophils, Absolute 01/27/2025 0.34  0.00 - 0.40 10*3/mm3 Final    Basophils, Absolute  01/27/2025 0.09  0.00 - 0.20 10*3/mm3 Final    Immature Grans, Absolute 01/27/2025 0.06 (H)  0.00 - 0.05 10*3/mm3 Final    nRBC 01/27/2025 0.0  0.0 - 0.2 /100 WBC Final       Assessment & Plan   Diagnoses and all orders for this visit:    1. Bipolar I disorder, most recent episode depressed (Primary)  -     Lurasidone HCl (Latuda) 120 MG tablet tablet; Take 1 tablet by mouth Daily.  Dispense: 30 tablet; Refill: 0  -     buPROPion XL (Wellbutrin XL) 300 MG 24 hr tablet; Take 1 tablet by mouth Every Morning.  Dispense: 30 tablet; Refill: 0  -     buPROPion XL (Wellbutrin XL) 150 MG 24 hr tablet; Take 1 tablet by mouth Every Morning.  Dispense: 30 tablet; Refill: 0    2. Generalized anxiety disorder  -     Lurasidone HCl (Latuda) 120 MG tablet tablet; Take 1 tablet by mouth Daily.  Dispense: 30 tablet; Refill: 0    3. Insomnia due to mental disorder  -     traZODone (DESYREL) 50 MG tablet; Take 1 tablet by mouth every night at bedtime.  Dispense: 30 tablet; Refill: 2    4. Medication management        -Switch bupropion to XL and increase to 450 daily for depression  -Continue lurasidone 120 mg daily for mood. Lengthy discussion with patient on the possible side effects of antipsychotic medications including increased cholesterol, increased blood sugar, and possibility of weight gain.  Also discussed the need to monitor lab work associated with this.  The risk of muscle movement disorders with this class of medication was also discussed.  -Continue trazodone 50 mg nightly for sleep  -Continue hydroxyzine pamoate 25 mg 3 times daily as needed for anxiety  -Encouraged patient to begin psychotherapy  -JULIET reviewed and appropriate. Patient counseled on use of controlled substances  -The benefits of a healthy diet and exercise were discussed with patient, especially the positive effects they have on mental health. Patient encouraged to consider lifestyle modification regarding  diet and exercise patterns to maximize  results of mental health treatment.  -Reviewed previous available documentation  -Reviewed most recent available labs   -Jessica Harris  reports that she has been smoking cigarettes. She has been exposed to tobacco smoke. She has never used smokeless tobacco. I have educated her on the risk of diseases from using tobacco products such as cancer, COPD, heart disease, reproductive problems, low birth weight, cataracts, and arterial disease. I advised her to quit and she is not willing to quit. I spent 3  minutes counseling the patient.             Visit Diagnoses:    ICD-10-CM ICD-9-CM   1. Bipolar I disorder, most recent episode depressed  F31.30 296.50   2. Generalized anxiety disorder  F41.1 300.02   3. Insomnia due to mental disorder  F51.05 300.9     327.02   4. Medication management  Z79.899 V58.69       TREATMENT PLAN/GOALS: Continue supportive psychotherapy efforts and medications as indicated. Treatment and medication options discussed during today's visit. Patient acknowledged and verbally consented to continue with current treatment plan and was educated on the importance of compliance with treatment and follow-up appointments.    MEDICATION ISSUES:    Discussed medication options and treatment plan of prescribed medication as well as the risks, benefits, and side effects including potential falls, possible impaired driving and metabolic adversities among others. Patient is agreeable to call the office with any worsening of symptoms or onset of side effects. Patient is agreeable to call 911 or go to the nearest ER should he/she begin having SI/HI.     MEDS ORDERED DURING VISIT:  New Medications Ordered This Visit   Medications    Lurasidone HCl (Latuda) 120 MG tablet tablet     Sig: Take 1 tablet by mouth Daily.     Dispense:  30 tablet     Refill:  0    traZODone (DESYREL) 50 MG tablet     Sig: Take 1 tablet by mouth every night at bedtime.     Dispense:  30 tablet     Refill:  2    buPROPion XL  (Wellbutrin XL) 300 MG 24 hr tablet     Sig: Take 1 tablet by mouth Every Morning.     Dispense:  30 tablet     Refill:  0    buPROPion XL (Wellbutrin XL) 150 MG 24 hr tablet     Sig: Take 1 tablet by mouth Every Morning.     Dispense:  30 tablet     Refill:  0       Return in about 4 weeks (around 3/17/2025), or if symptoms worsen or fail to improve.               This document has been electronically signed by JANIE Snyder  February 17, 2025 10:26 EST    Part of this note may be an electronic transcription/translation of spoken language to printed text using the Dragon Dictation System.

## 2025-03-06 ENCOUNTER — OFFICE VISIT (OUTPATIENT)
Dept: FAMILY MEDICINE CLINIC | Facility: CLINIC | Age: 40
End: 2025-03-06
Payer: MEDICAID

## 2025-03-06 VITALS
SYSTOLIC BLOOD PRESSURE: 124 MMHG | HEART RATE: 95 BPM | OXYGEN SATURATION: 95 % | WEIGHT: 224.2 LBS | RESPIRATION RATE: 18 BRPM | TEMPERATURE: 98.4 F | DIASTOLIC BLOOD PRESSURE: 65 MMHG | BODY MASS INDEX: 32.1 KG/M2 | HEIGHT: 70 IN

## 2025-03-06 DIAGNOSIS — I10 ESSENTIAL HYPERTENSION: ICD-10-CM

## 2025-03-06 DIAGNOSIS — E55.9 VITAMIN D DEFICIENCY: ICD-10-CM

## 2025-03-06 DIAGNOSIS — G62.9 NEUROPATHY: ICD-10-CM

## 2025-03-06 DIAGNOSIS — E11.29 TYPE 2 DIABETES MELLITUS WITH MICROALBUMINURIA, WITHOUT LONG-TERM CURRENT USE OF INSULIN: ICD-10-CM

## 2025-03-06 DIAGNOSIS — R80.9 TYPE 2 DIABETES MELLITUS WITH MICROALBUMINURIA, WITHOUT LONG-TERM CURRENT USE OF INSULIN: ICD-10-CM

## 2025-03-06 DIAGNOSIS — F17.200 SMOKER: ICD-10-CM

## 2025-03-06 DIAGNOSIS — E66.811 CLASS 1 OBESITY WITH SERIOUS COMORBIDITY AND BODY MASS INDEX (BMI) OF 32.0 TO 32.9 IN ADULT, UNSPECIFIED OBESITY TYPE: ICD-10-CM

## 2025-03-06 DIAGNOSIS — K21.9 GASTROESOPHAGEAL REFLUX DISEASE WITHOUT ESOPHAGITIS: ICD-10-CM

## 2025-03-06 DIAGNOSIS — I87.2 CHRONIC VENOUS INSUFFICIENCY: ICD-10-CM

## 2025-03-06 DIAGNOSIS — Z00.00 HEALTHCARE MAINTENANCE: ICD-10-CM

## 2025-03-06 DIAGNOSIS — G47.33 OSA (OBSTRUCTIVE SLEEP APNEA): ICD-10-CM

## 2025-03-06 DIAGNOSIS — J45.41 MODERATE PERSISTENT ASTHMA WITH ACUTE EXACERBATION: ICD-10-CM

## 2025-03-06 DIAGNOSIS — E78.2 MIXED HYPERLIPIDEMIA: Primary | ICD-10-CM

## 2025-03-06 NOTE — PROGRESS NOTES
Subjective   Jessica Harris is a 39 y.o. female.     Chief Complaint  She returns for a scheduled reassessment of multiple medical problems including obstructive sleep apnea, asthma, type 2 diabetes mellitus, hyperlipidemia, essential hypertension, and gastroesophageal reflux disease    History of Present Illness     Obstructive Sleep Apnea  This was confirmed on a sleep study performed on 1/6/2025.  She has started on AutoPap and is tolerating it well with a significant improvement in her sleep quality and daytime energy levels.    Asthma  She feels that her cough, shortness of breath, and wheezing remain at baseline.  She continues to deny any chest pain, hemoptysis, fever, chills, or night sweats.  Chest x-ray performed on 8/30/2024 was unremarkable. She remains on inhaled fluticasone-salmeterol and is currently using her rescue inhaler once or twice weekly.  She continues to smoke.     Type 2 Diabetes Mellitus  She continues to deny any paresthesia of the feet, visual disturbances, polydipsia, polyuria, or foot ulcerations.  She has been following her diet and excise plan, and remains on dapagliflozin.  She was unable to tolerate metformin or semaglutide.  She is awaiting insurance approval on tirzepatide.  She underwent a diabetic exam within the last year.      Hyperlipidemia  She remains on atorvastatin along with OTC fish oil capsules. She experienced GI upset with vascepa    Essential Hypertension  She continues to deny any lower extremity edema, and there is no history of any chest pain or recent palpitations.  She remains on propranolol     GERD  She continues to have occasional heartburn.  She continues to deny any recent regurgitation, and there is no history of any difficulty swallowing, or carli vomiting.  She underwent an EGD on 6/4/2024 with evidence of a small hiatal hernia and mild erythema of the gastric mucosa around the antrum.  Biopsies revealed reactive esophageal mucosa and evidence of mild  chronic gastritis.    The following portions of the patient's history were reviewed and updated as appropriate: allergies, current medications, past medical history, past social history, and problem list.    Review of Systems   Constitutional:  Positive for fatigue. Negative for chills and fever.   HENT:  Negative for congestion, ear pain, postnasal drip, rhinorrhea, sinus pressure, sneezing, sore throat and voice change.    Eyes:  Negative for visual disturbance.   Respiratory:  Positive for cough, shortness of breath and wheezing.    Cardiovascular:  Negative for chest pain, palpitations and leg swelling.   Gastrointestinal:  Positive for GERD. Negative for abdominal distention, abdominal pain, blood in stool, constipation, diarrhea, nausea and vomiting.   Endocrine: Negative for polydipsia and polyphagia.   Genitourinary:  Negative for dysuria and hematuria.   Musculoskeletal:  Positive for arthralgias and myalgias. Negative for back pain and joint swelling.   Skin:  Negative for rash.   Neurological:  Positive for headache. Negative for dizziness, weakness and numbness.   Psychiatric/Behavioral:  Negative for sleep disturbance and depressed mood. The patient is nervous/anxious.      Objective   Physical Exam  Constitutional:       General: She is not in acute distress.     Appearance: Normal appearance. She is well-developed. She is not diaphoretic.      Comments: Accompanied by her stepchildren.  Bright and in fair spirits. No apparent distress. No pallor, jaundice, diaphoresis, or cyanosis.   HENT:      Head: Atraumatic.      Right Ear: Tympanic membrane, ear canal and external ear normal.      Left Ear: Tympanic membrane, ear canal and external ear normal.      Mouth/Throat:      Lips: No lesions.      Mouth: Mucous membranes are moist. No oral lesions.      Pharynx: No oropharyngeal exudate or posterior oropharyngeal erythema.   Eyes:      General: Lids are normal.      Extraocular Movements: Extraocular  movements intact.      Conjunctiva/sclera: Conjunctivae normal.      Pupils: Pupils are equal.   Neck:      Thyroid: No thyroid mass or thyromegaly.      Vascular: No carotid bruit or JVD.      Trachea: Trachea normal. No tracheal deviation.   Cardiovascular:      Rate and Rhythm: Normal rate and regular rhythm.      Heart sounds: Normal heart sounds, S1 normal and S2 normal. No murmur heard.     No gallop.   Pulmonary:      Effort: Pulmonary effort is normal.      Breath sounds: Examination of the right-lower field reveals decreased breath sounds. Examination of the left-lower field reveals decreased breath sounds. Decreased breath sounds and wheezing (diffuse - mild) present. No rhonchi.   Abdominal:      General: Bowel sounds are normal. There is no distension.   Musculoskeletal:      Right lower leg: No edema.      Left lower leg: No edema.   Lymphadenopathy:      Head:      Right side of head: No submental, submandibular, tonsillar, preauricular, posterior auricular or occipital adenopathy.      Left side of head: No submental, submandibular, tonsillar, preauricular, posterior auricular or occipital adenopathy.      Cervical: No cervical adenopathy.      Upper Body:      Right upper body: No supraclavicular adenopathy.      Left upper body: No supraclavicular adenopathy.   Skin:     General: Skin is warm.      Coloration: Skin is not cyanotic, jaundiced or pale.      Findings: No rash.      Nails: There is no clubbing.   Neurological:      Mental Status: She is alert and oriented to person, place, and time.      Cranial Nerves: No cranial nerve deficit, dysarthria or facial asymmetry.      Sensory: No sensory deficit.      Motor: No tremor.      Coordination: Coordination normal.      Gait: Gait normal.   Psychiatric:         Attention and Perception: Attention normal.         Mood and Affect: Mood normal.         Speech: Speech normal.         Behavior: Behavior normal.         Thought Content: Thought content  normal.       Assessment & Plan   Problems Addressed this Visit          Cardiac and Vasculature    Chronic venous insufficiency    Essential hypertension   Hypertension: at goal. Evidence of target organ damage: none.  Encouraged to continue to work on diet and exercise plan.   Continue current medication    Mixed hyperlipidemia  As above.   Continue current medication.       Endocrine and Metabolic    Class 1 obesity with serious comorbidity and body mass index (BMI) of 32.0 to 32.9 in adult    Type 2 diabetes mellitus with microalbuminuria, without long-term current use of insulin  Diabetes mellitus Type II, under inadequate control.   Encouraged to continue to pursue ADA diet  Encouraged aerobic exercise.  Continue dapagliflozin  Efforts will be continued to obtain tirzepatide through insurance authorization    Vitamin D deficiency       Gastrointestinal Abdominal     Gastroesophageal reflux disease without esophagitis   Symptoms are currently stable.  Continue current medication.       Health Encounters    Healthcare maintenance  Recommended an updated COVID-19 shot       Neuro    Neuropathy       Pulmonary and Pneumonias    Moderate persistent asthma with acute exacerbation  Moderate persistent asthma. The patient is not currently having an exacerbation. In general, the patient's disease is well controlled.  Reminded of the importance of smoking cessation  Continue current medication       Sleep    ANNY (obstructive sleep apnea)  She is doing well with AutoPap  Encouraged to report if this should change.       Tobacco    Smoker     Diagnoses         Codes Comments    Mixed hyperlipidemia    -  Primary ICD-10-CM: E78.2  ICD-9-CM: 272.2     Essential hypertension     ICD-10-CM: I10  ICD-9-CM: 401.9     Chronic venous insufficiency     ICD-10-CM: I87.2  ICD-9-CM: 459.81     Vitamin D deficiency     ICD-10-CM: E55.9  ICD-9-CM: 268.9     Type 2 diabetes mellitus with microalbuminuria, without long-term current use  of insulin     ICD-10-CM: E11.29, R80.9  ICD-9-CM: 250.40, 791.0     Class 1 obesity with serious comorbidity and body mass index (BMI) of 32.0 to 32.9 in adult, unspecified obesity type     ICD-10-CM: E66.811, Z68.32  ICD-9-CM: 278.00, V85.32     Gastroesophageal reflux disease without esophagitis     ICD-10-CM: K21.9  ICD-9-CM: 530.81     Healthcare maintenance     ICD-10-CM: Z00.00  ICD-9-CM: V70.0     Moderate persistent asthma with acute exacerbation     ICD-10-CM: J45.41  ICD-9-CM: 493.92     ANNY (obstructive sleep apnea)     ICD-10-CM: G47.33  ICD-9-CM: 327.23     Smoker     ICD-10-CM: F17.200  ICD-9-CM: 305.1     Neuropathy     ICD-10-CM: G62.9  ICD-9-CM: 355.9

## 2025-03-13 ENCOUNTER — TELEPHONE (OUTPATIENT)
Dept: FAMILY MEDICINE CLINIC | Facility: CLINIC | Age: 40
End: 2025-03-13
Payer: MEDICAID

## 2025-03-13 NOTE — TELEPHONE ENCOUNTER
"Perla Angel (27 y.o. Female)       Date of Birth   1995    Social Security Number       Address   [address] [place] [address]    Home Phone   [phone]    MRN   [id number]       Orthodox   Confucianist    Marital Status                               Admission Date   10/6/23    Admission Type   Elective    Admitting Provider   Moshe Barnes DO    Attending Provider   Moshe Barnes DO    Department, Room/Bed   [facility], W247/1       Discharge Date       Discharge Disposition       Discharge Destination                                 Attending Provider: Moshe Barnes DO    Allergies: Penicillins    Isolation: None   Infection: None   Code Status: CPR    Ht: 165.1 cm (65\")   Wt: 92.5 kg (204 lb)    Admission Cmt: None   Principal Problem: None                  Active Insurance as of 10/6/2023       Primary Coverage       Payor Plan Insurance Group Employer/Plan Group    [employer] [place] [employer] [place]        Payor Plan Address Payor Plan Phone Number Payor Plan Fax Number Effective Dates    [address] [address] [phone]  6/25/2016 - None Entered    [facility] [id number]         Subscriber Name Subscriber Birth Date Member ID       PERLA ANGEL 1995 [id number]                     Emergency Contacts        (Rel.) Home Phone Work Phone Mobile Phone    Jocelin Angel (Mother) [phone]                 History & Physical        H&P signed by New Onbase, [place] at 10/06/23 0818         [Media Unavailable] Scan on 10/6/2023 by New Onbase, [place]: [facility]          Electronically signed by New Onbase, [place] at 10/06/23 0818       Orders (all)        Start     Ordered    10/07/23 0900  prenatal vitamin tablet 1 tablet  Daily,   Status:  Discontinued         10/06/23 0713    10/07/23 0600  CBC & Differential  Timed        Comments: Postpartum Day 1      " PA request sent again with notes      ----- Message from Jamie Santos sent at 3/6/2025  9:01 PM EST -----  Please try again to get Mounjaro preauthorized  Dx -type 2 diabetes mellitus inadequately controlled on dapaglaflozin  Intolerant to metformin   10/06/23 1340    10/07/23 0000  bisacodyl (DULCOLAX) suppository 10 mg  Daily PRN         10/06/23 1340    10/06/23 2100  docusate sodium (COLACE) capsule 100 mg  2 Times Daily         10/06/23 1340    10/06/23 1430  prenatal vitamin tablet 1 tablet  Daily         10/06/23 1340    10/06/23 1417  ceFAZolin 1000 mg IVPB in 100 mL NS (VTB)  Every 8 Hours,   Status:  Discontinued        [facility] Report.    10/06/23 0620    10/06/23 1400  ibuprofen (ADVIL,MOTRIN) tablet 800 mg  Every 8 Hours Scheduled,   Status:  Discontinued         10/06/23 1156    10/06/23 1341  Transfer Patient  Once         10/06/23 1340    10/06/23 1341  Code Status and Medical Interventions:  Continuous         10/06/23 1340    10/06/23 1341  Vital Signs Per hospital policy  Per Hospital Policy         10/06/23 1340    10/06/23 1341  Notify Physician  Until Discontinued         10/06/23 1340    10/06/23 1341  Up Ad Sommer  Until Discontinued         10/06/23 1340    10/06/23 1341  Fundal and Lochia Check  Per Hospital Policy        Comments: Q 15 min x 4, Q 30 min x 2, then Q Shift    10/06/23 1340    10/06/23 1341  RN to Assess Rh Status & Place RhIG Evaluation Order if Indicated  Continuous         10/06/23 1340    10/06/23 1341  Bladder Assessment  Per Order Details        Comments: Postpartum 1) Upon Admission to Unit & Every 4 Hours PRN Until Voiding. 2) Out of Bed to Void in 8 Hours.    10/06/23 1340    10/06/23 1341  Straight Cath  Per Order Details        Comments: Postpartum: If Distended & Unable to Void, May Repeat Once.    10/06/23 1340    10/06/23 1341  Indwelling Urinary Catheter  Per Order Details        Comments: Postpartum : After Straight Cathed x2 or if Greater Than 1000mL Residual, Insert Indwelling Urinary Catheter Until Further MD Order.    10/06/23 1340    10/06/23 1341  If indicated -- Please administer RH Immunoglobulin based on results of cord blood evaluation and fetal screen lab tests, pharmacy  to dispense  Per Order Details        Comments: See Process Instructions For Reference Range Details.    10/06/23 1340    10/06/23 1340  sodium chloride 0.9 % flush 1-10 mL  As Needed         10/06/23 1340    10/06/23 1340  oxytocin (PITOCIN) 30 units in 0.9% sodium chloride 500 mL (premix)  Continuous PRN         10/06/23 1340    10/06/23 1340  ibuprofen (ADVIL,MOTRIN) tablet 600 mg  Every 6 Hours PRN         10/06/23 1340    10/06/23 1340  acetaminophen (TYLENOL) tablet 650 mg  Every 6 Hours PRN         10/06/23 1340    10/06/23 1340  HYDROcodone-acetaminophen (NORCO) 5-325 MG per tablet 1 tablet  Every 4 Hours PRN        See Hyperspace for full Linked Orders Report.    10/06/23 1340    10/06/23 1340  HYDROcodone-acetaminophen (NORCO)  MG per tablet 1 tablet  Every 4 Hours PRN        See Hyperspace for full Linked Orders Report.    10/06/23 1340    10/06/23 1340  carboprost (HEMABATE) injection 250 mcg  Once As Needed         10/06/23 1340    10/06/23 1340  miSOPROStol (CYTOTEC) tablet 600 mcg  Once As Needed         10/06/23 1340    10/06/23 1340  methylergonovine (METHERGINE) injection 200 mcg  Once As Needed         10/06/23 1340    10/06/23 1340  diphenhydrAMINE (BENADRYL) capsule 25 mg  Nightly PRN         10/06/23 1340    10/06/23 1340  magnesium hydroxide (MILK OF MAGNESIA) suspension 10 mL  Daily PRN         10/06/23 1340    10/06/23 1340  witch hazel-glycerin (TUCKS) pad  As Needed         10/06/23 1340    10/06/23 1340  Hydrocortisone (Perianal) (ANUSOL-HC) 2.5 % rectal cream 1 application   As Needed         10/06/23 1340    10/06/23 1340  benzocaine (AMERICAINE) 20 % rectal ointment 1 application   As Needed         10/06/23 1340    10/06/23 1340  ondansetron (ZOFRAN) tablet 4 mg  Every 6 Hours PRN        See Hyperspace for full Linked Orders Report.    10/06/23 1340    10/06/23 1340  ondansetron (ZOFRAN) injection 4 mg  Every 6 Hours PRN        See Hyperspace for full Linked Orders Report.     10/06/23 1340    10/06/23 1340  Measles, Mumps & Rubella Vac (MMR) injection 0.5 mL  As Needed         10/06/23 1340    10/06/23 1300  oxytocin (PITOCIN) 30 units in 0.9% sodium chloride 500 mL (premix)  Continuous        See Hyperspace for full Linked Orders Report.    10/06/23 1156    10/06/23 1203  DIET MESSAGE CHICKEN TENDERS FRIES HONEY MUSTARD AND RANCH PLUS A COKE AND A DESSERT  Once        Comments: CHICKEN TENDERS FRIES HONEY MUSTARD AND RANCH PLUS A COKE AND A DESSERT    10/06/23 1203    10/06/23 1156  Notify Provider (Specified)  Until Discontinued         10/06/23 1156    10/06/23 1156  Vital Signs Per Hospital Policy  Per Hospital Policy         10/06/23 1156    10/06/23 1156  Up as Tolerated  Until Discontinued         10/06/23 1156    10/06/23 1156  Fundal & Lochia Check  Every Shift       10/06/23 1156    10/06/23 1156  Diet: Regular/House Diet; Texture: Regular Texture (IDDSI 7); Fluid Consistency: Thin (IDDSI 0)  Diet Effective Now         10/06/23 1156    10/06/23 1156  oxytocin (PITOCIN) 30 units in 0.9% sodium chloride 500 mL (premix)  Once,   Status:  Discontinued        See Hyperspace for full Linked Orders Report.    10/06/23 1156    10/06/23 1156  acetaminophen (TYLENOL) tablet 650 mg  Every 4 Hours PRN,   Status:  Discontinued         10/06/23 1156    10/06/23 1156  HYDROcodone-acetaminophen (NORCO) 5-325 MG per tablet 1 tablet  Every 4 Hours PRN,   Status:  Discontinued         10/06/23 1156    10/06/23 1156  methylergonovine (METHERGINE) injection 200 mcg  Once As Needed,   Status:  Discontinued         10/06/23 1156    10/06/23 1156  carboprost (HEMABATE) injection 250 mcg  As Needed,   Status:  Discontinued         10/06/23 1156    10/06/23 1156  Nurse may remove epidural catheter after delivery.  Until Discontinued         10/06/23 1156    10/06/23 1156  Transfer to postpartum when discharge criteria met.  Until Discontinued         10/06/23 1156    10/06/23 1154  VTE Prophylaxis Not  Indicated: No Risk Factors (0); </= 3 (Low Risk)  Once         10/06/23 1153    10/06/23 1115  lactated ringers bolus 1,000 mL  Once         10/06/23 1023    10/06/23 1115  ropivacaine (NAROPIN) 0.2 % injection  Continuous,   Status:  Discontinued         10/06/23 1023    10/06/23 1023  Vital Signs Per Anesthesia Guidelines  Per Order Details,   Status:  Canceled        Comments: Every 2 Minutes x5, Every 5 Minutes x4, Then If Stable Every 15 Minutes    10/06/23 1023    10/06/23 1023  Start IV with #16 or #18 gauge angiocath.  Once,   Status:  Canceled         10/06/23 1023    10/06/23 1023  Fetal Heart Rate Monitor  Once,   Status:  Canceled         10/06/23 1023    10/06/23 1023  Nurse or anesthesiologist to remain with patient for 15 minutes following dosing.  Until Discontinued,   Status:  Canceled         10/06/23 1023    10/06/23 1023  Facilitate maternal postion on side and maintain uterine displacement.  Until Discontinued,   Status:  Canceled         10/06/23 1023    10/06/23 1023  Consult anesthesia services prior to changing epidural infusion/rate.  Until Discontinued,   Status:  Canceled         10/06/23 1023    10/06/23 1023  Notify physician for the following conditions:  Until Discontinued,   Status:  Canceled         10/06/23 1023    10/06/23 1022  ePHEDrine Sulfate (Pressors) 5 MG/ML injection 10 mg  Every 10 Minutes PRN,   Status:  Discontinued         10/06/23 1023    10/06/23 1022  ondansetron (ZOFRAN) injection 4 mg  Once As Needed,   Status:  Discontinued         10/06/23 1023    10/06/23 1022  famotidine (PEPCID) injection 20 mg  Once As Needed,   Status:  Discontinued         10/06/23 1023    10/06/23 0900  sodium chloride 0.9 % flush 10 mL  Every 12 Hours Scheduled,   Status:  Discontinued         10/06/23 0620    10/06/23 0900  famotidine (PEPCID) injection 20 mg  2 Times Daily,   Status:  Discontinued         10/06/23 0725    10/06/23 0839  miSOPROStol (CYTOTEC) tablet 600 mcg  As Needed,    Status:  Discontinued         10/06/23 0839    10/06/23 0818  Fentanyl, Urine - Urine, Clean Catch  Once         10/06/23 0817    10/06/23 0800  Vital Signs q 4 while awake  Every 4 Hours,   Status:  Canceled      Comments: While the patient is awake.    10/06/23 0620    10/06/23 0800  oxytocin (PITOCIN) 30 units in 0.9% sodium chloride 500 mL (premix)  Titrated,   Status:  Discontinued         10/06/23 0620    10/06/23 0715  lactated ringers infusion  Continuous,   Status:  Discontinued         10/06/23 0620    10/06/23 0715  mineral oil  Once,   Status:  Discontinued         10/06/23 0620    10/06/23 0715  ceFAZolin 2000 mg IVPB in 100 mL NS (VTB)  Once        See Hyperspace for full Linked Orders Report.    10/06/23 0620    10/06/23 0617  VTE Prophylaxis Not Indicated: No Risk Factors (0); </= 3 (Low Risk)  Once         10/06/23 0620    10/06/23 0616  Admit To Obstetrics Inpatient  Once         10/06/23 0620    10/06/23 0616  Code Status and Medical Interventions:  Continuous,   Status:  Canceled         10/06/23 0620    10/06/23 0616  Obtain Informed Consent  Once         10/06/23 0620    10/06/23 0616  Vital Signs Per Hospital Policy  Per Hospital Policy,   Status:  Canceled         10/06/23 0620    10/06/23 0616  Confirm Presence of Amniotic Fluid if Patient Presents With SROM  Once         10/06/23 0620    10/06/23 0616  Mini-Prep Prior to Delivery  Once         10/06/23 0620    10/06/23 0616  Continuous Fetal Monitoring With NST on Admission and Prior to Initiation of Oxytocin.  Per Order Details,   Status:  Canceled        Comments: Continuous Fetal Monitoring With NST on Admission & Prior to Initiation of Oxytocin.    10/06/23 0620    10/06/23 0616  External Uterine Contraction Monitoring  Per Hospital Policy,   Status:  Canceled         10/06/23 0620    10/06/23 0616  Notify Provider (Specified)  Until Discontinued,   Status:  Canceled         10/06/23 0620    10/06/23 0616  Notify Provider of  Tachysystole (Per Hospital Algorithm)  Until Discontinued,   Status:  Canceled         10/06/23 0620    10/06/23 0616  Notify Provider if Membranes Ruptured, Bleeding Greater Than 1 Pad Per Hour, Fetal Heart Tone Abnormality or Severe Pain  Until Discontinued,   Status:  Canceled         10/06/23 0620    10/06/23 0616  May Ambulate if Membranes Intact or Head Engaged With Ruptured BOW or Normal Tracing for 20 Minutes  Until Discontinued,   Status:  Canceled         10/06/23 0620    10/06/23 0616  Initiate Group Beta Strep (GBS) Prophylaxis Protocol, If Criteria Met  Continuous,   Status:  Canceled        Comments: NO TREATMENT RECOMMENDED IF: 1) Maternal GBS Status Known Negative 2) Scheduled  Birth With Intact Membranes, Not in Labor 3) Maternal GBS Status Unknown, No Risk Factors  TREAT WITH ANTIBIOTICS IF:  1) Maternal GBS Status Known Positive 2) Maternal GBS Status Unknown With Risk Factors: a)  Previous Infant Affected By GBS Infection b) GBS Urinary Tract Infection (UTI) or Bacteriuria During Pregnancy c) Unexplained Maternal Fever (100.4F (38C) or Greater) During Labor d)  Prolonged Rupture of Membranes (18 or More Hours) e) Gestational Age Less Than 37 Weeks    10/06/23 0620    10/06/23 0616  Assess Need for Indwelling Urinary Catheter - Follow Removal Protocol  Continuous,   Status:  Canceled        Comments: Indwelling Urinary Catheter Removal Criteria  Discontinue Indwelling Urinary Catheter Unless One of the Following is Present:  Urinary Retention or Obstruction  Chronic Urinary Catheter Use  End of Life  Critical Illness with Strict I/O   Tract or Abdominal Surgery  Stage 3/4 Sacral / Perineal Wound  Required Activity Restriction: Trauma  Required Activity Restriction: Spine Surgery  If Patient is Being Followed by Urology Contact Them PRIOR to Removal  Do Not Remove Indwelling Urinary Catheter Order is Present with a CLINICAL REASON to Maintain the Catheter. Provider is Required to Include  a Clinical Reason to Maintain a Urinary Catheter    Patient Admitted With Indwelling Urinary Catheter (Not Placed at [facility])  Assess for Continued Need & Document Medical Necessity  If Infection is Suspected, Contact the Provider       See Hyperspace for full Linked Orders Report.    10/06/23 0620    10/06/23 0616  Urinary Catheter Care  Every Shift,   Status:  Canceled      See Hyperspace for full Linked Orders Report.    10/06/23 0620    10/06/23 0616  NPO Diet NPO Type: Ice Chips  Diet Effective Now,   Status:  Canceled         10/06/23 0620    10/06/23 0616  CBC & Differential  STAT         10/06/23 0620    10/06/23 0616  Urine Drug Screen - Urine, Clean Catch  STAT         10/06/23 0620    10/06/23 0616  Type & Screen  STAT         10/06/23 0620    10/06/23 0616  Insert Peripheral IV  Once         10/06/23 0620    10/06/23 0616  Saline Lock & Maintain IV Access  Continuous,   Status:  Canceled         10/06/23 0620    10/06/23 0616  CBC Auto Differential  PROCEDURE ONCE         10/06/23 0620    10/06/23 0615  Position Change - For Intra-Uterine Resusitation for Hypertonus, HyperStimulation or Non-Reassuring Fetal Status  As Needed,   Status:  Canceled       10/06/23 0620    10/06/23 0615  Insert Indwelling Urinary Catheter  As Needed,   Status:  Canceled      Comments: After epidural PRN.  Perform Nasal Decolonization all patients with walsh cath   See Hyperspace for full Linked Orders Report.    10/06/23 0620    10/06/23 0615  sodium chloride 0.9 % flush 10 mL  As Needed,   Status:  Discontinued         10/06/23 0620    10/06/23 0615  sodium chloride 0.9 % infusion 40 mL  As Needed,   Status:  Discontinued         10/06/23 0620    10/06/23 0615  lidocaine (XYLOCAINE) 1 % injection 0.5 mL  Once As Needed,   Status:  Discontinued         10/06/23 0620    10/06/23 0615  lactated ringers bolus 1,000 mL  Once As Needed,   Status:  Discontinued         10/06/23 0620    10/06/23 0615  acetaminophen  (TYLENOL) tablet 650 mg  Every 4 Hours PRN,   Status:  Discontinued         10/06/23 0620    10/06/23 0615  butorphanol (STADOL) injection 2 mg  Every 3 Hours PRN,   Status:  Discontinued         10/06/23 0620    10/06/23 0615  ondansetron (ZOFRAN) tablet 4 mg  Every 6 Hours PRN,   Status:  Discontinued        See Hyperspace for full Linked Orders Report.    10/06/23 0620    10/06/23 0615  ondansetron (ZOFRAN) injection 4 mg  Every 6 Hours PRN,   Status:  Discontinued        See Hyperspace for full Linked Orders Report.    10/06/23 0620    10/06/23 0615  terbutaline (BRETHINE) injection 0.2 mg  As Needed,   Status:  Discontinued         10/06/23 0620    10/06/23 0615  sodium chloride (NS) irrigation solution 1,000 mL  Once As Needed,   Status:  Discontinued         10/06/23 0620    10/06/23 0000  Group B Streptococcus Culture - Swab, Vaginal/Rectum        Comments: This is an external result entered through the Results Console.      10/06/23 0712    10/06/23 0000  Antibody Screen        Comments: This is an external result entered through the Results Console.      10/06/23 0712    10/06/23 0000  Chlamydia trachomatis, Neisseria gonorrhoeae, PCR w/ confirmation - Swab, Vagina        Comments: This is an external result entered through the Results Console.      10/06/23 0712    10/06/23 0000  Gonorrhea Screen - Swab,        Comments: This is an external result entered through the Results Console.      10/06/23 0712    10/06/23 0000  GTT 1 Hour        Comments: This is an external result entered through the Results Console.      10/06/23 0712    10/06/23 0000  Hepatitis B Surface Antigen        Comments: This is an external result entered through the Results Console.      10/06/23 0712    10/06/23 0000  RPR        Comments: This is an external result entered through the Results Console.      10/06/23 0712    10/06/23 0000  Rubella Antibody, IgG        Comments: This is an external result entered through the Results  Console.      10/06/23 0712    10/06/23 0000  HIV-1 Antibody, EIA        Comments: This is an external result entered through the Results Console.      10/06/23 0712    Unscheduled  Fundal & Lochia Check  As Needed      Comments: Every 15 Minutes x4, Then Every 30 Minutes x2, Then Every Shift    10/06/23 1156    Unscheduled  Apply Ice to Perineum  As Needed      Comments: For 20 min q 2 hrs    10/06/23 1340    Unscheduled  Kpad  As Needed      Comments: For pain    10/06/23 1340    Unscheduled  Warm compress  As Needed       10/06/23 1340    Unscheduled  Apply ace wrap, tight bra, or binder  As Needed       10/06/23 1340    Unscheduled  Apply ice packs  As Needed       10/06/23 1340                     Operative/Procedure Notes (all)        Moshe Barnes DO at 10/06/23 1154  Version 1 of [address] [place] [address]  Vaginal Delivery Note    Delivery     Delivery: Vaginal, Spontaneous     YOB: 2021    Time of Birth: 11:43 AM      Anesthesia: Epidural     Delivering clinician: Moshe Barnes    Forceps?   No   Vacuum? No    Shoulder dystocia present: No        Delivery narrative:      Infant    Findings: child  infant     Infant observations: Weight: No birth weight on file.   Length:   in  Observations/Comments:        Apgars:   @ 1 minute /      @ 5 minutes   Infant Name:      Placenta, Cord, and Fluid    Placenta delivered  Spontaneous  at  10/6/2023 11:44 AM     Cord: 3 vessels  present.   Nuchal Cord?  no   Cord blood obtained: No                   Repair    Episiotomy: None    Lacerations: Yes  Laceration Information  Laceration Repaired?   Perineal: None      Periurethral:       Labial:       Sulcus:       Vaginal:       Cervical:         Suture used for repair:     Estimated Blood Loss: 100  mls.   Suture used for repair:      Complications  none    Disposition  Mother to postpartum in stable condition.    Moshe Barnes DO  10/06/23  11:54 EDT        Electronically signed by  Moshe Barnes, DO at 10/06/23 1153

## 2025-03-17 ENCOUNTER — TELEMEDICINE (OUTPATIENT)
Dept: PSYCHIATRY | Facility: CLINIC | Age: 40
End: 2025-03-17
Payer: MEDICAID

## 2025-03-17 DIAGNOSIS — F51.05 INSOMNIA DUE TO MENTAL DISORDER: ICD-10-CM

## 2025-03-17 DIAGNOSIS — F17.210 TOBACCO DEPENDENCE DUE TO CIGARETTES: ICD-10-CM

## 2025-03-17 DIAGNOSIS — F41.1 GENERALIZED ANXIETY DISORDER: ICD-10-CM

## 2025-03-17 DIAGNOSIS — F31.30 BIPOLAR I DISORDER, MOST RECENT EPISODE DEPRESSED: Primary | ICD-10-CM

## 2025-03-17 DIAGNOSIS — Z79.899 MEDICATION MANAGEMENT: ICD-10-CM

## 2025-03-17 PROCEDURE — 99214 OFFICE O/P EST MOD 30 MIN: CPT | Performed by: NURSE PRACTITIONER

## 2025-03-17 RX ORDER — TRAZODONE HYDROCHLORIDE 50 MG/1
50 TABLET ORAL
Qty: 30 TABLET | Refills: 0 | Status: SHIPPED | OUTPATIENT
Start: 2025-03-17

## 2025-03-17 RX ORDER — BUPROPION HYDROCHLORIDE 300 MG/1
300 TABLET ORAL EVERY MORNING
Qty: 30 TABLET | Refills: 0 | Status: SHIPPED | OUTPATIENT
Start: 2025-03-17

## 2025-03-17 RX ORDER — LURASIDONE HYDROCHLORIDE 120 MG/1
120 TABLET, FILM COATED ORAL DAILY
Qty: 30 TABLET | Refills: 0 | Status: SHIPPED | OUTPATIENT
Start: 2025-03-17

## 2025-03-17 RX ORDER — BUPROPION HYDROCHLORIDE 150 MG/1
150 TABLET ORAL EVERY MORNING
Qty: 30 TABLET | Refills: 0 | Status: SHIPPED | OUTPATIENT
Start: 2025-03-17

## 2025-03-17 NOTE — PROGRESS NOTES
"This provider is located at the [facility], [address] [place] [address], [id number] using a secure tuQuejaSumahart Video Visit through [employer]. Patient is being seen remotely via telehealth in [place], and stated they are in a secure environment for this session. The patient's condition being diagnosed/treated is appropriate for telemedicine. The provider identified herself as well as her credentials.   The patient, and/or patients guardian, consent to be seen remotely, and when consent is given they understand that the consent allows for patient identifiable information to be sent to a third party as needed.   They may refuse to be seen remotely at any time. The electronic data is encrypted and password protected, and the patient and/or guardian has been advised of the potential risks to privacy not withstanding such measures.  Mode of Visit: Video  Location of patient: -HOME-  Location of provider: +[facility]+  You have chosen to receive care through a telehealth visit.  The patient has signed the video visit consent form.  The visit included audio and video interaction. No technical issues occurred during this visit.    Subjective   Jessica Harris is a 39 y.o. female who presents today for follow up    Chief Complaint:  Bipolar disorder    History of Present Illness: Patient presents as follow up via telehealth visit. She reports feeling \"much better\". States she has been sick but overall feels switching back to extended release bupropion has been helpful. She is now using a Cpap which has improved daytime fatigue. Reports she is working as a [employer]  in the evenings. She reports sleep has improved. Reports appetite is good. She denies SI/HI/AVH.    The following portions of the patient's history were reviewed and updated as appropriate: allergies, current medications, past family history, past medical history, past social history, past surgical history and problem list.      Past " "Medical History:  Past Medical History:   Diagnosis Date    Anxiety     Asthma     Bipolar 1 disorder     Depression     Diabetes mellitus     Elevated cholesterol     GERD (gastroesophageal reflux disease)     History of bronchitis     History of ear infections     History of stomach ulcers     History of streptococcal infection     Hyperlipidemia     Hypertension     Insomnia     Kidney stones     ANNY (obstructive sleep apnea) 3/13/2019    Urinary tract infection        Social History:  Social History     Socioeconomic History    Marital status: Single   Tobacco Use    Smoking status: Every Day     Current packs/day: 0.50     Types: Cigarettes     Passive exposure: Current    Smokeless tobacco: Never    Tobacco comments:     \"thinking about it\"   Vaping Use    Vaping status: Never Used   Substance and Sexual Activity    Alcohol use: No    Drug use: No    Sexual activity: Defer     Birth control/protection: Pill       Family History:  Family History   Problem Relation Age of Onset    Cancer Mother     Stroke Mother     Lung disease Mother     No Known Problems Father     Cancer Other     Heart disease Other     Stroke Other        Past Surgical History:  Past Surgical History:   Procedure Laterality Date    ADENOIDECTOMY      CHOLECYSTECTOMY      D & C HYSTEROSCOPY N/A 05/16/2022    Procedure: DILATATION AND CURETTAGE HYSTEROSCOPY;  Surgeon: Joey Andersen DO;  Location: [facility];  Service: Obstetrics/Gynecology;  Laterality: N/A;    DENTAL PROCEDURE      ENDOSCOPY      ENDOSCOPY N/A 6/4/2024    Procedure: ESOPHAGOGASTRODUODENOSCOPY WITH BIOPSY;  Surgeon: Matilde Moura MD;  Location: [facility];  Service: Gastroenterology;  Laterality: N/A;    HERNIA REPAIR      umbilical    OVARIAN CYST DRAINAGE/EXCISION Left 05/16/2022    Procedure: OVARIAN CYSTECTOMY WITH EXTENSIVE LYSIS OF ADHESIONS;  Surgeon: Joey Andersen DO;  Location: [facility];  Service: Obstetrics/Gynecology;  Laterality: " Left;    TONSILLECTOMY      TOTAL LAPAROSCOPIC HYSTERECTOMY N/A 6/22/2022    Procedure: ROBOTIC TOTAL LAPAROSCOPIC HYSTERECTOMY WITH BILATERAL SALPINGECTOMY;  Surgeon: Joey Andersen DO;  Location: [facility];  Service: Robotics - DaVinci;  Laterality: N/A;    UPPER GASTROINTESTINAL ENDOSCOPY      WISDOM TOOTH EXTRACTION         Problem List:  Patient Active Problem List   Diagnosis    Palpitations    Bipolar disorder, in partial remission, most recent episode mixed    Smoker    Gastroesophageal reflux disease without esophagitis    Chronic venous insufficiency    Healthcare maintenance    Vitamin D deficiency    ANNY (obstructive sleep apnea)    History of nephrolithiasis    Essential hypertension    Class 1 obesity with serious comorbidity and body mass index (BMI) of 32.0 to 32.9 in adult    Type 2 diabetes mellitus with microalbuminuria, without long-term current use of insulin    Mixed hyperlipidemia    Neuropathy    S/P laparoscopic hysterectomy    Encounter for immunization    Moderate persistent asthma with acute exacerbation        Allergy:   No Known Allergies     Current Medications:   Current Outpatient Medications   Medication Sig Dispense Refill    albuterol (PROVENTIL) (2.5 MG/3ML) 0.083% nebulizer solution Take 2.5 mg by nebulization Every 4 (Four) Hours As Needed for Shortness of Air or Wheezing. 360 mL 5    albuterol sulfate  (90 Base) MCG/ACT inhaler Inhale 2 puffs Every 6 (Six) Hours As Needed for Wheezing. 18 g 5    atorvastatin (LIPITOR) 20 MG tablet Take 1 tablet by mouth Every Night. 30 tablet 5    buPROPion XL (Wellbutrin XL) 150 MG 24 hr tablet Take 1 tablet by mouth Every Morning. 30 tablet 0    buPROPion XL (Wellbutrin XL) 300 MG 24 hr tablet Take 1 tablet by mouth Every Morning. 30 tablet 0    cholecalciferol (Vitamin D) 25 MCG (1000 UT) tablet Take 1 tablet by mouth Daily. 30 tablet 5    Continuous Glucose Sensor (Dexcom G7 Sensor) misc Use 1 each Every 10 (Ten) Days. 3  each 5    dapagliflozin Propanediol (Farxiga) 10 MG tablet Take 10 mg by mouth Every Morning. 30 tablet 5    fluticasone-salmeterol (Advair HFA) 230-21 MCG/ACT inhaler Inhale 2 puffs 2 (Two) Times a Day. 12 g 5    Glucose Blood (Blood Glucose Test) strip 1 daily 50 each 5    hydrOXYzine pamoate (VISTARIL) 25 MG capsule Take 1 capsule by mouth 3 (Three) Times a Day As Needed for Anxiety. 90 capsule 2    ibuprofen (ADVIL,MOTRIN) 800 MG tablet Take 1 tablet by mouth Every 6 (Six) Hours As Needed for Mild Pain, Moderate Pain or Fever. 30 tablet 1    Lancets misc 1 daily 50 each 5    linaclotide (Linzess) 290 MCG capsule capsule Take 1 capsule by mouth Daily. 30 minutes before meals on an empty stomach. 30 capsule 5    Lurasidone HCl (Latuda) 120 MG tablet tablet Take 1 tablet by mouth Daily. 30 tablet 0    Multi-Vitamin tablet tablet Take 1 tablet by mouth Daily. 30 tablet 5    ondansetron (ZOFRAN) 4 MG tablet Take 1 tablet by mouth Every 8 (Eight) Hours As Needed for Nausea or Vomiting. 60 tablet 0    pantoprazole (PROTONIX) 40 MG EC tablet Take 1 tablet by mouth Daily. 30 tablet 5    propranolol (INDERAL) 20 MG tablet Take 1 tablet by mouth 2 (Two) Times a Day. 60 tablet 5    Tirzepatide (Mounjaro) 2.5 MG/0.5ML solution auto-injector Inject 2.5 mg under the skin into the appropriate area as directed 1 (One) Time Per Week. 2 mL 0    traZODone (DESYREL) 50 MG tablet Take 1 tablet by mouth every night at bedtime. 30 tablet 0    vitamin B-12 (CYANOCOBALAMIN) 1000 MCG tablet Take 2 tablets by mouth Daily. 60 tablet 5     No current facility-administered medications for this visit.       Review of Symptoms:    Review of Systems   Constitutional:  Positive for fatigue.   HENT: Negative.     Eyes: Negative.    Respiratory: Negative.     Gastrointestinal: Negative.    Neurological: Negative.    Psychiatric/Behavioral:  Positive for depressed mood and stress. Negative for suicidal ideas. The patient is nervous/anxious.           Physical Exam:   There were no vitals taken for this visit.  There is no height or weight on file to calculate BMI.    Appearance: Well nourished female, appropriately dressed, appears stated age and in no acute distress  Gait, Station, Strength: PAULINE    Mental Status Exam:   Hygiene:   good  Cooperation:  Cooperative  Eye Contact:  Good  Psychomotor Behavior:  Appropriate  Affect:  Appropriate  Mood: normal  Hopelessness: Denies  Speech:  Normal  Thought Process:  Linear  Thought Content:  Mood congruent  Suicidal:  None  Homicidal:  None  Hallucinations:  None  Delusion:  None  Memory:  Intact  Orientation:  Person, Place, Time, and Situation  Reliability:  good  Insight:  Fair  Judgement:  Fair  Impulse Control:  Fair  Physical/Medical Issues:  Yes see med hx      PHQ-Score Total:  PHQ-9 Total Score: (Patient-Rptd) 0             Lab Results:   No visits with results within 1 Month(s) from this visit.   Latest known visit with results is:   Lab on 01/27/2025   Component Date Value Ref Range Status    Glucose 01/27/2025 121 (H)  65 - 99 mg/dL Final    BUN 01/27/2025 10  6 - 20 mg/dL Final    Creatinine 01/27/2025 0.72  0.57 - 1.00 mg/dL Final    Sodium 01/27/2025 136  136 - 145 mmol/L Final    Potassium 01/27/2025 4.2  3.5 - 5.2 mmol/L Final    Chloride 01/27/2025 98  98 - 107 mmol/L Final    CO2 01/27/2025 27.7  22.0 - 29.0 mmol/L Final    Calcium 01/27/2025 9.5  8.6 - 10.5 mg/dL Final    Total Protein 01/27/2025 7.0  6.0 - 8.5 g/dL Final    Albumin 01/27/2025 4.1  3.5 - 5.2 g/dL Final    ALT (SGPT) 01/27/2025 28  1 - 33 U/L Final    AST (SGOT) 01/27/2025 23  1 - 32 U/L Final    Alkaline Phosphatase 01/27/2025 81  39 - 117 U/L Final    Total Bilirubin 01/27/2025 <0.2  0.0 - 1.2 mg/dL Final    Globulin 01/27/2025 2.9  gm/dL Final    A/G Ratio 01/27/2025 1.4  g/dL Final    BUN/Creatinine Ratio 01/27/2025 13.9  7.0 - 25.0 Final    Anion Gap 01/27/2025 10.3  5.0 - 15.0 mmol/L Final    eGFR 01/27/2025 110.6   >60.0 mL/min/1.73 Final    Total Cholesterol 01/27/2025 124  0 - 200 mg/dL Final    Triglycerides 01/27/2025 224 (H)  0 - 150 mg/dL Final    HDL Cholesterol 01/27/2025 31 (L)  40 - 60 mg/dL Final    LDL Cholesterol  01/27/2025 57  0 - 100 mg/dL Final    VLDL Cholesterol 01/27/2025 36  5 - 40 mg/dL Final    LDL/HDL Ratio 01/27/2025 1.55   Final    Hemoglobin A1C 01/27/2025 7.50 (H)  4.80 - 5.60 % Final    Microalbumin, Urine 01/27/2025 2.5  mg/dL Final    WBC 01/27/2025 10.34  3.40 - 10.80 10*3/mm3 Final    RBC 01/27/2025 5.93 (H)  3.77 - 5.28 10*6/mm3 Final    Hemoglobin 01/27/2025 16.1 (H)  12.0 - 15.9 g/dL Final    Hematocrit 01/27/2025 51.2 (H)  34.0 - 46.6 % Final    MCV 01/27/2025 86.3  79.0 - 97.0 fL Final    MCH 01/27/2025 27.2  26.6 - 33.0 pg Final    MCHC 01/27/2025 31.4 (L)  31.5 - 35.7 g/dL Final    RDW 01/27/2025 13.0  12.3 - 15.4 % Final    RDW-SD 01/27/2025 40.8  37.0 - 54.0 fl Final    MPV 01/27/2025 9.5  6.0 - 12.0 fL Final    Platelets 01/27/2025 309  140 - 450 10*3/mm3 Final    Neutrophil % 01/27/2025 55.5  42.7 - 76.0 % Final    Lymphocyte % 01/27/2025 31.0  19.6 - 45.3 % Final    Monocyte % 01/27/2025 8.7  5.0 - 12.0 % Final    Eosinophil % 01/27/2025 3.3  0.3 - 6.2 % Final    Basophil % 01/27/2025 0.9  0.0 - 1.5 % Final    Immature Grans % 01/27/2025 0.6 (H)  0.0 - 0.5 % Final    Neutrophils, Absolute 01/27/2025 5.74  1.70 - 7.00 10*3/mm3 Final    Lymphocytes, Absolute 01/27/2025 3.21 (H)  0.70 - 3.10 10*3/mm3 Final    Monocytes, Absolute 01/27/2025 0.90  0.10 - 0.90 10*3/mm3 Final    Eosinophils, Absolute 01/27/2025 0.34  0.00 - 0.40 10*3/mm3 Final    Basophils, Absolute 01/27/2025 0.09  0.00 - 0.20 10*3/mm3 Final    Immature Grans, Absolute 01/27/2025 0.06 (H)  0.00 - 0.05 10*3/mm3 Final    nRBC 01/27/2025 0.0  0.0 - 0.2 /100 WBC Final       Assessment & Plan   Diagnoses and all orders for this visit:    1. Bipolar I disorder, most recent episode depressed (Primary)  -     buPROPion XL  (Wellbutrin XL) 150 MG 24 hr tablet; Take 1 tablet by mouth Every Morning.  Dispense: 30 tablet; Refill: 0  -     buPROPion XL (Wellbutrin XL) 300 MG 24 hr tablet; Take 1 tablet by mouth Every Morning.  Dispense: 30 tablet; Refill: 0  -     Lurasidone HCl (Latuda) 120 MG tablet tablet; Take 1 tablet by mouth Daily.  Dispense: 30 tablet; Refill: 0    2. Generalized anxiety disorder  -     Lurasidone HCl (Latuda) 120 MG tablet tablet; Take 1 tablet by mouth Daily.  Dispense: 30 tablet; Refill: 0    3. Medication management    4. Tobacco dependence due to cigarettes    5. Insomnia due to mental disorder  -     traZODone (DESYREL) 50 MG tablet; Take 1 tablet by mouth every night at bedtime.  Dispense: 30 tablet; Refill: 0        -Continue bupropion  daily for depression  -Continue lurasidone 120 mg daily for mood. Lengthy discussion with patient on the possible side effects of antipsychotic medications including increased cholesterol, increased blood sugar, and possibility of weight gain.  Also discussed the need to monitor lab work associated with this.  The risk of muscle movement disorders with this class of medication was also discussed.  -Continue trazodone 50 mg nightly for sleep  -Continue hydroxyzine pamoate 25 mg 3 times daily as needed for anxiety  -Encouraged patient to begin psychotherapy  -JULIET reviewed and appropriate. Patient counseled on use of controlled substances  -The benefits of a healthy diet and exercise were discussed with patient, especially the positive effects they have on mental health. Patient encouraged to consider lifestyle modification regarding  diet and exercise patterns to maximize results of mental health treatment.  -Reviewed previous available documentation  -Reviewed most recent available labs   -Jessica Harris  reports that she has been smoking cigarettes. She has been exposed to tobacco smoke. She has never used smokeless tobacco. I have educated her on the risk of  diseases from using tobacco products such as cancer, COPD, heart disease, reproductive problems, low birth weight, cataracts, and arterial disease. I advised her to quit and she is not willing to quit.  I spent 3  minutes counseling the patient.             Visit Diagnoses:    ICD-10-CM ICD-9-CM   1. Bipolar I disorder, most recent episode depressed  F31.30 296.50   2. Generalized anxiety disorder  F41.1 300.02   3. Medication management  Z79.899 V58.69   4. Tobacco dependence due to cigarettes  F17.210 305.1   5. Insomnia due to mental disorder  F51.05 300.9     327.02       TREATMENT PLAN/GOALS: Continue supportive psychotherapy efforts and medications as indicated. Treatment and medication options discussed during today's visit. Patient acknowledged and verbally consented to continue with current treatment plan and was educated on the importance of compliance with treatment and follow-up appointments.    MEDICATION ISSUES:    Discussed medication options and treatment plan of prescribed medication as well as the risks, benefits, and side effects including potential falls, possible impaired driving and metabolic adversities among others. Patient is agreeable to call the office with any worsening of symptoms or onset of side effects. Patient is agreeable to call 911 or go to the nearest ER should he/she begin having SI/HI.     MEDS ORDERED DURING VISIT:  New Medications Ordered This Visit   Medications    buPROPion XL (Wellbutrin XL) 150 MG 24 hr tablet     Sig: Take 1 tablet by mouth Every Morning.     Dispense:  30 tablet     Refill:  0    buPROPion XL (Wellbutrin XL) 300 MG 24 hr tablet     Sig: Take 1 tablet by mouth Every Morning.     Dispense:  30 tablet     Refill:  0    Lurasidone HCl (Latuda) 120 MG tablet tablet     Sig: Take 1 tablet by mouth Daily.     Dispense:  30 tablet     Refill:  0    traZODone (DESYREL) 50 MG tablet     Sig: Take 1 tablet by mouth every night at bedtime.     Dispense:  30 tablet      Refill:  0       No follow-ups on file.               This document has been electronically signed by JANIE Snyder  March 17, 2025 09:30 EDT    Part of this note may be an electronic transcription/translation of spoken language to printed text using the Dragon Dictation System.

## 2025-03-22 DIAGNOSIS — R80.9 TYPE 2 DIABETES MELLITUS WITH MICROALBUMINURIA, WITHOUT LONG-TERM CURRENT USE OF INSULIN: Primary | ICD-10-CM

## 2025-03-22 DIAGNOSIS — E11.29 TYPE 2 DIABETES MELLITUS WITH MICROALBUMINURIA, WITHOUT LONG-TERM CURRENT USE OF INSULIN: Primary | ICD-10-CM

## 2025-03-22 RX ORDER — DULAGLUTIDE 0.75 MG/.5ML
0.75 INJECTION, SOLUTION SUBCUTANEOUS WEEKLY
Qty: 2 ML | Refills: 5 | Status: SHIPPED | OUTPATIENT
Start: 2025-03-22

## 2025-04-21 ENCOUNTER — TELEMEDICINE (OUTPATIENT)
Dept: PSYCHIATRY | Facility: CLINIC | Age: 40
End: 2025-04-21
Payer: MEDICAID

## 2025-04-21 DIAGNOSIS — F31.30 BIPOLAR I DISORDER, MOST RECENT EPISODE DEPRESSED: Primary | ICD-10-CM

## 2025-04-21 DIAGNOSIS — F17.210 TOBACCO DEPENDENCE DUE TO CIGARETTES: ICD-10-CM

## 2025-04-21 DIAGNOSIS — Z79.899 MEDICATION MANAGEMENT: ICD-10-CM

## 2025-04-21 DIAGNOSIS — F51.05 INSOMNIA DUE TO MENTAL DISORDER: ICD-10-CM

## 2025-04-21 DIAGNOSIS — F41.1 GENERALIZED ANXIETY DISORDER: ICD-10-CM

## 2025-04-21 PROCEDURE — 96127 BRIEF EMOTIONAL/BEHAV ASSMT: CPT | Performed by: NURSE PRACTITIONER

## 2025-04-21 PROCEDURE — 99214 OFFICE O/P EST MOD 30 MIN: CPT | Performed by: NURSE PRACTITIONER

## 2025-04-21 PROCEDURE — 1159F MED LIST DOCD IN RCRD: CPT | Performed by: NURSE PRACTITIONER

## 2025-04-21 PROCEDURE — 1160F RVW MEDS BY RX/DR IN RCRD: CPT | Performed by: NURSE PRACTITIONER

## 2025-04-21 RX ORDER — TRAZODONE HYDROCHLORIDE 50 MG/1
50 TABLET ORAL
Qty: 90 TABLET | Refills: 0 | Status: SHIPPED | OUTPATIENT
Start: 2025-04-21

## 2025-04-21 RX ORDER — LURASIDONE HYDROCHLORIDE 120 MG/1
120 TABLET, FILM COATED ORAL DAILY
Qty: 90 TABLET | Refills: 0 | Status: SHIPPED | OUTPATIENT
Start: 2025-04-21

## 2025-04-21 RX ORDER — BUPROPION HYDROCHLORIDE 150 MG/1
150 TABLET ORAL EVERY MORNING
Qty: 90 TABLET | Refills: 0 | Status: SHIPPED | OUTPATIENT
Start: 2025-04-21

## 2025-04-21 RX ORDER — BUPROPION HYDROCHLORIDE 300 MG/1
300 TABLET ORAL EVERY MORNING
Qty: 90 TABLET | Refills: 0 | Status: SHIPPED | OUTPATIENT
Start: 2025-04-21

## 2025-04-21 NOTE — PROGRESS NOTES
This provider is located at the [facility], [address] [place] [address], [id number] using a secure [employer] Video Visit through [employer]. Patient is being seen remotely via telehealth in [place], and stated they are in a secure environment for this session. The patient's condition being diagnosed/treated is appropriate for telemedicine. The provider identified herself as well as her credentials.   The patient, and/or patients guardian, consent to be seen remotely, and when consent is given they understand that the consent allows for patient identifiable information to be sent to a third party as needed.   They may refuse to be seen remotely at any time. The electronic data is encrypted and password protected, and the patient and/or guardian has been advised of the potential risks to privacy not withstanding such measures.  Mode of Visit: Video  Location of patient: -HOME-  Location of provider: +[facility]+  You have chosen to receive care through a telehealth visit.  You have chosen to receive care through a telehealth visit.  The patient has signed the video visit consent form.  The visit included audio and video interaction. No technical issues occurred during this visit.    Subjective   Jessica Harris is a 39 y.o. female who presents today for follow up    Chief Complaint:  Bipolar disorder    History of Present Illness: Patient presents as follow up via telehealth visit. She reports medications are working well, denies any side effects. States depression has improved since increase of bupropion. She reports sleep and appetite is good. States her  has been pre-approved for a home loan and they are currently looking. She denies SI/HI/AVH.    The following portions of the patient's history were reviewed and updated as appropriate: allergies, current medications, past family history, past medical history, past social history, past surgical history and problem list.      Past Medical  "History:  Past Medical History:   Diagnosis Date    Anxiety     Asthma     Bipolar 1 disorder     Depression     Diabetes mellitus     Elevated cholesterol     GERD (gastroesophageal reflux disease)     History of bronchitis     History of ear infections     History of stomach ulcers     History of streptococcal infection     Hyperlipidemia     Hypertension     Insomnia     Kidney stones     ANNY (obstructive sleep apnea) 3/13/2019    Urinary tract infection        Social History:  Social History     Socioeconomic History    Marital status: Single   Tobacco Use    Smoking status: Every Day     Current packs/day: 0.50     Types: Cigarettes     Passive exposure: Current    Smokeless tobacco: Never    Tobacco comments:     \"thinking about it\"   Vaping Use    Vaping status: Never Used   Substance and Sexual Activity    Alcohol use: No    Drug use: No    Sexual activity: Defer     Birth control/protection: Pill       Family History:  Family History   Problem Relation Age of Onset    Cancer Mother     Stroke Mother     Lung disease Mother     No Known Problems Father     Cancer Other     Heart disease Other     Stroke Other        Past Surgical History:  Past Surgical History:   Procedure Laterality Date    ADENOIDECTOMY      CHOLECYSTECTOMY      D & C HYSTEROSCOPY N/A 05/16/2022    Procedure: DILATATION AND CURETTAGE HYSTEROSCOPY;  Surgeon: Joey Andersen DO;  Location: [facility];  Service: Obstetrics/Gynecology;  Laterality: N/A;    DENTAL PROCEDURE      ENDOSCOPY      ENDOSCOPY N/A 6/4/2024    Procedure: ESOPHAGOGASTRODUODENOSCOPY WITH BIOPSY;  Surgeon: Matilde Moura MD;  Location: [facility] OR;  Service: Gastroenterology;  Laterality: N/A;    HERNIA REPAIR      umbilical    OVARIAN CYST DRAINAGE/EXCISION Left 05/16/2022    Procedure: OVARIAN CYSTECTOMY WITH EXTENSIVE LYSIS OF ADHESIONS;  Surgeon: Joey Andersen DO;  Location: [facility];  Service: Obstetrics/Gynecology;  Laterality: Left;    " TONSILLECTOMY      TOTAL LAPAROSCOPIC HYSTERECTOMY N/A 6/22/2022    Procedure: ROBOTIC TOTAL LAPAROSCOPIC HYSTERECTOMY WITH BILATERAL SALPINGECTOMY;  Surgeon: Joey Andersen DO;  Location: [facility];  Service: Robotics - DaVinci;  Laterality: N/A;    UPPER GASTROINTESTINAL ENDOSCOPY      WISDOM TOOTH EXTRACTION         Problem List:  Patient Active Problem List   Diagnosis    Palpitations    Bipolar disorder, in partial remission, most recent episode mixed    Smoker    Gastroesophageal reflux disease without esophagitis    Chronic venous insufficiency    Healthcare maintenance    Vitamin D deficiency    ANNY (obstructive sleep apnea)    History of nephrolithiasis    Essential hypertension    Class 1 obesity with serious comorbidity and body mass index (BMI) of 32.0 to 32.9 in adult    Type 2 diabetes mellitus with microalbuminuria, without long-term current use of insulin    Mixed hyperlipidemia    Neuropathy    S/P laparoscopic hysterectomy    Encounter for immunization    Moderate persistent asthma with acute exacerbation        Allergy:   No Known Allergies     Current Medications:   Current Outpatient Medications   Medication Sig Dispense Refill    albuterol (PROVENTIL) (2.5 MG/3ML) 0.083% nebulizer solution Take 2.5 mg by nebulization Every 4 (Four) Hours As Needed for Shortness of Air or Wheezing. 360 mL 5    albuterol sulfate  (90 Base) MCG/ACT inhaler Inhale 2 puffs Every 6 (Six) Hours As Needed for Wheezing. 18 g 5    atorvastatin (LIPITOR) 20 MG tablet Take 1 tablet by mouth Every Night. 30 tablet 5    buPROPion XL (Wellbutrin XL) 150 MG 24 hr tablet Take 1 tablet by mouth Every Morning. 90 tablet 0    buPROPion XL (Wellbutrin XL) 300 MG 24 hr tablet Take 1 tablet by mouth Every Morning. 90 tablet 0    cholecalciferol (Vitamin D) 25 MCG (1000 UT) tablet Take 1 tablet by mouth Daily. 30 tablet 5    Continuous Glucose Sensor (Dexcom G7 Sensor) misc Use 1 each Every 10 (Ten) Days. 3 each 5     dapagliflozin Propanediol (Farxiga) 10 MG tablet Take 10 mg by mouth Every Morning. 30 tablet 5    Dulaglutide (Trulicity) 0.75 MG/0.5ML solution auto-injector Inject 0.75 mg under the skin into the appropriate area as directed 1 (One) Time Per Week. 2 mL 5    fluticasone-salmeterol (Advair HFA) 230-21 MCG/ACT inhaler Inhale 2 puffs 2 (Two) Times a Day. 12 g 5    Glucose Blood (Blood Glucose Test) strip 1 daily 50 each 5    hydrOXYzine pamoate (VISTARIL) 25 MG capsule Take 1 capsule by mouth 3 (Three) Times a Day As Needed for Anxiety. 90 capsule 2    ibuprofen (ADVIL,MOTRIN) 800 MG tablet Take 1 tablet by mouth Every 6 (Six) Hours As Needed for Mild Pain, Moderate Pain or Fever. 30 tablet 1    Lancets misc 1 daily 50 each 5    linaclotide (Linzess) 290 MCG capsule capsule Take 1 capsule by mouth Daily. 30 minutes before meals on an empty stomach. 30 capsule 5    Lurasidone HCl (Latuda) 120 MG tablet tablet Take 1 tablet by mouth Daily. 90 tablet 0    Multi-Vitamin tablet tablet Take 1 tablet by mouth Daily. 30 tablet 5    ondansetron (ZOFRAN) 4 MG tablet Take 1 tablet by mouth Every 8 (Eight) Hours As Needed for Nausea or Vomiting. 60 tablet 0    pantoprazole (PROTONIX) 40 MG EC tablet Take 1 tablet by mouth Daily. 30 tablet 5    propranolol (INDERAL) 20 MG tablet Take 1 tablet by mouth 2 (Two) Times a Day. 60 tablet 5    traZODone (DESYREL) 50 MG tablet Take 1 tablet by mouth every night at bedtime. 90 tablet 0    vitamin B-12 (CYANOCOBALAMIN) 1000 MCG tablet Take 2 tablets by mouth Daily. 60 tablet 5     No current facility-administered medications for this visit.       Review of Symptoms:    Review of Systems   Constitutional:  Positive for fatigue.   HENT: Negative.     Eyes: Negative.    Respiratory: Negative.     Cardiovascular: Negative.    Gastrointestinal: Negative.    Neurological: Negative.    Psychiatric/Behavioral:  Positive for sleep disturbance, depressed mood and stress. Negative for suicidal ideas.  The patient is nervous/anxious.          Physical Exam:   There were no vitals taken for this visit.  There is no height or weight on file to calculate BMI.    Appearance: Well nourished female, appropriately dressed, appears stated age and in no acute distress    Gait, Station, Strength: PAULINE    Mental Status Exam:   Hygiene:   good  Cooperation:  Cooperative  Eye Contact:  Good  Psychomotor Behavior:  Appropriate  Affect:  Appropriate  Mood: normal  Hopelessness: Denies  Speech:  Normal  Thought Process:  Linear  Thought Content:  Mood congruent  Suicidal:  None  Homicidal:  None  Hallucinations:  None  Delusion:  None  Memory:  Intact  Orientation:  Person, Place, Time, and Situation  Reliability:  good  Insight:  Fair  Judgement:  Fair  Impulse Control:  Fair  Physical/Medical Issues:   see med hx      PHQ-Score Total:  PHQ-9 Total Score: (Patient-Rptd) 0          Lab Results:   No visits with results within 1 Month(s) from this visit.   Latest known visit with results is:   Lab on 01/27/2025   Component Date Value Ref Range Status    Glucose 01/27/2025 121 (H)  65 - 99 mg/dL Final    BUN 01/27/2025 10  6 - 20 mg/dL Final    Creatinine 01/27/2025 0.72  0.57 - 1.00 mg/dL Final    Sodium 01/27/2025 136  136 - 145 mmol/L Final    Potassium 01/27/2025 4.2  3.5 - 5.2 mmol/L Final    Chloride 01/27/2025 98  98 - 107 mmol/L Final    CO2 01/27/2025 27.7  22.0 - 29.0 mmol/L Final    Calcium 01/27/2025 9.5  8.6 - 10.5 mg/dL Final    Total Protein 01/27/2025 7.0  6.0 - 8.5 g/dL Final    Albumin 01/27/2025 4.1  3.5 - 5.2 g/dL Final    ALT (SGPT) 01/27/2025 28  1 - 33 U/L Final    AST (SGOT) 01/27/2025 23  1 - 32 U/L Final    Alkaline Phosphatase 01/27/2025 81  39 - 117 U/L Final    Total Bilirubin 01/27/2025 <0.2  0.0 - 1.2 mg/dL Final    Globulin 01/27/2025 2.9  gm/dL Final    A/G Ratio 01/27/2025 1.4  g/dL Final    BUN/Creatinine Ratio 01/27/2025 13.9  7.0 - 25.0 Final    Anion Gap 01/27/2025 10.3  5.0 - 15.0 mmol/L Final     eGFR 01/27/2025 110.6  >60.0 mL/min/1.73 Final    Total Cholesterol 01/27/2025 124  0 - 200 mg/dL Final    Triglycerides 01/27/2025 224 (H)  0 - 150 mg/dL Final    HDL Cholesterol 01/27/2025 31 (L)  40 - 60 mg/dL Final    LDL Cholesterol  01/27/2025 57  0 - 100 mg/dL Final    VLDL Cholesterol 01/27/2025 36  5 - 40 mg/dL Final    LDL/HDL Ratio 01/27/2025 1.55   Final    Hemoglobin A1C 01/27/2025 7.50 (H)  4.80 - 5.60 % Final    Microalbumin, Urine 01/27/2025 2.5  mg/dL Final    WBC 01/27/2025 10.34  3.40 - 10.80 10*3/mm3 Final    RBC 01/27/2025 5.93 (H)  3.77 - 5.28 10*6/mm3 Final    Hemoglobin 01/27/2025 16.1 (H)  12.0 - 15.9 g/dL Final    Hematocrit 01/27/2025 51.2 (H)  34.0 - 46.6 % Final    MCV 01/27/2025 86.3  79.0 - 97.0 fL Final    MCH 01/27/2025 27.2  26.6 - 33.0 pg Final    MCHC 01/27/2025 31.4 (L)  31.5 - 35.7 g/dL Final    RDW 01/27/2025 13.0  12.3 - 15.4 % Final    RDW-SD 01/27/2025 40.8  37.0 - 54.0 fl Final    MPV 01/27/2025 9.5  6.0 - 12.0 fL Final    Platelets 01/27/2025 309  140 - 450 10*3/mm3 Final    Neutrophil % 01/27/2025 55.5  42.7 - 76.0 % Final    Lymphocyte % 01/27/2025 31.0  19.6 - 45.3 % Final    Monocyte % 01/27/2025 8.7  5.0 - 12.0 % Final    Eosinophil % 01/27/2025 3.3  0.3 - 6.2 % Final    Basophil % 01/27/2025 0.9  0.0 - 1.5 % Final    Immature Grans % 01/27/2025 0.6 (H)  0.0 - 0.5 % Final    Neutrophils, Absolute 01/27/2025 5.74  1.70 - 7.00 10*3/mm3 Final    Lymphocytes, Absolute 01/27/2025 3.21 (H)  0.70 - 3.10 10*3/mm3 Final    Monocytes, Absolute 01/27/2025 0.90  0.10 - 0.90 10*3/mm3 Final    Eosinophils, Absolute 01/27/2025 0.34  0.00 - 0.40 10*3/mm3 Final    Basophils, Absolute 01/27/2025 0.09  0.00 - 0.20 10*3/mm3 Final    Immature Grans, Absolute 01/27/2025 0.06 (H)  0.00 - 0.05 10*3/mm3 Final    nRBC 01/27/2025 0.0  0.0 - 0.2 /100 WBC Final       Assessment & Plan   Diagnoses and all orders for this visit:    1. Bipolar I disorder, most recent episode depressed  (Primary)  -     buPROPion XL (Wellbutrin XL) 150 MG 24 hr tablet; Take 1 tablet by mouth Every Morning.  Dispense: 90 tablet; Refill: 0  -     buPROPion XL (Wellbutrin XL) 300 MG 24 hr tablet; Take 1 tablet by mouth Every Morning.  Dispense: 90 tablet; Refill: 0  -     Lurasidone HCl (Latuda) 120 MG tablet tablet; Take 1 tablet by mouth Daily.  Dispense: 90 tablet; Refill: 0    2. Generalized anxiety disorder  -     Lurasidone HCl (Latuda) 120 MG tablet tablet; Take 1 tablet by mouth Daily.  Dispense: 90 tablet; Refill: 0    3. Insomnia due to mental disorder  -     traZODone (DESYREL) 50 MG tablet; Take 1 tablet by mouth every night at bedtime.  Dispense: 90 tablet; Refill: 0    4. Medication management    5. Tobacco dependence due to cigarettes        -Continue bupropion  daily for depression  -Continue lurasidone 120 mg daily for mood. Lengthy discussion with patient on the possible side effects of antipsychotic medications including increased cholesterol, increased blood sugar, and possibility of weight gain.  Also discussed the need to monitor lab work associated with this.  The risk of muscle movement disorders with this class of medication was also discussed.  -Continue trazodone 50 mg nightly for sleep  -Continue hydroxyzine pamoate 25 mg 3 times daily as needed for anxiety  -Encouraged patient to begin psychotherapy  -JULIET reviewed and appropriate. Patient counseled on use of controlled substances  -The benefits of a healthy diet and exercise were discussed with patient, especially the positive effects they have on mental health. Patient encouraged to consider lifestyle modification regarding  diet and exercise patterns to maximize results of mental health treatment.  -Reviewed previous available documentation  -Reviewed most recent available labs   -Jessica Harris  reports that she has been smoking cigarettes. She has been exposed to tobacco smoke. She has never used smokeless tobacco. I have  educated her on the risk of diseases from using tobacco products such as cancer, COPD, heart disease, reproductive problems, low birth weight, cataracts, and arterial disease. I advised her to quit and she is not willing to quit.  I spent 3  minutes counseling the patient.             Visit Diagnoses:    ICD-10-CM ICD-9-CM   1. Bipolar I disorder, most recent episode depressed  F31.30 296.50   2. Generalized anxiety disorder  F41.1 300.02   3. Insomnia due to mental disorder  F51.05 300.9     327.02   4. Medication management  Z79.899 V58.69   5. Tobacco dependence due to cigarettes  F17.210 305.1       TREATMENT PLAN/GOALS: Continue supportive psychotherapy efforts and medications as indicated. Treatment and medication options discussed during today's visit. Patient acknowledged and verbally consented to continue with current treatment plan and was educated on the importance of compliance with treatment and follow-up appointments.    MEDICATION ISSUES:    Discussed medication options and treatment plan of prescribed medication as well as the risks, benefits, and side effects including potential falls, possible impaired driving and metabolic adversities among others. Patient is agreeable to call the office with any worsening of symptoms or onset of side effects. Patient is agreeable to call 911 or go to the nearest ER should he/she begin having SI/HI.     MEDS ORDERED DURING VISIT:  New Medications Ordered This Visit   Medications    buPROPion XL (Wellbutrin XL) 150 MG 24 hr tablet     Sig: Take 1 tablet by mouth Every Morning.     Dispense:  90 tablet     Refill:  0    buPROPion XL (Wellbutrin XL) 300 MG 24 hr tablet     Sig: Take 1 tablet by mouth Every Morning.     Dispense:  90 tablet     Refill:  0    Lurasidone HCl (Latuda) 120 MG tablet tablet     Sig: Take 1 tablet by mouth Daily.     Dispense:  90 tablet     Refill:  0    traZODone (DESYREL) 50 MG tablet     Sig: Take 1 tablet by mouth every night at  bedtime.     Dispense:  90 tablet     Refill:  0       Return in about 3 months (around 7/21/2025).               This document has been electronically signed by JANIE Snyder  April 21, 2025 11:48 EDT    Part of this note may be an electronic transcription/translation of spoken language to printed text using the Dragon Dictation System.

## 2025-05-14 RX ORDER — ONDANSETRON 4 MG/1
4 TABLET, FILM COATED ORAL EVERY 8 HOURS PRN
Qty: 60 TABLET | Refills: 0 | Status: SHIPPED | OUTPATIENT
Start: 2025-05-14

## 2025-05-15 ENCOUNTER — LAB (OUTPATIENT)
Dept: LAB | Facility: HOSPITAL | Age: 40
End: 2025-05-15
Payer: MEDICAID

## 2025-05-15 ENCOUNTER — TELEMEDICINE (OUTPATIENT)
Dept: FAMILY MEDICINE CLINIC | Facility: CLINIC | Age: 40
End: 2025-05-15
Payer: MEDICAID

## 2025-05-15 DIAGNOSIS — F17.200 SMOKER: ICD-10-CM

## 2025-05-15 DIAGNOSIS — K21.9 GASTROESOPHAGEAL REFLUX DISEASE WITHOUT ESOPHAGITIS: ICD-10-CM

## 2025-05-15 DIAGNOSIS — G47.33 OSA (OBSTRUCTIVE SLEEP APNEA): ICD-10-CM

## 2025-05-15 DIAGNOSIS — J45.41 MODERATE PERSISTENT ASTHMA WITH ACUTE EXACERBATION: ICD-10-CM

## 2025-05-15 DIAGNOSIS — Z87.442 HISTORY OF NEPHROLITHIASIS: ICD-10-CM

## 2025-05-15 DIAGNOSIS — R80.9 TYPE 2 DIABETES MELLITUS WITH MICROALBUMINURIA, WITHOUT LONG-TERM CURRENT USE OF INSULIN: ICD-10-CM

## 2025-05-15 DIAGNOSIS — E78.2 MIXED HYPERLIPIDEMIA: Primary | ICD-10-CM

## 2025-05-15 DIAGNOSIS — E11.29 TYPE 2 DIABETES MELLITUS WITH MICROALBUMINURIA, WITHOUT LONG-TERM CURRENT USE OF INSULIN: ICD-10-CM

## 2025-05-15 DIAGNOSIS — R19.7 DIARRHEA, UNSPECIFIED TYPE: ICD-10-CM

## 2025-05-15 DIAGNOSIS — I10 ESSENTIAL HYPERTENSION: ICD-10-CM

## 2025-05-15 DIAGNOSIS — Z00.00 HEALTHCARE MAINTENANCE: ICD-10-CM

## 2025-05-15 DIAGNOSIS — E66.811 CLASS 1 OBESITY WITH SERIOUS COMORBIDITY AND BODY MASS INDEX (BMI) OF 32.0 TO 32.9 IN ADULT, UNSPECIFIED OBESITY TYPE: ICD-10-CM

## 2025-05-15 DIAGNOSIS — E78.2 MIXED HYPERLIPIDEMIA: ICD-10-CM

## 2025-05-15 PROBLEM — J45.40 MODERATE PERSISTENT ASTHMA WITHOUT COMPLICATION: Status: ACTIVE | Noted: 2024-09-06

## 2025-05-15 PROCEDURE — 82150 ASSAY OF AMYLASE: CPT

## 2025-05-15 PROCEDURE — 85025 COMPLETE CBC W/AUTO DIFF WBC: CPT

## 2025-05-15 PROCEDURE — 36415 COLL VENOUS BLD VENIPUNCTURE: CPT

## 2025-05-15 PROCEDURE — 83036 HEMOGLOBIN GLYCOSYLATED A1C: CPT

## 2025-05-15 PROCEDURE — 81003 URINALYSIS AUTO W/O SCOPE: CPT

## 2025-05-15 PROCEDURE — 82570 ASSAY OF URINE CREATININE: CPT

## 2025-05-15 PROCEDURE — 80061 LIPID PANEL: CPT

## 2025-05-15 PROCEDURE — 80053 COMPREHEN METABOLIC PANEL: CPT

## 2025-05-15 PROCEDURE — 83690 ASSAY OF LIPASE: CPT

## 2025-05-15 PROCEDURE — 82043 UR ALBUMIN QUANTITATIVE: CPT

## 2025-05-15 NOTE — PROGRESS NOTES
Subjective   Jessica Harris is a 39 y.o. female.     You have chosen to receive care through a telehealth visit.  Do you consent to use a video/audio connection for your medical care today? Yes    Patient was at her home and I was in the office.    Chief Complaint  Nausea vomiting and diarrhea    History of Present Illness     Nausea, Vomiting, and Diarrhea  She woke with the symptoms 2 days ago.  She admits to intermittent cramping prior to episodes of diarrhea and has also experienced transient sharp left lower quadrant pain.  She ran a fever initially.  She denies any hematemesis, hematochezia, or melena.  There is no history of any dysuria or hematuria.  She denies any rash or chills.  She feels her symptoms are similar to those she experienced in the past with pancreatitis.  She has not had any antibiotics since her course of amoxicillin nearly 2 months ago.  She denies any suspicious food and no one else in the house has been sick.    GERD  She continues to have occasional heartburn.  She underwent an EGD on 6/4/2024 with evidence of a small hiatal hernia and mild erythema of the gastric mucosa around the antrum.  Biopsies revealed reactive esophageal mucosa and evidence of mild chronic gastritis.    Obstructive Sleep Apnea  This was confirmed on a sleep study performed on 1/6/2025.  She continues on AutoPap and is tolerating it well with a significant improvement in her sleep quality and daytime energy levels.    Asthma  She feels that her cough, shortness of breath, and wheezing remain at baseline.  She continues to deny any chest pain or hemoptysis.  Chest x-ray performed on 8/30/2024 was unremarkable. She remains on inhaled fluticasone-salmeterol and is currently using her rescue inhaler once or twice weekly.  She continues to smoke.     Type 2 Diabetes Mellitus  She continues to deny any paresthesia of the feet, visual disturbances, polydipsia, polyuria, or foot ulcerations.  She has been following her  diet and excise plan, and remains on dapagliflozin.  She was unable to tolerate metformin or semaglutide.  She has not been using her CGM as of late but agrees to resume    Hyperlipidemia  She remains on atorvastatin along with OTC fish oil capsules. She experienced GI upset with vascepa    Essential Hypertension  She continues to deny any lower extremity edema, and there is no history of any chest pain or recent palpitations.  She remains on propranolol     The following portions of the patient's history were reviewed and updated as appropriate: allergies, current medications, past medical history, past social history, and problem list.    Review of Systems   Constitutional:  Positive for fatigue. Negative for chills and fever.   HENT:  Negative for congestion, ear pain, postnasal drip, rhinorrhea, sinus pressure, sneezing, sore throat and voice change.    Eyes:  Negative for visual disturbance.   Respiratory:  Positive for cough, shortness of breath and wheezing.    Cardiovascular:  Negative for chest pain, palpitations and leg swelling.   Gastrointestinal:  Positive for abdominal pain (cramping), diarrhea, nausea, vomiting and GERD. Negative for abdominal distention, blood in stool and constipation.   Endocrine: Negative for polydipsia and polyphagia.   Genitourinary:  Negative for dysuria and hematuria.   Musculoskeletal:  Positive for arthralgias and myalgias. Negative for back pain and joint swelling.   Skin:  Negative for rash.   Neurological:  Positive for headache. Negative for dizziness, weakness and numbness.   Psychiatric/Behavioral:  Negative for sleep disturbance and depressed mood. The patient is nervous/anxious.      Objective   Physical Exam  Constitutional:       Comments: Bright and in good spirits. No apparent distress. No pallor, jaundice, diaphoresis, or cyanosis   Pulmonary:      Comments: No cough or audible wheezing  Skin:     Coloration: Skin is not cyanotic, jaundiced or pale.    Neurological:      Mental Status: She is alert and oriented to person, place, and time.      Cranial Nerves: No cranial nerve deficit or dysarthria.   Psychiatric:         Attention and Perception: Attention normal.         Mood and Affect: Mood normal.         Speech: Speech normal.         Behavior: Behavior normal.         Thought Content: Thought content normal.       Assessment & Plan   Problems Addressed this Visit          Cardiac and Vasculature    Essential hypertension  Encouraged to continue to work on her diet and exercise plan.  Continue current medication    Relevant Orders    CBC & Differential    Comprehensive Metabolic Panel    Mixed hyperlipidemia  As above.   Continue current medication.    Relevant Orders    Comprehensive Metabolic Panel    Lipid Panel       Endocrine and Metabolic    Class 1 obesity with serious comorbidity and body mass index (BMI) of 32.0 to 32.9 in adult    Type 2 diabetes mellitus with microalbuminuria, without long-term current use of insulin  As above.   Continue current medication.  Encouraged to resume use of her CGM  Hemoglobin A1c will be updated with labs ordered today    Relevant Orders    Comprehensive Metabolic Panel    Hemoglobin A1c    Urinalysis With Culture If Indicated -    Microalbumin / Creatinine Urine Ratio - Urine, Clean Catch       Gastrointestinal Abdominal     Diarrhea  Advised regarding symptomatic treatment  Patient will have labs drawn this evening.  She will be notified of the results and any further action to be taken  Encouraged to seek immediate assessment if any worse or if any new symptoms or concerns in the meantime    Relevant Orders    CBC & Differential    Comprehensive Metabolic Panel    Amylase    Lipase    Urinalysis With Culture If Indicated -    Microalbumin / Creatinine Urine Ratio - Urine, Clean Catch    Gastroesophageal reflux disease without esophagitis  Continue current medication  Encouraged to report if any worse or if any  new symptoms or concerns.    Relevant Orders    CBC & Differential       Genitourinary and Reproductive     History of nephrolithiasis    Relevant Orders    Urinalysis With Culture If Indicated -       Health Encounters    Healthcare maintenance  We will discuss an updated COVID-19 shot again at her return       Pulmonary and Pneumonias    Moderate persistent asthma   Moderate persistent asthma. The patient is not currently having an exacerbation. In general, the patient's disease is fairly well controlled.  Encouraged to report if this should change.  Continue current medication    Relevant Orders    CBC & Differential       Sleep    ANNY (obstructive sleep apnea)    Relevant Orders    CBC & Differential       Tobacco    Smoker     Diagnoses         Codes Comments      Mixed hyperlipidemia    -  Primary ICD-10-CM: E78.2  ICD-9-CM: 272.2       Essential hypertension     ICD-10-CM: I10  ICD-9-CM: 401.9       Type 2 diabetes mellitus with microalbuminuria, without long-term current use of insulin     ICD-10-CM: E11.29, R80.9  ICD-9-CM: 250.40, 791.0       Class 1 obesity with serious comorbidity and body mass index (BMI) of 32.0 to 32.9 in adult, unspecified obesity type     ICD-10-CM: E66.811, Z68.32  ICD-9-CM: 278.00, V85.32       Gastroesophageal reflux disease without esophagitis     ICD-10-CM: K21.9  ICD-9-CM: 530.81       History of nephrolithiasis     ICD-10-CM: Z87.442  ICD-9-CM: V13.01       Healthcare maintenance     ICD-10-CM: Z00.00  ICD-9-CM: V70.0       Moderate persistent asthma with acute exacerbation     ICD-10-CM: J45.41  ICD-9-CM: 493.92       ANNY (obstructive sleep apnea)     ICD-10-CM: G47.33  ICD-9-CM: 327.23       Smoker     ICD-10-CM: F17.200  ICD-9-CM: 305.1       Diarrhea, unspecified type     ICD-10-CM: R19.7  ICD-9-CM: 787.91

## 2025-05-16 LAB
ALBUMIN SERPL-MCNC: 4.3 G/DL (ref 3.5–5.2)
ALBUMIN UR-MCNC: 1.6 MG/DL
ALBUMIN/GLOB SERPL: 1.4 G/DL
ALP SERPL-CCNC: 94 U/L (ref 39–117)
ALT SERPL W P-5'-P-CCNC: 24 U/L (ref 1–33)
AMYLASE SERPL-CCNC: 57 U/L (ref 28–100)
ANION GAP SERPL CALCULATED.3IONS-SCNC: 10.6 MMOL/L (ref 5–15)
AST SERPL-CCNC: 19 U/L (ref 1–32)
BASOPHILS # BLD AUTO: 0.04 10*3/MM3 (ref 0–0.2)
BASOPHILS NFR BLD AUTO: 0.3 % (ref 0–1.5)
BILIRUB SERPL-MCNC: 0.2 MG/DL (ref 0–1.2)
BILIRUB UR QL STRIP: NEGATIVE
BUN SERPL-MCNC: 8 MG/DL (ref 6–20)
BUN/CREAT SERPL: 11.9 (ref 7–25)
CALCIUM SPEC-SCNC: 9.3 MG/DL (ref 8.6–10.5)
CHLORIDE SERPL-SCNC: 104 MMOL/L (ref 98–107)
CHOLEST SERPL-MCNC: 110 MG/DL (ref 0–200)
CLARITY UR: CLEAR
CO2 SERPL-SCNC: 25.4 MMOL/L (ref 22–29)
COLOR UR: YELLOW
CREAT SERPL-MCNC: 0.67 MG/DL (ref 0.57–1)
CREAT UR-MCNC: 62.9 MG/DL
DEPRECATED RDW RBC AUTO: 46.2 FL (ref 37–54)
EGFRCR SERPLBLD CKD-EPI 2021: 115.6 ML/MIN/1.73
EOSINOPHIL # BLD AUTO: 0.32 10*3/MM3 (ref 0–0.4)
EOSINOPHIL NFR BLD AUTO: 2.7 % (ref 0.3–6.2)
ERYTHROCYTE [DISTWIDTH] IN BLOOD BY AUTOMATED COUNT: 15.1 % (ref 12.3–15.4)
GLOBULIN UR ELPH-MCNC: 3 GM/DL
GLUCOSE SERPL-MCNC: 98 MG/DL (ref 65–99)
GLUCOSE UR STRIP-MCNC: NEGATIVE MG/DL
HBA1C MFR BLD: 7.4 % (ref 4.8–5.6)
HCT VFR BLD AUTO: 52.2 % (ref 34–46.6)
HDLC SERPL-MCNC: 27 MG/DL (ref 40–60)
HGB BLD-MCNC: 16.6 G/DL (ref 12–15.9)
HGB UR QL STRIP.AUTO: NEGATIVE
HOLD SPECIMEN: NORMAL
IMM GRANULOCYTES # BLD AUTO: 0.11 10*3/MM3 (ref 0–0.05)
IMM GRANULOCYTES NFR BLD AUTO: 0.9 % (ref 0–0.5)
KETONES UR QL STRIP: NEGATIVE
LDLC SERPL CALC-MCNC: 43 MG/DL (ref 0–100)
LDLC/HDLC SERPL: 1.18 {RATIO}
LEUKOCYTE ESTERASE UR QL STRIP.AUTO: NEGATIVE
LIPASE SERPL-CCNC: 135 U/L (ref 13–60)
LYMPHOCYTES # BLD AUTO: 3.29 10*3/MM3 (ref 0.7–3.1)
LYMPHOCYTES NFR BLD AUTO: 27.5 % (ref 19.6–45.3)
MCH RBC QN AUTO: 27.6 PG (ref 26.6–33)
MCHC RBC AUTO-ENTMCNC: 31.8 G/DL (ref 31.5–35.7)
MCV RBC AUTO: 86.9 FL (ref 79–97)
MICROALBUMIN/CREAT UR: 25.4 MG/G (ref 0–29)
MONOCYTES # BLD AUTO: 0.83 10*3/MM3 (ref 0.1–0.9)
MONOCYTES NFR BLD AUTO: 6.9 % (ref 5–12)
NEUTROPHILS NFR BLD AUTO: 61.7 % (ref 42.7–76)
NEUTROPHILS NFR BLD AUTO: 7.36 10*3/MM3 (ref 1.7–7)
NITRITE UR QL STRIP: NEGATIVE
NRBC BLD AUTO-RTO: 0 /100 WBC (ref 0–0.2)
PH UR STRIP.AUTO: 6.5 [PH] (ref 5–8)
PLATELET # BLD AUTO: 309 10*3/MM3 (ref 140–450)
PMV BLD AUTO: 9.2 FL (ref 6–12)
POTASSIUM SERPL-SCNC: 4.3 MMOL/L (ref 3.5–5.2)
PROT SERPL-MCNC: 7.3 G/DL (ref 6–8.5)
PROT UR QL STRIP: NEGATIVE
RBC # BLD AUTO: 6.01 10*6/MM3 (ref 3.77–5.28)
SODIUM SERPL-SCNC: 140 MMOL/L (ref 136–145)
SP GR UR STRIP: 1.01 (ref 1–1.03)
TRIGL SERPL-MCNC: 256 MG/DL (ref 0–150)
UROBILINOGEN UR QL STRIP: NORMAL
VLDLC SERPL-MCNC: 40 MG/DL (ref 5–40)
WBC NRBC COR # BLD AUTO: 11.95 10*3/MM3 (ref 3.4–10.8)

## 2025-05-17 ENCOUNTER — APPOINTMENT (OUTPATIENT)
Dept: CT IMAGING | Facility: HOSPITAL | Age: 40
End: 2025-05-17
Payer: MEDICAID

## 2025-05-17 VITALS
HEIGHT: 65 IN | SYSTOLIC BLOOD PRESSURE: 144 MMHG | DIASTOLIC BLOOD PRESSURE: 91 MMHG | WEIGHT: 215 LBS | HEART RATE: 90 BPM | RESPIRATION RATE: 17 BRPM | BODY MASS INDEX: 35.82 KG/M2 | TEMPERATURE: 98.5 F | OXYGEN SATURATION: 92 %

## 2025-05-17 LAB
ALBUMIN SERPL-MCNC: 4.3 G/DL (ref 3.5–5.2)
ALBUMIN/GLOB SERPL: 1.2 G/DL
ALP SERPL-CCNC: 92 U/L (ref 39–117)
ALT SERPL W P-5'-P-CCNC: 21 U/L (ref 1–33)
AMPHET+METHAMPHET UR QL: NEGATIVE
AMPHETAMINES UR QL: NEGATIVE
AMYLASE SERPL-CCNC: 47 U/L (ref 28–100)
ANION GAP SERPL CALCULATED.3IONS-SCNC: 9.2 MMOL/L (ref 5–15)
AST SERPL-CCNC: 16 U/L (ref 1–32)
B PARAPERT DNA SPEC QL NAA+PROBE: NOT DETECTED
B PERT DNA SPEC QL NAA+PROBE: NOT DETECTED
B-HCG UR QL: NEGATIVE
BARBITURATES UR QL SCN: NEGATIVE
BASOPHILS # BLD AUTO: 0.08 10*3/MM3 (ref 0–0.2)
BASOPHILS NFR BLD AUTO: 0.7 % (ref 0–1.5)
BENZODIAZ UR QL SCN: NEGATIVE
BILIRUB SERPL-MCNC: 0.2 MG/DL (ref 0–1.2)
BILIRUB UR QL STRIP: NEGATIVE
BUN SERPL-MCNC: 8 MG/DL (ref 6–20)
BUN/CREAT SERPL: 10.8 (ref 7–25)
BUPRENORPHINE SERPL-MCNC: NEGATIVE NG/ML
C PNEUM DNA NPH QL NAA+NON-PROBE: NOT DETECTED
CALCIUM SPEC-SCNC: 9.6 MG/DL (ref 8.6–10.5)
CANNABINOIDS SERPL QL: POSITIVE
CHLORIDE SERPL-SCNC: 104 MMOL/L (ref 98–107)
CLARITY UR: CLEAR
CO2 SERPL-SCNC: 28.8 MMOL/L (ref 22–29)
COCAINE UR QL: NEGATIVE
COLOR UR: YELLOW
CREAT SERPL-MCNC: 0.74 MG/DL (ref 0.57–1)
CRP SERPL-MCNC: 0.33 MG/DL (ref 0–0.5)
D-LACTATE SERPL-SCNC: 1.2 MMOL/L (ref 0.5–2)
DEPRECATED RDW RBC AUTO: 47.4 FL (ref 37–54)
EGFRCR SERPLBLD CKD-EPI 2021: 107 ML/MIN/1.73
EOSINOPHIL # BLD AUTO: 0.39 10*3/MM3 (ref 0–0.4)
EOSINOPHIL NFR BLD AUTO: 3.2 % (ref 0.3–6.2)
ERYTHROCYTE [DISTWIDTH] IN BLOOD BY AUTOMATED COUNT: 15.2 % (ref 12.3–15.4)
ERYTHROCYTE [SEDIMENTATION RATE] IN BLOOD: 11 MM/HR (ref 0–20)
FENTANYL UR-MCNC: NEGATIVE NG/ML
FLUAV SUBTYP SPEC NAA+PROBE: NOT DETECTED
FLUBV RNA ISLT QL NAA+PROBE: NOT DETECTED
GLOBULIN UR ELPH-MCNC: 3.5 GM/DL
GLUCOSE SERPL-MCNC: 99 MG/DL (ref 65–99)
GLUCOSE UR STRIP-MCNC: ABNORMAL MG/DL
HADV DNA SPEC NAA+PROBE: NOT DETECTED
HCOV 229E RNA SPEC QL NAA+PROBE: NOT DETECTED
HCOV HKU1 RNA SPEC QL NAA+PROBE: NOT DETECTED
HCOV NL63 RNA SPEC QL NAA+PROBE: NOT DETECTED
HCOV OC43 RNA SPEC QL NAA+PROBE: NOT DETECTED
HCT VFR BLD AUTO: 53.8 % (ref 34–46.6)
HGB BLD-MCNC: 16.8 G/DL (ref 12–15.9)
HGB UR QL STRIP.AUTO: NEGATIVE
HMPV RNA NPH QL NAA+NON-PROBE: NOT DETECTED
HOLD SPECIMEN: NORMAL
HOLD SPECIMEN: NORMAL
HPIV1 RNA ISLT QL NAA+PROBE: NOT DETECTED
HPIV2 RNA SPEC QL NAA+PROBE: NOT DETECTED
HPIV3 RNA NPH QL NAA+PROBE: NOT DETECTED
HPIV4 P GENE NPH QL NAA+PROBE: NOT DETECTED
IMM GRANULOCYTES # BLD AUTO: 0.08 10*3/MM3 (ref 0–0.05)
IMM GRANULOCYTES NFR BLD AUTO: 0.7 % (ref 0–0.5)
KETONES UR QL STRIP: NEGATIVE
LEUKOCYTE ESTERASE UR QL STRIP.AUTO: NEGATIVE
LIPASE SERPL-CCNC: 31 U/L (ref 13–60)
LYMPHOCYTES # BLD AUTO: 2.71 10*3/MM3 (ref 0.7–3.1)
LYMPHOCYTES NFR BLD AUTO: 22.1 % (ref 19.6–45.3)
M PNEUMO IGG SER IA-ACNC: NOT DETECTED
MCH RBC QN AUTO: 27.1 PG (ref 26.6–33)
MCHC RBC AUTO-ENTMCNC: 31.2 G/DL (ref 31.5–35.7)
MCV RBC AUTO: 86.9 FL (ref 79–97)
METHADONE UR QL SCN: NEGATIVE
MONOCYTES # BLD AUTO: 0.95 10*3/MM3 (ref 0.1–0.9)
MONOCYTES NFR BLD AUTO: 7.7 % (ref 5–12)
NEUTROPHILS NFR BLD AUTO: 65.6 % (ref 42.7–76)
NEUTROPHILS NFR BLD AUTO: 8.06 10*3/MM3 (ref 1.7–7)
NITRITE UR QL STRIP: NEGATIVE
NRBC BLD AUTO-RTO: 0 /100 WBC (ref 0–0.2)
OPIATES UR QL: NEGATIVE
OXYCODONE UR QL SCN: NEGATIVE
PCP UR QL SCN: NEGATIVE
PH UR STRIP.AUTO: 6 [PH] (ref 5–8)
PLATELET # BLD AUTO: 305 10*3/MM3 (ref 140–450)
PMV BLD AUTO: 8.7 FL (ref 6–12)
POTASSIUM SERPL-SCNC: 4.5 MMOL/L (ref 3.5–5.2)
PROT SERPL-MCNC: 7.8 G/DL (ref 6–8.5)
PROT UR QL STRIP: NEGATIVE
RBC # BLD AUTO: 6.19 10*6/MM3 (ref 3.77–5.28)
RHINOVIRUS RNA SPEC NAA+PROBE: NOT DETECTED
RSV RNA NPH QL NAA+NON-PROBE: NOT DETECTED
SARS-COV-2 RNA RESP QL NAA+PROBE: NOT DETECTED
SODIUM SERPL-SCNC: 142 MMOL/L (ref 136–145)
SP GR UR STRIP: 1.01 (ref 1–1.03)
TRICYCLICS UR QL SCN: NEGATIVE
UROBILINOGEN UR QL STRIP: ABNORMAL
WBC NRBC COR # BLD AUTO: 12.27 10*3/MM3 (ref 3.4–10.8)
WHOLE BLOOD HOLD COAG: NORMAL
WHOLE BLOOD HOLD SPECIMEN: NORMAL

## 2025-05-17 PROCEDURE — 83690 ASSAY OF LIPASE: CPT

## 2025-05-17 PROCEDURE — 85025 COMPLETE CBC W/AUTO DIFF WBC: CPT

## 2025-05-17 PROCEDURE — 36415 COLL VENOUS BLD VENIPUNCTURE: CPT

## 2025-05-17 PROCEDURE — 80053 COMPREHEN METABOLIC PANEL: CPT

## 2025-05-17 PROCEDURE — 81003 URINALYSIS AUTO W/O SCOPE: CPT

## 2025-05-17 PROCEDURE — 82150 ASSAY OF AMYLASE: CPT

## 2025-05-17 PROCEDURE — 83605 ASSAY OF LACTIC ACID: CPT

## 2025-05-17 PROCEDURE — 74176 CT ABD & PELVIS W/O CONTRAST: CPT

## 2025-05-17 PROCEDURE — 99284 EMERGENCY DEPT VISIT MOD MDM: CPT

## 2025-05-17 PROCEDURE — 0202U NFCT DS 22 TRGT SARS-COV-2: CPT

## 2025-05-17 PROCEDURE — 74176 CT ABD & PELVIS W/O CONTRAST: CPT | Performed by: RADIOLOGY

## 2025-05-17 PROCEDURE — 80307 DRUG TEST PRSMV CHEM ANLYZR: CPT

## 2025-05-17 PROCEDURE — 86140 C-REACTIVE PROTEIN: CPT

## 2025-05-17 PROCEDURE — 85652 RBC SED RATE AUTOMATED: CPT

## 2025-05-17 PROCEDURE — 81025 URINE PREGNANCY TEST: CPT | Performed by: PHYSICIAN ASSISTANT

## 2025-05-18 ENCOUNTER — HOSPITAL ENCOUNTER (EMERGENCY)
Facility: HOSPITAL | Age: 40
Discharge: HOME | End: 2025-05-18
Payer: MEDICAID

## 2025-05-18 DIAGNOSIS — R10.84 GENERALIZED ABDOMINAL PAIN: Primary | ICD-10-CM

## 2025-05-18 DIAGNOSIS — K59.00 CONSTIPATION, UNSPECIFIED CONSTIPATION TYPE: ICD-10-CM

## 2025-05-18 NOTE — ED PROVIDER NOTES
Subjective   History of Present Illness  39-year-old female presents to ED with abdominal pain, nausea, vomiting and diarrhea for 2 weeks.  Patient does have a history of pancreatitis.  Other pertinent medical history includes type 2 diabetes, hypertension, GERD, depression, bipolar, asthma, hyperlipidemia, and anxiety.  Patient states over the past 2 weeks she has had on and off abdominal pain, along with nausea, vomiting and diarrhea. Her PCP was concerned she may have pancreatitis again. Patient started Trulicity about 2 weeks ago as well. Denies bloody stools, fever, fatigue or any other related symptoms.         Review of Systems   Constitutional: Negative.  Negative for fever.   HENT: Negative.     Respiratory: Negative.     Cardiovascular: Negative.  Negative for chest pain.   Gastrointestinal:  Positive for abdominal pain, diarrhea, nausea and vomiting.   Endocrine: Negative.    Genitourinary: Negative.  Negative for dysuria.   Skin: Negative.    Neurological: Negative.    Psychiatric/Behavioral: Negative.     All other systems reviewed and are negative.      Past Medical History:   Diagnosis Date    Anxiety     Asthma     Bipolar 1 disorder     Depression     Diabetes mellitus     Elevated cholesterol     GERD (gastroesophageal reflux disease)     History of bronchitis     History of ear infections     History of stomach ulcers     History of streptococcal infection     Hyperlipidemia     Hypertension     Insomnia     Kidney stones     ANNY (obstructive sleep apnea) 3/13/2019    Urinary tract infection        No Known Allergies    Past Surgical History:   Procedure Laterality Date    ADENOIDECTOMY      CHOLECYSTECTOMY      D & C HYSTEROSCOPY N/A 05/16/2022    Procedure: DILATATION AND CURETTAGE HYSTEROSCOPY;  Surgeon: Joey Andersen DO;  Location: [facility];  Service: Obstetrics/Gynecology;  Laterality: N/A;    DENTAL PROCEDURE      ENDOSCOPY      ENDOSCOPY N/A 6/4/2024    Procedure:  "ESOPHAGOGASTRODUODENOSCOPY WITH BIOPSY;  Surgeon: Matilde Moura MD;  Location: [facility] OR;  Service: Gastroenterology;  Laterality: N/A;    HERNIA REPAIR      umbilical    OVARIAN CYST DRAINAGE/EXCISION Left 05/16/2022    Procedure: OVARIAN CYSTECTOMY WITH EXTENSIVE LYSIS OF ADHESIONS;  Surgeon: Joey Andersen DO;  Location: [facility] OR;  Service: Obstetrics/Gynecology;  Laterality: Left;    TONSILLECTOMY      TOTAL LAPAROSCOPIC HYSTERECTOMY N/A 6/22/2022    Procedure: ROBOTIC TOTAL LAPAROSCOPIC HYSTERECTOMY WITH BILATERAL SALPINGECTOMY;  Surgeon: Joey Andersen DO;  Location: [facility] OR;  Service: Robotics - DaVinci;  Laterality: N/A;    UPPER GASTROINTESTINAL ENDOSCOPY      WISDOM TOOTH EXTRACTION         Family History   Problem Relation Age of Onset    Cancer Mother     Stroke Mother     Lung disease Mother     No Known Problems Father     Cancer Other     Heart disease Other     Stroke Other        Social History     Socioeconomic History    Marital status: Single   Tobacco Use    Smoking status: Every Day     Current packs/day: 0.50     Types: Cigarettes     Passive exposure: Current    Smokeless tobacco: Never    Tobacco comments:     \"thinking about it\"   Vaping Use    Vaping status: Never Used   Substance and Sexual Activity    Alcohol use: No    Drug use: No    Sexual activity: Defer     Birth control/protection: Pill           Objective   Physical Exam  Vitals and nursing note reviewed.   Constitutional:       General: She is not in acute distress.     Appearance: She is well-developed. She is not diaphoretic.   HENT:      Head: Normocephalic and atraumatic.      Right Ear: External ear normal.      Left Ear: External ear normal.      Nose: Nose normal.   Eyes:      Conjunctiva/sclera: Conjunctivae normal.      Pupils: Pupils are equal, round, and reactive to light.   Neck:      Vascular: No JVD.      Trachea: No tracheal deviation.   Cardiovascular:      Rate and Rhythm: Normal " rate and regular rhythm.      Heart sounds: Normal heart sounds. No murmur heard.  Pulmonary:      Effort: Pulmonary effort is normal. No respiratory distress.      Breath sounds: Normal breath sounds. No wheezing.   Abdominal:      General: Bowel sounds are normal.      Palpations: Abdomen is soft.      Tenderness: There is no abdominal tenderness.   Musculoskeletal:         General: No deformity. Normal range of motion.      Cervical back: Normal range of motion and neck supple.   Skin:     General: Skin is warm and dry.      Coloration: Skin is not pale.      Findings: No erythema or rash.   Neurological:      Mental Status: She is alert and oriented to person, place, and time.      Cranial Nerves: No cranial nerve deficit.   Psychiatric:         Behavior: Behavior normal.         Thought Content: Thought content normal.         Procedures           ED Course                         Results for orders placed or performed during the hospital encounter of 05/18/25   Comprehensive Metabolic Panel    Collection Time: 05/17/25 10:17 PM    Specimen: Arm, Right; Blood   Result Value Ref Range    Glucose 99 65 - 99 mg/dL    BUN 8 6 - 20 mg/dL    Creatinine 0.74 0.57 - 1.00 mg/dL    Sodium 142 136 - 145 mmol/L    Potassium 4.5 3.5 - 5.2 mmol/L    Chloride 104 98 - 107 mmol/L    CO2 28.8 22.0 - 29.0 mmol/L    Calcium 9.6 8.6 - 10.5 mg/dL    Total Protein 7.8 6.0 - 8.5 g/dL    Albumin 4.3 3.5 - 5.2 g/dL    ALT (SGPT) 21 1 - 33 U/L    AST (SGOT) 16 1 - 32 U/L    Alkaline Phosphatase 92 39 - 117 U/L    Total Bilirubin 0.2 0.0 - 1.2 mg/dL    Globulin 3.5 gm/dL    A/G Ratio 1.2 g/dL    BUN/Creatinine Ratio 10.8 7.0 - 25.0    Anion Gap 9.2 5.0 - 15.0 mmol/L    eGFR 107.0 >60.0 mL/min/1.73   Lipase    Collection Time: 05/17/25 10:17 PM    Specimen: Arm, Right; Blood   Result Value Ref Range    Lipase 31 13 - 60 U/L   C-reactive Protein    Collection Time: 05/17/25 10:17 PM    Specimen: Arm, Right; Blood   Result Value Ref Range     C-Reactive Protein 0.33 0.00 - 0.50 mg/dL   Sedimentation Rate    Collection Time: 05/17/25 10:17 PM    Specimen: Arm, Right; Blood   Result Value Ref Range    Sed Rate 11 0 - 20 mm/hr   Lactic Acid, Plasma    Collection Time: 05/17/25 10:17 PM    Specimen: Arm, Right; Blood   Result Value Ref Range    Lactate 1.2 0.5 - 2.0 mmol/L   Amylase    Collection Time: 05/17/25 10:17 PM    Specimen: Arm, Right; Blood   Result Value Ref Range    Amylase 47 28 - 100 U/L   CBC Auto Differential    Collection Time: 05/17/25 10:17 PM    Specimen: Arm, Right; Blood   Result Value Ref Range    WBC 12.27 (H) 3.40 - 10.80 10*3/mm3    RBC 6.19 (H) 3.77 - 5.28 10*6/mm3    Hemoglobin 16.8 (H) 12.0 - 15.9 g/dL    Hematocrit 53.8 (H) 34.0 - 46.6 %    MCV 86.9 79.0 - 97.0 fL    MCH 27.1 26.6 - 33.0 pg    MCHC 31.2 (L) 31.5 - 35.7 g/dL    RDW 15.2 12.3 - 15.4 %    RDW-SD 47.4 37.0 - 54.0 fl    MPV 8.7 6.0 - 12.0 fL    Platelets 305 140 - 450 10*3/mm3    Neutrophil % 65.6 42.7 - 76.0 %    Lymphocyte % 22.1 19.6 - 45.3 %    Monocyte % 7.7 5.0 - 12.0 %    Eosinophil % 3.2 0.3 - 6.2 %    Basophil % 0.7 0.0 - 1.5 %    Immature Grans % 0.7 (H) 0.0 - 0.5 %    Neutrophils, Absolute 8.06 (H) 1.70 - 7.00 10*3/mm3    Lymphocytes, Absolute 2.71 0.70 - 3.10 10*3/mm3    Monocytes, Absolute 0.95 (H) 0.10 - 0.90 10*3/mm3    Eosinophils, Absolute 0.39 0.00 - 0.40 10*3/mm3    Basophils, Absolute 0.08 0.00 - 0.20 10*3/mm3    Immature Grans, Absolute 0.08 (H) 0.00 - 0.05 10*3/mm3    nRBC 0.0 0.0 - 0.2 /100 WBC   Green Top (Gel)    Collection Time: 05/17/25 10:17 PM   Result Value Ref Range    Extra Tube Hold for add-ons.    Lavender Top    Collection Time: 05/17/25 10:17 PM   Result Value Ref Range    Extra Tube hold for add-on    Gold Top - SST    Collection Time: 05/17/25 10:17 PM   Result Value Ref Range    Extra Tube Hold for add-ons.    Light Blue Top    Collection Time: 05/17/25 10:17 PM   Result Value Ref Range    Extra Tube Hold for add-ons.     Respiratory Panel PCR w/COVID-19(SARS-CoV-2) [facility]/LUCIE/PAD/COR/LILY In-House, NP Swab in UTM/VTM, 2 HR TAT - Swab, Nasopharynx    Collection Time: 05/17/25 10:18 PM    Specimen: Nasopharynx; Swab   Result Value Ref Range    ADENOVIRUS, PCR Not Detected Not Detected    Coronavirus 229E Not Detected Not Detected    Coronavirus HKU1 Not Detected Not Detected    Coronavirus NL63 Not Detected Not Detected    Coronavirus OC43 Not Detected Not Detected    COVID19 Not Detected Not Detected - Ref. Range    Human Metapneumovirus Not Detected Not Detected    Human Rhinovirus/Enterovirus Not Detected Not Detected    Influenza A PCR Not Detected Not Detected    Influenza B PCR Not Detected Not Detected    Parainfluenza Virus 1 Not Detected Not Detected    Parainfluenza Virus 2 Not Detected Not Detected    Parainfluenza Virus 3 Not Detected Not Detected    Parainfluenza Virus 4 Not Detected Not Detected    RSV, PCR Not Detected Not Detected    Bordetella pertussis pcr Not Detected Not Detected    Bordetella parapertussis PCR Not Detected Not Detected    Chlamydophila pneumoniae PCR Not Detected Not Detected    Mycoplasma pneumo by PCR Not Detected Not Detected   Urinalysis With Microscopic If Indicated (No Culture) - Urine, Clean Catch    Collection Time: 05/17/25 10:20 PM    Specimen: Urine, Clean Catch   Result Value Ref Range    Color, UA Yellow Yellow, Straw    Appearance, UA Clear Clear    pH, UA 6.0 5.0 - 8.0    Specific Gravity, UA 1.013 1.005 - 1.030    Glucose,  mg/dL (Trace) (A) Negative    Ketones, UA Negative Negative    Bilirubin, UA Negative Negative    Blood, UA Negative Negative    Protein, UA Negative Negative    Leuk Esterase, UA Negative Negative    Nitrite, UA Negative Negative    Urobilinogen, UA 1.0 E.U./dL 0.2 - 1.0 E.U./dL   Urine Drug Screen - Urine, Clean Catch    Collection Time: 05/17/25 10:20 PM    Specimen: Urine, Clean Catch   Result Value Ref Range    THC, Screen, Urine Positive (A)  Negative    Phencyclidine (PCP), Urine Negative Negative    Cocaine Screen, Urine Negative Negative    Methamphetamine, Ur Negative Negative    Opiate Screen Negative Negative    Amphetamine Screen, Urine Negative Negative    Benzodiazepine Screen, Urine Negative Negative    Tricyclic Antidepressants Screen Negative Negative    Methadone Screen, Urine Negative Negative    Barbiturates Screen, Urine Negative Negative    Oxycodone Screen, Urine Negative Negative    Buprenorphine, Screen, Urine Negative Negative   Fentanyl, Urine - Urine, Clean Catch    Collection Time: 05/17/25 10:20 PM    Specimen: Urine, Clean Catch   Result Value Ref Range    Fentanyl, Urine Negative Negative   Pregnancy, Urine - Urine, Clean Catch    Collection Time: 05/17/25 10:20 PM    Specimen: Urine, Clean Catch   Result Value Ref Range    HCG, Urine QL Negative Negative     CT Abdomen Pelvis Without Contrast  Result Date: 5/18/2025   Cholecystectomy. Small umbilical hernia contains only fat. No herniated bowel segment. Small follicular cyst in the right and left ovary. The uterus is not identified. Recommend correlation with surgical history. Mild constipation involving the right colon. Normal appendix is well seen. No free fluid or free air. No abscess or hematoma.  This report was finalized on 5/18/2025 1:11 AM by Marcos Diaz MD.                                      Medical Decision Making  39-year-old female presents to ED with abdominal pain, nausea, vomiting and diarrhea for 2 weeks.  Patient does have a history of pancreatitis.  Other pertinent medical history includes type 2 diabetes, hypertension, GERD, depression, bipolar, asthma, hyperlipidemia, and anxiety.  Patient states over the past 2 weeks she has had on and off abdominal pain, along with nausea, vomiting and diarrhea. Her PCP was concerned she may have pancreatitis again. Patient started Trulicity about 2 weeks ago as well. Denies bloody stools, fever, fatigue or any other  related symptoms.     Advised patient it is more than likely her Trulicity giving her GI upset. CT abdomen was WNL except for mild constipation, labs were unremarkable. Needs to discuss with PCP to possibly switch medication. Continue stool softeners. Follow up with PCP next week if symptoms worsen.     Problems Addressed:  Constipation, unspecified constipation type: complicated acute illness or injury  Generalized abdominal pain: complicated acute illness or injury    Amount and/or Complexity of Data Reviewed  Labs: ordered.  Radiology: ordered.        Final diagnoses:   Generalized abdominal pain   Constipation, unspecified constipation type       ED Disposition  ED Disposition       ED Disposition   Discharge    Condition   Stable    Comment   --               Jamie Santos MD  [address] [facility] [address] [phone]    In 3 days  If symptoms worsen         Medication List      No changes were made to your prescriptions during this visit.            Sigrid Thompson [email or web address]  05/18/25 0213

## 2025-05-18 NOTE — ED NOTES
MEDICAL SCREENING:    Reason for Visit: nausea    Patient initially seen in triage.  The patient was advised further evaluation and diagnostic testing will be needed, some of the treatment and testing will be initiated in the lobby in order to begin the process.  The patient will be returned to the waiting area for the time being and possibly be re-assessed by a subsequent ED provider.  The patient will be brought back to the treatment area in as timely manner as possible.     July Milligan, APRN  05/17/25 2131

## 2025-05-20 DIAGNOSIS — E11.29 TYPE 2 DIABETES MELLITUS WITH MICROALBUMINURIA, WITHOUT LONG-TERM CURRENT USE OF INSULIN: Primary | ICD-10-CM

## 2025-05-20 DIAGNOSIS — R80.9 TYPE 2 DIABETES MELLITUS WITH MICROALBUMINURIA, WITHOUT LONG-TERM CURRENT USE OF INSULIN: Primary | ICD-10-CM

## 2025-05-20 RX ORDER — EMPAGLIFLOZIN AND LINAGLIPTIN 25; 5 MG/1; MG/1
1 TABLET, FILM COATED ORAL EVERY MORNING
Qty: 30 TABLET | Refills: 5 | Status: SHIPPED | OUTPATIENT
Start: 2025-05-20

## 2025-05-21 ENCOUNTER — TELEPHONE (OUTPATIENT)
Dept: FAMILY MEDICINE CLINIC | Facility: CLINIC | Age: 40
End: 2025-05-21
Payer: MEDICAID

## 2025-05-21 NOTE — TELEPHONE ENCOUNTER
Pt is aware of this information.       ----- Message from Jamie Santos sent at 5/21/2025  2:05 PM EDT -----  Cool-please let her know that prescription is at her pharmacy  ----- Message -----  From: Jeanette Cabral MA  Sent: 5/21/2025   1:57 PM EDT  To: Jamie Santos MD    Its been approved.  ----- Message -----  From: Jamie Santos MD  Sent: 5/20/2025  11:54 AM EDT  To: Jeanette Cabral MA    Needs pa on glyxambiDx - type 2 diabetesInadequately controlled on farxia (would take its place)Intolerant to metformin and GLP agonists

## 2025-07-18 DIAGNOSIS — E11.29 TYPE 2 DIABETES MELLITUS WITH MICROALBUMINURIA, WITHOUT LONG-TERM CURRENT USE OF INSULIN: Primary | ICD-10-CM

## 2025-07-18 DIAGNOSIS — R80.9 TYPE 2 DIABETES MELLITUS WITH MICROALBUMINURIA, WITHOUT LONG-TERM CURRENT USE OF INSULIN: Primary | ICD-10-CM

## 2025-08-04 ENCOUNTER — APPOINTMENT (OUTPATIENT)
Dept: GENERAL RADIOLOGY | Facility: HOSPITAL | Age: 40
End: 2025-08-04
Payer: MEDICAID

## 2025-08-04 ENCOUNTER — HOSPITAL ENCOUNTER (EMERGENCY)
Facility: HOSPITAL | Age: 40
Discharge: HOME | End: 2025-08-05
Payer: MEDICAID

## 2025-08-04 DIAGNOSIS — R19.7 DIARRHEA, UNSPECIFIED TYPE: ICD-10-CM

## 2025-08-04 DIAGNOSIS — E13.65 UNCONTROLLED OTHER SPECIFIED DIABETES MELLITUS WITH HYPERGLYCEMIA: Primary | ICD-10-CM

## 2025-08-04 LAB
ALBUMIN SERPL-MCNC: 4.7 G/DL (ref 3.5–5.2)
ALBUMIN/GLOB SERPL: 1.6 G/DL
ALP SERPL-CCNC: 93 U/L (ref 39–117)
ALT SERPL W P-5'-P-CCNC: 33 U/L (ref 1–33)
AMPHET+METHAMPHET UR QL: NEGATIVE
AMPHETAMINES UR QL: NEGATIVE
ANION GAP SERPL CALCULATED.3IONS-SCNC: 12.2 MMOL/L (ref 5–15)
AST SERPL-CCNC: 24 U/L (ref 1–32)
B PARAPERT DNA SPEC QL NAA+PROBE: NOT DETECTED
B PERT DNA SPEC QL NAA+PROBE: NOT DETECTED
BARBITURATES UR QL SCN: NEGATIVE
BASOPHILS # BLD AUTO: 0.08 10*3/MM3 (ref 0–0.2)
BASOPHILS NFR BLD AUTO: 0.7 % (ref 0–1.5)
BENZODIAZ UR QL SCN: NEGATIVE
BILIRUB SERPL-MCNC: 0.2 MG/DL (ref 0–1.2)
BILIRUB UR QL STRIP: NEGATIVE
BUN SERPL-MCNC: 11.8 MG/DL (ref 6–20)
BUN/CREAT SERPL: 20 (ref 7–25)
BUPRENORPHINE SERPL-MCNC: NEGATIVE NG/ML
C PNEUM DNA NPH QL NAA+NON-PROBE: NOT DETECTED
CALCIUM SPEC-SCNC: 9.8 MG/DL (ref 8.6–10.5)
CANNABINOIDS SERPL QL: POSITIVE
CHLORIDE SERPL-SCNC: 100 MMOL/L (ref 98–107)
CK SERPL-CCNC: 63 U/L (ref 20–180)
CLARITY UR: CLEAR
CO2 SERPL-SCNC: 26.8 MMOL/L (ref 22–29)
COCAINE UR QL: NEGATIVE
COLOR UR: YELLOW
CREAT SERPL-MCNC: 0.59 MG/DL (ref 0.57–1)
CRP SERPL-MCNC: 1.05 MG/DL (ref 0–0.5)
DEPRECATED RDW RBC AUTO: 45.9 FL (ref 37–54)
EGFRCR SERPLBLD CKD-EPI 2021: 119.2 ML/MIN/1.73
EOSINOPHIL # BLD AUTO: 0.48 10*3/MM3 (ref 0–0.4)
EOSINOPHIL NFR BLD AUTO: 4 % (ref 0.3–6.2)
ERYTHROCYTE [DISTWIDTH] IN BLOOD BY AUTOMATED COUNT: 14.3 % (ref 12.3–15.4)
ETHANOL BLD-MCNC: <10 MG/DL (ref 0–10)
ETHANOL UR QL: <0.01 %
FENTANYL UR-MCNC: NEGATIVE NG/ML
FLUAV SUBTYP SPEC NAA+PROBE: NOT DETECTED
FLUBV RNA NPH QL NAA+NON-PROBE: NOT DETECTED
GLOBULIN UR ELPH-MCNC: 2.9 GM/DL
GLUCOSE BLDC GLUCOMTR-MCNC: 120 MG/DL (ref 70–130)
GLUCOSE SERPL-MCNC: 104 MG/DL (ref 65–99)
GLUCOSE UR STRIP-MCNC: ABNORMAL MG/DL
HADV DNA SPEC NAA+PROBE: NOT DETECTED
HBA1C MFR BLD: 7.19 % (ref 4.8–5.6)
HCOV 229E RNA SPEC QL NAA+PROBE: NOT DETECTED
HCOV HKU1 RNA SPEC QL NAA+PROBE: NOT DETECTED
HCOV NL63 RNA SPEC QL NAA+PROBE: NOT DETECTED
HCOV OC43 RNA SPEC QL NAA+PROBE: NOT DETECTED
HCT VFR BLD AUTO: 54.4 % (ref 34–46.6)
HGB BLD-MCNC: 17.4 G/DL (ref 12–15.9)
HGB UR QL STRIP.AUTO: NEGATIVE
HMPV RNA NPH QL NAA+NON-PROBE: NOT DETECTED
HOLD SPECIMEN: NORMAL
HOLD SPECIMEN: NORMAL
HPIV1 RNA ISLT QL NAA+PROBE: NOT DETECTED
HPIV2 RNA SPEC QL NAA+PROBE: NOT DETECTED
HPIV3 RNA NPH QL NAA+PROBE: NOT DETECTED
HPIV4 P GENE NPH QL NAA+PROBE: NOT DETECTED
IMM GRANULOCYTES # BLD AUTO: 0.09 10*3/MM3 (ref 0–0.05)
IMM GRANULOCYTES NFR BLD AUTO: 0.8 % (ref 0–0.5)
KETONES UR QL STRIP: NEGATIVE
LEUKOCYTE ESTERASE UR QL STRIP.AUTO: NEGATIVE
LYMPHOCYTES # BLD AUTO: 3.44 10*3/MM3 (ref 0.7–3.1)
LYMPHOCYTES NFR BLD AUTO: 29 % (ref 19.6–45.3)
M PNEUMO IGG SER IA-ACNC: NOT DETECTED
MAGNESIUM SERPL-MCNC: 2.1 MG/DL (ref 1.6–2.6)
MCH RBC QN AUTO: 28.1 PG (ref 26.6–33)
MCHC RBC AUTO-ENTMCNC: 32 G/DL (ref 31.5–35.7)
MCV RBC AUTO: 87.7 FL (ref 79–97)
METHADONE UR QL SCN: NEGATIVE
MONOCYTES # BLD AUTO: 0.9 10*3/MM3 (ref 0.1–0.9)
MONOCYTES NFR BLD AUTO: 7.6 % (ref 5–12)
NEUTROPHILS NFR BLD AUTO: 57.9 % (ref 42.7–76)
NEUTROPHILS NFR BLD AUTO: 6.87 10*3/MM3 (ref 1.7–7)
NITRITE UR QL STRIP: NEGATIVE
NRBC BLD AUTO-RTO: 0 /100 WBC (ref 0–0.2)
OPIATES UR QL: NEGATIVE
OXYCODONE UR QL SCN: NEGATIVE
PCP UR QL SCN: NEGATIVE
PH UR STRIP.AUTO: 7 [PH] (ref 5–8)
PLATELET # BLD AUTO: 340 10*3/MM3 (ref 140–450)
PMV BLD AUTO: 8.9 FL (ref 6–12)
POTASSIUM SERPL-SCNC: 4.3 MMOL/L (ref 3.5–5.2)
PROT SERPL-MCNC: 7.6 G/DL (ref 6–8.5)
PROT UR QL STRIP: NEGATIVE
RBC # BLD AUTO: 6.2 10*6/MM3 (ref 3.77–5.28)
RHINOVIRUS RNA SPEC NAA+PROBE: NOT DETECTED
RSV RNA NPH QL NAA+NON-PROBE: NOT DETECTED
SARS-COV-2 RNA RESP QL NAA+PROBE: NOT DETECTED
SODIUM SERPL-SCNC: 139 MMOL/L (ref 136–145)
SP GR UR STRIP: 1.02 (ref 1–1.03)
TRICYCLICS UR QL SCN: NEGATIVE
TROPONIN T SERPL HS-MCNC: <6 NG/L
TSH SERPL DL<=0.05 MIU/L-ACNC: 2.53 UIU/ML (ref 0.27–4.2)
UROBILINOGEN UR QL STRIP: ABNORMAL
WBC NRBC COR # BLD AUTO: 11.86 10*3/MM3 (ref 3.4–10.8)
WHOLE BLOOD HOLD COAG: NORMAL
WHOLE BLOOD HOLD SPECIMEN: NORMAL

## 2025-08-04 PROCEDURE — 0202U NFCT DS 22 TRGT SARS-COV-2: CPT | Performed by: EMERGENCY MEDICINE

## 2025-08-04 PROCEDURE — 84484 ASSAY OF TROPONIN QUANT: CPT

## 2025-08-04 PROCEDURE — 93005 ELECTROCARDIOGRAM TRACING: CPT

## 2025-08-04 PROCEDURE — 83735 ASSAY OF MAGNESIUM: CPT

## 2025-08-04 PROCEDURE — 83036 HEMOGLOBIN GLYCOSYLATED A1C: CPT | Performed by: EMERGENCY MEDICINE

## 2025-08-04 PROCEDURE — 85025 COMPLETE CBC W/AUTO DIFF WBC: CPT

## 2025-08-04 PROCEDURE — 36415 COLL VENOUS BLD VENIPUNCTURE: CPT

## 2025-08-04 PROCEDURE — 84443 ASSAY THYROID STIM HORMONE: CPT | Performed by: EMERGENCY MEDICINE

## 2025-08-04 PROCEDURE — 82077 ASSAY SPEC XCP UR&BREATH IA: CPT | Performed by: EMERGENCY MEDICINE

## 2025-08-04 PROCEDURE — 80307 DRUG TEST PRSMV CHEM ANLYZR: CPT | Performed by: EMERGENCY MEDICINE

## 2025-08-04 PROCEDURE — 82948 REAGENT STRIP/BLOOD GLUCOSE: CPT

## 2025-08-04 PROCEDURE — 86140 C-REACTIVE PROTEIN: CPT | Performed by: EMERGENCY MEDICINE

## 2025-08-04 PROCEDURE — 99284 EMERGENCY DEPT VISIT MOD MDM: CPT

## 2025-08-04 PROCEDURE — 71045 X-RAY EXAM CHEST 1 VIEW: CPT

## 2025-08-04 PROCEDURE — 82550 ASSAY OF CK (CPK): CPT | Performed by: EMERGENCY MEDICINE

## 2025-08-04 PROCEDURE — 81003 URINALYSIS AUTO W/O SCOPE: CPT

## 2025-08-04 PROCEDURE — 80053 COMPREHEN METABOLIC PANEL: CPT

## 2025-08-04 RX ORDER — SODIUM CHLORIDE 0.9 % (FLUSH) 0.9 %
10 SYRINGE (ML) INJECTION AS NEEDED
Status: DISCONTINUED | OUTPATIENT
Start: 2025-08-04 | End: 2025-08-05 | Stop reason: HOSPADM

## 2025-08-05 VITALS
RESPIRATION RATE: 18 BRPM | HEART RATE: 88 BPM | OXYGEN SATURATION: 99 % | WEIGHT: 219 LBS | TEMPERATURE: 98.1 F | DIASTOLIC BLOOD PRESSURE: 96 MMHG | SYSTOLIC BLOOD PRESSURE: 148 MMHG | BODY MASS INDEX: 36.49 KG/M2 | HEIGHT: 65 IN

## 2025-08-05 LAB
GEN 5 1HR TROPONIN T REFLEX: <6 NG/L
GLUCOSE BLDC GLUCOMTR-MCNC: 111 MG/DL (ref 70–130)
QT INTERVAL: 358 MS
QTC INTERVAL: 442 MS
TROPONIN T NUMERIC DELTA: NORMAL

## 2025-08-05 PROCEDURE — 82948 REAGENT STRIP/BLOOD GLUCOSE: CPT

## 2025-08-05 PROCEDURE — 71045 X-RAY EXAM CHEST 1 VIEW: CPT | Performed by: RADIOLOGY

## 2025-08-22 ENCOUNTER — OFFICE VISIT (OUTPATIENT)
Dept: FAMILY MEDICINE CLINIC | Facility: CLINIC | Age: 40
End: 2025-08-22
Payer: MEDICAID

## 2025-08-22 DIAGNOSIS — E66.812 CLASS 2 SEVERE OBESITY WITH SERIOUS COMORBIDITY AND BODY MASS INDEX (BMI) OF 36.0 TO 36.9 IN ADULT, UNSPECIFIED OBESITY TYPE: ICD-10-CM

## 2025-08-22 DIAGNOSIS — Z87.442 HISTORY OF NEPHROLITHIASIS: ICD-10-CM

## 2025-08-22 DIAGNOSIS — F17.200 SMOKER: ICD-10-CM

## 2025-08-22 DIAGNOSIS — G62.9 NEUROPATHY: ICD-10-CM

## 2025-08-22 DIAGNOSIS — G47.33 OSA (OBSTRUCTIVE SLEEP APNEA): ICD-10-CM

## 2025-08-22 DIAGNOSIS — Z00.00 HEALTHCARE MAINTENANCE: ICD-10-CM

## 2025-08-22 DIAGNOSIS — E66.01 CLASS 2 SEVERE OBESITY WITH SERIOUS COMORBIDITY AND BODY MASS INDEX (BMI) OF 36.0 TO 36.9 IN ADULT, UNSPECIFIED OBESITY TYPE: ICD-10-CM

## 2025-08-22 DIAGNOSIS — K21.9 GASTROESOPHAGEAL REFLUX DISEASE WITHOUT ESOPHAGITIS: ICD-10-CM

## 2025-08-22 DIAGNOSIS — E11.29 TYPE 2 DIABETES MELLITUS WITH MICROALBUMINURIA, WITHOUT LONG-TERM CURRENT USE OF INSULIN: ICD-10-CM

## 2025-08-22 DIAGNOSIS — E55.9 VITAMIN D DEFICIENCY: ICD-10-CM

## 2025-08-22 DIAGNOSIS — F31.77 BIPOLAR DISORDER, IN PARTIAL REMISSION, MOST RECENT EPISODE MIXED: ICD-10-CM

## 2025-08-22 DIAGNOSIS — I10 ESSENTIAL HYPERTENSION: ICD-10-CM

## 2025-08-22 DIAGNOSIS — R80.9 TYPE 2 DIABETES MELLITUS WITH MICROALBUMINURIA, WITHOUT LONG-TERM CURRENT USE OF INSULIN: ICD-10-CM

## 2025-08-22 DIAGNOSIS — I87.2 CHRONIC VENOUS INSUFFICIENCY: ICD-10-CM

## 2025-08-22 DIAGNOSIS — E78.2 MIXED HYPERLIPIDEMIA: Primary | ICD-10-CM

## 2025-08-22 DIAGNOSIS — J45.40 MODERATE PERSISTENT ASTHMA WITHOUT COMPLICATION: ICD-10-CM

## 2025-08-22 RX ORDER — ATORVASTATIN CALCIUM 20 MG/1
20 TABLET, FILM COATED ORAL NIGHTLY
Qty: 30 TABLET | Refills: 5 | Status: SHIPPED | OUTPATIENT
Start: 2025-08-22

## 2025-08-22 RX ORDER — METFORMIN HYDROCHLORIDE 500 MG/1
TABLET, EXTENDED RELEASE ORAL
Qty: 60 TABLET | Refills: 5 | Status: SHIPPED | OUTPATIENT
Start: 2025-08-22

## 2025-08-22 RX ORDER — DAPAGLIFLOZIN 5 MG/1
5 TABLET, FILM COATED ORAL EVERY MORNING
Qty: 30 TABLET | Refills: 5 | Status: SHIPPED | OUTPATIENT
Start: 2025-08-22

## 2025-08-23 VITALS
HEIGHT: 65 IN | BODY MASS INDEX: 36.82 KG/M2 | HEART RATE: 90 BPM | RESPIRATION RATE: 14 BRPM | SYSTOLIC BLOOD PRESSURE: 118 MMHG | TEMPERATURE: 98.1 F | DIASTOLIC BLOOD PRESSURE: 62 MMHG | OXYGEN SATURATION: 92 % | WEIGHT: 221 LBS

## 2025-08-23 PROBLEM — R19.7 DIARRHEA: Status: RESOLVED | Noted: 2025-05-15 | Resolved: 2025-08-23

## 2025-08-23 RX ORDER — ALBUTEROL SULFATE 0.83 MG/ML
2.5 SOLUTION RESPIRATORY (INHALATION) EVERY 4 HOURS PRN
Qty: 360 ML | Refills: 5 | Status: SHIPPED | OUTPATIENT
Start: 2025-08-23

## 2025-08-23 RX ORDER — ESCITALOPRAM OXALATE 10 MG/1
TABLET ORAL
COMMUNITY
Start: 2025-08-19

## 2025-08-23 RX ORDER — FLUTICASONE PROPIONATE AND SALMETEROL XINAFOATE 230; 21 UG/1; UG/1
2 AEROSOL, METERED RESPIRATORY (INHALATION)
Qty: 12 G | Refills: 5 | Status: SHIPPED | OUTPATIENT
Start: 2025-08-23

## 2025-08-23 RX ORDER — ALBUTEROL SULFATE 90 UG/1
2 INHALANT RESPIRATORY (INHALATION) EVERY 6 HOURS PRN
Qty: 18 G | Refills: 5 | Status: SHIPPED | OUTPATIENT
Start: 2025-08-23

## 2025-08-23 RX ORDER — CHOLECALCIFEROL (VITAMIN D3) 25 MCG
1000 TABLET ORAL DAILY
Qty: 30 TABLET | Refills: 5 | Status: SHIPPED | OUTPATIENT
Start: 2025-08-23

## 2025-08-23 RX ORDER — ACYCLOVIR 400 MG/1
1 TABLET ORAL
Qty: 3 EACH | Refills: 5 | Status: SHIPPED | OUTPATIENT
Start: 2025-08-23

## 2025-08-23 RX ORDER — ATOMOXETINE 40 MG/1
CAPSULE ORAL
COMMUNITY
Start: 2025-08-19

## (undated) DEVICE — ST TBG PNEUMOCLEAR EVAC SMOKE HIFLO

## (undated) DEVICE — COR GYN LAPAROSCOPY: Brand: MEDLINE INDUSTRIES, INC.

## (undated) DEVICE — FRCP BX RADJAW4 NDL 2.8 240CM LG OG BX40

## (undated) DEVICE — UNDYED BRAIDED (POLYGLACTIN 910), SYNTHETIC ABSORBABLE SUTURE: Brand: COATED VICRYL

## (undated) DEVICE — DRAPE,UTILTY,TAPE,15X26, 4EA/PK: Brand: MEDLINE

## (undated) DEVICE — 40595 XL TRENDELENBURG POSITIONING KIT: Brand: 40595 XL TRENDELENBURG POSITIONING KIT

## (undated) DEVICE — Device

## (undated) DEVICE — APPL CHLORAPREP HI/LITE 26ML ORNG

## (undated) DEVICE — COR MINOR LITHOTOMY: Brand: MEDLINE INDUSTRIES, INC.

## (undated) DEVICE — ENSEAL X1 TISSUE SEALER, CURVED JAW, 37 CM SHAFT LENGTH: Brand: ENSEAL

## (undated) DEVICE — ADHS SKIN PREMIERPRO EXOFIN TOPICAL HI/VISC .5ML

## (undated) DEVICE — MANIP UTER ADVINCULA DELINEATOR W/ 4CM ULTEM PLSTC CUP

## (undated) DEVICE — Device: Brand: DEFENDO AIR/WATER/SUCTION AND BIOPSY VALVE

## (undated) DEVICE — ENDOPATH XCEL UNIVERSAL TROCAR STABLILITY SLEEVES: Brand: ENDOPATH XCEL

## (undated) DEVICE — ANTIBACTERIAL VIOLET BRAIDED (POLYGLACTIN 910), SYNTHETIC ABSORBABLE SURGICAL SUTURE: Brand: COATED VICRYL

## (undated) DEVICE — GLV SURG PREMIERPRO MIC LTX PF SZ8 BRN

## (undated) DEVICE — ENDOPATH XCEL BLADELESS TROCARS WITH STABILITY SLEEVES: Brand: ENDOPATH XCEL

## (undated) DEVICE — MONOPOLAR METZENBAUM SCISSOR TIP, DISPOSABLE: Brand: MONOPOLAR METZENBAUM SCISSOR TIP, DISPOSABLE

## (undated) DEVICE — PATIENT RETURN ELECTRODE, SINGLE-USE, CONTACT QUALITY MONITORING, ADULT, WITH 9FT CORD, FOR PATIENTS WEIGING OVER 33LBS. (15KG): Brand: MEGADYNE

## (undated) DEVICE — SYS CLOSE PORTII CARTR/THOMASN XL

## (undated) DEVICE — TISSUE RETRIEVAL SYSTEM: Brand: INZII RETRIEVAL SYSTEM

## (undated) DEVICE — THE BITE BLOCK MAXI, LATEX FREE STRAP IS USED TO PROTECT THE ENDOSCOPE INSERTION TUBE FROM BEING BITTEN BY THE PATIENT.

## (undated) DEVICE — CYSTO/BLADDER IRRIGATION SET, REGULATING CLAMP

## (undated) DEVICE — SUT MNCRYL 4/0 PS2 18 IN

## (undated) DEVICE — GOWN IMPERV 2XL BLU CA/50

## (undated) DEVICE — PK DAVINCI 70

## (undated) DEVICE — 2, DISPOSABLE SUCTION/IRRIGATOR WITH DISPOSABLE TIP: Brand: STRYKEFLOW

## (undated) DEVICE — ENDOPATH ELECTROSURGERY PROBE PLUS II 5MM RIGHT ANGLE MONOPOLAR ELECTROSURGERY SUCTION AND IRRRIGATION SHAFTS WITH RIGHT ANGLE ELECTRODE - USE ONLY WITH PROBE PLUS II HANDLES: Brand: ENDOPATH

## (undated) DEVICE — COVER,MAYO STAND,STERILE: Brand: MEDLINE

## (undated) DEVICE — GOWN,REINF,POLY,ECL,PP SLV,XXL: Brand: MEDLINE

## (undated) DEVICE — ENDOPATH ELECTROSURGERY PROBE PLUS II PISTOL HAND CONTROL PISTOL GRIP HANDLES WITH SUCTION AND IRRRIGATION FOR HAND CONTROL MONOPOLAR ELCTROSURGERY USE ONLY WITH PROBE PLUS II SHAFTS: Brand: ENDOPATH